# Patient Record
Sex: MALE | Race: WHITE | NOT HISPANIC OR LATINO | Employment: UNEMPLOYED | URBAN - METROPOLITAN AREA
[De-identification: names, ages, dates, MRNs, and addresses within clinical notes are randomized per-mention and may not be internally consistent; named-entity substitution may affect disease eponyms.]

---

## 2017-09-30 ENCOUNTER — HOSPITAL ENCOUNTER (OUTPATIENT)
Dept: RADIOLOGY | Facility: MEDICAL CENTER | Age: 57
End: 2017-09-30

## 2017-09-30 ENCOUNTER — APPOINTMENT (OUTPATIENT)
Dept: RADIOLOGY | Facility: MEDICAL CENTER | Age: 57
DRG: 166 | End: 2017-09-30
Attending: EMERGENCY MEDICINE
Payer: COMMERCIAL

## 2017-09-30 ENCOUNTER — RESOLUTE PROFESSIONAL BILLING HOSPITAL PROF FEE (OUTPATIENT)
Dept: MEDSURG UNIT | Facility: MEDICAL CENTER | Age: 57
End: 2017-09-30
Payer: MEDICAID

## 2017-09-30 ENCOUNTER — HOSPITAL ENCOUNTER (INPATIENT)
Facility: MEDICAL CENTER | Age: 57
LOS: 48 days | DRG: 166 | End: 2017-11-17
Attending: EMERGENCY MEDICINE | Admitting: INTERNAL MEDICINE
Payer: COMMERCIAL

## 2017-09-30 DIAGNOSIS — C34.91 SMALL CELL CARCINOMA OF RIGHT LUNG (HCC): ICD-10-CM

## 2017-09-30 DIAGNOSIS — E87.1 HYPONATREMIA: ICD-10-CM

## 2017-09-30 DIAGNOSIS — R91.8 LUNG MASS: ICD-10-CM

## 2017-09-30 DIAGNOSIS — C34.92 SMALL CELL CARCINOMA OF LEFT LUNG (HCC): ICD-10-CM
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
## 2017-09-30 PROBLEM — F10.20 ALCOHOLIC (HCC): Chronic | Status: ACTIVE | Noted: 2017-09-30

## 2017-09-30 LAB
25(OH)D3 SERPL-MCNC: 29 NG/ML (ref 30–100)
ALBUMIN SERPL BCP-MCNC: 3.4 G/DL (ref 3.2–4.9)
ALBUMIN/GLOB SERPL: 1.2 G/DL
ALP SERPL-CCNC: 84 U/L (ref 30–99)
ALT SERPL-CCNC: 18 U/L (ref 2–50)
AMPHET UR QL SCN: NEGATIVE
ANION GAP SERPL CALC-SCNC: 10 MMOL/L (ref 0–11.9)
ANION GAP SERPL CALC-SCNC: 13 MMOL/L (ref 0–11.9)
AST SERPL-CCNC: 47 U/L (ref 12–45)
BARBITURATES UR QL SCN: NEGATIVE
BASOPHILS # BLD AUTO: 0.4 % (ref 0–1.8)
BASOPHILS # BLD: 0.04 K/UL (ref 0–0.12)
BENZODIAZ UR QL SCN: NEGATIVE
BILIRUB SERPL-MCNC: 0.8 MG/DL (ref 0.1–1.5)
BUN SERPL-MCNC: 3 MG/DL (ref 8–22)
BUN SERPL-MCNC: 3 MG/DL (ref 8–22)
BZE UR QL SCN: NEGATIVE
CALCIUM SERPL-MCNC: 8.4 MG/DL (ref 8.5–10.5)
CALCIUM SERPL-MCNC: 8.4 MG/DL (ref 8.5–10.5)
CANNABINOIDS UR QL SCN: NEGATIVE
CHLORIDE SERPL-SCNC: 80 MMOL/L (ref 96–112)
CHLORIDE SERPL-SCNC: 82 MMOL/L (ref 96–112)
CO2 SERPL-SCNC: 18 MMOL/L (ref 20–33)
CO2 SERPL-SCNC: 18 MMOL/L (ref 20–33)
CORTIS SERPL-MCNC: 14.5 UG/DL (ref 0–23)
CREAT SERPL-MCNC: 0.23 MG/DL (ref 0.5–1.4)
CREAT SERPL-MCNC: 0.34 MG/DL (ref 0.5–1.4)
CREAT UR-MCNC: 23.1 MG/DL
EOSINOPHIL # BLD AUTO: 0.08 K/UL (ref 0–0.51)
EOSINOPHIL NFR BLD: 0.8 % (ref 0–6.9)
ERYTHROCYTE [DISTWIDTH] IN BLOOD BY AUTOMATED COUNT: 37.2 FL (ref 35.9–50)
ETHANOL BLD-MCNC: 0.01 G/DL
GFR SERPL CREATININE-BSD FRML MDRD: >60 ML/MIN/1.73 M 2
GFR SERPL CREATININE-BSD FRML MDRD: >60 ML/MIN/1.73 M 2
GLOBULIN SER CALC-MCNC: 2.9 G/DL (ref 1.9–3.5)
GLUCOSE SERPL-MCNC: 68 MG/DL (ref 65–99)
GLUCOSE SERPL-MCNC: 76 MG/DL (ref 65–99)
HCT VFR BLD AUTO: 33.1 % (ref 42–52)
HGB BLD-MCNC: 12.3 G/DL (ref 14–18)
IMM GRANULOCYTES # BLD AUTO: 0.06 K/UL (ref 0–0.11)
IMM GRANULOCYTES NFR BLD AUTO: 0.6 % (ref 0–0.9)
LYMPHOCYTES # BLD AUTO: 0.82 K/UL (ref 1–4.8)
LYMPHOCYTES NFR BLD: 8.5 % (ref 22–41)
MCH RBC QN AUTO: 33.8 PG (ref 27–33)
MCHC RBC AUTO-ENTMCNC: 37.2 G/DL (ref 33.7–35.3)
MCV RBC AUTO: 90.9 FL (ref 81.4–97.8)
METHADONE UR QL SCN: NEGATIVE
MONOCYTES # BLD AUTO: 1.3 K/UL (ref 0–0.85)
MONOCYTES NFR BLD AUTO: 13.5 % (ref 0–13.4)
NEUTROPHILS # BLD AUTO: 7.36 K/UL (ref 1.82–7.42)
NEUTROPHILS NFR BLD: 76.2 % (ref 44–72)
NRBC # BLD AUTO: 0 K/UL
NRBC BLD AUTO-RTO: 0 /100 WBC
OPIATES UR QL SCN: NEGATIVE
OSMOLALITY SERPL: 229 MOSM/KG H2O (ref 278–298)
OSMOLALITY UR: 173 MOSM/KG H2O (ref 300–900)
OXYCODONE UR QL SCN: NEGATIVE
PCP UR QL SCN: NEGATIVE
PLATELET # BLD AUTO: 262 K/UL (ref 164–446)
PMV BLD AUTO: 9.4 FL (ref 9–12.9)
POTASSIUM SERPL-SCNC: 3.8 MMOL/L (ref 3.6–5.5)
POTASSIUM SERPL-SCNC: 5 MMOL/L (ref 3.6–5.5)
PROPOXYPH UR QL SCN: NEGATIVE
PROT SERPL-MCNC: 6.3 G/DL (ref 6–8.2)
RBC # BLD AUTO: 3.64 M/UL (ref 4.7–6.1)
SODIUM SERPL-SCNC: 108 MMOL/L (ref 135–145)
SODIUM SERPL-SCNC: 113 MMOL/L (ref 135–145)
SODIUM UR-SCNC: <10 MMOL/L
TSH SERPL DL<=0.005 MIU/L-ACNC: 1.61 UIU/ML (ref 0.3–3.7)
VIT B12 SERPL-MCNC: 452 PG/ML (ref 211–911)
WBC # BLD AUTO: 9.7 K/UL (ref 4.8–10.8)

## 2017-09-30 PROCEDURE — 84300 ASSAY OF URINE SODIUM: CPT

## 2017-09-30 PROCEDURE — 93005 ELECTROCARDIOGRAM TRACING: CPT | Performed by: EMERGENCY MEDICINE

## 2017-09-30 PROCEDURE — 83930 ASSAY OF BLOOD OSMOLALITY: CPT

## 2017-09-30 PROCEDURE — 85025 COMPLETE CBC W/AUTO DIFF WBC: CPT

## 2017-09-30 PROCEDURE — 71260 CT THORAX DX C+: CPT

## 2017-09-30 PROCEDURE — 83935 ASSAY OF URINE OSMOLALITY: CPT

## 2017-09-30 PROCEDURE — 80048 BASIC METABOLIC PNL TOTAL CA: CPT

## 2017-09-30 PROCEDURE — 99291 CRITICAL CARE FIRST HOUR: CPT

## 2017-09-30 PROCEDURE — 82306 VITAMIN D 25 HYDROXY: CPT

## 2017-09-30 PROCEDURE — 80053 COMPREHEN METABOLIC PANEL: CPT

## 2017-09-30 PROCEDURE — 770022 HCHG ROOM/CARE - ICU (200)

## 2017-09-30 PROCEDURE — 84443 ASSAY THYROID STIM HORMONE: CPT

## 2017-09-30 PROCEDURE — 82533 TOTAL CORTISOL: CPT

## 2017-09-30 PROCEDURE — 700117 HCHG RX CONTRAST REV CODE 255: Performed by: EMERGENCY MEDICINE

## 2017-09-30 PROCEDURE — 700105 HCHG RX REV CODE 258: Performed by: INTERNAL MEDICINE

## 2017-09-30 PROCEDURE — 36415 COLL VENOUS BLD VENIPUNCTURE: CPT

## 2017-09-30 PROCEDURE — 80307 DRUG TEST PRSMV CHEM ANLYZR: CPT

## 2017-09-30 PROCEDURE — 82570 ASSAY OF URINE CREATININE: CPT

## 2017-09-30 PROCEDURE — 82607 VITAMIN B-12: CPT

## 2017-09-30 RX ORDER — POLYETHYLENE GLYCOL 3350 17 G/17G
1 POWDER, FOR SOLUTION ORAL
Status: DISCONTINUED | OUTPATIENT
Start: 2017-09-30 | End: 2017-10-20

## 2017-09-30 RX ORDER — ONDANSETRON 4 MG/1
4 TABLET, ORALLY DISINTEGRATING ORAL EVERY 4 HOURS PRN
Status: DISCONTINUED | OUTPATIENT
Start: 2017-09-30 | End: 2017-11-17 | Stop reason: HOSPADM

## 2017-09-30 RX ORDER — BISACODYL 10 MG
10 SUPPOSITORY, RECTAL RECTAL
Status: DISCONTINUED | OUTPATIENT
Start: 2017-09-30 | End: 2017-10-20

## 2017-09-30 RX ORDER — AMOXICILLIN 250 MG
2 CAPSULE ORAL 2 TIMES DAILY
Status: DISCONTINUED | OUTPATIENT
Start: 2017-09-30 | End: 2017-10-20

## 2017-09-30 RX ORDER — LABETALOL HYDROCHLORIDE 5 MG/ML
10 INJECTION, SOLUTION INTRAVENOUS EVERY 4 HOURS PRN
Status: DISCONTINUED | OUTPATIENT
Start: 2017-09-30 | End: 2017-10-30

## 2017-09-30 RX ORDER — PROMETHAZINE HYDROCHLORIDE 25 MG/1
12.5-25 TABLET ORAL EVERY 4 HOURS PRN
Status: DISCONTINUED | OUTPATIENT
Start: 2017-09-30 | End: 2017-11-17 | Stop reason: HOSPADM

## 2017-09-30 RX ORDER — SODIUM CHLORIDE 9 MG/ML
INJECTION, SOLUTION INTRAVENOUS CONTINUOUS
Status: DISCONTINUED | OUTPATIENT
Start: 2017-09-30 | End: 2017-10-01

## 2017-09-30 RX ORDER — PROMETHAZINE HYDROCHLORIDE 25 MG/1
12.5-25 SUPPOSITORY RECTAL EVERY 4 HOURS PRN
Status: DISCONTINUED | OUTPATIENT
Start: 2017-09-30 | End: 2017-11-17 | Stop reason: HOSPADM

## 2017-09-30 RX ORDER — ONDANSETRON 2 MG/ML
4 INJECTION INTRAMUSCULAR; INTRAVENOUS EVERY 4 HOURS PRN
Status: DISCONTINUED | OUTPATIENT
Start: 2017-09-30 | End: 2017-11-17 | Stop reason: HOSPADM

## 2017-09-30 RX ORDER — NICOTINE 21 MG/24HR
21 PATCH, TRANSDERMAL 24 HOURS TRANSDERMAL
Status: DISCONTINUED | OUTPATIENT
Start: 2017-10-01 | End: 2017-10-23

## 2017-09-30 RX ORDER — THIAMINE MONONITRATE (VIT B1) 100 MG
100 TABLET ORAL DAILY
Status: DISCONTINUED | OUTPATIENT
Start: 2017-10-01 | End: 2017-10-01

## 2017-09-30 RX ORDER — ACETAMINOPHEN 325 MG/1
650 TABLET ORAL EVERY 6 HOURS PRN
Status: DISCONTINUED | OUTPATIENT
Start: 2017-09-30 | End: 2017-11-17 | Stop reason: HOSPADM

## 2017-09-30 RX ORDER — FOLIC ACID 1 MG/1
1 TABLET ORAL DAILY
Status: DISCONTINUED | OUTPATIENT
Start: 2017-10-01 | End: 2017-10-01

## 2017-09-30 RX ORDER — GUAIFENESIN/DEXTROMETHORPHAN 100-10MG/5
10 SYRUP ORAL EVERY 6 HOURS PRN
Status: DISCONTINUED | OUTPATIENT
Start: 2017-09-30 | End: 2017-10-30

## 2017-09-30 RX ADMIN — IOHEXOL 75 ML: 350 INJECTION, SOLUTION INTRAVENOUS at 19:45

## 2017-09-30 RX ADMIN — SODIUM CHLORIDE: 9 INJECTION, SOLUTION INTRAVENOUS at 21:12

## 2017-09-30 ASSESSMENT — LIFESTYLE VARIABLES
TOTAL SCORE: 1
EVER HAD A DRINK FIRST THING IN THE MORNING TO STEADY YOUR NERVES TO GET RID OF A HANGOVER: NO
HAVE YOU EVER FELT YOU SHOULD CUT DOWN ON YOUR DRINKING: YES
AVERAGE NUMBER OF DAYS PER WEEK YOU HAVE A DRINK CONTAINING ALCOHOL: 2
ALCOHOL_USE: YES
EVER_SMOKED: YES
ON A TYPICAL DAY WHEN YOU DRINK ALCOHOL HOW MANY DRINKS DO YOU HAVE: 2
CONSUMPTION TOTAL: NEGATIVE
EVER FELT BAD OR GUILTY ABOUT YOUR DRINKING: NO
EVER_SMOKED: YES
HAVE PEOPLE ANNOYED YOU BY CRITICIZING YOUR DRINKING: NO
HOW MANY TIMES IN THE PAST YEAR HAVE YOU HAD 5 OR MORE DRINKS IN A DAY: 0

## 2017-09-30 ASSESSMENT — PATIENT HEALTH QUESTIONNAIRE - PHQ9
1. LITTLE INTEREST OR PLEASURE IN DOING THINGS: NOT AT ALL
SUM OF ALL RESPONSES TO PHQ9 QUESTIONS 1 AND 2: 0
SUM OF ALL RESPONSES TO PHQ QUESTIONS 1-9: 0
2. FEELING DOWN, DEPRESSED, IRRITABLE, OR HOPELESS: NOT AT ALL

## 2017-09-30 ASSESSMENT — ENCOUNTER SYMPTOMS
WHEEZING: 0
COUGH: 1
NECK PAIN: 0
TINGLING: 0
HEARTBURN: 0
ORTHOPNEA: 0
DIZZINESS: 0
MYALGIAS: 0
ABDOMINAL PAIN: 0
CHILLS: 0
PALPITATIONS: 0
WEIGHT LOSS: 1
DEPRESSION: 0
VOMITING: 0
DIARRHEA: 0
FEVER: 0
SPUTUM PRODUCTION: 0
CLAUDICATION: 0
DOUBLE VISION: 0
WEAKNESS: 1
NAUSEA: 0
SHORTNESS OF BREATH: 0
HEMOPTYSIS: 0
BLURRED VISION: 0
HEADACHES: 0

## 2017-09-30 ASSESSMENT — COPD QUESTIONNAIRES
COPD SCREENING SCORE: 3
HAVE YOU SMOKED AT LEAST 100 CIGARETTES IN YOUR ENTIRE LIFE: YES
DO YOU EVER COUGH UP ANY MUCUS OR PHLEGM?: NO/ONLY WITH OCCASIONAL COLDS OR INFECTIONS
DURING THE PAST 4 WEEKS HOW MUCH DID YOU FEEL SHORT OF BREATH: NONE/LITTLE OF THE TIME

## 2017-09-30 ASSESSMENT — PAIN SCALES - GENERAL: PAINLEVEL_OUTOF10: 0

## 2017-10-01 PROBLEM — F10.90 CHRONIC ALCOHOL USE: Status: ACTIVE | Noted: 2017-09-30

## 2017-10-01 PROBLEM — E87.8 DISORDER OF ELECTROLYTES: Status: ACTIVE | Noted: 2017-10-01

## 2017-10-01 LAB
25(OH)D3 SERPL-MCNC: 21 NG/ML (ref 30–100)
ANION GAP SERPL CALC-SCNC: 6 MMOL/L (ref 0–11.9)
ANION GAP SERPL CALC-SCNC: 7 MMOL/L (ref 0–11.9)
ANION GAP SERPL CALC-SCNC: 7 MMOL/L (ref 0–11.9)
ANION GAP SERPL CALC-SCNC: 9 MMOL/L (ref 0–11.9)
ANION GAP SERPL CALC-SCNC: 9 MMOL/L (ref 0–11.9)
APPEARANCE UR: CLEAR
BACTERIA #/AREA URNS HPF: ABNORMAL /HPF
BASOPHILS # BLD AUTO: 0.3 % (ref 0–1.8)
BASOPHILS # BLD: 0.03 K/UL (ref 0–0.12)
BILIRUB UR QL STRIP.AUTO: NEGATIVE
BUN SERPL-MCNC: 3 MG/DL (ref 8–22)
BUN SERPL-MCNC: 4 MG/DL (ref 8–22)
CALCIUM SERPL-MCNC: 7.5 MG/DL (ref 8.5–10.5)
CALCIUM SERPL-MCNC: 8.2 MG/DL (ref 8.5–10.5)
CALCIUM SERPL-MCNC: 8.4 MG/DL (ref 8.5–10.5)
CALCIUM SERPL-MCNC: 8.6 MG/DL (ref 8.5–10.5)
CALCIUM SERPL-MCNC: 8.8 MG/DL (ref 8.5–10.5)
CHLORIDE SERPL-SCNC: 81 MMOL/L (ref 96–112)
CHLORIDE SERPL-SCNC: 83 MMOL/L (ref 96–112)
CHLORIDE SERPL-SCNC: 84 MMOL/L (ref 96–112)
CHLORIDE SERPL-SCNC: 89 MMOL/L (ref 96–112)
CHLORIDE SERPL-SCNC: 90 MMOL/L (ref 96–112)
CO2 SERPL-SCNC: 18 MMOL/L (ref 20–33)
CO2 SERPL-SCNC: 20 MMOL/L (ref 20–33)
CO2 SERPL-SCNC: 23 MMOL/L (ref 20–33)
CO2 SERPL-SCNC: 25 MMOL/L (ref 20–33)
CO2 SERPL-SCNC: 25 MMOL/L (ref 20–33)
COLOR UR: YELLOW
CREAT SERPL-MCNC: 0.26 MG/DL (ref 0.5–1.4)
CREAT SERPL-MCNC: 0.29 MG/DL (ref 0.5–1.4)
CREAT SERPL-MCNC: 0.32 MG/DL (ref 0.5–1.4)
CREAT SERPL-MCNC: 0.41 MG/DL (ref 0.5–1.4)
CREAT SERPL-MCNC: 0.46 MG/DL (ref 0.5–1.4)
EOSINOPHIL # BLD AUTO: 0.04 K/UL (ref 0–0.51)
EOSINOPHIL NFR BLD: 0.4 % (ref 0–6.9)
ERYTHROCYTE [DISTWIDTH] IN BLOOD BY AUTOMATED COUNT: 38.5 FL (ref 35.9–50)
ETHANOL BLD-MCNC: 0.01 G/DL
FERRITIN SERPL-MCNC: 513.5 NG/ML (ref 22–322)
FOLATE SERPL-MCNC: 9.9 NG/ML
GFR SERPL CREATININE-BSD FRML MDRD: >60 ML/MIN/1.73 M 2
GLUCOSE SERPL-MCNC: 108 MG/DL (ref 65–99)
GLUCOSE SERPL-MCNC: 111 MG/DL (ref 65–99)
GLUCOSE SERPL-MCNC: 143 MG/DL (ref 65–99)
GLUCOSE SERPL-MCNC: 75 MG/DL (ref 65–99)
GLUCOSE SERPL-MCNC: 99 MG/DL (ref 65–99)
GLUCOSE UR STRIP.AUTO-MCNC: NEGATIVE MG/DL
HCT VFR BLD AUTO: 30.5 % (ref 42–52)
HGB BLD-MCNC: 11 G/DL (ref 14–18)
IMM GRANULOCYTES # BLD AUTO: 0.05 K/UL (ref 0–0.11)
IMM GRANULOCYTES NFR BLD AUTO: 0.6 % (ref 0–0.9)
IRON SATN MFR SERPL: 20 % (ref 15–55)
IRON SERPL-MCNC: 51 UG/DL (ref 50–180)
KETONES UR STRIP.AUTO-MCNC: 100 MG/DL
LEUKOCYTE ESTERASE UR QL STRIP.AUTO: NEGATIVE
LYMPHOCYTES # BLD AUTO: 0.65 K/UL (ref 1–4.8)
LYMPHOCYTES NFR BLD: 7.2 % (ref 22–41)
MCH RBC QN AUTO: 33.8 PG (ref 27–33)
MCHC RBC AUTO-ENTMCNC: 36.1 G/DL (ref 33.7–35.3)
MCV RBC AUTO: 93.8 FL (ref 81.4–97.8)
MICRO URNS: ABNORMAL
MONOCYTES # BLD AUTO: 0.79 K/UL (ref 0–0.85)
MONOCYTES NFR BLD AUTO: 8.7 % (ref 0–13.4)
NEUTROPHILS # BLD AUTO: 7.48 K/UL (ref 1.82–7.42)
NEUTROPHILS NFR BLD: 82.8 % (ref 44–72)
NITRITE UR QL STRIP.AUTO: NEGATIVE
NRBC # BLD AUTO: 0 K/UL
NRBC BLD AUTO-RTO: 0 /100 WBC
PH UR STRIP.AUTO: 7 [PH]
PHOSPHATE SERPL-MCNC: 3.3 MG/DL (ref 2.5–4.5)
PLATELET # BLD AUTO: 247 K/UL (ref 164–446)
PMV BLD AUTO: 9.5 FL (ref 9–12.9)
POTASSIUM SERPL-SCNC: 3.4 MMOL/L (ref 3.6–5.5)
POTASSIUM SERPL-SCNC: 3.5 MMOL/L (ref 3.6–5.5)
PROT UR QL STRIP: NEGATIVE MG/DL
RBC # BLD AUTO: 3.25 M/UL (ref 4.7–6.1)
RBC UR QL AUTO: NEGATIVE
SODIUM SERPL-SCNC: 113 MMOL/L (ref 135–145)
SODIUM SERPL-SCNC: 115 MMOL/L (ref 135–145)
SODIUM SERPL-SCNC: 116 MMOL/L (ref 135–145)
SP GR UR STRIP.AUTO: 1
TIBC SERPL-MCNC: 252 UG/DL (ref 250–450)
TSH SERPL DL<=0.005 MIU/L-ACNC: 2.33 UIU/ML (ref 0.3–3.7)
UROBILINOGEN UR STRIP.AUTO-MCNC: 2 MG/DL
VIT B12 SERPL-MCNC: 370 PG/ML (ref 211–911)
WBC # BLD AUTO: 9 K/UL (ref 4.8–10.8)
WBC #/AREA URNS HPF: ABNORMAL /HPF

## 2017-10-01 PROCEDURE — 700111 HCHG RX REV CODE 636 W/ 250 OVERRIDE (IP): Performed by: INTERNAL MEDICINE

## 2017-10-01 PROCEDURE — 84443 ASSAY THYROID STIM HORMONE: CPT

## 2017-10-01 PROCEDURE — 700101 HCHG RX REV CODE 250: Performed by: HOSPITALIST

## 2017-10-01 PROCEDURE — 83540 ASSAY OF IRON: CPT

## 2017-10-01 PROCEDURE — 82728 ASSAY OF FERRITIN: CPT

## 2017-10-01 PROCEDURE — 87181 SC STD AGAR DILUTION PER AGT: CPT

## 2017-10-01 PROCEDURE — 82607 VITAMIN B-12: CPT

## 2017-10-01 PROCEDURE — 700105 HCHG RX REV CODE 258: Performed by: INTERNAL MEDICINE

## 2017-10-01 PROCEDURE — 82306 VITAMIN D 25 HYDROXY: CPT

## 2017-10-01 PROCEDURE — 700102 HCHG RX REV CODE 250 W/ 637 OVERRIDE(OP): Performed by: INTERNAL MEDICINE

## 2017-10-01 PROCEDURE — 80048 BASIC METABOLIC PNL TOTAL CA: CPT | Mod: 91

## 2017-10-01 PROCEDURE — A9270 NON-COVERED ITEM OR SERVICE: HCPCS | Performed by: INTERNAL MEDICINE

## 2017-10-01 PROCEDURE — 80307 DRUG TEST PRSMV CHEM ANLYZR: CPT

## 2017-10-01 PROCEDURE — 81001 URINALYSIS AUTO W/SCOPE: CPT

## 2017-10-01 PROCEDURE — 770022 HCHG ROOM/CARE - ICU (200)

## 2017-10-01 PROCEDURE — 87040 BLOOD CULTURE FOR BACTERIA: CPT

## 2017-10-01 PROCEDURE — 83550 IRON BINDING TEST: CPT

## 2017-10-01 PROCEDURE — 700101 HCHG RX REV CODE 250: Performed by: INTERNAL MEDICINE

## 2017-10-01 PROCEDURE — 85025 COMPLETE CBC W/AUTO DIFF WBC: CPT

## 2017-10-01 PROCEDURE — 84100 ASSAY OF PHOSPHORUS: CPT

## 2017-10-01 PROCEDURE — 82746 ASSAY OF FOLIC ACID SERUM: CPT

## 2017-10-01 PROCEDURE — 87077 CULTURE AEROBIC IDENTIFY: CPT

## 2017-10-01 RX ORDER — POTASSIUM CHLORIDE 20 MEQ/1
20 TABLET, EXTENDED RELEASE ORAL ONCE
Status: COMPLETED | OUTPATIENT
Start: 2017-10-01 | End: 2017-10-01

## 2017-10-01 RX ORDER — THIAMINE MONONITRATE (VIT B1) 100 MG
100 TABLET ORAL DAILY
Status: DISCONTINUED | OUTPATIENT
Start: 2017-10-02 | End: 2017-10-01

## 2017-10-01 RX ORDER — THIAMINE MONONITRATE (VIT B1) 100 MG
100 TABLET ORAL DAILY
Status: DISCONTINUED | OUTPATIENT
Start: 2017-10-01 | End: 2017-10-01

## 2017-10-01 RX ORDER — SODIUM CHLORIDE AND POTASSIUM CHLORIDE 150; 900 MG/100ML; MG/100ML
INJECTION, SOLUTION INTRAVENOUS CONTINUOUS
Status: DISCONTINUED | OUTPATIENT
Start: 2017-10-01 | End: 2017-10-02

## 2017-10-01 RX ORDER — DEXTROSE MONOHYDRATE, SODIUM CHLORIDE, AND POTASSIUM CHLORIDE 50; 2.98; 4.5 G/1000ML; G/1000ML; G/1000ML
INJECTION, SOLUTION INTRAVENOUS CONTINUOUS
Status: DISCONTINUED | OUTPATIENT
Start: 2017-10-01 | End: 2017-10-01

## 2017-10-01 RX ORDER — POTASSIUM CHLORIDE 20 MEQ/1
20 TABLET, EXTENDED RELEASE ORAL
Status: DISCONTINUED | OUTPATIENT
Start: 2017-10-01 | End: 2017-10-01

## 2017-10-01 RX ORDER — AZITHROMYCIN 250 MG/1
500 TABLET, FILM COATED ORAL DAILY
Status: DISPENSED | OUTPATIENT
Start: 2017-10-01 | End: 2017-10-06

## 2017-10-01 RX ORDER — FOLIC ACID 1 MG/1
1 TABLET ORAL DAILY
Status: DISCONTINUED | OUTPATIENT
Start: 2017-10-02 | End: 2017-10-01

## 2017-10-01 RX ORDER — SODIUM CHLORIDE AND POTASSIUM CHLORIDE 150; 450 MG/100ML; MG/100ML
INJECTION, SOLUTION INTRAVENOUS CONTINUOUS
Status: DISCONTINUED | OUTPATIENT
Start: 2017-10-01 | End: 2017-10-01

## 2017-10-01 RX ADMIN — CALCIUM GLUCONATE 1 G: 94 INJECTION, SOLUTION INTRAVENOUS at 08:52

## 2017-10-01 RX ADMIN — NICOTINE 21 MG: 21 PATCH, EXTENDED RELEASE TRANSDERMAL at 05:06

## 2017-10-01 RX ADMIN — ACETAMINOPHEN 650 MG: 325 TABLET, FILM COATED ORAL at 04:18

## 2017-10-01 RX ADMIN — FOLIC ACID: 5 INJECTION, SOLUTION INTRAMUSCULAR; INTRAVENOUS; SUBCUTANEOUS at 08:52

## 2017-10-01 RX ADMIN — POTASSIUM CHLORIDE 20 MEQ: 1500 TABLET, EXTENDED RELEASE ORAL at 10:35

## 2017-10-01 RX ADMIN — POTASSIUM CHLORIDE 20 MEQ: 1500 TABLET, EXTENDED RELEASE ORAL at 02:56

## 2017-10-01 RX ADMIN — AZITHROMYCIN 500 MG: 250 TABLET, FILM COATED ORAL at 09:05

## 2017-10-01 RX ADMIN — POTASSIUM CHLORIDE AND SODIUM CHLORIDE 1000 ML: 450; 150 INJECTION, SOLUTION INTRAVENOUS at 09:22

## 2017-10-01 RX ADMIN — POTASSIUM CHLORIDE AND SODIUM CHLORIDE: 900; 150 INJECTION, SOLUTION INTRAVENOUS at 23:23

## 2017-10-01 RX ADMIN — POTASSIUM CHLORIDE, DEXTROSE MONOHYDRATE AND SODIUM CHLORIDE: 300; 5; 450 INJECTION, SOLUTION INTRAVENOUS at 07:47

## 2017-10-01 RX ADMIN — CEFTRIAXONE 2 G: 2 INJECTION, SOLUTION INTRAVENOUS at 10:32

## 2017-10-01 RX ADMIN — STANDARDIZED SENNA CONCENTRATE AND DOCUSATE SODIUM 2 TABLET: 8.6; 5 TABLET, FILM COATED ORAL at 09:05

## 2017-10-01 RX ADMIN — ENOXAPARIN SODIUM 40 MG: 100 INJECTION SUBCUTANEOUS at 09:05

## 2017-10-01 ASSESSMENT — PAIN SCALES - GENERAL
PAINLEVEL_OUTOF10: 0

## 2017-10-01 ASSESSMENT — ENCOUNTER SYMPTOMS
CHILLS: 1
SHORTNESS OF BREATH: 1
SEIZURES: 0
VOMITING: 0
STRIDOR: 0
WEAKNESS: 1
ABDOMINAL PAIN: 0
HEADACHES: 1
NAUSEA: 0
COUGH: 1
DIZZINESS: 0
TREMORS: 0

## 2017-10-01 ASSESSMENT — LIFESTYLE VARIABLES
ORIENTATION AND CLOUDING OF SENSORIUM: ORIENTED AND CAN DO SERIAL ADDITIONS
NAUSEA AND VOMITING: NO NAUSEA AND NO VOMITING
TOTAL SCORE: 1
AUDITORY DISTURBANCES: NOT PRESENT
ANXIETY: NO ANXIETY (AT EASE)
HAVE PEOPLE ANNOYED YOU BY CRITICIZING YOUR DRINKING: NO
TOTAL SCORE: 1
AGITATION: NORMAL ACTIVITY
ANXIETY: NO ANXIETY (AT EASE)
EVER HAD A DRINK FIRST THING IN THE MORNING TO STEADY YOUR NERVES TO GET RID OF A HANGOVER: NO
VISUAL DISTURBANCES: NOT PRESENT
CONSUMPTION TOTAL: NEGATIVE
AUDITORY DISTURBANCES: NOT PRESENT
PAROXYSMAL SWEATS: NO SWEAT VISIBLE
HEADACHE, FULLNESS IN HEAD: NOT PRESENT
AVERAGE NUMBER OF DAYS PER WEEK YOU HAVE A DRINK CONTAINING ALCOHOL: 2
DO YOU DRINK ALCOHOL: YES
VISUAL DISTURBANCES: NOT PRESENT
EVER FELT BAD OR GUILTY ABOUT YOUR DRINKING: NO
AGITATION: NORMAL ACTIVITY
HOW MANY TIMES IN THE PAST YEAR HAVE YOU HAD 5 OR MORE DRINKS IN A DAY: 0
TOTAL SCORE: 1
TOTAL SCORE: 0
TREMOR: NO TREMOR
TOTAL SCORE: 0
NAUSEA AND VOMITING: NO NAUSEA AND NO VOMITING
ON A TYPICAL DAY WHEN YOU DRINK ALCOHOL HOW MANY DRINKS DO YOU HAVE: 2
TREMOR: NO TREMOR
HAVE YOU EVER FELT YOU SHOULD CUT DOWN ON YOUR DRINKING: YES
ORIENTATION AND CLOUDING OF SENSORIUM: ORIENTED AND CAN DO SERIAL ADDITIONS
HEADACHE, FULLNESS IN HEAD: NOT PRESENT
PAROXYSMAL SWEATS: NO SWEAT VISIBLE

## 2017-10-01 ASSESSMENT — COPD QUESTIONNAIRES
DO YOU EVER COUGH UP ANY MUCUS OR PHLEGM?: YES, A FEW DAYS A WEEK OR MONTH
HAVE YOU SMOKED AT LEAST 100 CIGARETTES IN YOUR ENTIRE LIFE: YES
COPD SCREENING SCORE: 4
DURING THE PAST 4 WEEKS HOW MUCH DID YOU FEEL SHORT OF BREATH: NONE/LITTLE OF THE TIME

## 2017-10-01 ASSESSMENT — PATIENT HEALTH QUESTIONNAIRE - PHQ9
SUM OF ALL RESPONSES TO PHQ9 QUESTIONS 1 AND 2: 0
1. LITTLE INTEREST OR PLEASURE IN DOING THINGS: NOT AT ALL
2. FEELING DOWN, DEPRESSED, IRRITABLE, OR HOPELESS: NOT AT ALL
SUM OF ALL RESPONSES TO PHQ QUESTIONS 1-9: 0
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
## 2017-10-01 NOTE — PROGRESS NOTES
Tres from Lab called with critical result of Na 116 at 0637. Critical lab result read back to Tres.  This value is expected; no MD notification required.

## 2017-10-01 NOTE — ED NOTES
Pt BIB [facility] from [place]  Pt had a GLF, because he tripped on the sidewalk, pt denies hitting head, pt denies LOC  Pt was found to have a sodium of 107 and BS 54  Rally bag and NS infusing  Pt A & 0 x 4, pt placed on monitor, provided urinal and call light

## 2017-10-01 NOTE — PROGRESS NOTES
Karina from Lab called with critical result of Na 113 at 2305. Critical lab result read back to Karina.  This lab value is improving; no MD notification required.
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
## 2017-10-01 NOTE — ASSESSMENT & PLAN NOTE
- Consumed 1 beer daily   - BAL: 0.01 on admission  - Continue PO thiamine, B12 and folic acid supplements

## 2017-10-01 NOTE — PROGRESS NOTES
[facility] [place] [facility] Interval Note    Name Froylan Spain     1960   Age/Sex 57 y.o. male   MRN [id number]   Code Status Full     After 5PM or if no immediate response to page, please call for cross-coverage  Attending/Team: JAMES Manzano / Dr. Garvey  Call [phone] to page   1st Call - Day Intern (R1):    2nd Call - Day Sr. Resident (R2/R3):   Dr. Roberts         Chief complaint/ reason for interval visit (Primary Diagnosis)   58 yo M with PMHx of alcohol use disorder and lung nodule presented after GLF and AMS, found to have severe hyponatremia of 107, admitted to ICU for management and close monitoring     Interval Problem Daily Status Update    - 10/1: Patient spiked a fever last night with Tmax of 101.1, sodium initially corrected too quick from 108 to 116 in 8 hours, he complains of cough and dyspnea this morning, no evidence of neurological deficits, looks very dry on exam, will switch to D5 1/2 NS, initiate sepsis work up and start patient on Ceftriaxone and azithromycin     Review of Systems   Constitutional: Positive for chills and malaise/fatigue.   Respiratory: Positive for cough and shortness of breath. Negative for stridor.    Cardiovascular: Negative for chest pain and leg swelling.   Gastrointestinal: Negative for abdominal pain, nausea and vomiting.   Genitourinary: Negative for dysuria.   Neurological: Positive for weakness and headaches. Negative for dizziness, tremors and seizures.     Consultants/Specialty  PMA    Disposition  ICU for severe hyponatremia     Quality Measures    Reviewed items::  EKG reviewed, Radiology images reviewed, Labs reviewed and Medications reviewed  Garvin catheter::  No Garvin  DVT prophylaxis pharmacological::  Enoxaparin (Lovenox)  DVT prophylaxis - mechanical:  SCDs  Ulcer Prophylaxis::  Not indicated  Antibiotics:  Treating active infection/contamination beyond 24 hours perioperative coverage      Physical Exam       Vitals:     10/01/17 0815 10/01/17 0900 10/01/17 1000 10/01/17 1100   BP:       Pulse:  88 86 89   Resp:  (!) 22 (!) 11 17   Temp:  36.2 °C (97.2 °F)  37.1 °C (98.8 °F)   SpO2: 99% 99% 98% 97%   Weight:       Height:         Body mass index is 18.03 kg/m². Weight: 57 kg (125 lb 10.6 oz)  Oxygen Therapy:  Pulse Oximetry: 97 %, O2 (LPM): 3.5, O2 Delivery: Silicone Nasal Cannula    Physical Exam   Constitutional: He is oriented to person, place, and time. No distress.   HENT:   Head: Normocephalic and atraumatic.   Dry mucus membranes    Eyes: Conjunctivae are normal. No scleral icterus.   Neck: Neck supple.   Cardiovascular: Normal rate and regular rhythm.    Pulmonary/Chest: Effort normal and breath sounds normal. No stridor. No respiratory distress. He has no wheezes.   Coarse respiratory sounds    Abdominal: Soft. Bowel sounds are normal. He exhibits no distension. There is no tenderness.   Neurological: He is alert and oriented to person, place, and time. GCS score is 15.   No focal deficits    Skin: Skin is dry. He is not diaphoretic.       Lab Data Review:      10/1/2017  7:45 AM    Recent Labs      09/30/17   2222  10/01/17   0146  10/01/17   0551   SODIUM  113*  116*  116*   POTASSIUM  3.8  3.4*  3.5*   CHLORIDE  82*  90*  89*   CO2  18*  20  18*   BUN  3*  4*  4*   CREATININE  0.34*  0.32*  0.26*   PHOSPHORUS   --    --   3.3   CALCIUM  8.4*  7.5*  8.2*       Recent Labs      09/30/17   1808  09/30/17   2222  10/01/17   0146  10/01/17   0551   ALTSGPT  18   --    --    --    ASTSGOT  47*   --    --    --    ALKPHOSPHAT  84   --    --    --    TBILIRUBIN  0.8   --    --    --    GLUCOSE  76  68  99  75       Recent Labs      09/30/17   1808  10/01/17   0551   RBC  3.64*  3.25*   HEMOGLOBIN  12.3*  11.0*   HEMATOCRIT  33.1*  30.5*   PLATELETCT  262  247       Recent Labs      09/30/17   1808  10/01/17   0551   WBC  9.7  9.0   NEUTSPOLYS  76.20*  82.80*   LYMPHOCYTES  8.50*  7.20*   MONOCYTES  13.50*  8.70   EOSINOPHILS   0.80  0.40   BASOPHILS  0.40  0.30   ASTSGOT  47*   --    ALTSGPT  18   --    ALKPHOSPHAT  84   --    TBILIRUBIN  0.8   --            Assessment/Plan     * Hyponatremia- (present on admission)   Assessment & Plan    - Na of 107 and AMS on admission   - Hypovolemic hyponatremia  Vs SIADH due paraneoplastic syndrome   - Sosm: 229, Uosm: 173, Liys <10 (10/1)  - Slow correction of Na, <10 in 24 hours   - On 1/2 NS and KCL @ 50  - Rally bad with D5W @ 50  - Monitor BMP Q4H with neuro-checks         Lung mass- (present on admission)   Assessment & Plan    - Hx of smoking >20 years, 10 pack-year.   - Cough, weight loss and low appetite  - CT chest: 3.6x5.9 cm paratracheal mass compressing on SVC and 2x1.8 cm R upper lobe mass (9/30)  - Highly suspicious for malignant etiology  - Will need eval by biopsy: most likely IR after patient is more stable         Disorder of electrolytes- (present on admission)   Assessment & Plan    - Low level of K and Ca   - Replete as needed         Fall- (present on admission)   Assessment & Plan    - GLF with no reported syncope   - Due weakness and mechanical etiology   - No CT head on admission   - No evidence of neurological deficits   - CTM        Chronic alcohol use- (present on admission)   Assessment & Plan    - Consumes 1 beer daily   - BAL: 0.01on admission   - Rally bag   - PO thiamine, B12 and folic acid supplements   - Monitor him for withdrawal symptoms   - Monitor Mg and PO4

## 2017-10-01 NOTE — ASSESSMENT & PLAN NOTE
- GLF with no reported syncope.  - CT head on admission from other facility negative for bleeding or intracranial pathology  - No evidence of neurological deficits   - CTM

## 2017-10-01 NOTE — PROGRESS NOTES
Tech from Lab called with critical result of Na 116 at 0225. Critical lab result read back to Tech  Dr. Son notified of critical lab result at 0239.  Critical lab result read back by Dr. Son.  MD also notified of K+ 3.4, and Ca 7.5 (corrected calcium 7.98).  No new orders received at this time.  MD also notified of one episode of diarrhea.

## 2017-10-01 NOTE — CONSULTS
DATE OF SERVICE:  09/30/2017    PULMONARY CRITICAL CARE CONSULTATION    DATE OF SERVICE:  09/30/2017.    REQUESTING PHYSICIAN:  Jaquan Quintanilla MD    REASON FOR REQUEST:  Pulmonary nodule and severe hyponatremia.    HISTORY OF PRESENT ILLNESS:  This is a 57-year-old gentleman transferred from   [facility] after initially presenting there after a ground level   fall, from which he says he slipped on a rock and fell.  He had workup there   identifying severe hyponatremia with sodium level of less than 110 and CAT   scan results showing large paratracheal mass as well as right upper lobe mass.    The patient states that over the past 1 year he has lost approximately 20   pounds of weight.  He says he has a chronic cough, denies any hemoptysis,   denies any fever, chills or night sweats.  Denies any nausea, vomiting,   abdominal pain or diarrhea.  Denies any headache, visual change, sore throat,   or sinus congestion.  The patient denies any known past medical history.    Denies taking any prescribed medications.  He does endorse tobacco use   long-term, approximately half a pack to 1 pack per day.  Also endorses alcohol   use, indicating 1 beer every other day; however, he was admitted with acute   intoxication back in 2016 and traumatic fractures of his facial bones from   fall at that time.  The patient denies any family history of malignancy, lung   or heart disease.  He denies having any loss of consciousness with most recent   fall.  He denies any ataxia or imbalance as well.    ALLERGIES:  No known drug allergies.    OUTPATIENT MEDICATIONS:  None.    FAMILY HISTORY:  Negative for malignancy or lung disease.    PAST MEDICAL HISTORY:  As indicated above in the HPI.    SOCIAL HISTORY:  Regular tobacco use, approximately half a pack to a pack a   day x30 years, endorses only 1 beer every other day.    REVIEW OF SYSTEMS:  Otherwise negative according AMA/CMS criteria.    PHYSICAL EXAMINATION:  VITAL  SIGNS:  Afebrile, heart rate 87, respiratory rate 18, satting 97% on   room air, blood pressure 129/70.  GENERAL:  The patient appears much older than stated age, very emaciated and   cachectic in appearance.  HEENT:  Normocephalic, atraumatic.  Anicteric.  Moist mucosal membranes.  LUNGS:  Diminished with scattered rhonchi throughout.  CARDIOVASCULAR:  Regular rate and rhythm.  ABDOMEN:  Soft, nontender, and nondistended.  Positive bowel sounds.  EXTREMITIES:  Trace edema.  Otherwise very thin in appearance.  NEUROLOGIC:  The patient is mildly somnolent.  Otherwise oriented and   following commands.   PSYCHIATRIC:  Normal affect and behavior.    RESULTS:  White count 9.7, hemoglobin 12, hematocrit of 33, platelet count of   262.  Sodium 108, potassium 5, chloride 80, bicarb 18, and anion gap 10,   glucose 76, BUN 3, creatinine 0.23, calcium 8.4, AST 47, ALT 18, alkaline   phosphatase 94, total bilirubin 0.8.  Serum osmolality is 229.  Urine tox screen negative.    IMAGING:  A CT chest from 09/30/2017 shows approximately 6 cm large   paratracheal mass with deviation of trachea to the left, bilateral perihilar   lymphadenopathy, greatest on the right and approximately 2 cm lesion in the   right upper lobe, abutting the pleura posteriorly.       ASSESSMENT:  1.  Mildly symptomatic severe hyponatremia, likely related to syndrome of   inappropriate antidiuretic hormone plus or minus beer potomania as the patient   euvolemic status.  2.  Large paratracheal mass with bilateral perihilar lymphadenopathy and 2 cm   right upper lobe nodule.  3.  Severe protein-calorie malnutrition.  4.  Non-anion gap metabolic acidosis.  5.  Chronic tobacco use.  6.  Chronic alcohol use.    PLAN:  1.  Again, this is a 57-year-old gentleman presenting after a ground level   fall and transferred from [facility] with severe underlying   hyponatremia as well as newly identified large pulmonary and mediastinal mass   lesion.  At this  time, the patient will be admitted to the intensive care unit   for very close monitoring and correction of his hyponatremia.  We will   correct sodium by 6 to 9 mEq per 24-hour period.  Initially we will attempt   with normal saline; however, should this cause further decline in sodium   level, we will place on food restriction and low-dose continuous 3% NS.    Furthermore, he will need to be on seizure precautions as well as every 1 hour   neuro checks.  Given the fact that the patient does not have emergent   findings needing rapid correction of sodium, we will monitor closely and   carefully; further workup to the etiology of his hyponatremia in place with   concern of possible underlying malignancy related syndrome of inappropriate   antidiuretic hormone plus or minus beer potomania.  Once the patient is more   clinically stable, we will need further workup of his mediastinal and right   upper lobe mass lesions concerning for underlying malignancy.  The patient   will be on RT and OT protocols.  Further we will monitor for any evidence of   alcohol withdrawal.  The patient will be placed on multivitamin, thiamine and   folate.  2.  Prognosis, guarded.  3.  Total critical care time not including billable procedures is 40 minutes.       ____________________________________     MD EZEKIEL Beltrán / WILD    DD:  09/30/2017 21:41:10  DT:  10/01/2017 00:18:05    D#:  [id number]  Job#:  [id number]

## 2017-10-01 NOTE — PROGRESS NOTES
Pt transported from [facility] to R-112 on monitor with ACLS RN and CCT via [place] at approximately 2130.  Pt tolerated transport without difficulty.  A&Ox4.  Physical assessment and CHG bath completed.  Pt resting comfortably at this time.  BMP q4h.  Fall precautions in place.

## 2017-10-01 NOTE — ED NOTES
Call received from [facility]  Dept, [phone], that Pt's wallet is there and Pt needs to pick it up when he's discharged.
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
## 2017-10-01 NOTE — ASSESSMENT & PLAN NOTE
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

## 2017-10-01 NOTE — ASSESSMENT & PLAN NOTE
-Diagnosed by mediastinoscopy with biopsy of paratracheal mass (10/17) showing metastatic small cell carcinoma  - Staging workup including MRI Brain and CT Abdomen/Pelvis:  Unremarkable  - Chemotherapy regimen-  CISplatin on day 1 and etoposide on days 1-3 28 day cycle for 4-6 cycles. First cycle 10/21-10/23, second cycle 11/13-11/15  - 10/23- 11/13 completed 30 doses of radiation  - Patient will be discharged to a group home tomorrow

## 2017-10-01 NOTE — CARE PLAN
Problem: Venous Thromboembolism (VTW)/Deep Vein Thrombosis (DVT) Prevention:  Goal: Patient will participate in Venous Thrombosis (VTE)/Deep Vein Thrombosis (DVT)Prevention Measures  SCDs in place to lower extremities    Problem: Bowel/Gastric:  Goal: Normal bowel function is maintained or improved  Pt had one loose, yellow/brown BM this shift    Problem: Respiratory:  Goal: Respiratory status will improve  High 90's on RA

## 2017-10-01 NOTE — PROGRESS NOTES
(0700) Assumed care of pt at this time. Rec'd lying in bed with HOB elevated. AAO X4. Resp even and nonlabored on 4L O2 via NC after desaturation on last night. Base line 2L. Pt denies any SOB or chest pain at this time. No s/s ETOH w/d noted. Skin w/d to touch. Redness and fragile skin noted to sacrum, buttocks and BUE.Tele box intact. Bilateral hand 20G saline locks WNL. Expiratory wheezes noted during auscultation to BUL. Abdomen soft and nondistended. BS present x4. Pt is passing a lot of flatulence this morning. Condom catheter intact; gabi colored urine noted in drainage bag. SCDs/Nonskid socks in place to BLE. Pt denies any needs or complaints at this time. Side rails up x2, bed in lowest/locked position and call light within reach. Will continue to monitor this shift.    (0852) Rally bag @50ml/hr initiated and currently infusing via LT hand PIV as ordered.    (1000) 0.45% NaCL with 20mEq KCL initiated as ordered and currently infusing via RT hand PIV @50l/hr. Q4 hour BMP due at this time. Pt denies any needs or complaints at this time. Side rails up x2, bed in lowest/locked position and call light within reach. Will continue to monitor this shift.    (1650) Call rec'd from lab regarding critical NA of 115. Dr. Roberts informed immediately at pt's bedside. As ordered, 1/2NaCl+20KCL infusion increased to 100 ml/hr and Rally bag infusion decreased to 42 ml/hr. Pt remains alert, oriented and stable. No seizure activity noted. Will continue to monitor this shift.

## 2017-10-01 NOTE — CARE PLAN
Problem: Safety  Goal: Will remain free from falls    Intervention: Implement fall precautions  Bed in lowest/locked position and call light within reach. Non-skids in place to BLE.

## 2017-10-01 NOTE — CARE PLAN
Problem: Nutritional:  Goal: Achieve adequate nutritional intake  Patient will consume 50% of meals    Intervention: Advance diet as tolerated  Pt is NPO at this time and being monitored for s/s ETOH withdrawal. RD following.

## 2017-10-02 PROBLEM — R78.81 BACTEREMIA: Status: ACTIVE | Noted: 2017-10-02

## 2017-10-02 LAB
ANION GAP SERPL CALC-SCNC: 6 MMOL/L (ref 0–11.9)
ANION GAP SERPL CALC-SCNC: 7 MMOL/L (ref 0–11.9)
BASOPHILS # BLD AUTO: 0.6 % (ref 0–1.8)
BASOPHILS # BLD: 0.05 K/UL (ref 0–0.12)
BLOOD CULTURE HOLD CXBCH: NORMAL
BUN SERPL-MCNC: 3 MG/DL (ref 8–22)
BUN SERPL-MCNC: 4 MG/DL (ref 8–22)
BUN SERPL-MCNC: 5 MG/DL (ref 8–22)
BUN SERPL-MCNC: 5 MG/DL (ref 8–22)
CALCIUM SERPL-MCNC: 8.5 MG/DL (ref 8.5–10.5)
CALCIUM SERPL-MCNC: 8.6 MG/DL (ref 8.5–10.5)
CALCIUM SERPL-MCNC: 8.7 MG/DL (ref 8.5–10.5)
CALCIUM SERPL-MCNC: 8.8 MG/DL (ref 8.5–10.5)
CALCIUM SERPL-MCNC: 8.8 MG/DL (ref 8.5–10.5)
CALCIUM SERPL-MCNC: 9.1 MG/DL (ref 8.5–10.5)
CHLORIDE SERPL-SCNC: 81 MMOL/L (ref 96–112)
CHLORIDE SERPL-SCNC: 82 MMOL/L (ref 96–112)
CHLORIDE SERPL-SCNC: 84 MMOL/L (ref 96–112)
CHLORIDE SERPL-SCNC: 84 MMOL/L (ref 96–112)
CHLORIDE SERPL-SCNC: 85 MMOL/L (ref 96–112)
CHLORIDE SERPL-SCNC: 87 MMOL/L (ref 96–112)
CO2 SERPL-SCNC: 23 MMOL/L (ref 20–33)
CO2 SERPL-SCNC: 24 MMOL/L (ref 20–33)
CO2 SERPL-SCNC: 26 MMOL/L (ref 20–33)
CO2 SERPL-SCNC: 26 MMOL/L (ref 20–33)
CREAT SERPL-MCNC: 0.32 MG/DL (ref 0.5–1.4)
CREAT SERPL-MCNC: 0.33 MG/DL (ref 0.5–1.4)
CREAT SERPL-MCNC: 0.39 MG/DL (ref 0.5–1.4)
CREAT SERPL-MCNC: 0.4 MG/DL (ref 0.5–1.4)
EOSINOPHIL # BLD AUTO: 0.05 K/UL (ref 0–0.51)
EOSINOPHIL NFR BLD: 0.6 % (ref 0–6.9)
ERYTHROCYTE [DISTWIDTH] IN BLOOD BY AUTOMATED COUNT: 38.5 FL (ref 35.9–50)
GFR SERPL CREATININE-BSD FRML MDRD: >60 ML/MIN/1.73 M 2
GLUCOSE SERPL-MCNC: 115 MG/DL (ref 65–99)
GLUCOSE SERPL-MCNC: 118 MG/DL (ref 65–99)
GLUCOSE SERPL-MCNC: 179 MG/DL (ref 65–99)
GLUCOSE SERPL-MCNC: 67 MG/DL (ref 65–99)
GLUCOSE SERPL-MCNC: 88 MG/DL (ref 65–99)
GLUCOSE SERPL-MCNC: 93 MG/DL (ref 65–99)
HCT VFR BLD AUTO: 36.4 % (ref 42–52)
HGB BLD-MCNC: 13.6 G/DL (ref 14–18)
IMM GRANULOCYTES # BLD AUTO: 0.03 K/UL (ref 0–0.11)
IMM GRANULOCYTES NFR BLD AUTO: 0.3 % (ref 0–0.9)
LYMPHOCYTES # BLD AUTO: 0.91 K/UL (ref 1–4.8)
LYMPHOCYTES NFR BLD: 10.5 % (ref 22–41)
MCH RBC QN AUTO: 33.8 PG (ref 27–33)
MCHC RBC AUTO-ENTMCNC: 36.5 G/DL (ref 33.7–35.3)
MCV RBC AUTO: 92.6 FL (ref 81.4–97.8)
MONOCYTES # BLD AUTO: 0.74 K/UL (ref 0–0.85)
MONOCYTES NFR BLD AUTO: 8.5 % (ref 0–13.4)
NEUTROPHILS # BLD AUTO: 6.89 K/UL (ref 1.82–7.42)
NEUTROPHILS NFR BLD: 79.5 % (ref 44–72)
NRBC # BLD AUTO: 0 K/UL
NRBC BLD AUTO-RTO: 0 /100 WBC
PLATELET # BLD AUTO: 271 K/UL (ref 164–446)
PMV BLD AUTO: 9.5 FL (ref 9–12.9)
POTASSIUM SERPL-SCNC: 3.5 MMOL/L (ref 3.6–5.5)
POTASSIUM SERPL-SCNC: 3.6 MMOL/L (ref 3.6–5.5)
POTASSIUM SERPL-SCNC: 3.7 MMOL/L (ref 3.6–5.5)
POTASSIUM SERPL-SCNC: 3.9 MMOL/L (ref 3.6–5.5)
POTASSIUM SERPL-SCNC: 4 MMOL/L (ref 3.6–5.5)
POTASSIUM SERPL-SCNC: 4 MMOL/L (ref 3.6–5.5)
RBC # BLD AUTO: 3.93 M/UL (ref 4.7–6.1)
SODIUM SERPL-SCNC: 113 MMOL/L (ref 135–145)
SODIUM SERPL-SCNC: 114 MMOL/L (ref 135–145)
SODIUM SERPL-SCNC: 115 MMOL/L (ref 135–145)
SODIUM SERPL-SCNC: 116 MMOL/L (ref 135–145)
SODIUM SERPL-SCNC: 116 MMOL/L (ref 135–145)
SODIUM SERPL-SCNC: 117 MMOL/L (ref 135–145)
WBC # BLD AUTO: 8.7 K/UL (ref 4.8–10.8)

## 2017-10-02 PROCEDURE — A9270 NON-COVERED ITEM OR SERVICE: HCPCS | Performed by: INTERNAL MEDICINE

## 2017-10-02 PROCEDURE — G8978 MOBILITY CURRENT STATUS: HCPCS | Mod: CK

## 2017-10-02 PROCEDURE — 97165 OT EVAL LOW COMPLEX 30 MIN: CPT

## 2017-10-02 PROCEDURE — 770022 HCHG ROOM/CARE - ICU (200)

## 2017-10-02 PROCEDURE — G8987 SELF CARE CURRENT STATUS: HCPCS | Mod: CK

## 2017-10-02 PROCEDURE — G8988 SELF CARE GOAL STATUS: HCPCS | Mod: CI

## 2017-10-02 PROCEDURE — 700102 HCHG RX REV CODE 250 W/ 637 OVERRIDE(OP): Performed by: INTERNAL MEDICINE

## 2017-10-02 PROCEDURE — 700111 HCHG RX REV CODE 636 W/ 250 OVERRIDE (IP): Performed by: INTERNAL MEDICINE

## 2017-10-02 PROCEDURE — 97161 PT EVAL LOW COMPLEX 20 MIN: CPT

## 2017-10-02 PROCEDURE — 85025 COMPLETE CBC W/AUTO DIFF WBC: CPT

## 2017-10-02 PROCEDURE — 87040 BLOOD CULTURE FOR BACTERIA: CPT

## 2017-10-02 PROCEDURE — 80048 BASIC METABOLIC PNL TOTAL CA: CPT | Mod: 91

## 2017-10-02 PROCEDURE — G8979 MOBILITY GOAL STATUS: HCPCS | Mod: CI

## 2017-10-02 PROCEDURE — 700105 HCHG RX REV CODE 258: Performed by: STUDENT IN AN ORGANIZED HEALTH CARE EDUCATION/TRAINING PROGRAM

## 2017-10-02 RX ORDER — POTASSIUM CHLORIDE 20 MEQ/1
40 TABLET, EXTENDED RELEASE ORAL ONCE
Status: COMPLETED | OUTPATIENT
Start: 2017-10-02 | End: 2017-10-02

## 2017-10-02 RX ORDER — SODIUM CHLORIDE 9 MG/ML
INJECTION, SOLUTION INTRAVENOUS CONTINUOUS
Status: DISCONTINUED | OUTPATIENT
Start: 2017-10-02 | End: 2017-10-03

## 2017-10-02 RX ORDER — FOLIC ACID 1 MG/1
1 TABLET ORAL DAILY
Status: DISCONTINUED | OUTPATIENT
Start: 2017-10-02 | End: 2017-11-17 | Stop reason: HOSPADM

## 2017-10-02 RX ORDER — THIAMINE MONONITRATE (VIT B1) 100 MG
100 TABLET ORAL DAILY
Status: DISCONTINUED | OUTPATIENT
Start: 2017-10-02 | End: 2017-11-17 | Stop reason: HOSPADM

## 2017-10-02 RX ADMIN — ENOXAPARIN SODIUM 40 MG: 100 INJECTION SUBCUTANEOUS at 08:14

## 2017-10-02 RX ADMIN — SODIUM CHLORIDE: 9 INJECTION, SOLUTION INTRAVENOUS at 15:30

## 2017-10-02 RX ADMIN — CEFTRIAXONE 2 G: 2 INJECTION, SOLUTION INTRAVENOUS at 08:14

## 2017-10-02 RX ADMIN — POTASSIUM CHLORIDE 40 MEQ: 1500 TABLET, EXTENDED RELEASE ORAL at 01:28

## 2017-10-02 RX ADMIN — THERA TABS 1 TABLET: TAB at 08:14

## 2017-10-02 RX ADMIN — FOLIC ACID 1 MG: 1 TABLET ORAL at 08:14

## 2017-10-02 RX ADMIN — THIAMINE HCL TAB 100 MG 100 MG: 100 TAB at 08:14

## 2017-10-02 RX ADMIN — AZITHROMYCIN 500 MG: 250 TABLET, FILM COATED ORAL at 08:14

## 2017-10-02 RX ADMIN — NICOTINE 21 MG: 21 PATCH, EXTENDED RELEASE TRANSDERMAL at 06:58

## 2017-10-02 RX ADMIN — ACETAMINOPHEN 650 MG: 325 TABLET, FILM COATED ORAL at 08:14

## 2017-10-02 ASSESSMENT — ACTIVITIES OF DAILY LIVING (ADL): TOILETING: INDEPENDENT

## 2017-10-02 ASSESSMENT — PAIN SCALES - GENERAL
PAINLEVEL_OUTOF10: 0
PAINLEVEL_OUTOF10: 3
PAINLEVEL_OUTOF10: 0

## 2017-10-02 ASSESSMENT — GAIT ASSESSMENTS
DISTANCE (FEET): 5
DEVIATION: SHUFFLED GAIT;BRADYKINETIC
ASSISTIVE DEVICE: HAND HELD ASSIST
GAIT LEVEL OF ASSIST: MODERATE ASSIST

## 2017-10-02 ASSESSMENT — COGNITIVE AND FUNCTIONAL STATUS - GENERAL
SUGGESTED CMS G CODE MODIFIER DAILY ACTIVITY: CJ
WALKING IN HOSPITAL ROOM: A LOT
TURNING FROM BACK TO SIDE WHILE IN FLAT BAD: UNABLE
SUGGESTED CMS G CODE MODIFIER MOBILITY: CL
HELP NEEDED FOR BATHING: A LITTLE
MOVING FROM LYING ON BACK TO SITTING ON SIDE OF FLAT BED: UNABLE
CLIMB 3 TO 5 STEPS WITH RAILING: A LOT
TOILETING: A LITTLE
DAILY ACTIVITIY SCORE: 20
MOVING TO AND FROM BED TO CHAIR: UNABLE
DRESSING REGULAR LOWER BODY CLOTHING: A LOT
MOBILITY SCORE: 10
STANDING UP FROM CHAIR USING ARMS: A LITTLE

## 2017-10-02 NOTE — PROGRESS NOTES
Pulmonary Critical Care Progress Note      Date of Service: 10/2/2017  Reason for Admit:  Severe hyponatremia and GLF    History of Present Illness:  57 year old homeless male without significant past medical history, was transferred from [facility] for AMS and a sodium level of 107 and also found to have a right apical lung mass       ROS:  Respiratory: negative, Cardiac: negative, GI: negative.  All other systems negative.    Interval Events:  24 hour interval history reviewed    - No overnight events   - Tmax 99.0   - Remains hemodynamically stable   - Regular 2g sodium diet   - No CXR today and stable on 6 liters/min       PFSH:  No change.    Respiratory:     Pulse Oximetry: 95 %  Chest Tube Drains:          Exam: clear throughout, no wheezing, but diminished at the bases  ImagingAvailable data reviewed         Invalid input(s): [id number]    HemoDynamics:  Pulse: 82, Heart Rate (Monitored): 80  NIBP: 110/69       Exam: regular rate and rhythm  Imaging: Available data reviewed        Neuro:  GCS Total Clymer Coma Score: 15       Exam: no focal deficits noted mental status intact oriented for age x3 Motor and sensory exam grossly intact follows commands, raises two fingers  Imaging: Available data reviewed    Fluids:  Intake/Output       09/30/17 0700 - 10/01/17 0659 10/01/17 0700 - 10/02/17 0659 10/02/17 0700 - 10/03/17 0659      7637-2032 4977-0603 Total 3805-2697 9153-5552 Total 8540-7599 1806-6426 Total       Intake    P.O.  --  120 120  118  360 478  --  -- --    P.O. -- 120 120 118 360 478 -- -- --    I.V.  --  -- --  1100  778 1878  --  -- --    IV Volume (1/2NaCL+20KCL) -- -- --  -- -- --    IV Volume (Rally Bag) -- -- -- 500 168 668 -- -- --    IV Volume (NS w/ 20 K) -- -- -- -- 110 110 -- -- --    Total Intake --  1138 2356 -- -- --       Output    Urine  --  1300 1300  1775  1450 3225  --  -- --    Condom Catheter -- 1300 1300 1775 1450 3225 -- -- --    Number  of Times Incontinent of Urine -- 1 x 1 x -- -- -- -- -- --    Stool  --  -- --  --  -- --  --  -- --    Number of Times Stooled -- -- -- 2 x -- 2 x -- -- --    Total Output -- 1300 1300 1775 1450 3225 -- -- --       Net I/O     -- -1180 -1180 -557 -312 -869 -- -- --           Recent Labs      10/01/17   0551   10/01/17   2044  10/02/17   0027  10/02/17   0423   SODIUM  116*   < >  113*  113*  116*   POTASSIUM  3.5*   < >  3.5*  3.5*  4.0   CHLORIDE  89*   < >  81*  82*  85*   CO2  18*   < >  25  24  24   BUN  4*   < >  3*  4*  3*   CREATININE  0.26*   < >  0.46*  0.32*  0.32*   PHOSPHORUS  3.3   --    --    --    --    CALCIUM  8.2*   < >  8.4*  8.5  8.8    < > = values in this interval not displayed.       GI/Nutrition:  Exam: abdomen is soft and non-tender, normal active bowel sounds, nontender  Imaging: Available data reviewed  taking PO  Liver Function  Recent Labs      09/30/17   1808   10/01/17   2044  10/02/17   0027  10/02/17   0423   ALTSGPT  18   --    --    --    --    ASTSGOT  47*   --    --    --    --    ALKPHOSPHAT  84   --    --    --    --    TBILIRUBIN  0.8   --    --    --    --    GLUCOSE  76   < >  143*  115*  93    < > = values in this interval not displayed.       Heme:  Recent Labs      09/30/17   1808  10/01/17   0551  10/01/17   0846  10/01/17   1038   RBC  3.64*  3.25*   --    --    HEMOGLOBIN  12.3*  11.0*   --    --    HEMATOCRIT  33.1*  30.5*   --    --    PLATELETCT  262  247   --    --    IRON   --    --    --   51   FERRITIN   --    --   513.5*   --    TOTIRONBC   --    --    --   252       Infectious Disease:  Temp  Av.1 °C (98.7 °F)  Min: 36.2 °C (97.2 °F)  Max: 37.2 °C (99 °F)  Micro: antibiotics reviewed, cultures pending and cultures reviewed  Recent Labs      17   1808  10/01/17   0551   WBC  9.7  9.0   NEUTSPOLYS  76.20*  82.80*   LYMPHOCYTES  8.50*  7.20*   MONOCYTES  13.50*  8.70   EOSINOPHILS  0.80  0.40   BASOPHILS  0.40  0.30   ASTSGOT  47*   --    ALTSGPT  18    --    ALKPHOSPHAT  84   --    TBILIRUBIN  0.8   --      Current Facility-Administered Medications   Medication Dose Frequency Provider Last Rate Last Dose   • multivitamin (THERAGRAN) tablet 1 Tab  1 Tab DAILY Trell Curry M.D.       • thiamine (THIAMINE) tablet 100 mg  100 mg DAILY Trell Curry M.D.       • folic acid (FOLVITE) tablet 1 mg  1 mg DAILY Trell Curry M.D.       • cefTRIAXone (ROCEPHIN) 2 g in 50 mL D5W IVPB premix  2 g Q24HRS Angelica Roberts M.D.   Stopped at 10/01/17 1102   • azithromycin (ZITHROMAX) tablet 500 mg  500 mg DAILY Angelica Roberts M.D.   500 mg at 10/01/17 0905   • senna-docusate (PERICOLACE or SENOKOT S) 8.6-50 MG per tablet 2 Tab  2 Tab BID Jannet Son M.D.   Stopped at 10/01/17 2100    And   • polyethylene glycol/lytes (MIRALAX) PACKET 1 Packet  1 Packet QDAY PRN Jannet Son M.D.        And   • magnesium hydroxide (MILK OF MAGNESIA) suspension 30 mL  30 mL QDAY PRN Jannet Son M.D.        And   • bisacodyl (DULCOLAX) suppository 10 mg  10 mg QDAY PRN Jannet Son M.D.       • Respiratory Care per Protocol   Continuous RT Jannet Son M.D.       • enoxaparin (LOVENOX) inj 40 mg  40 mg DAILY Jannet Son M.D.   40 mg at 10/01/17 0905   • labetalol (NORMODYNE,TRANDATE) injection 10 mg  10 mg Q4HRS PRN Jannet Son M.D.       • ondansetron (ZOFRAN) syringe/vial injection 4 mg  4 mg Q4HRS PRN Jannet Son M.D.       • ondansetron (ZOFRAN ODT) dispertab 4 mg  4 mg Q4HRS PRN Jannet Sno M.D.       • promethazine (PHENERGAN) tablet 12.5-25 mg  12.5-25 mg Q4HRS PRN Jannet Son M.D.       • promethazine (PHENERGAN) suppository 12.5-25 mg  12.5-25 mg Q4HRS PRN Jannet Son M.D.       • prochlorperazine (COMPAZINE) injection 5-10 mg  5-10 mg Q4HRS PRN Jannet Son M.D.       • nicotine (NICODERM) 21 MG/24HR 21 mg  21 mg Daily-0600 Jannet Son M.D.   21 mg at 10/02/17 0658    And   • nicotine  polacrilex (NICORETTE) 2 MG piece 2 mg  2 mg Q HOUR PRN Jannet Son M.D.       • guaifenesin dextromethorphan (ROBITUSSIN DM) 100-10 MG/5ML syrup 10 mL  10 mL Q6HRS PRN Jannet Son M.D.       • acetaminophen (TYLENOL) tablet 650 mg  650 mg Q6HRS PRN Jannet Son M.D.   650 mg at 10/01/17 0418   • Influenza Vaccine Quad pf injection 0.5 mL  0.5 mL Once PRN Trell Curry M.D.       • pneumococcal vaccine (PNEUMOVAX-23) injection 25 mcg  0.5 mL Once PRN Trell Curry M.D.         Last reviewed on 9/30/2017  8:16 PM by Ramona Lagunas T    Quality  Measures:  Radiology images reviewed, Labs reviewed and Medications reviewed  Garvin catheter: No Garvin      DVT Prophylaxis: Enoxaparin (Lovenox)  DVT prophylaxis - mechanical: SCDs  Ulcer prophylaxis: Not indicated  Antibiotics: Treating active infection/contamination beyond 24 hours perioperative coverage        Problems/Plan:  Severe hyponatremia   - likely related to syndrome of inappropriate antidiuretic hormone +/- beer podomania    - Euvolemic status   - noted slight decrease in levels   - cont to fluid restrict   - may need to resume NS and stop 1/2NS  Large paratracheal mass with bilateral perihilar lymphadenopathy and 2 cm right upper lobe nodule.   - once medically stable will need IR-guided biopsy   - diffuse infiltrates-->C3/azithromycin  Severe protein-calorie malnutrition.   - dietary to see   - start supplemental nutrition  Non-anion gap metabolic acidosis.  Chronic tobacco use.   - nicotine patch  Chronic alcohol use.   - Rally bag   - cont MVI, folate, and thiamine   - cont to monitor for alcohol withdrawal  Ground level fall, no trauma       Discussed patient condition and risk of morbidity and/or mortality with RN, RT, Pharmacy, [facility] resident and Patient.    The patient remains critically ill.  Critical care time = 34 minutes in directly providing and coordinating critical care and extensive data review.  No time overlap  and excludes procedures.

## 2017-10-02 NOTE — PROGRESS NOTES
2255- Dr. Casper at bedside to evaluate pt. No new orders received.    2315- Call from Dr. Casper. Orders received to DC 1/2 NS w/ 20 mEq of K and start 0.9% NS w/ 20 mEq of K at 110mL/hr.     0030- Call from Urban from pharmacy- orders received per MD Curry to DC 0.9% NS w/ 20 mEq of K.

## 2017-10-02 NOTE — CARE PLAN
Problem: Venous Thromboembolism (VTW)/Deep Vein Thrombosis (DVT) Prevention:  Goal: Patient will participate in Venous Thrombosis (VTE)/Deep Vein Thrombosis (DVT)Prevention Measures    Intervention: Ensure patient wears graduated elastic stockings (ZORAIDA hose) and/or SCDs, if ordered, when in bed or chair (Remove at least once per shift for skin check)  SCD's correctly in place. Anticoagulation ordered, on MAR.       Problem: Knowledge Deficit  Goal: Knowledge of disease process/condition, treatment plan, diagnostic tests, and medications will improve    Intervention: Assess knowledge level of disease process/condition, treatment plan, diagnostic tests, and medications  Updated and educated pt on disease process and plan of care. RN assessed pts understanding and provided explanation for interventions.       Problem: Skin Integrity  Goal: Risk for impaired skin integrity will decrease    Intervention: Implement precautions to protect skin integrity in collaboration with the interdisciplinary team  Implemented interventions to maintain skin integrity. Q2 hr turns, regular diet ordered, mepilex applied to sacrum, pillows in place to protect bony prominences.

## 2017-10-02 NOTE — PROGRESS NOTES
[facility] Progress Note      Admit Date: 9/30/2017  ICU Day: 4    Resident(s): Cortney Parra   Attending: JAMES MARCIAL/ Dr. Hood     Date & Time:   10/2/2017   11:11 AM       Patient ID:    Name:             Froylan Spain   YOB: 1960  Age:                 57 y.o.  male   MRN:               [id number]    Diagnosis:  Hyponatremia  Lung mass   GLF  EtOH use    HPI:    57 y.o male with hx of alcohol use disorder and lung nodule presented after GLF and AMS, found to have severe hyponatremia of 107, admitted to ICU for management and close monitoring.    Consultants:     PMA: Dr. Hood    Interval Events:    10/02 - Na dropped overnight, IVF discontinues. This AM Na is back up to 116. Will hold IVF until next BMP and if decreasing will start pt on NS.    Physical Exam:  GEN: AAOx3, NAD.  CV: S1, S2, NSR, No MRG.  RESP: CTABL, crackles in upper lung fields BL  ABD: Soft, ND, mild tenderness to palpation  EXT: No LE edema b/l. No signs of cyanosis    Respiratory:     Respiration: 19, Pulse Oximetry: 98 %    HemoDynamics:  Pulse: 95, Heart Rate (Monitored): 94 NIBP: (!) 92/52      Fluids:    Date 10/02/17 0700 - 10/03/17 0659   Shift 5273-7364 5192-4601 5165-9532 24 Hour Total   I  N  T  A  K  E   Shift Total       O  U  T  P  U  T   Stool          Number of Times Stooled 1 x   1 x    Shift Total       NET            Intake/Output Summary (Last 24 hours) at 10/02/17 1111  Last data filed at 10/02/17 0600   Gross per 24 hour   Intake             2356 ml   Output             2175 ml   Net              181 ml          Body mass index is 18.03 kg/m².    Recent Labs      10/01/17   0551   10/02/17   0027  10/02/17   0423  10/02/17   0958   SODIUM  116*   < >  113*  116*  116*   POTASSIUM  3.5*   < >  3.5*  4.0  3.7   CHLORIDE  89*   < >  82*  85*  84*   CO2  18*   < >  24  24  26   BUN  4*   < >  4*  3*  3*   CREATININE  0.26*   < >  0.32*  0.32*  0.39*   PHOSPHORUS  3.3   --    --    --    --     CALCIUM  8.2*   < >  8.5  8.8  8.8    < > = values in this interval not displayed.       GI/Nutrition:  Recent Labs      09/30/17   1808   10/02/17   0027  10/02/17   0423  10/02/17   0958   ALTSGPT  18   --    --    --    --    ASTSGOT  47*   --    --    --    --    ALKPHOSPHAT  84   --    --    --    --    TBILIRUBIN  0.8   --    --    --    --    GLUCOSE  76   < >  115*  93  179*    < > = values in this interval not displayed.       Heme:  Recent Labs      09/30/17   1808  10/01/17   0551  10/01/17   0846  10/01/17   1038  10/02/17   0958   RBC  3.64*  3.25*   --    --   3.93*   HEMOGLOBIN  12.3*  11.0*   --    --   13.6*   HEMATOCRIT  33.1*  30.5*   --    --   36.4*   PLATELETCT  262  247   --    --   271   IRON   --    --    --   51   --    FERRITIN   --    --   513.5*   --    --    TOTIRONBC   --    --    --   252   --        Infectious Disease:  Temp  Av.2 °C (98.9 °F)  Min: 37 °C (98.6 °F)  Max: 37.3 °C (99.2 °F)  Recent Labs      09/30/17   1808  10/01/17   0551  10/02/17   0958   WBC  9.7  9.0  8.7   NEUTSPOLYS  76.20*  82.80*  79.50*   LYMPHOCYTES  8.50*  7.20*  10.50*   MONOCYTES  13.50*  8.70  8.50   EOSINOPHILS  0.80  0.40  0.60   BASOPHILS  0.40  0.30  0.60   ASTSGOT  47*   --    --    ALTSGPT  18   --    --    ALKPHOSPHAT  84   --    --    TBILIRUBIN  0.8   --    --        Meds:  • multivitamin  1 Tab     • thiamine  100 mg     • folic acid  1 mg     • cefTRIAXone (ROCEPHIN) IVPB  2 g Stopped (10/02/17 0844)   • azithromycin  500 mg     • senna-docusate  2 Tab      And   • polyethylene glycol/lytes  1 Packet      And   • magnesium hydroxide  30 mL      And   • bisacodyl  10 mg     • Respiratory Care per Protocol       • enoxaparin  40 mg     • labetalol  10 mg     • ondansetron  4 mg     • ondansetron  4 mg     • promethazine  12.5-25 mg     • promethazine  12.5-25 mg     • prochlorperazine  5-10 mg     • nicotine  21 mg      And   • nicotine polacrilex  2 mg     • guaifenesin dextromethorphan   10 mL     • acetaminophen  650 mg     • Influenza Vaccine Quad pf  0.5 mL     • pneumococcal vaccine  0.5 mL          Problem and Plan:    * Hyponatremia- (present on admission)   Assessment & Plan    - Na of 107 and AMS on admission   - Sosm: 229, Uosm: 173, Lisy <10 (10/1)  - Continue slow correction of Na, <10 in 24 hours   - Monitor BMP Q4H with neuro-checks         Lung mass- (present on admission)   Assessment & Plan    - Hx of smoking >20 years, 10 pack-year.   - Cough, recent weight loss and low appetite.  - CT chest: 3.6x5.9 cm paratracheal mass compressing on SVC and 2x1.8 cm R upper lobe mass (9/30)  - Highly suspicious for malignant etiology  - Will need eval by biopsy: most likely IR after patient is more stable.        Disorder of electrolytes- (present on admission)   Assessment & Plan    - monitor electrolytes  - replete as necessary        Fall- (present on admission)   Assessment & Plan    - GLF with no reported syncope.  - CT head on admission from other facility negative for bleeding or intracranial pathology  - No evidence of neurological deficits   - CTM        Bacteremia   Assessment & Plan    - blood cultures positive for Gram positive cocci  - WBC wnl, CXR clear, UA negative  - Continue Rocephin and Azithromycin  - Repeat blood cultures        Chronic alcohol use- (present on admission)   Assessment & Plan    - Consumes 1 beer daily   - BAL: 0.01 on admission   - PO thiamine, B12 and folic acid supplements   - Monitor for withdrawal symptoms             DISPO: ICU    CODE STATUS: FULL    Quality Measures:  Garvin Catheter: Condom cath  DVT Prophylaxis: Lovenox  Ulcer Prophylaxis: Not indicated  Antibiotics: C3, Azithromycin  Lines: PIV    Procedures:  none    Imaging:  CT-CHEST (THORAX) WITH   Final Result      Right paratracheal mass measuring 3.6 x 5.9 cm which causes mass effect on the SVC and trachea with mild tracheal deviation to the left. Subcarinal and right hilar lymphadenopathy is  also noted.      2 x 1.8 cm right upper lobe nodule which is centrally hypodense and may be necrotic worrisome for malignancy.      Ill-defined areas of groundglass opacity may be infectious or inflammatory.      Tree-in-bud nodularity in the left lower lobe is likely infectious or inflammatory.      Small pericardial effusion.      Atherosclerotic plaque.      There is likely mild esophageal wall thickening with mucosal hyperenhancement. This can be seen in the setting of esophagitis.      Healing left-sided rib fractures.      Hepatic steatosis.      Hypodensity along the falciform ligament may represent focal fat but a small 1.1 cm lesion is not excluded.            OUTSIDE IMAGES-DX CHEST   Final Result      OUTSIDE IMAGES-CT CERVICAL SPINE   Final Result      OUTSIDE IMAGES-CT FACE   Final Result      OUTSIDE IMAGES-CT HEAD   Final Result

## 2017-10-02 NOTE — ASSESSMENT & PLAN NOTE
- blood cultures on admission positive for Strep pneumo  - Repeat blood cultures negative 10/1, 10/2, 10/3  - afebrile  - s/p PO Augmentin BID (10/9-10/14)

## 2017-10-02 NOTE — PROGRESS NOTES
Saroj from Lab called with critical result of Na 113 at 0149. Critical lab result read back to Saroj.   Dr. Curry notified of critical lab result at 0159.  Critical lab result read back by Dr. Curry.    Saroj from Lab called with result of Gram positive cocci in blood cultures- Dr. Curry aware of new result.     No new orders received.

## 2017-10-02 NOTE — PROGRESS NOTES
Lab called with critical result of Na 116 at 0513. Critical lab result read back to lab personnel.   Dr. Curry notified of critical lab result at 0524.  Critical lab result read back by Dr. Curry.

## 2017-10-02 NOTE — PROGRESS NOTES
Lor from Lab called with critical result of Na 116 at 1052. Critical lab result read back to Lor.   Dr. Parra notified of critical lab result at 1100.  Critical lab result read back by Dr. Parra.

## 2017-10-03 LAB
ANION GAP SERPL CALC-SCNC: 6 MMOL/L (ref 0–11.9)
ANION GAP SERPL CALC-SCNC: 7 MMOL/L (ref 0–11.9)
BUN SERPL-MCNC: 3 MG/DL (ref 8–22)
BUN SERPL-MCNC: 4 MG/DL (ref 8–22)
CALCIUM SERPL-MCNC: 8.3 MG/DL (ref 8.5–10.5)
CALCIUM SERPL-MCNC: 8.4 MG/DL (ref 8.5–10.5)
CALCIUM SERPL-MCNC: 8.6 MG/DL (ref 8.5–10.5)
CALCIUM SERPL-MCNC: 8.7 MG/DL (ref 8.5–10.5)
CHLORIDE SERPL-SCNC: 85 MMOL/L (ref 96–112)
CHLORIDE SERPL-SCNC: 87 MMOL/L (ref 96–112)
CHLORIDE UR-SCNC: 163 MMOL/L
CO2 SERPL-SCNC: 23 MMOL/L (ref 20–33)
CO2 SERPL-SCNC: 23 MMOL/L (ref 20–33)
CO2 SERPL-SCNC: 24 MMOL/L (ref 20–33)
CO2 SERPL-SCNC: 24 MMOL/L (ref 20–33)
CREAT SERPL-MCNC: 0.31 MG/DL (ref 0.5–1.4)
CREAT SERPL-MCNC: 0.33 MG/DL (ref 0.5–1.4)
CREAT SERPL-MCNC: 0.35 MG/DL (ref 0.5–1.4)
CREAT SERPL-MCNC: 0.43 MG/DL (ref 0.5–1.4)
CREAT UR-MCNC: 64.3 MG/DL
ETEST SENSITIVITY ETEST: NORMAL
GFR SERPL CREATININE-BSD FRML MDRD: >60 ML/MIN/1.73 M 2
GLUCOSE SERPL-MCNC: 129 MG/DL (ref 65–99)
GLUCOSE SERPL-MCNC: 135 MG/DL (ref 65–99)
GLUCOSE SERPL-MCNC: 89 MG/DL (ref 65–99)
GLUCOSE SERPL-MCNC: 92 MG/DL (ref 65–99)
OSMOLALITY SERPL: 240 MOSM/KG H2O (ref 278–298)
OSMOLALITY UR: 475 MOSM/KG H2O (ref 300–900)
POTASSIUM SERPL-SCNC: 3.6 MMOL/L (ref 3.6–5.5)
POTASSIUM SERPL-SCNC: 3.9 MMOL/L (ref 3.6–5.5)
POTASSIUM SERPL-SCNC: 3.9 MMOL/L (ref 3.6–5.5)
POTASSIUM SERPL-SCNC: 4 MMOL/L (ref 3.6–5.5)
POTASSIUM UR-SCNC: 37.2 MMOL/L
PROT UR-MCNC: 7.2 MG/DL (ref 0–15)
SODIUM SERPL-SCNC: 116 MMOL/L (ref 135–145)
SODIUM SERPL-SCNC: 117 MMOL/L (ref 135–145)
SODIUM UR-SCNC: 139 MMOL/L

## 2017-10-03 PROCEDURE — 84156 ASSAY OF PROTEIN URINE: CPT

## 2017-10-03 PROCEDURE — 700102 HCHG RX REV CODE 250 W/ 637 OVERRIDE(OP): Performed by: INTERNAL MEDICINE

## 2017-10-03 PROCEDURE — 700111 HCHG RX REV CODE 636 W/ 250 OVERRIDE (IP): Performed by: INTERNAL MEDICINE

## 2017-10-03 PROCEDURE — 83930 ASSAY OF BLOOD OSMOLALITY: CPT

## 2017-10-03 PROCEDURE — 83935 ASSAY OF URINE OSMOLALITY: CPT

## 2017-10-03 PROCEDURE — 84133 ASSAY OF URINE POTASSIUM: CPT

## 2017-10-03 PROCEDURE — 80048 BASIC METABOLIC PNL TOTAL CA: CPT | Mod: 91

## 2017-10-03 PROCEDURE — 770006 HCHG ROOM/CARE - MED/SURG/GYN SEMI*

## 2017-10-03 PROCEDURE — 87040 BLOOD CULTURE FOR BACTERIA: CPT

## 2017-10-03 PROCEDURE — 82570 ASSAY OF URINE CREATININE: CPT

## 2017-10-03 PROCEDURE — 84300 ASSAY OF URINE SODIUM: CPT

## 2017-10-03 PROCEDURE — A9270 NON-COVERED ITEM OR SERVICE: HCPCS | Performed by: INTERNAL MEDICINE

## 2017-10-03 PROCEDURE — 700105 HCHG RX REV CODE 258: Performed by: STUDENT IN AN ORGANIZED HEALTH CARE EDUCATION/TRAINING PROGRAM

## 2017-10-03 PROCEDURE — 82436 ASSAY OF URINE CHLORIDE: CPT

## 2017-10-03 RX ORDER — SODIUM CHLORIDE 1 G/1
1 TABLET ORAL
Status: DISCONTINUED | OUTPATIENT
Start: 2017-10-03 | End: 2017-10-04

## 2017-10-03 RX ADMIN — SODIUM CHLORIDE TAB 1 GM 1 G: 1 TAB at 17:35

## 2017-10-03 RX ADMIN — NICOTINE 21 MG: 21 PATCH, EXTENDED RELEASE TRANSDERMAL at 05:29

## 2017-10-03 RX ADMIN — FOLIC ACID 1 MG: 1 TABLET ORAL at 08:46

## 2017-10-03 RX ADMIN — CEFTRIAXONE 2 G: 2 INJECTION, SOLUTION INTRAVENOUS at 08:46

## 2017-10-03 RX ADMIN — AZITHROMYCIN 500 MG: 250 TABLET, FILM COATED ORAL at 08:46

## 2017-10-03 RX ADMIN — THERA TABS 1 TABLET: TAB at 08:46

## 2017-10-03 RX ADMIN — SODIUM CHLORIDE: 9 INJECTION, SOLUTION INTRAVENOUS at 13:59

## 2017-10-03 RX ADMIN — SODIUM CHLORIDE: 9 INJECTION, SOLUTION INTRAVENOUS at 04:25

## 2017-10-03 RX ADMIN — VITAMIN D, TAB 1000IU (100/BT) 5000 UNITS: 25 TAB at 10:59

## 2017-10-03 RX ADMIN — ENOXAPARIN SODIUM 40 MG: 100 INJECTION SUBCUTANEOUS at 08:46

## 2017-10-03 RX ADMIN — THIAMINE HCL TAB 100 MG 100 MG: 100 TAB at 08:46

## 2017-10-03 ASSESSMENT — PAIN SCALES - GENERAL
PAINLEVEL_OUTOF10: 0

## 2017-10-03 NOTE — THERAPY
"Physical Therapy Evaluation completed.   Bed Mobility:  Supine to Sit: Minimal Assist  Transfers: Sit to Stand: Moderate Assist  Gait: Level Of Assist: Moderate Assist with Front-Wheel Walker       Plan of Care: Will benefit from Physical Therapy 3 times per week  Discharge Recommendations: Equipment: Will Continue to Assess for Equipment Needs. Post-acute therapy Discharge to a transitional care facility for continued skilled therapy services.    See \"Rehab Therapy-Acute\" Patient Summary Report for complete documentation.     "

## 2017-10-03 NOTE — PROGRESS NOTES
ICU TRANSFER NOTE    54 y.o male with EtOH abuse andwithout significant PMH, was transferred from [facility] where he presented after GLF.  At that time, the pt reported that he slipped on a rock and fell; denied LOC, palpitations, dizziness. Pt also reported that he had poor appetite, weakness, lost a lot of weight, and was feeling weak. Smoker for 20 years, 1/2 PPD. On work up he was found to have sever hyponatremia with Na 107 and he was transferred to [facility].    In the ED, he was alert and oriented, c/o weakness, low Na on chem panel. On further evaluation, CT chest showed a right paratracheal mass measuring 3.6 x 5.9 cm which causes mass effect on the SVC and trachea with mild tracheal deviation to the left, a subcarinal and right hilar lymphadenopathy, and a 2 x 1.8 cm right upper lobe nodule which is centrally hypodense and may be necrotic (worrisome for malignancy).    In the ICU, the pt received NS which slowly improved his NA to 117 and he's been stable since. The etiology of his hyponatremia is most likely due to SIADH and beer potomania. As his initial labs were inconclusive, repeat studies have been sent. Pt was started on C3 and Azithromycin for empiric treatment of pneumonia and remains on same regimen as his blood cultures came back positive for strep pneumoniae. Pt has been AAOx3, hemodynamically stable, and is transferred to [facility] for further management.      Plan:  - Follow urine studies  - Consult Pulm for further follow up of lung mass  - IR for biopsy of lung mass  - Continue vitamin supplementation  - Continue ABT for S. Pneumoniae bacteremia  - Follow up blood cultures

## 2017-10-03 NOTE — PROGRESS NOTES
[facility] ICU Progress Note      Admit Date: 9/30/2017  ICU Day: 5    Resident(s): Cortney Parra   Attending: JAMES MARCIAL/ Dr. Hood    Date & Time:   10/3/2017   11:26 AM       Patient ID:    Name:             Froylan Spain   YOB: 1960  Age:                 57 y.o.  male   MRN:               [id number]    Diagnosis:    Hyponatremia  Lung mass   GLF  EtOH use     HPI:    57 y.o male with hx of alcohol use disorder and lung nodule presented after GLF and AMS, found to have severe hyponatremia of 107, admitted to ICU for management and close monitoring.     Consultants:    PMA: Dr. Hood    Interval Events:    10/02 - Na dropped overnight, IVF discontinues. This AM Na is back up to 116. Will hold IVF until next BMP and if decreasing will start pt on NS.    10/03 - pt resumed on NS, Na up to 117 overnight and stable. Increased rate of NS as the Na is not going up appropriately.     Physical Exam:  GEN: AAOx3. NAD.  CV: S1, S2, NSR, No MRG.  RESP: Mild crackles BL.  ABD: Soft, NT, ND; +BS  EXT: No LE edema b/l. No signs of cyanosis    Respiratory:     Respiration: 13, Pulse Oximetry: 96 %    HemoDynamics:  Pulse: 86, Heart Rate (Monitored): 85 NIBP: 118/70      Fluids:    Date 10/03/17 0700 - 10/04/17 0659   Shift 3453-5293 1760-9599 1850-5127 24 Hour Total   I  N  T  A  K  E   P.O. 120   120      P.O. 120   120    I.V. 332.3   332.3      IV Volume (Normal Saline) 332.3   332.3    Shift Total 452.3   452.3   O  U  T  P  U  T   Urine 400   400      Condom Catheter 400   400    Stool          Number of Times Stooled 0 x   0 x    Shift Total 400   400   NET 52.3   52.3        Intake/Output Summary (Last 24 hours) at 10/03/17 1126  Last data filed at 10/03/17 1000   Gross per 24 hour   Intake          2772.75 ml   Output             2250 ml   Net           522.75 ml          Body mass index is 18.03 kg/m².    Recent Labs      10/01/17   0551   10/02/17   2205  10/03/17   0210  10/03/17   0645   SODIUM   116*   < >  117*  117*  117*   POTASSIUM  3.5*   < >  4.0  3.9  3.9   CHLORIDE  89*   < >  87*  87*  87*   CO2  18*   < >  23  23  24   BUN  4*   < >  5*  4*  4*   CREATININE  0.26*   < >  0.32*  0.33*  0.31*   PHOSPHORUS  3.3   --    --    --    --    CALCIUM  8.2*   < >  8.7  8.4*  8.7    < > = values in this interval not displayed.       GI/Nutrition:  Recent Labs      09/30/17   1808   10/02/17   2205  10/03/17   0210  10/03/17   0645   ALTSGPT  18   --    --    --    --    ASTSGOT  47*   --    --    --    --    ALKPHOSPHAT  84   --    --    --    --    TBILIRUBIN  0.8   --    --    --    --    GLUCOSE  76   < >  67  92  89    < > = values in this interval not displayed.       Heme:  Recent Labs      09/30/17   1808  10/01/17   0551  10/01/17   0846  10/01/17   1038  10/02/17   0958   RBC  3.64*  3.25*   --    --   3.93*   HEMOGLOBIN  12.3*  11.0*   --    --   13.6*   HEMATOCRIT  33.1*  30.5*   --    --   36.4*   PLATELETCT  262  247   --    --   271   IRON   --    --    --   51   --    FERRITIN   --    --   513.5*   --    --    TOTIRONBC   --    --    --   252   --        Infectious Disease:  Temp  Av °C (98.6 °F)  Min: 36.6 °C (97.9 °F)  Max: 37.3 °C (99.2 °F)  Recent Labs      09/30/17   1808  10/01/17   0551  10/02/17   0958   WBC  9.7  9.0  8.7   NEUTSPOLYS  76.20*  82.80*  79.50*   LYMPHOCYTES  8.50*  7.20*  10.50*   MONOCYTES  13.50*  8.70  8.50   EOSINOPHILS  0.80  0.40  0.60   BASOPHILS  0.40  0.30  0.60   ASTSGOT  47*   --    --    ALTSGPT  18   --    --    ALKPHOSPHAT  84   --    --    TBILIRUBIN  0.8   --    --        Meds:  • vitamin D  5,000 Units     • multivitamin  1 Tab     • thiamine  100 mg     • folic acid  1 mg     • NS   120 mL/hr at 10/03/17 0745   • cefTRIAXone (ROCEPHIN) IVPB  2 g Stopped (10/03/17 0916)   • azithromycin  500 mg     • senna-docusate  2 Tab      And   • polyethylene glycol/lytes  1 Packet      And   • magnesium hydroxide  30 mL      And   • bisacodyl  10 mg     •  Respiratory Care per Protocol       • enoxaparin  40 mg     • labetalol  10 mg     • ondansetron  4 mg     • ondansetron  4 mg     • promethazine  12.5-25 mg     • promethazine  12.5-25 mg     • prochlorperazine  5-10 mg     • nicotine  21 mg      And   • nicotine polacrilex  2 mg     • guaifenesin dextromethorphan  10 mL     • acetaminophen  650 mg     • Influenza Vaccine Quad pf  0.5 mL     • pneumococcal vaccine  0.5 mL          Problem and Plan:    * Hyponatremia- (present on admission)   Assessment & Plan    - Na of 107 and AMS on admission   - Continue slow correction of Na, <10 in 24 hours   - Monitor BMP Q4H with neuro-checks   - Na up to 117 on NS and not increasing  - Increase rate of fluid and adjust per BMP results  - Monitor for signs of fluid overload.        Lung mass- (present on admission)   Assessment & Plan    - Hx of smoking >20 years, 10 pack-year.   - Cough, recent weight loss and low appetite.  - CT chest: 3.6x5.9 cm paratracheal mass compressing on SVC and 2x1.8 cm R upper lobe mass (9/30)  - Highly suspicious for malignant etiology  - Will need eval by biopsy: most likely IR after patient is more stable.        Disorder of electrolytes- (present on admission)   Assessment & Plan    - monitor electrolytes  - replete as necessary        Fall- (present on admission)   Assessment & Plan    - GLF with no reported syncope.  - CT head on admission from other facility negative for bleeding or intracranial pathology  - No evidence of neurological deficits   - CTM        Bacteremia   Assessment & Plan    - blood cultures positive for Strep pneumo  - Continue Rocephin and Azithromycin  - Repeat blood cultures until negative        Chronic alcohol use- (present on admission)   Assessment & Plan    - Consumes 1 beer daily   - BAL: 0.01 on admission   - PO thiamine, B12 and folic acid supplements   - Monitor for withdrawal symptoms           DISPO: If Na remains stable, will transfer to floor.    CODE  STATUS: FULL    Quality Measures:  Garvin Catheter: Condom cath  DVT Prophylaxis: Lovenox  Ulcer Prophylaxis: Not indicated  Antibiotics: C3, Azithromycin  Lines: PIV     Procedures:  None.    Imaging:  CT-CHEST (THORAX) WITH   Final Result      Right paratracheal mass measuring 3.6 x 5.9 cm which causes mass effect on the SVC and trachea with mild tracheal deviation to the left. Subcarinal and right hilar lymphadenopathy is also noted.      2 x 1.8 cm right upper lobe nodule which is centrally hypodense and may be necrotic worrisome for malignancy.      Ill-defined areas of groundglass opacity may be infectious or inflammatory.      Tree-in-bud nodularity in the left lower lobe is likely infectious or inflammatory.      Small pericardial effusion.      Atherosclerotic plaque.      There is likely mild esophageal wall thickening with mucosal hyperenhancement. This can be seen in the setting of esophagitis.      Healing left-sided rib fractures.      Hepatic steatosis.      Hypodensity along the falciform ligament may represent focal fat but a small 1.1 cm lesion is not excluded.            OUTSIDE IMAGES-DX CHEST   Final Result      OUTSIDE IMAGES-CT CERVICAL SPINE   Final Result      OUTSIDE IMAGES-CT FACE   Final Result      OUTSIDE IMAGES-CT HEAD   Final Result

## 2017-10-03 NOTE — PROGRESS NOTES
Pulmonary Critical Care Progress Note      Date of Service: 10/3/2017  Reason for Admit:  Severe hyponatremia and GLF    History of Present Illness:  57 year old homeless male without significant past medical history, was transferred from [facility] for AMS and a sodium level of 107 and also found to have a right apical lung mass       ROS:  Respiratory: negative, Cardiac: negative, GI: negative.  All other systems negative.    Interval Events:  24 hour interval history reviewed    - No overnight events   - Flat affect, but oriented and appropriate   - SR and hemodynamically stable   - Regular 2 g sodium diet   - Last BM yesterday   - No respiratory issues   - 1/2 BC was Strep   - Last sodium was 117   - NS at 120cc    Yesterday's Events:   - No overnight events   - Tmax 99.0   - Remains hemodynamically stable   - Regular 2g sodium diet   - No CXR today and stable on 6 liters/min       PFSH:  No change.    Respiratory:     Pulse Oximetry: 99 %    Exam: clear throughout, no wheezing, but diminished at the bases  ImagingAvailable data reviewed     HemoDynamics:  Pulse: 80, Heart Rate (Monitored): 79  NIBP: 135/82       Exam: regular rate and rhythm  Imaging: Available data reviewed        Neuro:  GCS Total [place] Coma Score: 15       Exam: no focal deficits noted mental status intact oriented for age x3 Motor and sensory exam grossly intact follows commands, raises two fingers  Imaging: Available data reviewed    Fluids:  Intake/Output       10/01/17 0700 - 10/02/17 0659 10/02/17 0700 - 10/03/17 0659 10/03/17 0700 - 10/04/17 0659      1203-8210 0823-8303 Total 8821-7448 6605-0900 Total 5058-6779 6594-5691 Total       Intake    P.O.  118  360 478  630  840 1470  --  -- --    P.O. 118 360 478  -- -- --    I.V.  1100  778 1878  174.5  996 1170.5  --  -- --    IV Volume (1/2NaCL+20KCL)  -- -- -- -- -- --    IV Volume (Rally Bag) 500 168 668 -- -- -- -- -- --    IV Volume (NS w/ 20 K)  -- 110 110 -- -- -- -- -- --    IV Volume (IVPB) -- -- -- 50 -- 50 -- -- --    IV Volume (Normal Saline) -- -- -- 124.5 996 1120.5 -- -- --    Total Intake 1218 1138 2356 804.5 1836 2640.5 -- -- --       Output    Urine  1775  1450 3225  600  1250 1850  --  -- --    Condom Catheter 1775 1450 3225 600 1250 1850 -- -- --    Number of Times Incontinent of Urine -- -- -- 1 x -- 1 x -- -- --    Stool  --  -- --  --  -- --  --  -- --    Number of Times Stooled 2 x -- 2 x 1 x 0 x 1 x -- -- --    Total Output 1775 1450 3225 600 1250 1850 -- -- --       Net I/O     -719 -312 -679 204.5 586 790.5 -- -- --           Recent Labs      10/01/17   0551   10/02/17   2205  10/03/17   0210  10/03/17   0645   SODIUM  116*   < >  117*  117*  117*   POTASSIUM  3.5*   < >  4.0  3.9  3.9   CHLORIDE  89*   < >  87*  87*  87*   CO2  18*   < >  23  23  24   BUN  4*   < >  5*  4*  4*   CREATININE  0.26*   < >  0.32*  0.33*  0.31*   PHOSPHORUS  3.3   --    --    --    --    CALCIUM  8.2*   < >  8.7  8.4*  8.7    < > = values in this interval not displayed.       GI/Nutrition:  Exam: abdomen is soft and non-tender, normal active bowel sounds, nontender  Imaging: Available data reviewed  taking PO  Liver Function  Recent Labs      09/30/17   1808   10/02/17   2205  10/03/17   0210  10/03/17   0645   ALTSGPT  18   --    --    --    --    ASTSGOT  47*   --    --    --    --    ALKPHOSPHAT  84   --    --    --    --    TBILIRUBIN  0.8   --    --    --    --    GLUCOSE  76   < >  67  92  89    < > = values in this interval not displayed.       Heme:  Recent Labs      09/30/17   1808  10/01/17   0551  10/01/17   0846  10/01/17   1038  10/02/17   0958   RBC  3.64*  3.25*   --    --   3.93*   HEMOGLOBIN  12.3*  11.0*   --    --   13.6*   HEMATOCRIT  33.1*  30.5*   --    --   36.4*   PLATELETCT  262  247   --    --   271   IRON   --    --    --   51   --    FERRITIN   --    --   513.5*   --    --    TOTIRONBC   --    --    --   252   --         Infectious Disease:  Temp  Av.1 °C (98.7 °F)  Min: 36.6 °C (97.9 °F)  Max: 37.3 °C (99.2 °F)  Micro: antibiotics reviewed, cultures pending and cultures reviewed  Recent Labs      17   1808  10/01/17   0551  10/02/17   0958   WBC  9.7  9.0  8.7   NEUTSPOLYS  76.20*  82.80*  79.50*   LYMPHOCYTES  8.50*  7.20*  10.50*   MONOCYTES  13.50*  8.70  8.50   EOSINOPHILS  0.80  0.40  0.60   BASOPHILS  0.40  0.30  0.60   ASTSGOT  47*   --    --    ALTSGPT  18   --    --    ALKPHOSPHAT  84   --    --    TBILIRUBIN  0.8   --    --      Current Facility-Administered Medications   Medication Dose Frequency Provider Last Rate Last Dose   • multivitamin (THERAGRAN) tablet 1 Tab  1 Tab DAILY Trell Curry M.D.   1 Tab at 10/02/17 0814   • thiamine (THIAMINE) tablet 100 mg  100 mg DAILY Trell Curry M.D.   100 mg at 10/02/17 0814   • folic acid (FOLVITE) tablet 1 mg  1 mg DAILY Trell Curry M.D.   1 mg at 10/02/17 0814   • NS infusion   Continuous Cortney Parra M.D. 120 mL/hr at 10/03/17 0745     • cefTRIAXone (ROCEPHIN) 2 g in 50 mL D5W IVPB premix  2 g Q24HRS Angelica Roberts M.D.   Stopped at 10/02/17 0844   • azithromycin (ZITHROMAX) tablet 500 mg  500 mg DAILY Angelica Roberts M.D.   500 mg at 10/02/17 0814   • senna-docusate (PERICOLACE or SENOKOT S) 8.6-50 MG per tablet 2 Tab  2 Tab BID Jannet Son M.D.   Stopped at 10/01/17 2100    And   • polyethylene glycol/lytes (MIRALAX) PACKET 1 Packet  1 Packet QDAY PRN Jannet Son M.D.        And   • magnesium hydroxide (MILK OF MAGNESIA) suspension 30 mL  30 mL QDAY PRN Jannet Son M.D.        And   • bisacodyl (DULCOLAX) suppository 10 mg  10 mg QDAY PRN Jannet Son M.D.       • Respiratory Care per Protocol   Continuous RT Jannet Son M.D.       • enoxaparin (LOVENOX) inj 40 mg  40 mg DAILY Jannet Son M.D.   40 mg at 10/02/17 0814   • labetalol (NORMODYNE,TRANDATE) injection 10 mg  10 mg Q4HRS PRN  Jannet Son M.D.       • ondansetron (ZOFRAN) syringe/vial injection 4 mg  4 mg Q4HRS PRN Jannet Son M.D.       • ondansetron (ZOFRAN ODT) dispertab 4 mg  4 mg Q4HRS PRN Jannet Son M.D.       • promethazine (PHENERGAN) tablet 12.5-25 mg  12.5-25 mg Q4HRS PRN Jannet Son M.D.       • promethazine (PHENERGAN) suppository 12.5-25 mg  12.5-25 mg Q4HRS PRN Jannet Son M.D.       • prochlorperazine (COMPAZINE) injection 5-10 mg  5-10 mg Q4HRS PRN Jannet Son M.D.       • nicotine (NICODERM) 21 MG/24HR 21 mg  21 mg Daily-0600 Jannet Son M.D.   21 mg at 10/03/17 0529    And   • nicotine polacrilex (NICORETTE) 2 MG piece 2 mg  2 mg Q HOUR PRN Jannet Son M.D.       • guaifenesin dextromethorphan (ROBITUSSIN DM) 100-10 MG/5ML syrup 10 mL  10 mL Q6HRS PRN Jannet Son M.D.       • acetaminophen (TYLENOL) tablet 650 mg  650 mg Q6HRS PRN Jannet Son M.D.   650 mg at 10/02/17 0814   • Influenza Vaccine Quad pf injection 0.5 mL  0.5 mL Once PRN Trell Curry M.D.       • pneumococcal vaccine (PNEUMOVAX-23) injection 25 mcg  0.5 mL Once PRN Trell Curry M.D.         Last reviewed on 9/30/2017  8:16 PM by Kenny Kinney    Quality  Measures:  Radiology images reviewed, Labs reviewed and Medications reviewed  Garvin catheter: No Garvin      DVT Prophylaxis: Enoxaparin (Lovenox)  DVT prophylaxis - mechanical: SCDs  Ulcer prophylaxis: Not indicated  Antibiotics: Treating active infection/contamination beyond 24 hours perioperative coverage        Problems/Plan:  Severe hyponatremia   - likely related to syndrome of inappropriate antidiuretic hormone +/- beer podomania    - Euvolemic status initially and now may be hypervolemic   - cont NS    - recheck serum/urine osmolality and urine Na+ levels as initial data inconsistent  Large paratracheal mass with bilateral perihilar lymphadenopathy and 2 cm right upper lobe nodule.   - once medically stable will  need IR-guided biopsy   - diffuse infiltrates-->C3/azithromycin  Severe protein-calorie malnutrition.   - dietary to see   - start supplemental nutrition  Non-anion gap metabolic acidosis.  Chronic tobacco use.   - nicotine patch  Chronic alcohol use.   - Rally bag   - cont MVI, folate, and thiamine   - cont to monitor for alcohol withdrawal  Ground level fall, no trauma     Pt is stable from an ICU perspective and can be transferred to the medicine floor with pulmonary continuing to follow due to the lung masses.  Pt will need IR-guided biopsy of right apical mass, but may need staging bronchoscopy vs PET scan for subcarinal adenopathy.    Discussed patient condition and risk of morbidity and/or mortality with RN, RT, Pharmacy, [facility] resident and Patient. [phone]

## 2017-10-03 NOTE — PROGRESS NOTES
Report called to Xiao on Gabriela 5. Assessment, plan of care, labs, medications reviewed with new Primary RN.

## 2017-10-03 NOTE — THERAPY
"Occupational Therapy Evaluation completed.   Functional Status:  Mod A with ADLs and txfs  Plan of Care: Will benefit from Occupational Therapy 3 times per week  Discharge Recommendations:  Equipment: Will Continue to Assess for Equipment Needs. Post-acute therapy Discharge to a transitional care facility for continued skilled therapy services.    See \"Rehab Therapy-Acute\" Patient Summary Report for complete documentation.    "

## 2017-10-03 NOTE — PROGRESS NOTES
Candelaria  from Lab called with critical result of Na 116 at 1600. Critical lab result read back to Candelaria.   Dr. Parra notified of critical lab result at 1604.  Critical lab result read back by Dr. Patterson.

## 2017-10-03 NOTE — PROGRESS NOTES
Dr. Parra notified of Na result of 116, prior check was 117. Pt has transfer orders to Medical and a bed available. MD to come evaluate pt to determine is transfer is still appropriate at this time.

## 2017-10-03 NOTE — PROGRESS NOTES
Dr. Parra on the unit to assess pt. Per MD, pt is still ok to transfer. MD will heplock iv and place patient on 1500 ml/day fluid restriction. Continue to check BMP Q 4 hrs.

## 2017-10-03 NOTE — PROGRESS NOTES
Janet from Lab called with critical result of Na 117 at 0725. Critical lab result read back to Janet.   Dr. Parra notified of critical lab result at 0727.  Critical lab result read back by Dr. Valderrama27.

## 2017-10-04 LAB
ANION GAP SERPL CALC-SCNC: 6 MMOL/L (ref 0–11.9)
ANION GAP SERPL CALC-SCNC: 6 MMOL/L (ref 0–11.9)
ANION GAP SERPL CALC-SCNC: 7 MMOL/L (ref 0–11.9)
ANION GAP SERPL CALC-SCNC: 8 MMOL/L (ref 0–11.9)
ANION GAP SERPL CALC-SCNC: 9 MMOL/L (ref 0–11.9)
ANION GAP SERPL CALC-SCNC: 9 MMOL/L (ref 0–11.9)
BACTERIA BLD CULT: ABNORMAL
BACTERIA BLD CULT: ABNORMAL
BUN SERPL-MCNC: 4 MG/DL (ref 8–22)
BUN SERPL-MCNC: 5 MG/DL (ref 8–22)
BUN SERPL-MCNC: 5 MG/DL (ref 8–22)
BUN SERPL-MCNC: 7 MG/DL (ref 8–22)
BUN SERPL-MCNC: 7 MG/DL (ref 8–22)
BUN SERPL-MCNC: 9 MG/DL (ref 8–22)
CALCIUM SERPL-MCNC: 8.3 MG/DL (ref 8.5–10.5)
CALCIUM SERPL-MCNC: 8.4 MG/DL (ref 8.5–10.5)
CALCIUM SERPL-MCNC: 8.4 MG/DL (ref 8.5–10.5)
CALCIUM SERPL-MCNC: 8.5 MG/DL (ref 8.5–10.5)
CALCIUM SERPL-MCNC: 8.5 MG/DL (ref 8.5–10.5)
CALCIUM SERPL-MCNC: 8.6 MG/DL (ref 8.5–10.5)
CHLORIDE SERPL-SCNC: 84 MMOL/L (ref 96–112)
CHLORIDE SERPL-SCNC: 84 MMOL/L (ref 96–112)
CHLORIDE SERPL-SCNC: 85 MMOL/L (ref 96–112)
CHLORIDE SERPL-SCNC: 85 MMOL/L (ref 96–112)
CHLORIDE SERPL-SCNC: 86 MMOL/L (ref 96–112)
CHLORIDE SERPL-SCNC: 87 MMOL/L (ref 96–112)
CO2 SERPL-SCNC: 21 MMOL/L (ref 20–33)
CO2 SERPL-SCNC: 22 MMOL/L (ref 20–33)
CO2 SERPL-SCNC: 23 MMOL/L (ref 20–33)
CO2 SERPL-SCNC: 24 MMOL/L (ref 20–33)
CREAT SERPL-MCNC: 0.27 MG/DL (ref 0.5–1.4)
CREAT SERPL-MCNC: 0.29 MG/DL (ref 0.5–1.4)
CREAT SERPL-MCNC: 0.31 MG/DL (ref 0.5–1.4)
CREAT SERPL-MCNC: 0.36 MG/DL (ref 0.5–1.4)
CREAT SERPL-MCNC: 0.37 MG/DL (ref 0.5–1.4)
CREAT SERPL-MCNC: 0.41 MG/DL (ref 0.5–1.4)
GFR SERPL CREATININE-BSD FRML MDRD: >60 ML/MIN/1.73 M 2
GLUCOSE SERPL-MCNC: 102 MG/DL (ref 65–99)
GLUCOSE SERPL-MCNC: 130 MG/DL (ref 65–99)
GLUCOSE SERPL-MCNC: 195 MG/DL (ref 65–99)
GLUCOSE SERPL-MCNC: 92 MG/DL (ref 65–99)
GLUCOSE SERPL-MCNC: 92 MG/DL (ref 65–99)
GLUCOSE SERPL-MCNC: 94 MG/DL (ref 65–99)
INR PPP: 1.04 (ref 0.87–1.13)
POTASSIUM SERPL-SCNC: 3.1 MMOL/L (ref 3.6–5.5)
POTASSIUM SERPL-SCNC: 3.5 MMOL/L (ref 3.6–5.5)
POTASSIUM SERPL-SCNC: 3.5 MMOL/L (ref 3.6–5.5)
POTASSIUM SERPL-SCNC: 3.6 MMOL/L (ref 3.6–5.5)
POTASSIUM SERPL-SCNC: 3.7 MMOL/L (ref 3.6–5.5)
POTASSIUM SERPL-SCNC: 3.8 MMOL/L (ref 3.6–5.5)
PROCALCITONIN SERPL-MCNC: 0.06 NG/ML
PROTHROMBIN TIME: 13.9 SEC (ref 12–14.6)
SIGNIFICANT IND 70042: ABNORMAL
SITE SITE: ABNORMAL
SODIUM SERPL-SCNC: 113 MMOL/L (ref 135–145)
SODIUM SERPL-SCNC: 115 MMOL/L (ref 135–145)
SODIUM SERPL-SCNC: 116 MMOL/L (ref 135–145)
SODIUM SERPL-SCNC: 116 MMOL/L (ref 135–145)
SOURCE SOURCE: ABNORMAL

## 2017-10-04 PROCEDURE — 90732 PPSV23 VACC 2 YRS+ SUBQ/IM: CPT | Performed by: INTERNAL MEDICINE

## 2017-10-04 PROCEDURE — 85610 PROTHROMBIN TIME: CPT

## 2017-10-04 PROCEDURE — 700102 HCHG RX REV CODE 250 W/ 637 OVERRIDE(OP): Performed by: INTERNAL MEDICINE

## 2017-10-04 PROCEDURE — 36415 COLL VENOUS BLD VENIPUNCTURE: CPT

## 2017-10-04 PROCEDURE — 80048 BASIC METABOLIC PNL TOTAL CA: CPT

## 2017-10-04 PROCEDURE — A9270 NON-COVERED ITEM OR SERVICE: HCPCS | Performed by: INTERNAL MEDICINE

## 2017-10-04 PROCEDURE — 84145 PROCALCITONIN (PCT): CPT

## 2017-10-04 PROCEDURE — 90686 IIV4 VACC NO PRSV 0.5 ML IM: CPT | Performed by: INTERNAL MEDICINE

## 2017-10-04 PROCEDURE — 90471 IMMUNIZATION ADMIN: CPT

## 2017-10-04 PROCEDURE — 700111 HCHG RX REV CODE 636 W/ 250 OVERRIDE (IP): Performed by: INTERNAL MEDICINE

## 2017-10-04 PROCEDURE — A9270 NON-COVERED ITEM OR SERVICE: HCPCS | Performed by: STUDENT IN AN ORGANIZED HEALTH CARE EDUCATION/TRAINING PROGRAM

## 2017-10-04 PROCEDURE — 700102 HCHG RX REV CODE 250 W/ 637 OVERRIDE(OP): Performed by: STUDENT IN AN ORGANIZED HEALTH CARE EDUCATION/TRAINING PROGRAM

## 2017-10-04 PROCEDURE — 302242 IV POLE: Performed by: STUDENT IN AN ORGANIZED HEALTH CARE EDUCATION/TRAINING PROGRAM

## 2017-10-04 PROCEDURE — 99233 SBSQ HOSP IP/OBS HIGH 50: CPT | Mod: GC | Performed by: INTERNAL MEDICINE

## 2017-10-04 PROCEDURE — 770006 HCHG ROOM/CARE - MED/SURG/GYN SEMI*

## 2017-10-04 RX ORDER — SODIUM CHLORIDE 1 G/1
2 TABLET ORAL
Status: DISCONTINUED | OUTPATIENT
Start: 2017-10-04 | End: 2017-10-05

## 2017-10-04 RX ORDER — SODIUM CHLORIDE 9 MG/ML
INJECTION, SOLUTION INTRAVENOUS CONTINUOUS
Status: DISCONTINUED | OUTPATIENT
Start: 2017-10-04 | End: 2017-10-04

## 2017-10-04 RX ORDER — POTASSIUM CHLORIDE 20 MEQ/1
40 TABLET, EXTENDED RELEASE ORAL DAILY
Status: DISCONTINUED | OUTPATIENT
Start: 2017-10-04 | End: 2017-10-04

## 2017-10-04 RX ORDER — POTASSIUM CHLORIDE 20 MEQ/1
20 TABLET, EXTENDED RELEASE ORAL 2 TIMES DAILY
Status: DISCONTINUED | OUTPATIENT
Start: 2017-10-04 | End: 2017-10-04

## 2017-10-04 RX ORDER — POTASSIUM CHLORIDE 20 MEQ/1
40 TABLET, EXTENDED RELEASE ORAL 2 TIMES DAILY
Status: DISCONTINUED | OUTPATIENT
Start: 2017-10-04 | End: 2017-10-08

## 2017-10-04 RX ADMIN — SODIUM CHLORIDE TAB 1 GM 2 G: 1 TAB at 17:56

## 2017-10-04 RX ADMIN — NICOTINE 21 MG: 21 PATCH, EXTENDED RELEASE TRANSDERMAL at 06:05

## 2017-10-04 RX ADMIN — FOLIC ACID 1 MG: 1 TABLET ORAL at 09:00

## 2017-10-04 RX ADMIN — POTASSIUM CHLORIDE 40 MEQ: 1500 TABLET, EXTENDED RELEASE ORAL at 17:57

## 2017-10-04 RX ADMIN — CEFTRIAXONE 2 G: 2 INJECTION, SOLUTION INTRAVENOUS at 11:17

## 2017-10-04 RX ADMIN — THIAMINE HCL TAB 100 MG 100 MG: 100 TAB at 09:01

## 2017-10-04 RX ADMIN — THERA TABS 1 TABLET: TAB at 09:00

## 2017-10-04 RX ADMIN — PNEUMOCOCCAL VACCINE POLYVALENT 25 MCG
25; 25; 25; 25; 25; 25; 25; 25; 25; 25; 25; 25; 25; 25; 25; 25; 25; 25; 25; 25; 25; 25; 25 INJECTION, SOLUTION INTRAMUSCULAR; SUBCUTANEOUS at 11:17

## 2017-10-04 RX ADMIN — AZITHROMYCIN 500 MG: 250 TABLET, FILM COATED ORAL at 08:59

## 2017-10-04 RX ADMIN — VITAMIN D, TAB 1000IU (100/BT) 5000 UNITS: 25 TAB at 08:59

## 2017-10-04 RX ADMIN — ENOXAPARIN SODIUM 40 MG: 100 INJECTION SUBCUTANEOUS at 09:01

## 2017-10-04 RX ADMIN — STANDARDIZED SENNA CONCENTRATE AND DOCUSATE SODIUM 2 TABLET: 8.6; 5 TABLET, FILM COATED ORAL at 08:59

## 2017-10-04 RX ADMIN — SODIUM CHLORIDE TAB 1 GM 2 G: 1 TAB at 09:00

## 2017-10-04 RX ADMIN — INFLUENZA A VIRUS A/MICHIGAN/45/2015 X-275 (H1N1) ANTIGEN (FORMALDEHYDE INACTIVATED), INFLUENZA A VIRUS A/HONG KONG/4801/2014 X-263B (H3N2) ANTIGEN (FORMALDEHYDE INACTIVATED), INFLUENZA B VIRUS B/PHUKET/3073/2013 ANTIGEN (FORMALDEHYDE INACTIVATED), AND INFLUENZA B VIRUS B/BRISBANE/60/2008 ANTIGEN (FORMALDEHYDE INACTIVATED) 0.5 ML: 15; 15; 15; 15 INJECTION, SUSPENSION INTRAMUSCULAR at 18:33

## 2017-10-04 ASSESSMENT — ENCOUNTER SYMPTOMS
DIZZINESS: 1
WHEEZING: 0
FALLS: 0
LOSS OF CONSCIOUSNESS: 0
POLYDIPSIA: 0
ABDOMINAL PAIN: 0
BRUISES/BLEEDS EASILY: 0
EYE PAIN: 0
SENSORY CHANGE: 1
DOUBLE VISION: 0
NAUSEA: 0
HEARTBURN: 0
COUGH: 1
DIZZINESS: 0
FEVER: 0
DIARRHEA: 0
EYE DISCHARGE: 0
WEIGHT LOSS: 1
SEIZURES: 0
HEADACHES: 0
BLURRED VISION: 0
CHILLS: 0
FOCAL WEAKNESS: 0
PALPITATIONS: 0
HEADACHES: 1
MEMORY LOSS: 0
TREMORS: 0
SPUTUM PRODUCTION: 0
MYALGIAS: 0
HEMOPTYSIS: 0
CONSTIPATION: 0
SHORTNESS OF BREATH: 0
WEAKNESS: 1
NERVOUS/ANXIOUS: 0

## 2017-10-04 ASSESSMENT — PAIN SCALES - GENERAL
PAINLEVEL_OUTOF10: 0
PAINLEVEL_OUTOF10: 0
PAINLEVEL_OUTOF10: 3
PAINLEVEL_OUTOF10: 0
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
## 2017-10-04 NOTE — PROGRESS NOTES
Internal Medicine Interval Note    Name Froylan Spain     1960   Age/Sex 57 y.o. male   MRN [id number]   Code Status FULL     After 5PM or if no immediate response to page, please call for cross-coverage  Attending/Team: Dr. Villalta/Nikolai See Patient List for primary contact information  Call [phone] to page    1st Call - Day Intern (R1):   Dr. Gale 2nd Call - Day Sr. Resident (R2/R3):   Dr. Blanchard         Reason for interval visit  (Principal Problem)   Hyponatremia    Interval Problem Daily Status Update  (24 hours)   Received patient from ICU. Overnight, patient's Na decreased slightly 117-->115-->113. ICU team made aware and fluids were restricted to 1500 ml/day. IV line was heplocked. Patient did not report any symptoms. No seizures.     He currently appears lethargic and reports numbness in his feet b/l, unchanged from the day before. Patient states he has had a cough for years. He does not see a Dr regularly. Lives alone in [place]. Started smoking when he was 26 (30 years, current smoker, 0.5 PPD).    Pt aware about masses in lungs. He would like to have them evaluated. Spoke with Dr. Hood (ICU Pulm) regarding hyponatremia. She recommends regular diet, fluid restriction 1500 L/day. Salt tablets increased to 2g TID. CTM. Dr. Menchaca (pulm) to follow on medical floor.     Pt was seen by Pulmonary this AM. Recommend IR biopsy of R upper lobe lung mass. 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 Early diagnosis and treatment is indicated as small cell cancer is responsive to chemo.     Review of Systems   Constitutional: Negative for chills and fever.   HENT: Negative for congestion.    Eyes: Negative for pain and discharge.   Respiratory: Positive for cough. Negative for sputum production and shortness of breath.    Cardiovascular: Negative for chest pain, palpitations and leg swelling.   Gastrointestinal: Negative for abdominal pain, constipation, diarrhea and  heartburn.   Genitourinary: Negative for dysuria and frequency.   Musculoskeletal: Negative for falls and myalgias.   Skin: Negative for itching and rash.   Neurological: Positive for dizziness, sensory change, weakness and headaches. Negative for tremors, focal weakness, seizures and loss of consciousness.   Endo/Heme/Allergies: Negative for polydipsia. Does not bruise/bleed easily.   Psychiatric/Behavioral: Negative for memory loss. The patient is not nervous/anxious.        Consultants/Specialty  Pulm    Disposition  Inpatient    Quality Measures    Reviewed items::  Labs reviewed, Medications reviewed and Radiology images reviewed  Garvin catheter::  No Garvin  DVT prophylaxis pharmacological::  Enoxaparin (Lovenox)  DVT prophylaxis - mechanical:  SCDs  Antibiotics:  Treating active infection/contamination beyond 24 hours perioperative coverage          Physical Exam       Vitals:    10/03/17 1200 10/03/17 1300 10/04/17 0400 10/04/17 0820   BP:   125/69 106/60   Pulse: 83 78 82 79   Resp: 15 15 16 16   Temp:   36.9 °C (98.5 °F) 37.3 °C (99.1 °F)   SpO2: 95% 99% 93% 97%   Weight:       Height:         Body mass index is 18.03 kg/m².    Oxygen Therapy:  Pulse Oximetry: 97 %, O2 (LPM): 0, O2 Delivery: None (Room Air)    Physical Exam   Constitutional: No distress.   Lethargic, cachectic, disheveled   HENT:   Head: Normocephalic and atraumatic.   Eyes: Conjunctivae are normal. No scleral icterus.   Neck: Normal range of motion.   Cardiovascular: Normal rate, regular rhythm and normal heart sounds.    Pulmonary/Chest: No stridor.   Coarse breathing. Diminished BS b/l.    Abdominal: Soft. Bowel sounds are normal. He exhibits no distension. There is tenderness.   Tenderness on deep palpation of Upper abdomen   Musculoskeletal: Normal range of motion. He exhibits no edema, tenderness or deformity.   Neurological: He is alert.   Poor sensation in UE and LE, difficulty determining sharp vs dull. Strength 3-4 in UE and LE.    Skin: Skin is warm and dry. He is not diaphoretic.   Psychiatric: Affect and judgment normal.         Lab Data Review:         10/4/2017  7:04 AM    Recent Labs      10/04/17   0217  10/04/17   0649  10/04/17   0926   SODIUM  115*  113*  115*   POTASSIUM  3.6  3.5*  3.1*   CHLORIDE  84*  84*  85*   CO2  22  23  21   BUN  5*  4*  5*   CREATININE  0.29*  0.27*  0.36*   CALCIUM  8.4*  8.5  8.3*       Recent Labs      10/04/17   0217  10/04/17   0649  10/04/17   0926   GLUCOSE  94  92  195*       Recent Labs      10/02/17   0958   RBC  3.93*   HEMOGLOBIN  13.6*   HEMATOCRIT  36.4*   PLATELETCT  271       Recent Labs      10/02/17   0958   WBC  8.7   NEUTSPOLYS  79.50*   LYMPHOCYTES  10.50*   MONOCYTES  8.50   EOSINOPHILS  0.60   BASOPHILS  0.60           Assessment/Plan     Hyponatremia- (present on admission)  Na of 107 and AMS on admission, admitted to ICU. Na improved to 117 with IVF on 10/3, and patient was transferred to medical floor. FeNa 0.5%. Hyponatremia likely 2/2 SIADH and mild beer protonemia.   - Continue slow correction of Na, <10 in 24 hours   - As per Dr. Hood (ICU pulm): Diet Regular, Fluid restriction to 1.5L/daily. Increased salt tablets to 2g TID  - Nephrology consulted, appreciate recs on whether additional agents needed  - Monitor BMP Q4H with neuro-checks     Lung mass- (present on admission)  Hx of smoking >20 years, 10 pack-year. Has not seen PMD in years. +Cough for years, recent weight loss, poor appetite. Presented with SIADH. CT chest on 9/30 showed 3.6x5.9 cm paratracheal mass compressing on SVC and 2x1.8 cm  necrotic R upper lobe mass. Pt would like to have this investigated. Likely malignancy.   As per pulm:  - Suspecting a small cell lung cancer with a paraneoplastic syndrome  recommend bronchoscopy soon to evaluate masses. Small cell lung ca is rapidly growing and highly responsive to chemo  - Need to evaluate by tissue IR biopsy, INR ordered     Bacteremia  Blood cultures on  presentation positive for Strep pneumo x1 (10/1). No WBC elevation. No fevers/chills. Likely from CAP  - C/w ceftriaxone and azithromycin (day 4)  - Repeat BCx until 10/3 BCx neg for 48 hours  - CTM for signs of infection    Chronic alcohol use- (present on admission)  Consumes ~2 beers per week. BAL: 0.01 on admission. Mild AST elevation (53).   - f/u B12, folate, TSH, Mg tomorrow  - PO thiamine, B12 and folic acid supplements   - Monitor for withdrawal symptoms     Fall- (present on admission)  Presented after GLF with no reported syncope. CT head on admission from other facility negative for bleeding or intracranial pathology  - No evidence of neurological deficits   - CTM

## 2017-10-04 NOTE — PROGRESS NOTES
Nataly Tamayo Fall Risk Assessment:     Last Known Fall: Within the last month  Mobility: Immobilized/requires assist of one person  Medications: No meds  Mental Status/LOC/Awareness: Oriented to person and place  Toileting Needs: Use of assistive device (Bedside commode, bedpan, urinal)  Volume/Electrolyte Status: Low blood sugar/electrolyte imbalances  Communication/Sensory: No deficits  Behavior: Appropriate behavior  Nataly Tamayo Fall Risk Total: 14  Fall Risk Level: MODERATE RISK    Universal Fall Precautions:  call light/belongings in reach, bed in low position and locked, wheelchairs and assistive devices out of sight, siderails up x 2, use non-slip footwear, adequate lighting, clutter free and spill free environment, educate on level of risk, educate to call for assistance    Fall Risk Level Interventions:    TRIAL (TELE 8, NEURO, MED BEN 5) Moderate Fall Risk Interventions  Place yellow fall risk ID band on patient: verified  Provide patient/family education based on risk assessment : completed  Educate patient/family to call staff for assistance when getting out of bed: completed  Place fall precaution signage outside patient door: completed  Utilize bed/chair fall alarm: completed     Patient Specific Interventions:     Medication: review medications with patient and family  Mental Status/LOC/Awareness: reorient patient, reinforce falls education, check on patient hourly and utilize bed/chair fall alarm  Toileting: consider obtaining elevated toilet seat or bedside commode (BSC)  Volume/Electrolyte Status: ensure patient remains hydrated  Communication/Sensory: update plan of care on whiteboard and ensure proper positioning when transferrng/ambulating  Behavioral: encourage patient to voice feelings  Mobility: utilize bed/chair fall alarm, ensure bed is locked and in lowest position and provide appropriate assistive device

## 2017-10-04 NOTE — PROGRESS NOTES
Lakesha from Lab called with critical result of sodium level 115 mmol/L at 0325. Critical lab result read back to Lakesha.   [facility] team MD notified of critical lab result at 0327.  Critical lab result read back by [facility] team MD at 0333. No new orders received. Pt's VSS, no s/s of distress, pt resting comfortably.

## 2017-10-04 NOTE — PROGRESS NOTES
Roselia from Lab called with critical result of sodium level 113 at at 0730. Critical lab result read back to Roselia. [facility] team (Dr Gale) notified of critical lab result at 0733. Critical lab result read back by Dr. Gale. Order given to increase sodium chloride tablet dose from 1g 3x/day to 2g 3x/day. Pt's VSS, no s/s of distress.Pt eating breakfast.

## 2017-10-04 NOTE — PROGRESS NOTES
Pauline from Lab called with critical result sodium level of 116 at 1540. Critical lab result read back to Pauline. [facility] team notified of critical lab result at 1545. Critical lab result read back by MD. No new orders received, will continue to monitor and report any change in condition. Pt's VSS, no s/s of distress.

## 2017-10-04 NOTE — PROGRESS NOTES
Pulmonary Medicine Interval Note    Name Froylan Spain     1960   Age/Sex 57 y.o. male   MRN [id number]   Code Status Full       Attending/Team: Dr. Menchaca / Pulmonary  See Patient List for primary contact information  Call [phone] to page        Reason for interval visit  (Principal Problem)   Hyponatremia   Lung masses    ID: Patient is a 57-year-old male admitted following a ground level fall and was found to be severely hyponatremic. Further workup showed a right sided paratracheal and upper lobe lung masses concerning for malignancy in the setting of long-standing smoking history. CAT scan also showed some infiltrates and blood cultures were positive for strep pneumonia. Patient currently on ceftriaxone and azithromycin.    Interval Problem Daily Status Update  (24 hours)   Patient reported feeling fine. Denied any shortness of breath or chest pain. Sodium level is down again, 113 this morning. Sodium tablets were added.    Review of Systems   Constitutional: Positive for malaise/fatigue and weight loss. Negative for chills and fever.   HENT: Negative for congestion.    Eyes: Negative for blurred vision and double vision.   Respiratory: Positive for cough. Negative for hemoptysis, sputum production, shortness of breath and wheezing.    Cardiovascular: Negative for chest pain, palpitations and leg swelling.   Gastrointestinal: Negative for constipation, heartburn and nausea.   Genitourinary: Negative for dysuria and urgency.   Musculoskeletal: Negative for myalgias.   Skin: Negative for itching and rash.   Neurological: Positive for weakness. Negative for dizziness, focal weakness and headaches.         Quality Measures    Reviewed items::  EKG reviewed, Labs reviewed, Medications reviewed and Radiology images reviewed          Physical Exam       Vitals:    10/03/17 1200 10/03/17 1300 10/04/17 0400 10/04/17 0820   BP:   125/69 106/60   Pulse: 83 78 82 79   Resp: 15 15 16 16   Temp:   36.9  °C (98.5 °F) 37.3 °C (99.1 °F)   SpO2: 95% 99% 93% 97%   Weight:       Height:         Body mass index is 18.03 kg/m².    Oxygen Therapy:  Pulse Oximetry: 97 %, O2 (LPM): 0, O2 Delivery: None (Room Air)    Physical Exam   Constitutional: He is oriented to person, place, and time.   Cachectic   HENT:   Head: Atraumatic.   Temporal wasting   Eyes: Conjunctivae are normal. Pupils are equal, round, and reactive to light.   Neck: Neck supple.   Cardiovascular: Normal rate, regular rhythm and normal heart sounds.    No murmur heard.  Pulmonary/Chest: Effort normal. He has wheezes (scattered more on the right side. Slightly prolonged expiratory phase).   Abdominal: Soft. He exhibits no distension. There is no tenderness.   Musculoskeletal: Normal range of motion. He exhibits no edema or deformity.   Lymphadenopathy:     He has no cervical adenopathy.   Neurological: He is alert and oriented to person, place, and time.   Skin:   Some skin breaks and dryness on  hands.         Lab Data Review:         10/4/2017  10:44 AM    Recent Labs      10/04/17   0217  10/04/17   0649  10/04/17   0926   SODIUM  115*  113*  115*   POTASSIUM  3.6  3.5*  3.1*   CHLORIDE  84*  84*  85*   CO2  22  23  21   BUN  5*  4*  5*   CREATININE  0.29*  0.27*  0.36*   CALCIUM  8.4*  8.5  8.3*       Recent Labs      10/04/17   0217  10/04/17   0649  10/04/17   0926   GLUCOSE  94  92  195*       Recent Labs      10/02/17   0958   RBC  3.93*   HEMOGLOBIN  13.6*   HEMATOCRIT  36.4*   PLATELETCT  271       Recent Labs      10/02/17   0958   WBC  8.7   NEUTSPOLYS  79.50*   LYMPHOCYTES  10.50*   MONOCYTES  8.50   EOSINOPHILS  0.60   BASOPHILS  0.60           Assessment/Plan   Patient is a 57-year-old male with history of homelessness and chronic tobacco use presents with a ground-level fall found to be severely hyponatremic and Imaging showed concerning right-sided lung masses. Pulmonary medicine asked to evaluate the patient regarding the lung masses. A  tissue diagnosis is definitely required in this situation. A bronchoscopy with biopsy would be a good option at least to help with staging but with the involvement of subcarinal and hilar lymph nodes evident on imaging, the patient is not a surgical candidate anyway. In addition bronchoscopy is not the test of choice at this point due to unstable sodium levels. 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 Plan discussed with the patient and he is agreeable to the plan and workup.    Lung mass  Hyponatremia secondary to SIADH  Strep bacteremia  Community acquired pneumonia  Chronic alcohol use  Chronic tobacco use    Recommend:  -Proceeding with IR biopsy of the right upper lobe lung mass. INR ordered for tomorrow morning.  -Agree with current antibiotics  -RT and OT treatments per protocols  -Hyponatremia management per primary team. Patient currently on salt tablets and sodium levels coming up slowly. Recommend fluid restriction, considering loop diuretics and/or demeclocycline if no improvement on the current treatment.  -Monitor and replace electrolytes as needed. Ordered magnesium levels for tomorrow.  -Discussing goals of care with the patient and next of kin for decision making.  -We'll continue to follow patient with you.  -Thank you for allowing us to participate in this patient's care.
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
## 2017-10-04 NOTE — PROGRESS NOTES
Received report from day RN. Assumed pt care. Discussed call light/phone system, communication board and POC. Pt is aao x 3, disoriented to time. Pt mobility assessed. Pt is a one assist with the use of a FWW. Bed alarm on. Fall precautions in place. Bed is locked and in low position, call light within reach. Treaded slippers in place. Pt needs met at this time. Hourly rounding in place.

## 2017-10-04 NOTE — PROGRESS NOTES
Assumed pt care at 0700. Pt A&Ox3, disoriented to time. Denies pain. No SOB or in any acute distress, pt resting comfortably in bed. Pt on 1,500mL fluid restriction. Up with 1 assist and use of FWW. Bed alarm on, pt wearing treaded socks, personal possessions and call light within reach.

## 2017-10-04 NOTE — WOUND TEAM
"[facility]  Inpatient Services  Initial Wound & Skin Care Evaluation    Admission Date:  9/30/2017   HPI, PMH, SH: Reviewed  Unit where seen by Wound Team: ANDRE 112/00    WOUND CONSULT RELATED TO: multiple wounds    SUBJECTIVE:  \"Okay we can do that.\"     Self Report / Pain Level: no c/o pain      OBJECTIVE: on BRUNO bed, heels floated off pillows, mepilex drsg in place  WOUND TYPE, LOCATION, CHARACTERISTICS (Pressure ulcers: location, stage, POA or date identified)  Wound POA Abrasion Shin;Knee Anterior BLE  Periwound Skin: Intact  Drainage : Scant, Serosanguinous   Tissue Type and %:    100% scab/red  Wound Edges:    attached    Odor:     None   Exposed structure(s):   No   Signs and Symptoms of Infection: none    Wound POA Skin Tear Ear Left  Periwound Skin: Intact  Drainage : None  Tissue Type and %:    100% red  Wound Edges:    attached    Odor:     None   Exposed structure(s):   No   Signs and Symptoms of Infection: none    Wound POA Abrasion Sacrum Medial  Periwound Skin: Intact  Drainage : None     Tissue Type and %:    100% pink  Wound Edges:    attached    Odor:     None   Exposed structure(s):   No   Signs and Symptoms of Infection: none    Measurements: taken 10/3/17    Pierre; L  L ear sacrum  Length (cm):  2  0.4 1.5  Width (cm):  0.8  0.5 2  Depth (cm):  0.1  <0.1 <0.1    Tracts/undermining:  None     INTERVENTIONS BY WOUND TEAM:viewed and palpated wounds, L shin cleaned with NS, dried and mepilex lite foam placed. Peeled back mepilex, viewed and palpated skin, able to remove dry brown crust from wound bed revealing pink resolving wound. Left L ear wound EFRAIN.   Pierre;L-Dressing Options:  (mepilex lite)  Ear Left-Dressing Options: Open to Air  Sacrum Medial-Dressing Options: Mepilex    Interdisciplinary consultation:   With Nursing;With Patient    EVALUATION: pt has abrasions to BLE, discolored elbows that margie. Small open partial thickness wound to L ear, blanching. L shin wound covered r/t " scant ss drainage from SCDs abrading it. Rest of scabs are all EFRAIN. Sacrococcygeal area is covered with a mepilex, improved since admit pictures.     Factors affecting wound healing: lung cancer, homelessness, decreased nutrition  Goals: decreased wound size 2% every week      NURSING PLAN OF CARE ORDERS (X):    Dressing changes: See Dressing Maintenance orders:   Skin care: See Skin Care orders: x  Rectal tube care: See Rectal Tube Care orders:   Other orders:    RSKIN: CURRENT (X) ORDERED (O)  Q shift Kieran:  X  Q shift pressure point assessments:  X  Pressure redistribution mattress        BRUNO   x   Bariatric BRUNO      Bariatric foam        Heel float boots       Heels floated on pillows   x   Barrier wipes      Barrier Cream      Barrier paste      Sacral silicone dressing  x    Padded O2 tubing      Anchorfast      Trach with Optifoam split foam       Waffle cushion      Rectal tube or BMS      Antifungal tx    Turn q 2 hours  x  Up to chair  Ambulate   PT/OT     Dietician      PO x    TF   TPN     PVN    NPO   # days   Other       WOUND TEAM PLAN OF CARE (X):   NPWT change 3 x week:        Dressing changes by wound team:       Follow up as needed:   Wound worsening or failure to progress    Other (explain):    Anticipated discharge plans (X):  SNF:           Home Care:           Outpatient Wound Center:            Self Care:            Other: tbd

## 2017-10-04 NOTE — PROGRESS NOTES
Updates:    1. Case discussed with Dr. Choudhary (Pulmonary resident physician) - Pt to have IR biopsy of peripheral pulmonary lesion tomorrow, staining for EGFR, ALK, and ROS requested. NPO after midnight. INR ordered for tomorrow morning. Pt is not a good candidate for bronchoscopy at this time due to hyponatremia. CTM.    2. Pt seen by Dr. Bain (nephrology). NO FREE fluids at this time, other beverages OK. Encourage improved nutritional status. Poor PO intake. Nutrition consult placed. K repletion 40 mEq BID ordered. Will continue to follow.

## 2017-10-04 NOTE — PROGRESS NOTES
Nataly Tamayo Fall Risk Assessment:     Last Known Fall: Within the last month  Mobility: Immobilized/requires assist of one person  Medications: No meds  Mental Status/LOC/Awareness: Oriented to person and place  Toileting Needs: Use of assistive device (Bedside commode, bedpan, urinal)  Volume/Electrolyte Status: Low blood sugar/electrolyte imbalances  Communication/Sensory: No deficits  Behavior: Appropriate behavior  Nataly Tamayo Fall Risk Total: 14  Fall Risk Level: MODERATE RISK    Universal Fall Precautions:  call light/belongings in reach, bed in low position and locked, wheelchairs and assistive devices out of sight, use non-slip footwear, adequate lighting, clutter free and spill free environment, educate on level of risk, educate to call for assistance    Fall Risk Level Interventions:    TRIAL (TELE 8, NEURO, MED BEN 5) Moderate Fall Risk Interventions  Place yellow fall risk ID band on patient: verified  Provide patient/family education based on risk assessment : completed  Educate patient/family to call staff for assistance when getting out of bed: completed  Place fall precaution signage outside patient door: verified  Utilize bed/chair fall alarm: verified     Patient Specific Interventions:     Medication: review medications with patient and family  Mental Status/LOC/Awareness: reinforce falls education, check on patient hourly, utilize bed/chair fall alarm and reinforce the use of call light  Toileting: consider obtaining elevated toilet seat or bedside commode (BSC), provide frquent toileting, instruct patient/family on the use of grab bars and instruct patient/family on the need to call for assistance when toileting  Volume/Electrolyte Status: ensure patient remains hydrated and monitor abnormal lab values  Communication/Sensory: update plan of care on whiteboard and ensure proper positioning when transferrng/ambulating  Behavioral: encourage patient to voice feelings  Mobility: utilize  bed/chair fall alarm, ensure bed is locked and in lowest position, provide appropriate assistive device and collaborate with doctor for possible PT/OT consult

## 2017-10-05 ENCOUNTER — APPOINTMENT (OUTPATIENT)
Dept: RADIOLOGY | Facility: MEDICAL CENTER | Age: 57
DRG: 166 | End: 2017-10-05
Attending: RADIOLOGY
Payer: COMMERCIAL

## 2017-10-05 ENCOUNTER — APPOINTMENT (OUTPATIENT)
Dept: RADIOLOGY | Facility: MEDICAL CENTER | Age: 57
DRG: 166 | End: 2017-10-05
Attending: STUDENT IN AN ORGANIZED HEALTH CARE EDUCATION/TRAINING PROGRAM
Payer: COMMERCIAL

## 2017-10-05 LAB
ALBUMIN SERPL BCP-MCNC: 2.9 G/DL (ref 3.2–4.9)
ALBUMIN/GLOB SERPL: 0.9 G/DL
ALP SERPL-CCNC: 68 U/L (ref 30–99)
ALT SERPL-CCNC: 19 U/L (ref 2–50)
ANION GAP SERPL CALC-SCNC: 5 MMOL/L (ref 0–11.9)
ANION GAP SERPL CALC-SCNC: 6 MMOL/L (ref 0–11.9)
ANION GAP SERPL CALC-SCNC: 7 MMOL/L (ref 0–11.9)
ANION GAP SERPL CALC-SCNC: 8 MMOL/L (ref 0–11.9)
AST SERPL-CCNC: 30 U/L (ref 12–45)
BASOPHILS # BLD AUTO: 0.5 % (ref 0–1.8)
BASOPHILS # BLD: 0.03 K/UL (ref 0–0.12)
BILIRUB SERPL-MCNC: 0.5 MG/DL (ref 0.1–1.5)
BUN SERPL-MCNC: 10 MG/DL (ref 8–22)
BUN SERPL-MCNC: 6 MG/DL (ref 8–22)
BUN SERPL-MCNC: 7 MG/DL (ref 8–22)
CALCIUM SERPL-MCNC: 8.4 MG/DL (ref 8.5–10.5)
CALCIUM SERPL-MCNC: 8.5 MG/DL (ref 8.5–10.5)
CALCIUM SERPL-MCNC: 8.7 MG/DL (ref 8.5–10.5)
CALCIUM SERPL-MCNC: 8.9 MG/DL (ref 8.5–10.5)
CHLORIDE SERPL-SCNC: 88 MMOL/L (ref 96–112)
CHLORIDE SERPL-SCNC: 89 MMOL/L (ref 96–112)
CHLORIDE SERPL-SCNC: 90 MMOL/L (ref 96–112)
CHLORIDE SERPL-SCNC: 93 MMOL/L (ref 96–112)
CO2 SERPL-SCNC: 21 MMOL/L (ref 20–33)
CO2 SERPL-SCNC: 22 MMOL/L (ref 20–33)
CO2 SERPL-SCNC: 22 MMOL/L (ref 20–33)
CO2 SERPL-SCNC: 23 MMOL/L (ref 20–33)
CO2 SERPL-SCNC: 24 MMOL/L (ref 20–33)
CO2 SERPL-SCNC: 24 MMOL/L (ref 20–33)
CREAT SERPL-MCNC: 0.28 MG/DL (ref 0.5–1.4)
CREAT SERPL-MCNC: 0.29 MG/DL (ref 0.5–1.4)
CREAT SERPL-MCNC: 0.29 MG/DL (ref 0.5–1.4)
CREAT SERPL-MCNC: 0.34 MG/DL (ref 0.5–1.4)
CREAT SERPL-MCNC: 0.36 MG/DL (ref 0.5–1.4)
CREAT SERPL-MCNC: 0.4 MG/DL (ref 0.5–1.4)
EOSINOPHIL # BLD AUTO: 0.13 K/UL (ref 0–0.51)
EOSINOPHIL NFR BLD: 2.1 % (ref 0–6.9)
ERYTHROCYTE [DISTWIDTH] IN BLOOD BY AUTOMATED COUNT: 37.8 FL (ref 35.9–50)
FOLATE SERPL-MCNC: 11.4 NG/ML
GFR SERPL CREATININE-BSD FRML MDRD: >60 ML/MIN/1.73 M 2
GLOBULIN SER CALC-MCNC: 3.1 G/DL (ref 1.9–3.5)
GLUCOSE SERPL-MCNC: 194 MG/DL (ref 65–99)
GLUCOSE SERPL-MCNC: 63 MG/DL (ref 65–99)
GLUCOSE SERPL-MCNC: 83 MG/DL (ref 65–99)
GLUCOSE SERPL-MCNC: 87 MG/DL (ref 65–99)
GLUCOSE SERPL-MCNC: 88 MG/DL (ref 65–99)
GLUCOSE SERPL-MCNC: 90 MG/DL (ref 65–99)
HCT VFR BLD AUTO: 32.2 % (ref 42–52)
HGB BLD-MCNC: 11.9 G/DL (ref 14–18)
IMM GRANULOCYTES # BLD AUTO: 0.04 K/UL (ref 0–0.11)
IMM GRANULOCYTES NFR BLD AUTO: 0.6 % (ref 0–0.9)
INR PPP: 1.08 (ref 0.87–1.13)
LYMPHOCYTES # BLD AUTO: 1.14 K/UL (ref 1–4.8)
LYMPHOCYTES NFR BLD: 18.5 % (ref 22–41)
MAGNESIUM SERPL-MCNC: 1.6 MG/DL (ref 1.5–2.5)
MCH RBC QN AUTO: 33.8 PG (ref 27–33)
MCHC RBC AUTO-ENTMCNC: 37 G/DL (ref 33.7–35.3)
MCV RBC AUTO: 91.5 FL (ref 81.4–97.8)
MONOCYTES # BLD AUTO: 1.27 K/UL (ref 0–0.85)
MONOCYTES NFR BLD AUTO: 20.6 % (ref 0–13.4)
NEUTROPHILS # BLD AUTO: 3.55 K/UL (ref 1.82–7.42)
NEUTROPHILS NFR BLD: 57.7 % (ref 44–72)
NRBC # BLD AUTO: 0 K/UL
NRBC BLD AUTO-RTO: 0 /100 WBC
PLATELET # BLD AUTO: 267 K/UL (ref 164–446)
PMV BLD AUTO: 9.3 FL (ref 9–12.9)
POTASSIUM SERPL-SCNC: 3.7 MMOL/L (ref 3.6–5.5)
POTASSIUM SERPL-SCNC: 3.7 MMOL/L (ref 3.6–5.5)
POTASSIUM SERPL-SCNC: 3.8 MMOL/L (ref 3.6–5.5)
POTASSIUM SERPL-SCNC: 3.8 MMOL/L (ref 3.6–5.5)
POTASSIUM SERPL-SCNC: 4 MMOL/L (ref 3.6–5.5)
POTASSIUM SERPL-SCNC: 4.3 MMOL/L (ref 3.6–5.5)
PROT SERPL-MCNC: 6 G/DL (ref 6–8.2)
PROTHROMBIN TIME: 14.3 SEC (ref 12–14.6)
RBC # BLD AUTO: 3.46 M/UL (ref 4.7–6.1)
SODIUM SERPL-SCNC: 117 MMOL/L (ref 135–145)
SODIUM SERPL-SCNC: 117 MMOL/L (ref 135–145)
SODIUM SERPL-SCNC: 118 MMOL/L (ref 135–145)
SODIUM SERPL-SCNC: 118 MMOL/L (ref 135–145)
SODIUM SERPL-SCNC: 120 MMOL/L (ref 135–145)
SODIUM SERPL-SCNC: 122 MMOL/L (ref 135–145)
TSH SERPL DL<=0.005 MIU/L-ACNC: 3.68 UIU/ML (ref 0.3–3.7)
VIT B12 SERPL-MCNC: 386 PG/ML (ref 211–911)
WBC # BLD AUTO: 6.2 K/UL (ref 4.8–10.8)

## 2017-10-05 PROCEDURE — 700102 HCHG RX REV CODE 250 W/ 637 OVERRIDE(OP): Performed by: INTERNAL MEDICINE

## 2017-10-05 PROCEDURE — 99232 SBSQ HOSP IP/OBS MODERATE 35: CPT | Mod: GC | Performed by: INTERNAL MEDICINE

## 2017-10-05 PROCEDURE — 82607 VITAMIN B-12: CPT

## 2017-10-05 PROCEDURE — A9270 NON-COVERED ITEM OR SERVICE: HCPCS | Performed by: STUDENT IN AN ORGANIZED HEALTH CARE EDUCATION/TRAINING PROGRAM

## 2017-10-05 PROCEDURE — 0BBC3ZX EXCISION OF RIGHT UPPER LUNG LOBE, PERCUTANEOUS APPROACH, DIAGNOSTIC: ICD-10-PCS | Performed by: RADIOLOGY

## 2017-10-05 PROCEDURE — 80048 BASIC METABOLIC PNL TOTAL CA: CPT | Mod: 91

## 2017-10-05 PROCEDURE — 700101 HCHG RX REV CODE 250

## 2017-10-05 PROCEDURE — 770006 HCHG ROOM/CARE - MED/SURG/GYN SEMI*

## 2017-10-05 PROCEDURE — A9270 NON-COVERED ITEM OR SERVICE: HCPCS | Performed by: INTERNAL MEDICINE

## 2017-10-05 PROCEDURE — 36415 COLL VENOUS BLD VENIPUNCTURE: CPT

## 2017-10-05 PROCEDURE — 700111 HCHG RX REV CODE 636 W/ 250 OVERRIDE (IP)

## 2017-10-05 PROCEDURE — 700111 HCHG RX REV CODE 636 W/ 250 OVERRIDE (IP): Performed by: STUDENT IN AN ORGANIZED HEALTH CARE EDUCATION/TRAINING PROGRAM

## 2017-10-05 PROCEDURE — 85610 PROTHROMBIN TIME: CPT

## 2017-10-05 PROCEDURE — 700111 HCHG RX REV CODE 636 W/ 250 OVERRIDE (IP): Performed by: INTERNAL MEDICINE

## 2017-10-05 PROCEDURE — 85025 COMPLETE CBC W/AUTO DIFF WBC: CPT

## 2017-10-05 PROCEDURE — 700102 HCHG RX REV CODE 250 W/ 637 OVERRIDE(OP): Performed by: STUDENT IN AN ORGANIZED HEALTH CARE EDUCATION/TRAINING PROGRAM

## 2017-10-05 PROCEDURE — 82746 ASSAY OF FOLIC ACID SERUM: CPT

## 2017-10-05 PROCEDURE — 80053 COMPREHEN METABOLIC PANEL: CPT

## 2017-10-05 PROCEDURE — 99153 MOD SED SAME PHYS/QHP EA: CPT

## 2017-10-05 PROCEDURE — 83735 ASSAY OF MAGNESIUM: CPT

## 2017-10-05 PROCEDURE — 71010 DX-CHEST-PORTABLE (1 VIEW): CPT

## 2017-10-05 PROCEDURE — 84443 ASSAY THYROID STIM HORMONE: CPT

## 2017-10-05 PROCEDURE — 88305 TISSUE EXAM BY PATHOLOGIST: CPT

## 2017-10-05 RX ORDER — FLUMAZENIL 0.1 MG/ML
INJECTION INTRAVENOUS
Status: COMPLETED
Start: 2017-10-05 | End: 2017-10-05

## 2017-10-05 RX ORDER — MIDAZOLAM HYDROCHLORIDE 1 MG/ML
INJECTION INTRAMUSCULAR; INTRAVENOUS
Status: COMPLETED
Start: 2017-10-05 | End: 2017-10-05

## 2017-10-05 RX ORDER — SODIUM CHLORIDE 1 G/1
3 TABLET ORAL
Status: DISCONTINUED | OUTPATIENT
Start: 2017-10-05 | End: 2017-10-22

## 2017-10-05 RX ORDER — ONDANSETRON 2 MG/ML
4 INJECTION INTRAMUSCULAR; INTRAVENOUS PRN
Status: ACTIVE | OUTPATIENT
Start: 2017-10-05 | End: 2017-10-05

## 2017-10-05 RX ORDER — AZITHROMYCIN 250 MG/1
500 TABLET, FILM COATED ORAL DAILY
Status: DISCONTINUED | OUTPATIENT
Start: 2017-10-05 | End: 2017-10-06

## 2017-10-05 RX ORDER — MIDAZOLAM HYDROCHLORIDE 1 MG/ML
.5-2 INJECTION INTRAMUSCULAR; INTRAVENOUS PRN
Status: ACTIVE | OUTPATIENT
Start: 2017-10-05 | End: 2017-10-05

## 2017-10-05 RX ORDER — SODIUM CHLORIDE 9 MG/ML
500 INJECTION, SOLUTION INTRAVENOUS
Status: ACTIVE | OUTPATIENT
Start: 2017-10-05 | End: 2017-10-05

## 2017-10-05 RX ORDER — NALOXONE HYDROCHLORIDE 0.4 MG/ML
INJECTION, SOLUTION INTRAMUSCULAR; INTRAVENOUS; SUBCUTANEOUS
Status: COMPLETED
Start: 2017-10-05 | End: 2017-10-05

## 2017-10-05 RX ADMIN — SODIUM CHLORIDE TAB 1 GM 2 G: 1 TAB at 12:37

## 2017-10-05 RX ADMIN — FOLIC ACID 1 MG: 1 TABLET ORAL at 16:58

## 2017-10-05 RX ADMIN — AZITHROMYCIN 500 MG: 250 TABLET, FILM COATED ORAL at 16:57

## 2017-10-05 RX ADMIN — MIDAZOLAM HYDROCHLORIDE 2 MG: 1 INJECTION INTRAMUSCULAR; INTRAVENOUS at 13:12

## 2017-10-05 RX ADMIN — STANDARDIZED SENNA CONCENTRATE AND DOCUSATE SODIUM 2 TABLET: 8.6; 5 TABLET, FILM COATED ORAL at 16:58

## 2017-10-05 RX ADMIN — POTASSIUM CHLORIDE 40 MEQ: 1500 TABLET, EXTENDED RELEASE ORAL at 22:31

## 2017-10-05 RX ADMIN — THERA TABS 1 TABLET: TAB at 16:56

## 2017-10-05 RX ADMIN — THIAMINE HCL TAB 100 MG 100 MG: 100 TAB at 16:57

## 2017-10-05 RX ADMIN — ENOXAPARIN SODIUM 40 MG: 100 INJECTION SUBCUTANEOUS at 22:30

## 2017-10-05 RX ADMIN — SODIUM CHLORIDE TAB 1 GM 3 G: 1 TAB at 16:57

## 2017-10-05 RX ADMIN — POTASSIUM CHLORIDE 40 MEQ: 1500 TABLET, EXTENDED RELEASE ORAL at 16:58

## 2017-10-05 RX ADMIN — MIDAZOLAM 2 MG: 1 INJECTION INTRAMUSCULAR; INTRAVENOUS at 13:12

## 2017-10-05 RX ADMIN — NICOTINE 21 MG: 21 PATCH, EXTENDED RELEASE TRANSDERMAL at 06:23

## 2017-10-05 RX ADMIN — VITAMIN D, TAB 1000IU (100/BT) 5000 UNITS: 25 TAB at 16:55

## 2017-10-05 RX ADMIN — FENTANYL CITRATE 25 MCG: 50 INJECTION, SOLUTION INTRAMUSCULAR; INTRAVENOUS at 13:12

## 2017-10-05 RX ADMIN — CEFTRIAXONE 2 G: 2 INJECTION, SOLUTION INTRAVENOUS at 09:55

## 2017-10-05 ASSESSMENT — ENCOUNTER SYMPTOMS
ORTHOPNEA: 0
COUGH: 1
LOSS OF CONSCIOUSNESS: 0
MYALGIAS: 0
SORE THROAT: 0
DEPRESSION: 0
NERVOUS/ANXIOUS: 0
SENSORY CHANGE: 0
HEARTBURN: 0
NECK PAIN: 0
EYES NEGATIVE: 1
ABDOMINAL PAIN: 0
BLURRED VISION: 0
HEMOPTYSIS: 0
BACK PAIN: 1
FOCAL WEAKNESS: 0
DIZZINESS: 0
WEIGHT LOSS: 1
MUSCULOSKELETAL NEGATIVE: 1
FLANK PAIN: 0
DOUBLE VISION: 0
CONSTIPATION: 0
CHILLS: 0
WEAKNESS: 1
FEVER: 0
SHORTNESS OF BREATH: 0
STRIDOR: 0
VOMITING: 0
PALPITATIONS: 0
SPUTUM PRODUCTION: 0
SEIZURES: 0
SENSORY CHANGE: 1
HEADACHES: 0
DIARRHEA: 0
NAUSEA: 0
WHEEZING: 0
COUGH: 0

## 2017-10-05 ASSESSMENT — PAIN SCALES - GENERAL
PAINLEVEL_OUTOF10: 0

## 2017-10-05 ASSESSMENT — LIFESTYLE VARIABLES: SUBSTANCE_ABUSE: 0

## 2017-10-05 NOTE — PROGRESS NOTES
10/4/17 Per Dr. Anita christy to do procedure on 10/5/17-Informed nurse Xiao and she will have pt NPO midnight and will have PT/INR drawn. [email or web address]

## 2017-10-05 NOTE — PROGRESS NOTES
Nataly Tamayo Fall Risk Assessment:     Last Known Fall: Within the last month  Mobility: Immobilized/requires assist of one person  Medications: No meds  Mental Status/LOC/Awareness: Awake, alert, and oriented to date, place, and person  Toileting Needs: Use of assistive device (Bedside commode, bedpan, urinal)  Volume/Electrolyte Status: No problems  Communication/Sensory: No deficits  Behavior: Appropriate behavior  Nataly Tamayo Fall Risk Total: 9  Fall Risk Level: LOW RISK    Universal Fall Precautions:  call light/belongings in reach, bed in low position and locked, wheelchairs and assistive devices out of sight, siderails up x 2, use non-slip footwear, adequate lighting, clutter free and spill free environment, educate on level of risk, educate to call for assistance    Fall Risk Level Interventions:   TRIAL ([employer]er, NEURO, MED BEN 5) Low Fall Risk Interventions  Place yellow fall risk ID band on patient: verified  Provide patient/family education based on risk assessment: completed  Educate patient/family to call staff for assistance when getting out of bed: completed  Place fall precaution signage outside patient door: verifiedTRIAL ([place] 8, NEURO, MED BEN 5) Moderate Fall Risk Interventions  Place yellow fall risk ID band on patient: verified  Provide patient/family education based on risk assessment : completed  Educate patient/family to call staff for assistance when getting out of bed: completed  Place fall precaution signage outside patient door: verified  Utilize bed/chair fall alarm: verified     Patient Specific Interventions:     Medication: review medications with patient and family  Mental Status/LOC/Awareness: reinforce falls education, check on patient hourly, utilize bed/chair fall alarm and reinforce the use of call light  Toileting: provide frquent toileting  Volume/Electrolyte Status: monitor abnormal lab values and ensure IV fluids are appropriate  Communication/Sensory: update plan of  care on whiteboard  Behavioral: administer medication as ordered  Mobility: utilize bed/chair fall alarm and ensure bed is locked and in lowest position

## 2017-10-05 NOTE — PROGRESS NOTES
"       Internal Medicine Interval Note  Note Author: Mary Gale     Name Froylan Spain     1960   Age/Sex 57 y.o. male   MRN [id number]   Code Status FULL     After 5PM or if no immediate response to page, please call for cross-coverage  Attending/Team: Dr. Villalta/Nikolai See Patient List for primary contact information  Call [phone] to page    1st Call - Day Intern (R1):   Dr. Gale 2nd Call - Day Sr. Resident (R2/R3):   Dr. Earl          Reason for interval visit  (Principal Problem)   Hyponatremia    Interval Problem Daily Status Update  (24 hours)   Pt seen and examined at bedside. Patient continues to feel weak. No seizures or AMS. Na uptrending to 118 this morning. B12/Folate/TSH WNL.     States he has \"a little\" shortness of breath. Cough persists. BCx from 10/2 and 103 NGTD.    No abdominal complaints. Was NPO after midnight. Lovenox was d/c'ed for procedure today. Will be restarted 6h after procedure. INR 1.04.     Discussed need for IR biopsy this AM.     ROS  Constitutional: Negative for chills and fever.   HENT: Negative for congestion.    Eyes: Negative for pain and discharge.   Respiratory: Positive for cough and shortness of breath. Negative for sputum production.  Cardiovascular: Negative for chest pain, palpitations and leg swelling.   Gastrointestinal: Negative for abdominal pain, constipation, diarrhea and heartburn.   Genitourinary: Negative for dysuria and frequency.   Musculoskeletal: Negative for falls and myalgias.   Skin: Negative for itching and rash.   Neurological: Positive for weakness. Negative for tremors, focal weakness, seizures and loss of consciousness.   Endo/Heme/Allergies: Negative for polydipsia. Does not bruise/bleed easily.   Psychiatric/Behavioral: Negative for memory loss. The patient is not nervous/anxious.      Consultants/Specialty  Nephrology  Pulmonary  IR    Disposition  Inpatient    Quality Measures  Quality-Core Measures  Reviewed items::  " Labs reviewed, Medications reviewed and Radiology images reviewed  Garvin catheter::  No Garvin  DVT prophylaxis - mechanical:  SCDs  Antibiotics:  Treating active infection/contamination beyond 24 hours perioperative coverage      Physical Exam       Vitals:    10/04/17 0820 10/04/17 1647 10/04/17 2000 10/05/17 0400   BP: 106/60 120/74 129/75 106/68   Pulse: 79 81 81 82   Resp: 16 16 16 16   Temp: 37.3 °C (99.1 °F) 37.1 °C (98.8 °F) 36.8 °C (98.2 °F) 36.6 °C (97.8 °F)   SpO2: 97% 97% 96% 100%   Weight:       Height:         Body mass index is 18.03 kg/m².    Oxygen Therapy:  Pulse Oximetry: 100 %, O2 (LPM): 0, O2 Delivery: None (Room Air)    Physical Exam   Constitutional: Weak, cachectic, temporal wasting. He is oriented to person, place, and time.   Eyes: Conjunctivae are normal. Pupils are equal, round, and reactive to light.   Neck: Neck supple.   Cardiovascular: Normal rate, regular rhythm and normal heart sounds.    No murmur heard.  Pulmonary/Chest: Effort normal. Scattered diffuse rhonchi R>L. Noisy upper airway sounds.   Abdominal: Soft. He exhibits no distension. There is no tenderness.   Musculoskeletal: Normal range of motion. He exhibits no edema or deformity.   Lymphadenopathy:     He has no cervical adenopathy.   Neurological: He is alert and oriented to person, place, and time.   Skin: Patient appears dry.     Lab Data Review:         10/5/2017  7:15 AM    Recent Labs      10/04/17   2145  10/05/17   0255  10/05/17   0615   SODIUM  116*  117*  118*   POTASSIUM  3.8  3.8  3.7   CHLORIDE  87*  88*  88*   CO2  22  23  22   BUN  7*  7*  6*   CREATININE  0.31*  0.29*  0.29*   MAGNESIUM   --   1.6   --    CALCIUM  8.6  8.9  8.5       Recent Labs      10/04/17   2145  10/05/17   0255  10/05/17   0615   ALTSGPT   --   19   --    ASTSGOT   --   30   --    ALKPHOSPHAT   --   68   --    TBILIRUBIN   --   0.5   --    GLUCOSE  92  83  90       Recent Labs      10/02/17   0958  10/04/17   2146  10/05/17   0256    RBC  3.93*   --   3.46*   HEMOGLOBIN  13.6*   --   11.9*   HEMATOCRIT  36.4*   --   32.2*   PLATELETCT  271   --   267   PROTHROMBTM   --   13.9  14.3   INR   --   1.04  1.08       Recent Labs      10/02/17   0958  10/05/17   0255   WBC  8.7  6.2   NEUTSPOLYS  79.50*  57.70   LYMPHOCYTES  10.50*  18.50*   MONOCYTES  8.50  20.60*   EOSINOPHILS  0.60  2.10   BASOPHILS  0.60  0.50   ASTSGOT   --   30   ALTSGPT   --   19   ALKPHOSPHAT   --   68   TBILIRUBIN   --   0.5           Assessment/Plan     Hyponatremia- (present on admission)  Na of 107 and AMS on admission, admitted to ICU. Na improved to 117 with IVF on 10/3, and patient was transferred to medical floor. Hyponatremia likely 2/2 SIADH. Na now uptrending.  - Continue slow correction of Na, <10 in 24 hours   -  Diet Regular, Fluid restriction to 800 mL daily. Increased salt tablets to 3g TID.   - Nephrology on board  - Monitor BMP Q4H with neuro-checks      Lung mass- (present on admission)  Hx of smoking >20 years, 10 pack-year. Has not seen PMD in years. +Cough for years, recent weight loss, poor appetite. Presented with SIADH. CT chest on 9/30 showed 3.6x5.9 cm paratracheal mass compressing on SVC and 2x1.8 cm  necrotic R upper lobe mass. Pt would like to have this investigated. Likely malignancy.   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 mall cell lung ca is rapidly growing and highly responsive to chemo  - No bronchoscopy at this time as pt is hyponatremic  - F/u IR bx results      Bacteremia  Blood cultures on presentation positive for Strep pneumo x1 (10/1). No WBC elevation. No fevers/chills. Likely from CAP. BCx 10/1 +, BCx 10/2 and 10/3 NGTD.  - C/w ceftriaxone and azithromycin (day 5)  - CTM for signs of infection    Chronic alcohol use- (present on admission)  Consumes ~2 beers per week. BAL: 0.01 on admission. Mild AST elevation (53). No withdrawal sx to date.  -  B12, folate, TSH, Mg WNL  - C/w PO thiamine, B12 and folic acid supplements   - Monitor for withdrawal symptoms      Fall- (present on admission)  Presented after GLF with no reported syncope. CT head on admission from other facility negative for bleeding or intracranial pathology  - No evidence of neurological deficits   - CTM

## 2017-10-05 NOTE — PROGRESS NOTES
Roselia from Lab called with critical result of sodium 118 at 0712. Critical lab result read back to Roselia.   [facility] team notified of critical lab result at 0725.  Critical lab result read back by MD.

## 2017-10-05 NOTE — DISCHARGE PLANNING
MARIAJOSE received consult for  stating pt would like his brother contacted to make medical decisions in case pt becomes incapacitated. This can be completed through an advanced directive. Certificate of Competency placed in pt's chart.   MARIAJOSE paged [facility] Dr. Gale requesting Certificate of Competency be completed. MARIAJOSE will notify Lety Reyes that patient is requesting an Advanced Directive.

## 2017-10-05 NOTE — PROGRESS NOTES
CT RN note:    RIGHT UPPER LUNG MASS BIOPSY  by MD CONTRERAS assisted by CT TECH CRESENCIO,  End Tidal CO2 NOT WORKING during procedure  D/T PT BEING A MOUTH BREATHER    Patient tolerated procedure, hemodynamically stable; report given to MARCIA IRIZARRY;   patient transported back to [facility]-

## 2017-10-05 NOTE — PROGRESS NOTES
Lakesha from Lab called with critical result of sodium level 117 at 0330. Critical lab result read back to Lakesha.   This critical lab result is within parameters established by [facility] team per previous result for this patient. Pt's sodium level trending up. Pt is asymptomatic.

## 2017-10-05 NOTE — PROGRESS NOTES
Pt arrived via bed to room 521-2 from interventional radiology. Pt is not c/o pain or distress at this time. Will monitor.

## 2017-10-05 NOTE — PROGRESS NOTES
Received report from day RN. Assumed pt care. Discussed call light/phone system, communication board and POC. Pt is aao x 4 and verbalizes understanding. Pt severely hyponatremic. This RN monitors labs closely and watches abnormal s/s. Pt mobility assessed. Pt is a one assist with the use of a FWW. Bed alarm on. Fall precautions in place. Bed is locked and in low position, call light within reach. Treaded slippers in place. Pt needs met at this time. Hourly rounding in place.

## 2017-10-05 NOTE — PROGRESS NOTES
Pulmonary Medicine Interval Note    Name Froylan Spain     1960   Age/Sex 57 y.o. male   MRN [id number]   Code Status Full       Attending/Team: Dr. Menchaca / Pulmonary  See Patient List for primary contact information  Call [phone] to page        Reason for interval visit  (Principal Problem)   Hyponatremia   Lung masses    ID: Patient is a 57-year-old male admitted following a ground level fall and was found to be severely hyponatremic. Further workup showed a right sided paratracheal and upper lobe lung masses concerning for malignancy in the setting of long-standing smoking history. CAT scan also showed some infiltrates and blood cultures were positive for strep pneumonia. Patient currently on ceftriaxone and azithromycin.    Interval Problem Daily Status Update  (24 hours)   Patient reported feeling fine. Denied any shortness of breath or chest pain. Sodium level coming up, 118 this morning. On sodium tabs. Nephro consulted recommended Free fluid restriction and adding Lasix if refractory. Plan on IR Biopsy today.    Review of Systems   Constitutional: Positive for malaise/fatigue and weight loss. Negative for chills and fever.   HENT: Negative for congestion.    Eyes: Negative for blurred vision and double vision.   Respiratory: Positive for cough. Negative for hemoptysis, sputum production, shortness of breath and wheezing.    Cardiovascular: Negative for chest pain, palpitations and leg swelling.   Gastrointestinal: Negative for constipation, heartburn and nausea.   Genitourinary: Negative for dysuria and urgency.   Musculoskeletal: Negative for myalgias.   Skin: Negative for itching and rash.   Neurological: Positive for weakness. Negative for dizziness, focal weakness and headaches.         Quality Measures    Reviewed items::  EKG reviewed, Labs reviewed, Medications reviewed and Radiology images reviewed          Physical Exam       Vitals:    10/04/17 1647 10/04/17 2000 10/05/17 0400  10/05/17 0710   BP: 120/74 129/75 106/68 122/72   Pulse: 81 81 82 81   Resp: 16 16 16 17   Temp: 37.1 °C (98.8 °F) 36.8 °C (98.2 °F) 36.6 °C (97.8 °F) 36.6 °C (97.8 °F)   SpO2: 97% 96% 100% 96%   Weight:       Height:         Body mass index is 18.03 kg/m².    Oxygen Therapy:  Pulse Oximetry: 96 %, O2 (LPM): 0, O2 Delivery: None (Room Air)    Physical Exam   Constitutional: He is oriented to person, place, and time.   Cachectic   HENT:   Head: Atraumatic.   Temporal wasting   Eyes: Conjunctivae are normal. Pupils are equal, round, and reactive to light.   Neck: Neck supple.   Cardiovascular: Normal rate, regular rhythm and normal heart sounds.    No murmur heard.  Pulmonary/Chest: Effort normal. He has wheezes (scattered more on the right side. Slightly prolonged expiratory phase).   Abdominal: Soft. He exhibits no distension. There is no tenderness.   Musculoskeletal: Normal range of motion. He exhibits no edema or deformity.   Lymphadenopathy:     He has no cervical adenopathy.   Neurological: He is alert and oriented to person, place, and time.   Skin:   Some skin breaks and dryness on  hands.         Lab Data Review:         10/4/2017  10:44 AM    Recent Labs      10/04/17   2145  10/05/17   0255  10/05/17   0615   SODIUM  116*  117*  118*   POTASSIUM  3.8  3.8  3.7   CHLORIDE  87*  88*  88*   CO2  22  23  22   BUN  7*  7*  6*   CREATININE  0.31*  0.29*  0.29*   MAGNESIUM   --   1.6   --    CALCIUM  8.6  8.9  8.5       Recent Labs      10/04/17   2145  10/05/17   0255  10/05/17   0615   ALTSGPT   --   19   --    ASTSGOT   --   30   --    ALKPHOSPHAT   --   68   --    TBILIRUBIN   --   0.5   --    GLUCOSE  92  83  90       Recent Labs      10/04/17   2146  10/05/17   0255   RBC   --   3.46*   HEMOGLOBIN   --   11.9*   HEMATOCRIT   --   32.2*   PLATELETCT   --   267   PROTHROMBTM  13.9  14.3   INR  1.04  1.08       Recent Labs      10/05/17   0255   WBC  6.2   NEUTSPOLYS  57.70   LYMPHOCYTES  18.50*   MONOCYTES   20.60*   EOSINOPHILS  2.10   BASOPHILS  0.50   ASTSGOT  30   ALTSGPT  19   ALKPHOSPHAT  68   TBILIRUBIN  0.5           Assessment/Plan   Patient is a 57-year-old male with history of homelessness and chronic tobacco use presents with a ground-level fall found to be severely hyponatremic and Imaging showed concerning right-sided lung masses. Pulmonary medicine asked to evaluate the patient regarding the lung masses. A tissue diagnosis is definitely required in this situation. A bronchoscopy with biopsy would be a good option at least to help with staging but with the involvement of subcarinal and hilar lymph nodes evident on imaging, the patient is not a surgical candidate anyway. In addition bronchoscopy is High risk procedure now due to unstable sodium levels. 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 Plan discussed with the patient and he is agreeable.    Lung mass  Hyponatremia secondary to SIADH  Strep bacteremia  Community acquired pneumonia  Chronic alcohol use  Chronic tobacco use    Recommend:  -Proceed with IR biopsy of the right upper lobe lung mass.   -Consider sample testing for actionable driven mutations in case of non-small cell lung cancer.  -Agree with current antibiotics  -RT and OT treatments per protocols  -Hyponatremia management per primary team. Patient currently on salt tablets and free water restriction and sodium levels coming up slowly. Recommend continuing fluid restriction, considering loop diuretics and/or demeclocycline if no improvement on the current  treatment. Nephro on board  -Monitor and replace electrolytes as needed.   -Pt has a brother Benjamin Spain in [place] who he would want contacted if needed and could make decisions for him if the pt becomes unable.  -We'll continue to follow patient with you.  -Thank you for allowing us to participate in this patient's care.

## 2017-10-05 NOTE — PROGRESS NOTES
Hospital Medicine Progress Note, Adult, Complex               Author: Tomasa Cotodominickjess Date & Time created: 10/5/2017  1:33 PM     Interval History:  58 y/o male with paratracheal and RUL masses with severe hyponatremia  C/o fatigue, weight loss, poor po intake  Low Na at 118  Scheduled diagnostic RUL biopsy    Review of Systems:  Review of Systems   Constitutional: Positive for malaise/fatigue and weight loss. Negative for chills and fever.   HENT: Negative.  Negative for congestion, nosebleeds and sore throat.    Eyes: Negative.    Respiratory: Negative for cough, shortness of breath and wheezing.    Cardiovascular: Negative for chest pain, palpitations, orthopnea and leg swelling.   Gastrointestinal: Negative for abdominal pain, nausea and vomiting.   Genitourinary: Negative for dysuria, flank pain, frequency, hematuria and urgency.   Musculoskeletal: Positive for back pain. Negative for myalgias and neck pain.   Skin: Negative.  Negative for itching and rash.   Neurological: Negative for dizziness, sensory change, focal weakness and headaches.   All other systems reviewed and are negative.      Physical Exam:  Physical Exam   Constitutional: He is oriented to person, place, and time. He appears well-developed. No distress.   cachectic   HENT:   Head: Normocephalic and atraumatic.   Mouth/Throat: Oropharynx is clear and moist.   Eyes: Conjunctivae and EOM are normal. Pupils are equal, round, and reactive to light. No scleral icterus.   Neck: Normal range of motion. Neck supple.   Cardiovascular: Normal rate and regular rhythm.  Exam reveals no gallop and no friction rub.    Pulmonary/Chest: Effort normal and breath sounds normal. No respiratory distress. He has no wheezes. He has no rales.   Abdominal: Soft. Bowel sounds are normal. He exhibits no distension and no mass.   Musculoskeletal: Normal range of motion. He exhibits no edema.   Neurological: He is alert and oriented to person, place, and time. No cranial  nerve deficit.   Skin: Skin is warm. No rash noted. No erythema.   Nursing note and vitals reviewed.      Labs:        Invalid input(s): [id number]      Recent Labs      10/05/17   0255  10/05/17   0615  10/05/17   1002   SODIUM  117*  118*  117*   POTASSIUM  3.8  3.7  3.8   CHLORIDE  88*  88*  88*   CO2  23  22  22   BUN  7*  6*  6*   CREATININE  0.29*  0.29*  0.28*   MAGNESIUM  1.6   --    --    CALCIUM  8.9  8.5  8.5     Recent Labs      10/05/17   0255  10/05/17   0615  10/05/17   1002   ALTSGPT  19   --    --    ASTSGOT  30   --    --    ALKPHOSPHAT  68   --    --    TBILIRUBIN  0.5   --    --    GLUCOSE  83  90  87     Recent Labs      10/04/17   2146  10/05/17   0255   RBC   --   3.46*   HEMOGLOBIN   --   11.9*   HEMATOCRIT   --   32.2*   PLATELETCT   --   267   PROTHROMBTM  13.9  14.3   INR  1.04  1.08     Recent Labs      10/05/17   0255   WBC  6.2   NEUTSPOLYS  57.70   LYMPHOCYTES  18.50*   MONOCYTES  20.60*   EOSINOPHILS  2.10   BASOPHILS  0.50   ASTSGOT  30   ALTSGPT  19   ALKPHOSPHAT  68   TBILIRUBIN  0.5           Hemodynamics:  Temp (24hrs), Av.8 °C (98.2 °F), Min:36.6 °C (97.8 °F), Max:37.1 °C (98.8 °F)  Temperature: 36.6 °C (97.8 °F)  Pulse  Av.1  Min: 75  Max: 99Heart Rate (Monitored): 80  Blood Pressure: 122/72, NIBP: 128/81     Respiratory:    Respiration: 16, Pulse Oximetry: 100 %        RUL Breath Sounds: Rhonchi, RML Breath Sounds: Rhonchi, RLL Breath Sounds: Diminished, LEONARDO Breath Sounds: Rhonchi, LLL Breath Sounds: Diminished  Fluids:    Intake/Output Summary (Last 24 hours) at 10/05/17 1333  Last data filed at 10/05/17 0810   Gross per 24 hour   Intake              592 ml   Output             1700 ml   Net            -1108 ml        GI/Nutrition:  Orders Placed This Encounter   Procedures   • DIET ORDER     Standing Status:   Standing     Number of Occurrences:   1     Order Specific Question:   Diet:     Answer:   Regular [1]     Medical Decision Making, by Problem:  Active  Hospital Problems    Diagnosis   • *Hyponatremia [E87.1]   • Lung mass [R91.8]   • Disorder of electrolytes [E87.8]   • Fall [W19.XXXA]   • Chronic alcohol use [F10.10]   • Bacteremia [R78.81]         Reviewed items::  Labs reviewed and Medications reviewed    Assessment and Plan  1.CKD I  -stable -  creat WNL  2.HTN: Pt advised to monitor BP at home  3.Electrolytes: hyponatremia better (SIADH, ? Cancer)  4.Anemia: Hb stable  5.Volume: euvolemic    Recs: avoid free water, f/u biopsy results             [facility]

## 2017-10-05 NOTE — PROGRESS NOTES
Roselia from Lab called with critical result of sodium 117 at 1118. Critical lab result read back to Roselia.   This critical lab result is within parameters established by [facility] team for this patient.

## 2017-10-05 NOTE — PROGRESS NOTES
Lakesha from Lab called with critical result sodium level of 115 at 1840. Critical lab result read back to Lakesha. R Nikolai team paged. Nightshift RN aware, awaiting page back from MD.

## 2017-10-05 NOTE — OR SURGEON
Immediate Post- Operative Note        PostOp Diagnosis: R lung mass.       Procedure(s): CT guided lung biopsy.       Estimated Blood Loss: Less than 5 ml        Complications: None            10/5/2017     1332 PM     Dorian Watkins

## 2017-10-05 NOTE — CARE PLAN
Problem: Safety  Goal: Will remain free from injury  Outcome: PROGRESSING AS EXPECTED  Pt calls for assistance when needing to mobilize. Practices safe transfer techniques with walker and one person assist.

## 2017-10-05 NOTE — PROGRESS NOTES
Lakesha from Lab called with critical result of sodium level 116 mmol/L at 1029. Critical lab result read back to Lakesha.   [facility] team MD notified of critical lab result at 1059.  Critical lab result read back by JAMES Menchaca MD. No new orders received at this time. Pt is asymptomatic. Will continue to monitor pt for AMS and s/s of hyponatremia.

## 2017-10-06 LAB
ALBUMIN SERPL BCP-MCNC: 2.9 G/DL (ref 3.2–4.9)
ALBUMIN/GLOB SERPL: 1 G/DL
ALP SERPL-CCNC: 64 U/L (ref 30–99)
ALT SERPL-CCNC: 18 U/L (ref 2–50)
ANION GAP SERPL CALC-SCNC: 6 MMOL/L (ref 0–11.9)
ANION GAP SERPL CALC-SCNC: 7 MMOL/L (ref 0–11.9)
ANION GAP SERPL CALC-SCNC: 9 MMOL/L (ref 0–11.9)
AST SERPL-CCNC: 28 U/L (ref 12–45)
BACTERIA BLD CULT: NORMAL
BASOPHILS # BLD AUTO: 0.6 % (ref 0–1.8)
BASOPHILS # BLD: 0.04 K/UL (ref 0–0.12)
BILIRUB SERPL-MCNC: 0.4 MG/DL (ref 0.1–1.5)
BUN SERPL-MCNC: 10 MG/DL (ref 8–22)
BUN SERPL-MCNC: 11 MG/DL (ref 8–22)
BUN SERPL-MCNC: 7 MG/DL (ref 8–22)
BUN SERPL-MCNC: 7 MG/DL (ref 8–22)
BUN SERPL-MCNC: 8 MG/DL (ref 8–22)
BUN SERPL-MCNC: 9 MG/DL (ref 8–22)
CALCIUM SERPL-MCNC: 8.7 MG/DL (ref 8.5–10.5)
CALCIUM SERPL-MCNC: 8.7 MG/DL (ref 8.5–10.5)
CALCIUM SERPL-MCNC: 8.8 MG/DL (ref 8.5–10.5)
CALCIUM SERPL-MCNC: 9.1 MG/DL (ref 8.5–10.5)
CHLORIDE SERPL-SCNC: 91 MMOL/L (ref 96–112)
CHLORIDE SERPL-SCNC: 92 MMOL/L (ref 96–112)
CHLORIDE SERPL-SCNC: 93 MMOL/L (ref 96–112)
CHLORIDE SERPL-SCNC: 97 MMOL/L (ref 96–112)
CO2 SERPL-SCNC: 21 MMOL/L (ref 20–33)
CO2 SERPL-SCNC: 21 MMOL/L (ref 20–33)
CO2 SERPL-SCNC: 23 MMOL/L (ref 20–33)
CO2 SERPL-SCNC: 23 MMOL/L (ref 20–33)
CO2 SERPL-SCNC: 24 MMOL/L (ref 20–33)
CO2 SERPL-SCNC: 25 MMOL/L (ref 20–33)
CREAT SERPL-MCNC: 0.3 MG/DL (ref 0.5–1.4)
CREAT SERPL-MCNC: 0.36 MG/DL (ref 0.5–1.4)
CREAT SERPL-MCNC: 0.38 MG/DL (ref 0.5–1.4)
CREAT SERPL-MCNC: 0.41 MG/DL (ref 0.5–1.4)
CREAT SERPL-MCNC: 0.42 MG/DL (ref 0.5–1.4)
CREAT SERPL-MCNC: 0.47 MG/DL (ref 0.5–1.4)
EOSINOPHIL # BLD AUTO: 0.18 K/UL (ref 0–0.51)
EOSINOPHIL NFR BLD: 2.7 % (ref 0–6.9)
ERYTHROCYTE [DISTWIDTH] IN BLOOD BY AUTOMATED COUNT: 38.1 FL (ref 35.9–50)
GFR SERPL CREATININE-BSD FRML MDRD: >60 ML/MIN/1.73 M 2
GLOBULIN SER CALC-MCNC: 3 G/DL (ref 1.9–3.5)
GLUCOSE SERPL-MCNC: 130 MG/DL (ref 65–99)
GLUCOSE SERPL-MCNC: 130 MG/DL (ref 65–99)
GLUCOSE SERPL-MCNC: 136 MG/DL (ref 65–99)
GLUCOSE SERPL-MCNC: 156 MG/DL (ref 65–99)
GLUCOSE SERPL-MCNC: 88 MG/DL (ref 65–99)
GLUCOSE SERPL-MCNC: 92 MG/DL (ref 65–99)
HCT VFR BLD AUTO: 33.2 % (ref 42–52)
HGB BLD-MCNC: 12.2 G/DL (ref 14–18)
IMM GRANULOCYTES # BLD AUTO: 0.04 K/UL (ref 0–0.11)
IMM GRANULOCYTES NFR BLD AUTO: 0.6 % (ref 0–0.9)
LYMPHOCYTES # BLD AUTO: 1.03 K/UL (ref 1–4.8)
LYMPHOCYTES NFR BLD: 15.2 % (ref 22–41)
MCH RBC QN AUTO: 34.3 PG (ref 27–33)
MCHC RBC AUTO-ENTMCNC: 36.7 G/DL (ref 33.7–35.3)
MCV RBC AUTO: 93.3 FL (ref 81.4–97.8)
MONOCYTES # BLD AUTO: 1.1 K/UL (ref 0–0.85)
MONOCYTES NFR BLD AUTO: 16.3 % (ref 0–13.4)
NEUTROPHILS # BLD AUTO: 4.37 K/UL (ref 1.82–7.42)
NEUTROPHILS NFR BLD: 64.6 % (ref 44–72)
NRBC # BLD AUTO: 0 K/UL
NRBC BLD AUTO-RTO: 0 /100 WBC
PLATELET # BLD AUTO: 303 K/UL (ref 164–446)
PMV BLD AUTO: 9.5 FL (ref 9–12.9)
POTASSIUM SERPL-SCNC: 4.3 MMOL/L (ref 3.6–5.5)
POTASSIUM SERPL-SCNC: 4.3 MMOL/L (ref 3.6–5.5)
POTASSIUM SERPL-SCNC: 4.4 MMOL/L (ref 3.6–5.5)
POTASSIUM SERPL-SCNC: 4.4 MMOL/L (ref 3.6–5.5)
POTASSIUM SERPL-SCNC: 4.5 MMOL/L (ref 3.6–5.5)
POTASSIUM SERPL-SCNC: 4.6 MMOL/L (ref 3.6–5.5)
PROT SERPL-MCNC: 5.9 G/DL (ref 6–8.2)
RBC # BLD AUTO: 3.56 M/UL (ref 4.7–6.1)
SIGNIFICANT IND 70042: NORMAL
SITE SITE: NORMAL
SODIUM SERPL-SCNC: 122 MMOL/L (ref 135–145)
SODIUM SERPL-SCNC: 123 MMOL/L (ref 135–145)
SODIUM SERPL-SCNC: 125 MMOL/L (ref 135–145)
SOURCE SOURCE: NORMAL
WBC # BLD AUTO: 6.8 K/UL (ref 4.8–10.8)

## 2017-10-06 PROCEDURE — 302255 BARRIER CREAM MOISTURE BAZA PROTECT (ZINC) 5OZ: Performed by: INTERNAL MEDICINE

## 2017-10-06 PROCEDURE — 80053 COMPREHEN METABOLIC PANEL: CPT

## 2017-10-06 PROCEDURE — 700111 HCHG RX REV CODE 636 W/ 250 OVERRIDE (IP): Performed by: INTERNAL MEDICINE

## 2017-10-06 PROCEDURE — 700102 HCHG RX REV CODE 250 W/ 637 OVERRIDE(OP): Performed by: STUDENT IN AN ORGANIZED HEALTH CARE EDUCATION/TRAINING PROGRAM

## 2017-10-06 PROCEDURE — A9270 NON-COVERED ITEM OR SERVICE: HCPCS | Performed by: INTERNAL MEDICINE

## 2017-10-06 PROCEDURE — 80048 BASIC METABOLIC PNL TOTAL CA: CPT | Mod: 91

## 2017-10-06 PROCEDURE — 99232 SBSQ HOSP IP/OBS MODERATE 35: CPT | Mod: GC | Performed by: INTERNAL MEDICINE

## 2017-10-06 PROCEDURE — A9270 NON-COVERED ITEM OR SERVICE: HCPCS | Performed by: STUDENT IN AN ORGANIZED HEALTH CARE EDUCATION/TRAINING PROGRAM

## 2017-10-06 PROCEDURE — 700102 HCHG RX REV CODE 250 W/ 637 OVERRIDE(OP): Performed by: INTERNAL MEDICINE

## 2017-10-06 PROCEDURE — 700111 HCHG RX REV CODE 636 W/ 250 OVERRIDE (IP): Performed by: STUDENT IN AN ORGANIZED HEALTH CARE EDUCATION/TRAINING PROGRAM

## 2017-10-06 PROCEDURE — 36415 COLL VENOUS BLD VENIPUNCTURE: CPT

## 2017-10-06 PROCEDURE — 85025 COMPLETE CBC W/AUTO DIFF WBC: CPT

## 2017-10-06 PROCEDURE — 770006 HCHG ROOM/CARE - MED/SURG/GYN SEMI*

## 2017-10-06 RX ADMIN — NICOTINE 21 MG: 21 PATCH, EXTENDED RELEASE TRANSDERMAL at 06:33

## 2017-10-06 RX ADMIN — POTASSIUM CHLORIDE 40 MEQ: 1500 TABLET, EXTENDED RELEASE ORAL at 21:52

## 2017-10-06 RX ADMIN — FOLIC ACID 1 MG: 1 TABLET ORAL at 08:23

## 2017-10-06 RX ADMIN — POTASSIUM CHLORIDE 40 MEQ: 1500 TABLET, EXTENDED RELEASE ORAL at 08:22

## 2017-10-06 RX ADMIN — THERA TABS 1 TABLET: TAB at 08:22

## 2017-10-06 RX ADMIN — ENOXAPARIN SODIUM 40 MG: 100 INJECTION SUBCUTANEOUS at 08:21

## 2017-10-06 RX ADMIN — SODIUM CHLORIDE TAB 1 GM 3 G: 1 TAB at 17:38

## 2017-10-06 RX ADMIN — SODIUM CHLORIDE TAB 1 GM 3 G: 1 TAB at 11:11

## 2017-10-06 RX ADMIN — VITAMIN D, TAB 1000IU (100/BT) 5000 UNITS: 25 TAB at 08:22

## 2017-10-06 RX ADMIN — CEFTRIAXONE 2 G: 2 INJECTION, SOLUTION INTRAVENOUS at 08:24

## 2017-10-06 RX ADMIN — THIAMINE HCL TAB 100 MG 100 MG: 100 TAB at 08:22

## 2017-10-06 RX ADMIN — STANDARDIZED SENNA CONCENTRATE AND DOCUSATE SODIUM 2 TABLET: 8.6; 5 TABLET, FILM COATED ORAL at 08:22

## 2017-10-06 RX ADMIN — AZITHROMYCIN 500 MG: 250 TABLET, FILM COATED ORAL at 08:22

## 2017-10-06 RX ADMIN — STANDARDIZED SENNA CONCENTRATE AND DOCUSATE SODIUM 2 TABLET: 8.6; 5 TABLET, FILM COATED ORAL at 21:52

## 2017-10-06 ASSESSMENT — ENCOUNTER SYMPTOMS
DOUBLE VISION: 0
ABDOMINAL PAIN: 0
ORTHOPNEA: 0
FLANK PAIN: 0
HEARTBURN: 0
SORE THROAT: 0
SHORTNESS OF BREATH: 1
SHORTNESS OF BREATH: 0
WHEEZING: 0
COUGH: 1
HEADACHES: 0
WEIGHT LOSS: 1
DIZZINESS: 0
DIARRHEA: 0
NAUSEA: 0
CONSTIPATION: 0
MYALGIAS: 0
EYES NEGATIVE: 1
WEAKNESS: 1
FALLS: 1
NECK PAIN: 0
CHILLS: 0
HEMOPTYSIS: 0
VOMITING: 0
FEVER: 0
SENSORY CHANGE: 0
PALPITATIONS: 0
COUGH: 0
BLURRED VISION: 0
FOCAL WEAKNESS: 0
BACK PAIN: 1
SPUTUM PRODUCTION: 0

## 2017-10-06 ASSESSMENT — PAIN SCALES - GENERAL
PAINLEVEL_OUTOF10: 3
PAINLEVEL_OUTOF10: 0
PAINLEVEL_OUTOF10: 3
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
## 2017-10-06 NOTE — PROGRESS NOTES
Pulmonary Medicine Interval Note    Name Froylan Spain     1960   Age/Sex 57 y.o. male   MRN [id number]   Code Status Full       Attending/Team: Dr. Menchaca / Pulmonary  See Patient List for primary contact information  Call [phone] to page        Reason for interval visit  (Principal Problem)   Hyponatremia   Lung masses    ID: Patient is a 57-year-old male admitted following a ground level fall and was found to be severely hyponatremic. Further workup showed a right sided paratracheal and upper lobe lung masses concerning for malignancy in the setting of long-standing smoking history. CAT scan also showed some infiltrates and blood cultures were positive for strep pneumonia. Patient currently on ceftriaxone and azithromycin.    Interval Problem Daily Status Update  (24 hours)   Patient reported feeling fine. Denied any shortness of breath or chest pain. Sodium level coming up, 123 this morning. On sodium tabs. IR biopsy of the right upper lobe peripheral mass negative for malignancy.    Review of Systems   Constitutional: Positive for malaise/fatigue and weight loss. Negative for chills and fever.   HENT: Negative for congestion.    Eyes: Negative for blurred vision and double vision.   Respiratory: Positive for cough. Negative for hemoptysis, sputum production, shortness of breath and wheezing.    Cardiovascular: Negative for chest pain, palpitations and leg swelling.   Gastrointestinal: Negative for constipation, heartburn and nausea.   Genitourinary: Negative for dysuria and urgency.   Musculoskeletal: Negative for myalgias.   Skin: Negative for itching and rash.   Neurological: Positive for weakness. Negative for dizziness, focal weakness and headaches.         Quality Measures    Reviewed items::  EKG reviewed, Labs reviewed, Medications reviewed and Radiology images reviewed          Physical Exam       Vitals:    10/05/17 1515 10/05/17 1934 10/06/17 0230 10/06/17 0746   BP: 111/72 112/67  110/60 109/61   Pulse: 76 86 85 80   Resp:  18 20 18   Temp:  36.8 °C (98.2 °F) 36.8 °C (98.2 °F) 36.7 °C (98.1 °F)   SpO2: 98% 98% 97% 98%   Weight:       Height:         Body mass index is 18.03 kg/m².    Oxygen Therapy:  Pulse Oximetry: 98 %, O2 (LPM): 0, O2 Delivery: None (Room Air)    Physical Exam   Constitutional: He is oriented to person, place, and time.   Cachectic   HENT:   Head: Atraumatic.   Temporal wasting   Eyes: Conjunctivae are normal. Pupils are equal, round, and reactive to light.   Neck: Neck supple.   Cardiovascular: Normal rate, regular rhythm and normal heart sounds.    No murmur heard.  Pulmonary/Chest: Effort normal. He has wheezes (scattered more on the right side. Slightly prolonged expiratory phase).   Abdominal: Soft. He exhibits no distension. There is no tenderness.   Musculoskeletal: Normal range of motion. He exhibits no edema or deformity.   Lymphadenopathy:     He has no cervical adenopathy.   Neurological: He is alert and oriented to person, place, and time.   Skin:   Some skin breaks and dryness on  hands.         Lab Data Review:         10/4/2017  10:44 AM    Recent Labs      10/05/17   0255   10/06/17   0154  10/06/17   0528  10/06/17   1013   SODIUM  117*   < >  122*  123*  123*   POTASSIUM  3.8   < >  4.3  4.6  4.5   CHLORIDE  88*   < >  92*  93*  93*   CO2  23   < >  21  23  23   BUN  7*   < >  9  8  7*   CREATININE  0.29*   < >  0.30*  0.36*  0.38*   MAGNESIUM  1.6   --    --    --    --    CALCIUM  8.9   < >  8.7  8.8  8.8    < > = values in this interval not displayed.       Recent Labs      10/05/17   0255   10/06/17   0154  10/06/17   0528  10/06/17   1013   ALTSGPT  19   --   18   --    --    ASTSGOT  30   --   28   --    --    ALKPHOSPHAT  68   --   64   --    --    TBILIRUBIN  0.5   --   0.4   --    --    GLUCOSE  83   < >  156*  88  130*    < > = values in this interval not displayed.       Recent Labs      10/04/17   2146  10/05/17   0255  10/06/17   0154   RBC    --   3.46*  3.56*   HEMOGLOBIN   --   11.9*  12.2*   HEMATOCRIT   --   32.2*  33.2*   PLATELETCT   --   267  303   PROTHROMBTM  13.9  14.3   --    INR  1.04  1.08   --        Recent Labs      10/05/17   0255  10/06/17   0154   WBC  6.2  6.8   NEUTSPOLYS  57.70  64.60   LYMPHOCYTES  18.50*  15.20*   MONOCYTES  20.60*  16.30*   EOSINOPHILS  2.10  2.70   BASOPHILS  0.50  0.60   ASTSGOT  30  28   ALTSGPT  19  18   ALKPHOSPHAT  68  64   TBILIRUBIN  0.5  0.4           Assessment/Plan   Patient is a 57-year-old male with history of homelessness and chronic tobacco use presents with a ground-level fall found to be severely hyponatremic and Imaging showed concerning right-sided lung masses. Pulmonary medicine asked to evaluate the patient regarding the lung masses. A tissue diagnosis is definitely required in this situation. IR biopsy of the right upper lobe mass is nondiagnostic showing fibrous tissue and no evidence of malignancy. We'll arrange bronchoscopy with biopsy and possible endobronchial ultrasound.    Lung mass  Hyponatremia secondary to SIADH  Strep bacteremia  Community acquired pneumonia  Chronic alcohol use  Chronic tobacco use    Recommend:  -We'll arrange bronchoscopy with biopsy of the paratracheal mass.  -Consider sample testing for actionable driven mutations in case of non-small cell lung cancer.  -Agree with current antibiotics  -RT and OT treatments per protocols  -Hyponatremia management per primary team. Patient currently on salt tablets and free water restriction and sodium levels coming up slowly. Recommend continuing fluid restriction, considering loop diuretics and/or demeclocycline if no improvement on the current treatment. Nephro on board  -Monitor and replace electrolytes as needed.   -Pt has a brother Benjamin Spain in [place] who he would want contacted if needed and could make decisions for him if the pt becomes unable.  -We'll continue to follow patient with you.  -Thank you for allowing us  to participate in this patient's care.

## 2017-10-06 NOTE — PROGRESS NOTES
Hospital Medicine Progress Note, Adult, Complex               Author: Tomasa Odell Date & Time created: 10/6/2017  2:54 PM     Interval History:  56 y/o male with paratracheal and RUL masses with severe hyponatremia  C/o fatigue, weight loss, poor po intake  Low Na at 118 -better to 123  Scheduled bronchoscopy    Review of Systems:  Review of Systems   Constitutional: Positive for malaise/fatigue and weight loss. Negative for chills and fever.   HENT: Negative.  Negative for congestion, nosebleeds and sore throat.    Eyes: Negative.    Respiratory: Negative for cough, shortness of breath and wheezing.    Cardiovascular: Negative for chest pain, palpitations, orthopnea and leg swelling.   Gastrointestinal: Negative for abdominal pain, nausea and vomiting.   Genitourinary: Negative for dysuria, flank pain, frequency, hematuria and urgency.   Musculoskeletal: Positive for back pain. Negative for myalgias and neck pain.   Skin: Negative.  Negative for itching and rash.   Neurological: Negative for dizziness, sensory change, focal weakness and headaches.   All other systems reviewed and are negative.      Physical Exam:  Physical Exam   Constitutional: He is oriented to person, place, and time. He appears well-developed. No distress.   cachectic   HENT:   Head: Normocephalic and atraumatic.   Mouth/Throat: Oropharynx is clear and moist.   Eyes: Conjunctivae and EOM are normal. Pupils are equal, round, and reactive to light. No scleral icterus.   Neck: Normal range of motion. Neck supple.   Cardiovascular: Normal rate and regular rhythm.  Exam reveals no gallop and no friction rub.    Pulmonary/Chest: Effort normal and breath sounds normal. No respiratory distress. He has no wheezes. He has no rales.   Abdominal: Soft. Bowel sounds are normal. He exhibits no distension and no mass.   Musculoskeletal: Normal range of motion. He exhibits no edema.   Neurological: He is alert and oriented to person, place, and time. No  cranial nerve deficit.   Skin: Skin is warm. No rash noted. No erythema.   Nursing note and vitals reviewed.      Labs:        Invalid input(s): [id number]      Recent Labs      10/05/17   0255   10/06/17   0528  10/06/17   1013  10/06/17   1406   SODIUM  117*   < >  123*  123*  123*   POTASSIUM  3.8   < >  4.6  4.5  4.4   CHLORIDE  88*   < >  93*  93*  91*   CO2  23   < >  23  23  25   BUN  7*   < >  8  7*  7*   CREATININE  0.29*   < >  0.36*  0.38*  0.42*   MAGNESIUM  1.6   --    --    --    --    CALCIUM  8.9   < >  8.8  8.8  9.1    < > = values in this interval not displayed.     Recent Labs      10/05/17   0255   10/06/17   0154  10/06/17   0528  10/06/17   1013  10/06/17   1406   ALTSGPT  19   --   18   --    --    --    ASTSGOT  30   --   28   --    --    --    ALKPHOSPHAT  68   --   64   --    --    --    TBILIRUBIN  0.5   --   0.4   --    --    --    GLUCOSE  83   < >  156*  88  130*  130*    < > = values in this interval not displayed.     Recent Labs      10/04/17   2146  10/05/17   0255  10/06/17   0154   RBC   --   3.46*  3.56*   HEMOGLOBIN   --   11.9*  12.2*   HEMATOCRIT   --   32.2*  33.2*   PLATELETCT   --   267  303   PROTHROMBTM  13.9  14.3   --    INR  1.04  1.08   --      Recent Labs      10/05/17   0255  10/06/17   0154   WBC  6.2  6.8   NEUTSPOLYS  57.70  64.60   LYMPHOCYTES  18.50*  15.20*   MONOCYTES  20.60*  16.30*   EOSINOPHILS  2.10  2.70   BASOPHILS  0.50  0.60   ASTSGOT  30  28   ALTSGPT  19  18   ALKPHOSPHAT  68  64   TBILIRUBIN  0.5  0.4           Hemodynamics:  Temp (24hrs), Av.8 °C (98.2 °F), Min:36.7 °C (98.1 °F), Max:36.8 °C (98.2 °F)  Temperature: 36.7 °C (98.1 °F)  Pulse  Av.5  Min: 74  Max: 99   Blood Pressure: 109/61     Respiratory:    Respiration: 18, Pulse Oximetry: 98 %        RUL Breath Sounds: Rhonchi, RML Breath Sounds: Rhonchi, RLL Breath Sounds: Diminished, LEONARDO Breath Sounds: Rhonchi, LLL Breath Sounds: Diminished  Fluids:    Intake/Output Summary (Last  24 hours) at 10/06/17 1454  Last data filed at 10/06/17 1315   Gross per 24 hour   Intake             1910 ml   Output             1200 ml   Net              710 ml        GI/Nutrition:  Orders Placed This Encounter   Procedures   • DIET ORDER     Standing Status:   Standing     Number of Occurrences:   1     Order Specific Question:   Diet:     Answer:   Regular [1]     Order Specific Question:   Consistency/Fluid modifications:     Answer:   1000 ml Fluid Restriction [7]     Medical Decision Making, by Problem:  Active Hospital Problems    Diagnosis   • *Hyponatremia [E87.1]   • Lung mass [R91.8]   • Disorder of electrolytes [E87.8]   • Fall [W19.XXXA]   • Chronic alcohol use [F10.10]   • Bacteremia [R78.81]         Reviewed items::  Labs reviewed and Medications reviewed    Assessment and Plan  1.CKD I  -stable -  creat WNL  2.HTN: Pt advised to monitor BP at home  3.Electrolytes: hyponatremia better (SIADH, ? Cancer)  4.Anemia: Hb stable  5.Volume: euvolemic    Recs: avoid free water,              BMP QAM

## 2017-10-06 NOTE — PROGRESS NOTES
Marcelino from Lab called with critical result of sodium level 118 mmol/L at 1910. Critical lab result read back to Marcelino.   This critical lab result is within parameters established by the JAMES Menchaca MD team for this patient.  Pt's Na level trending up slowly. RN monitoring for changes in mental status and s/s.

## 2017-10-06 NOTE — PROGRESS NOTES
Pt's lung biopsy site on right upper back is clean, dry and intact. There does not appear to be any swelling or changes from previous assessment. Pt has no c/o pain. VSS.

## 2017-10-06 NOTE — PROGRESS NOTES
Received report from day RN. Assumed pt care. Discussed call light/phone system, communication board and POC. Pt is aao x 4 and verbalizes understanding. Pt severely hyponatremic. This RN monitors labs closely and watches abnormal s/s. Pt is on a 720 mL fluid restriction. RN and CNA monitor strict I/O. Pt s/p biopsy during day shift, dressing to R shoulder, CDI. Pt mobility assessed. Pt is a one assist with the use of a FWW. Bed alarm on. Fall precautions in place. Bed is locked and in low position, call light within reach. Treaded slippers in place. Pt needs met at this time. Hourly rounding in place.

## 2017-10-06 NOTE — PROGRESS NOTES
Nataly Tamayo Fall Risk Assessment:     Last Known Fall: Within the last month  Mobility: Immobilized/requires assist of one person  Medications: No meds  Mental Status/LOC/Awareness: Awake, alert, and oriented to date, place, and person  Toileting Needs: Use of assistive device (Bedside commode, bedpan, urinal)  Volume/Electrolyte Status: Low blood sugar/electrolyte imbalances  Communication/Sensory: No deficits  Behavior: Ethanol/substance abuse  Nataly Tamayo Fall Risk Total: 16  Fall Risk Level: HIGH RISK    Universal Fall Precautions:  call light/belongings in reach, bed in low position and locked, wheelchairs and assistive devices out of sight, siderails up x 2, use non-slip footwear, adequate lighting, clutter free and spill free environment, educate on level of risk, educate to call for assistance    Fall Risk Level Interventions:   TRIAL (TELE 8, NEURO, MED BEN 5) Low Fall Risk Interventions  Place yellow fall risk ID band on patient: verified  Provide patient/family education based on risk assessment: completed  Educate patient/family to call staff for assistance when getting out of bed: completed  Place fall precaution signage outside patient door: verifiedTRIAL (TELE 8, NEURO, MED BEN 5) Moderate Fall Risk Interventions  Place yellow fall risk ID band on patient: verified  Provide patient/family education based on risk assessment : completed  Educate patient/family to call staff for assistance when getting out of bed: completed  Place fall precaution signage outside patient door: verified  Utilize bed/chair fall alarm: verifiedTRIAL (TELE 8, NEURO, [employer] BEN 5) High Fall Risk Interventions  Place yellow fall risk ID band on patient: verified  Provide patient/family education based on risk assessment: completed  Educate patient/family to call staff for assistance when getting out of bed: completed  Place fall precaution signage outside patient door: verified  Place patient in room close to nursing  station: currently not available/charge notified  Utilize bed/chair fall alarm: verified  Notify charge of high risk for huddle: completed    Patient Specific Interventions:     Medication: review medications with patient and family  Mental Status/LOC/Awareness: reinforce falls education, check on patient hourly and utilize bed/chair fall alarm  Toileting: provide frquent toileting and monitor intake and output/use of appropriate interventions  Volume/Electrolyte Status: ensure patient remains hydrated  Communication/Sensory: update plan of care on whiteboard, ensure proper positioning when transferrng/ambulating and ensure patient has glasses/contacts and hearing aids/dentures  Behavioral: encourage patient to voice feelings  Mobility: utilize bed/chair fall alarm, ensure bed is locked and in lowest position and provide appropriate assistive device

## 2017-10-06 NOTE — CARE PLAN
Problem: Nutritional:  Goal: Achieve adequate nutritional intake  Patient will consume 50% of meals   Outcome: PROGRESSING AS EXPECTED  Patient eating well so far at 50-75% per chart since advancing to a regular diet from NPO.

## 2017-10-07 LAB
ANION GAP SERPL CALC-SCNC: 6 MMOL/L (ref 0–11.9)
ANION GAP SERPL CALC-SCNC: 7 MMOL/L (ref 0–11.9)
ANION GAP SERPL CALC-SCNC: 7 MMOL/L (ref 0–11.9)
BACTERIA BLD CULT: NORMAL
BUN SERPL-MCNC: 7 MG/DL (ref 8–22)
BUN SERPL-MCNC: 8 MG/DL (ref 8–22)
CALCIUM SERPL-MCNC: 8.7 MG/DL (ref 8.5–10.5)
CALCIUM SERPL-MCNC: 8.8 MG/DL (ref 8.5–10.5)
CALCIUM SERPL-MCNC: 8.8 MG/DL (ref 8.5–10.5)
CALCIUM SERPL-MCNC: 8.9 MG/DL (ref 8.5–10.5)
CALCIUM SERPL-MCNC: 9 MG/DL (ref 8.5–10.5)
CALCIUM SERPL-MCNC: 9.2 MG/DL (ref 8.5–10.5)
CHLORIDE SERPL-SCNC: 91 MMOL/L (ref 96–112)
CHLORIDE SERPL-SCNC: 92 MMOL/L (ref 96–112)
CHLORIDE SERPL-SCNC: 93 MMOL/L (ref 96–112)
CHLORIDE SERPL-SCNC: 93 MMOL/L (ref 96–112)
CHLORIDE SERPL-SCNC: 95 MMOL/L (ref 96–112)
CHLORIDE SERPL-SCNC: 95 MMOL/L (ref 96–112)
CO2 SERPL-SCNC: 23 MMOL/L (ref 20–33)
CO2 SERPL-SCNC: 24 MMOL/L (ref 20–33)
CO2 SERPL-SCNC: 24 MMOL/L (ref 20–33)
CO2 SERPL-SCNC: 25 MMOL/L (ref 20–33)
CREAT SERPL-MCNC: 0.29 MG/DL (ref 0.5–1.4)
CREAT SERPL-MCNC: 0.32 MG/DL (ref 0.5–1.4)
CREAT SERPL-MCNC: 0.36 MG/DL (ref 0.5–1.4)
CREAT SERPL-MCNC: 0.38 MG/DL (ref 0.5–1.4)
CREAT SERPL-MCNC: 0.38 MG/DL (ref 0.5–1.4)
CREAT SERPL-MCNC: 0.41 MG/DL (ref 0.5–1.4)
GFR SERPL CREATININE-BSD FRML MDRD: >60 ML/MIN/1.73 M 2
GLUCOSE SERPL-MCNC: 107 MG/DL (ref 65–99)
GLUCOSE SERPL-MCNC: 110 MG/DL (ref 65–99)
GLUCOSE SERPL-MCNC: 82 MG/DL (ref 65–99)
GLUCOSE SERPL-MCNC: 88 MG/DL (ref 65–99)
GLUCOSE SERPL-MCNC: 88 MG/DL (ref 65–99)
GLUCOSE SERPL-MCNC: 92 MG/DL (ref 65–99)
POTASSIUM SERPL-SCNC: 3.6 MMOL/L (ref 3.6–5.5)
POTASSIUM SERPL-SCNC: 3.9 MMOL/L (ref 3.6–5.5)
POTASSIUM SERPL-SCNC: 4.4 MMOL/L (ref 3.6–5.5)
POTASSIUM SERPL-SCNC: 4.5 MMOL/L (ref 3.6–5.5)
POTASSIUM SERPL-SCNC: 4.5 MMOL/L (ref 3.6–5.5)
POTASSIUM SERPL-SCNC: 4.6 MMOL/L (ref 3.6–5.5)
SIGNIFICANT IND 70042: NORMAL
SITE SITE: NORMAL
SODIUM SERPL-SCNC: 122 MMOL/L (ref 135–145)
SODIUM SERPL-SCNC: 122 MMOL/L (ref 135–145)
SODIUM SERPL-SCNC: 123 MMOL/L (ref 135–145)
SODIUM SERPL-SCNC: 125 MMOL/L (ref 135–145)
SODIUM SERPL-SCNC: 125 MMOL/L (ref 135–145)
SODIUM SERPL-SCNC: 126 MMOL/L (ref 135–145)
SOURCE SOURCE: NORMAL

## 2017-10-07 PROCEDURE — 700111 HCHG RX REV CODE 636 W/ 250 OVERRIDE (IP): Performed by: INTERNAL MEDICINE

## 2017-10-07 PROCEDURE — 80048 BASIC METABOLIC PNL TOTAL CA: CPT | Mod: 91

## 2017-10-07 PROCEDURE — A9270 NON-COVERED ITEM OR SERVICE: HCPCS | Performed by: STUDENT IN AN ORGANIZED HEALTH CARE EDUCATION/TRAINING PROGRAM

## 2017-10-07 PROCEDURE — 770006 HCHG ROOM/CARE - MED/SURG/GYN SEMI*

## 2017-10-07 PROCEDURE — A9270 NON-COVERED ITEM OR SERVICE: HCPCS | Performed by: INTERNAL MEDICINE

## 2017-10-07 PROCEDURE — 700102 HCHG RX REV CODE 250 W/ 637 OVERRIDE(OP): Performed by: INTERNAL MEDICINE

## 2017-10-07 PROCEDURE — 700102 HCHG RX REV CODE 250 W/ 637 OVERRIDE(OP): Performed by: STUDENT IN AN ORGANIZED HEALTH CARE EDUCATION/TRAINING PROGRAM

## 2017-10-07 PROCEDURE — 700111 HCHG RX REV CODE 636 W/ 250 OVERRIDE (IP): Performed by: STUDENT IN AN ORGANIZED HEALTH CARE EDUCATION/TRAINING PROGRAM

## 2017-10-07 PROCEDURE — 99232 SBSQ HOSP IP/OBS MODERATE 35: CPT | Mod: GC | Performed by: INTERNAL MEDICINE

## 2017-10-07 PROCEDURE — 36415 COLL VENOUS BLD VENIPUNCTURE: CPT

## 2017-10-07 RX ADMIN — POTASSIUM CHLORIDE 40 MEQ: 1500 TABLET, EXTENDED RELEASE ORAL at 22:09

## 2017-10-07 RX ADMIN — FOLIC ACID 1 MG: 1 TABLET ORAL at 10:12

## 2017-10-07 RX ADMIN — THIAMINE HCL TAB 100 MG 100 MG: 100 TAB at 10:11

## 2017-10-07 RX ADMIN — NICOTINE 21 MG: 21 PATCH, EXTENDED RELEASE TRANSDERMAL at 11:35

## 2017-10-07 RX ADMIN — ENOXAPARIN SODIUM 40 MG: 100 INJECTION SUBCUTANEOUS at 10:13

## 2017-10-07 RX ADMIN — POTASSIUM CHLORIDE 40 MEQ: 1500 TABLET, EXTENDED RELEASE ORAL at 10:12

## 2017-10-07 RX ADMIN — VITAMIN D, TAB 1000IU (100/BT) 5000 UNITS: 25 TAB at 10:09

## 2017-10-07 RX ADMIN — SODIUM CHLORIDE TAB 1 GM 3 G: 1 TAB at 11:35

## 2017-10-07 RX ADMIN — THERA TABS 1 TABLET: TAB at 10:10

## 2017-10-07 RX ADMIN — SODIUM CHLORIDE TAB 1 GM 3 G: 1 TAB at 10:10

## 2017-10-07 RX ADMIN — SODIUM CHLORIDE TAB 1 GM 3 G: 1 TAB at 17:29

## 2017-10-07 RX ADMIN — CEFTRIAXONE 2 G: 2 INJECTION, SOLUTION INTRAVENOUS at 10:13

## 2017-10-07 ASSESSMENT — ENCOUNTER SYMPTOMS
BACK PAIN: 1
DIZZINESS: 0
PALPITATIONS: 0
VOMITING: 0
COUGH: 0
ORTHOPNEA: 0
MYALGIAS: 0
FOCAL WEAKNESS: 0
NAUSEA: 0
FEVER: 0
EYES NEGATIVE: 1
CHILLS: 0
FLANK PAIN: 0
WEIGHT LOSS: 1
SENSORY CHANGE: 0
ABDOMINAL PAIN: 0
SORE THROAT: 0
HEADACHES: 0
SHORTNESS OF BREATH: 0
WHEEZING: 0
NECK PAIN: 0

## 2017-10-07 ASSESSMENT — PAIN SCALES - GENERAL: PAINLEVEL_OUTOF10: 3

## 2017-10-07 NOTE — PROGRESS NOTES
Hospital Medicine Progress Note, Adult, Complex               Author: Tomasa Odell Date & Time created: 10/7/2017  11:28 AM     Interval History:  58 y/o male with paratracheal and RUL masses with severe hyponatremia  C/o fatigue, weight loss, poor po intake  Low Na at 122  Plan for dignostic bronchoscopy with / of malignancy    Review of Systems:  Review of Systems   Constitutional: Positive for malaise/fatigue and weight loss. Negative for chills and fever.   HENT: Negative.  Negative for congestion, nosebleeds and sore throat.    Eyes: Negative.    Respiratory: Negative for cough, shortness of breath and wheezing.    Cardiovascular: Negative for chest pain, palpitations, orthopnea and leg swelling.   Gastrointestinal: Negative for abdominal pain, nausea and vomiting.   Genitourinary: Negative for dysuria, flank pain, frequency, hematuria and urgency.   Musculoskeletal: Positive for back pain. Negative for myalgias and neck pain.   Skin: Negative.  Negative for itching and rash.   Neurological: Negative for dizziness, sensory change, focal weakness and headaches.   All other systems reviewed and are negative.      Physical Exam:  Physical Exam   Constitutional: He is oriented to person, place, and time. He appears well-developed. No distress.   cachectic   HENT:   Head: Normocephalic and atraumatic.   Mouth/Throat: Oropharynx is clear and moist.   Eyes: Conjunctivae and EOM are normal. Pupils are equal, round, and reactive to light. No scleral icterus.   Neck: Normal range of motion. Neck supple.   Cardiovascular: Normal rate and regular rhythm.  Exam reveals no gallop and no friction rub.    Pulmonary/Chest: Effort normal and breath sounds normal. No respiratory distress. He has no wheezes. He has no rales.   Abdominal: Soft. Bowel sounds are normal. He exhibits no distension and no mass.   Musculoskeletal: Normal range of motion. He exhibits no edema.   Neurological: He is alert and oriented to person, place, and  time. No cranial nerve deficit.   Skin: Skin is warm. No rash noted. No erythema.   Nursing note and vitals reviewed.      Labs:        Invalid input(s): [id number]      Recent Labs      10/05/17   0255   10/07/17   0156  10/07/17   0543  10/07/17   09   SODIUM  117*   < >  123*  122*  122*   POTASSIUM  3.8   < >  4.4  3.9  3.6   CHLORIDE  88*   < >  93*  92*  91*   CO2  23   < >  24  24  25   BUN  7*   < >  8  7*  7*   CREATININE  0.29*   < >  0.38*  0.29*  0.32*   MAGNESIUM  1.6   --    --    --    --    CALCIUM  8.9   < >  9.0  8.8  8.8    < > = values in this interval not displayed.     Recent Labs      10/05/17   0255   10/06/17   0154   10/07/17   0156  10/07/17   0543  10/07/17   0942   ALTSGPT  19   --   18   --    --    --    --    ASTSGOT  30   --   28   --    --    --    --    ALKPHOSPHAT  68   --   64   --    --    --    --    TBILIRUBIN  0.5   --   0.4   --    --    --    --    GLUCOSE  83   < >  156*   < >  88  88  110*    < > = values in this interval not displayed.     Recent Labs      10/04/17   2146  10/05/17   0255  10/06/17   0154   RBC   --   3.46*  3.56*   HEMOGLOBIN   --   11.9*  12.2*   HEMATOCRIT   --   32.2*  33.2*   PLATELETCT   --   267  303   PROTHROMBTM  13.9  14.3   --    INR  1.04  1.08   --      Recent Labs      10/05/17   0255  10/06/17   0154   WBC  6.2  6.8   NEUTSPOLYS  57.70  64.60   LYMPHOCYTES  18.50*  15.20*   MONOCYTES  20.60*  16.30*   EOSINOPHILS  2.10  2.70   BASOPHILS  0.50  0.60   ASTSGOT  30  28   ALTSGPT  19  18   ALKPHOSPHAT  68  64   TBILIRUBIN  0.5  0.4           Hemodynamics:  Temp (24hrs), Av.8 °C (98.3 °F), Min:36.7 °C (98 °F), Max:37 °C (98.6 °F)  Temperature: 36.9 °C (98.4 °F)  Pulse  Av.2  Min: 65  Max: 99   Blood Pressure: 120/75     Respiratory:    Respiration: 18, Pulse Oximetry: 95 %        RUL Breath Sounds: Rhonchi, RML Breath Sounds: Rhonchi, RLL Breath Sounds: Diminished, LEONARDO Breath Sounds: Rhonchi, LLL Breath Sounds:  Diminished  Fluids:    Intake/Output Summary (Last 24 hours) at 10/07/17 1128  Last data filed at 10/07/17 0900   Gross per 24 hour   Intake             1200 ml   Output              500 ml   Net              700 ml        GI/Nutrition:  Orders Placed This Encounter   Procedures   • DIET ORDER     Standing Status:   Standing     Number of Occurrences:   1     Order Specific Question:   Diet:     Answer:   Regular [1]     Order Specific Question:   Consistency/Fluid modifications:     Answer:   720 ml Fluid Restriction [6]     Medical Decision Making, by Problem:  Active Hospital Problems    Diagnosis   • *Hyponatremia [E87.1]   • Lung mass [R91.8]   • Disorder of electrolytes [E87.8]   • Fall [W19.XXXA]   • Chronic alcohol use [F10.10]   • Bacteremia [R78.81]         Reviewed items::  Labs reviewed and Medications reviewed    Assessment and Plan  1.CKD I  -stable -  creat WNL  2.HTN: Pt advised to monitor BP at home  3.Electrolytes: hyponatremia better (SIADH, ? Cancer)  4.Anemia: Hb stable  5.Volume: euvolemic    Recs: avoid free water, encoutage po solid food intake             BMP QAM

## 2017-10-07 NOTE — PROGRESS NOTES
Seen by [facility] group during rounds, fluid restrictions clarified to be 800 ml total including all oral intake, will get oob.

## 2017-10-07 NOTE — CARE PLAN
Problem: Safety  Goal: Will remain free from falls    Intervention: Assess risk factors for falls  Patient environment is clutter free at this time. Non-skid socks in place. Bed locked and in the lowest position.       Problem: Skin Integrity  Goal: Risk for impaired skin integrity will decrease    Intervention: Assess and monitor skin integrity, appearance and/or temperature  Assessing patient skin for risk factors associated with impaired skin integrity. Patient provided with Barrier cream and moisture.

## 2017-10-07 NOTE — PROGRESS NOTES
Nataly Tamayo Fall Risk Assessment:     Last Known Fall: Within the last month  Mobility: Immobilized/requires assist of one person  Medications: No meds  Mental Status/LOC/Awareness: Awake, alert, and oriented to date, place, and person  Toileting Needs: Use of assistive device (Bedside commode, bedpan, urinal)  Volume/Electrolyte Status: Low blood sugar/electrolyte imbalances  Communication/Sensory: No deficits  Behavior: Ethanol/substance abuse  Nataly Tamayo Fall Risk Total: 16  Fall Risk Level: HIGH RISK    Universal Fall Precautions:  call light/belongings in reach, bed in low position and locked, siderails up x 2, use non-slip footwear, adequate lighting, educate to call for assistance, clutter free and spill free environment    Fall Risk Level Interventions:   TRIAL ([employer], NEURO, MED BEN 5) Low Fall Risk Interventions  Place yellow fall risk ID band on patient: verified  Provide patient/family education based on risk assessment: completed  Educate patient/family to call staff for assistance when getting out of bed: completed  Place fall precaution signage outside patient door: verifiedTRIAL ([employer] 8, NEURO, MED BEN 5) Moderate Fall Risk Interventions  Place yellow fall risk ID band on patient: verified  Provide patient/family education based on risk assessment : completed  Educate patient/family to call staff for assistance when getting out of bed: completed  Place fall precaution signage outside patient door: verified  Utilize bed/chair fall alarm: verifiedTRIAL ([employer] 8, NEURO, [employer] BEN 5) High Fall Risk Interventions  Place yellow fall risk ID band on patient: verified  Provide patient/family education based on risk assessment: completed  Educate patient/family to call staff for assistance when getting out of bed: completed  Place fall precaution signage outside patient door: verified  Place patient in room close to nursing station: currently not available/charge notified  Utilize bed/chair fall  alarm: verified  Notify charge of high risk for huddle: completed    Patient Specific Interventions:     Medication: review medications with patient and family  Mental Status/LOC/Awareness: reinforce falls education, check on patient hourly, utilize bed/chair fall alarm and reinforce the use of call light  Toileting: provide frquent toileting and instruct patient/family on the need to call for assistance when toileting  Volume/Electrolyte Status: ensure patient remains hydrated and monitor abnormal lab values  Communication/Sensory: update plan of care on whiteboard and ensure proper positioning when transferrng/ambulating  Behavioral: encourage patient to voice feelings, administer medication as ordered and instruct/reinforce fall program rationale  Mobility: provide comfort measures during transport, utilize bed/chair fall alarm, ensure bed is locked and in lowest position, provide appropriate assistive device and instruct patient to exit bed on their strongest side

## 2017-10-07 NOTE — PROGRESS NOTES
Assumed care of pt after receiving report from dayshift RN. Bedside rounding completed w/ dayshift RN. Pt resting in bed A&OX4 w/ no complaints of pain or discomfort. Fall measures in place, call light within reach, personal belongings nearby, bed in lowest position, and hourly rounding in place. Will continue to monitor pt.

## 2017-10-07 NOTE — CARE PLAN
Problem: Knowledge Deficit  Goal: Knowledge of disease process/condition, treatment plan, diagnostic tests, and medications will improve    Intervention: Explain information regarding disease process/condition, treatment plan, diagnostic tests, and medications and document in education  Fluid restrictions, will get oob, NA level checks      Problem: Skin Integrity  Goal: Risk for impaired skin integrity will decrease    Intervention: Implement precautions to protect skin integrity in collaboration with the interdisciplinary team  q2 turn observed

## 2017-10-07 NOTE — PROGRESS NOTES
Report received from night RN. Assumed patient care at 0700. Patient is AAOx4. Labs and orders reviewed. Assessment completed. Patient sitting up in bed at this time. Patient denies any pain at this time. Patient provided with scheduled medication, refer to MAR. Patient needs have been met at this time. Patient encouraged to call when needing assistance. Call light within reach. Bed locked and in the lowest position. Hourly rounding in place.

## 2017-10-07 NOTE — PROGRESS NOTES
Nataly Tamayo Fall Risk Assessment:     Last Known Fall: Within the last month  Mobility: Immobilized/requires assist of one person  Medications: No meds  Mental Status/LOC/Awareness: Awake, alert, and oriented to date, place, and person  Toileting Needs: Use of assistive device (Bedside commode, bedpan, urinal)  Volume/Electrolyte Status: Low blood sugar/electrolyte imbalances  Communication/Sensory: No deficits  Behavior: Ethanol/substance abuse  Nataly Tamayo Fall Risk Total: 16  Fall Risk Level: HIGH RISK    Universal Fall Precautions:  call light/belongings in reach, bed in low position and locked, siderails up x 2, use non-slip footwear, adequate lighting, educate to call for assistance, clutter free and spill free environment    Fall Risk Level Interventions:   TRIAL ([employer], NEURO, MED BEN 5) Low Fall Risk Interventions  Place yellow fall risk ID band on patient: verified  Provide patient/family education based on risk assessment: completed  Educate patient/family to call staff for assistance when getting out of bed: completed  Place fall precaution signage outside patient door: verifiedTRIAL ([employer], NEURO, MED BEN 5) Moderate Fall Risk Interventions  Place yellow fall risk ID band on patient: verified  Provide patient/family education based on risk assessment : completed  Educate patient/family to call staff for assistance when getting out of bed: completed  Place fall precaution signage outside patient door: verified  Utilize bed/chair fall alarm: verifiedTRIAL ([employer], NEURO, [employer] BEN 5) High Fall Risk Interventions  Place yellow fall risk ID band on patient: verified  Provide patient/family education based on risk assessment: completed  Educate patient/family to call staff for assistance when getting out of bed: completed  Place fall precaution signage outside patient door: verified  Place patient in room close to nursing station: currently not available/charge notified  Utilize bed/chair fall  alarm: verified  Notify charge of high risk for huddle: completed    Patient Specific Interventions:     Medication: review medications with patient and family  Mental Status/LOC/Awareness: reinforce falls education, check on patient hourly, utilize bed/chair fall alarm and reinforce the use of call light  Toileting: provide frquent toileting and instruct patient/family on the need to call for assistance when toileting  Volume/Electrolyte Status: ensure patient remains hydrated and monitor abnormal lab values  Communication/Sensory: update plan of care on whiteboard and ensure proper positioning when transferrng/ambulating  Behavioral: encourage patient to voice feelings, administer medication as ordered and instruct/reinforce fall program rationale  Mobility: provide comfort measures during transport, utilize bed/chair fall alarm, ensure bed is locked and in lowest position, provide appropriate assistive device and instruct patient to exit bed on their strongest side

## 2017-10-07 NOTE — PROGRESS NOTES
Internal Medicine Interval Note  Note Author: Mary Gale M.D.     Name Froylan Spain     1960   Age/Sex 57 y.o. male   MRN [id number]   Code Status FULL     After 5PM or if no immediate response to page, please call for cross-coverage  Attending/Team: Dr. Villalta/Nikolai See Patient List for primary contact information  Call [phone] to page    1st Call - Day Intern (R1):   Dr. Gale 2nd Call - Day Sr. Resident (R2/R3):   Dr. Blanchard         Reason for interval visit  (Principal Problem)   Hyponatremia    Interval Problem Daily Status Update  (24 hours)   Pt seen and examined at bedside. No acute events.  Fluid restriction volume was raised to 1L yesterday. Na improving and reached a peak of 125 overnight but decreased to 122 this AM. TOTAL fluid restriction changed back to 800mL. Discussed with RN.      Monitoring to prevent overcorrection.   Pt has mild SOB that he reports has been stable.   Plan for bronchoscopy on Monday as IR biopsy was negative.   Day 7 of IV abx, will consider switching to PO after bronch on Monday.    ROS  Constitutional: Positive for malaise/fatigue and weight loss. Negative for chills and fever.   HENT: Negative.  Negative for congestion, nosebleeds and sore throat.    Eyes: Negative.    Respiratory: Positive for shortness of breath and cough. Negative forwheezing.    Cardiovascular: Negative for chest pain, palpitations, orthopnea and leg swelling.   Gastrointestinal: Negative for abdominal pain, nausea and vomiting.   Genitourinary: Negative for dysuria, flank pain, frequency, hematuria and urgency.   Musculoskeletal:  Negative for myalgias and neck pain.   Skin: Negative.  Negative for itching and rash.   Neurological: Negative for dizziness, sensory change, focal weakness and headaches.   All other systems reviewed and are negative.    Consultants/Specialty  Pulmonary   Nephrology    Disposition  Inpatient    Quality Measures  Quality-Core Measures  Reviewed  items::  Labs reviewed, Medications reviewed and Radiology images reviewed  Garvin catheter::  No Garvin  DVT prophylaxis pharmacological::  Enoxaparin (Lovenox)      Physical Exam       Vitals:    10/06/17 1536 10/06/17 1928 10/07/17 0353 10/07/17 0745   BP: 108/62 105/64 119/75 120/75   Pulse: 81 85 65 79   Resp: 18 18 16 18   Temp: 36.8 °C (98.3 °F) 37 °C (98.6 °F) 36.7 °C (98 °F) 36.9 °C (98.4 °F)   SpO2: 96% 93% 95% 95%   Weight:       Height:         Body mass index is 18.03 kg/m².    Oxygen Therapy:  Pulse Oximetry: 95 %, O2 (LPM): 0, O2 Delivery: None (Room Air)    Physical Exam  Constitutional: He is oriented to person, place, and time.   Elderly disheveled  male, not in acute distress   HENT:   Head: Normocephalic and atraumatic.   Eyes: EOM are normal.   L pupil dilation   Neck: Neck supple.   Cardiovascular: Normal rate and regular rhythm.    No murmur heard.  Pulmonary/Chest: Effort normal. No respiratory distress. He has rales. He exhibits no tenderness.   +mild bi-balisar rhonci  Abdominal: Soft. He exhibits no distension. There is no tenderness.   Musculoskeletal: He exhibits no edema or deformity.   Neurological: He is alert and oriented to person, place, and time. GCS score is 15.   No gross motor or sensory deficit. Cr Nr II to XII grossly intact. No gross sensory deficit.  Slow speech  Skin: Skin is dry.   Bronze colored skin/likely sunburn. But no plethora noted.  bruises/excoriation of the skin in elbow and knee from the fall    Lab Data Review:         10/7/2017  7:48 AM    Recent Labs      10/05/17   0255   10/06/17   2153  10/07/17   0156  10/07/17   0543   SODIUM  117*   < >  125*  123*  122*   POTASSIUM  3.8   < >  4.3  4.4  3.9   CHLORIDE  88*   < >  97  93*  92*   CO2  23   < >  21  24  24   BUN  7*   < >  11  8  7*   CREATININE  0.29*   < >  0.47*  0.38*  0.29*   MAGNESIUM  1.6   --    --    --    --    CALCIUM  8.9   < >  8.8  9.0  8.8    < > = values in this interval not  displayed.       Recent Labs      10/05/17   0255   10/06/17   0154   10/06/17   2153  10/07/17   0156  10/07/17   0543   ALTSGPT  19   --   18   --    --    --    --    ASTSGOT  30   --   28   --    --    --    --    ALKPHOSPHAT  68   --   64   --    --    --    --    TBILIRUBIN  0.5   --   0.4   --    --    --    --    GLUCOSE  83   < >  156*   < >  136*  88  88    < > = values in this interval not displayed.       Recent Labs      10/04/17   2146  10/05/17   0255  10/06/17   0154   RBC   --   3.46*  3.56*   HEMOGLOBIN   --   11.9*  12.2*   HEMATOCRIT   --   32.2*  33.2*   PLATELETCT   --   267  303   PROTHROMBTM  13.9  14.3   --    INR  1.04  1.08   --        Recent Labs      10/05/17   0255  10/06/17   0154   WBC  6.2  6.8   NEUTSPOLYS  57.70  64.60   LYMPHOCYTES  18.50*  15.20*   MONOCYTES  20.60*  16.30*   EOSINOPHILS  2.10  2.70   BASOPHILS  0.50  0.60   ASTSGOT  30  28   ALTSGPT  19  18   ALKPHOSPHAT  68  64   TBILIRUBIN  0.5  0.4           Assessment/Plan     Hyponatremia- (present on admission)  Due to SIADH. TSH and random cortisol level is wnl. Na of 107 and AMS on admission, admitted to ICU and transferred out to floor on 10/3.   10/7: Na is 122-125  Plan:  -Change fluid restriction to 800mL  -C/w salt tabs 3g TID cautiously, careful not to overcorrect  -Cont with fall, aspiration and seizure precautions  -F/u orthostatics      Lung mass- (present on admission)  Hx of smoking >20 years, 10 pack-year. Has not seen PMD in years. +Cough for years, recent weight loss, poor appetite. Presented with SIADH. CT chest on 9/30 showed 3.6x5.9 cm paratracheal mass compressing on SVC and 2x1.8 cm  necrotic R upper lobe mass. Pt would like to have this investigated. Likely malignancy.   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 Mixed inflammation, granulation tissue, fibrosis and necrosis consistent w organizing pna.  Plan:  -Plan for  Bronchoscopy of the paratracheal mass as per pulm on Monday  -Will cont to follow pulm        Bacteremia  Blood cultures on presentation positive for Strep pneumo x1 (10/1).   No WBC elevation. No fevers/chills. Likely from CAP. BCx 10/1 +, BCx 10/2 and 10/3 NGTD.  -C/w ceftriaxone and azithromycin, continue changing to PO after bronch  -CTM for signs of infection    Chronic alcohol use- (present on admission)  Consumes ~2 beers per week. BAL: 0.01 on admission. Mild AST elevation (53). No withdrawal sx to date.  - B12, folate, TSH, Mg WNL  - C/w PO thiamine, B12 and folic acid supplements   - No s/s of withdrawal.   - Not on CIWA protocol     Fall- (present on admission)  Presented after GLF with no reported syncope. CT head and C-spine on admission from other facility negative for bleeding or intracranial pathology or Cervical #  Likely from chronic weakness from the hyponatremia and alcohol intake.  - No evidence of neurological deficits   - CTM

## 2017-10-08 LAB
ALBUMIN SERPL BCP-MCNC: 3.1 G/DL (ref 3.2–4.9)
ALBUMIN/GLOB SERPL: 0.9 G/DL
ALP SERPL-CCNC: 67 U/L (ref 30–99)
ALT SERPL-CCNC: 26 U/L (ref 2–50)
ANION GAP SERPL CALC-SCNC: 5 MMOL/L (ref 0–11.9)
ANION GAP SERPL CALC-SCNC: 5 MMOL/L (ref 0–11.9)
ANION GAP SERPL CALC-SCNC: 6 MMOL/L (ref 0–11.9)
ANION GAP SERPL CALC-SCNC: 7 MMOL/L (ref 0–11.9)
ANION GAP SERPL CALC-SCNC: 8 MMOL/L (ref 0–11.9)
AST SERPL-CCNC: 29 U/L (ref 12–45)
BACTERIA BLD CULT: NORMAL
BASOPHILS # BLD AUTO: 0.9 % (ref 0–1.8)
BASOPHILS # BLD: 0.06 K/UL (ref 0–0.12)
BILIRUB SERPL-MCNC: 0.3 MG/DL (ref 0.1–1.5)
BUN SERPL-MCNC: 11 MG/DL (ref 8–22)
BUN SERPL-MCNC: 12 MG/DL (ref 8–22)
BUN SERPL-MCNC: 12 MG/DL (ref 8–22)
BUN SERPL-MCNC: 6 MG/DL (ref 8–22)
BUN SERPL-MCNC: 7 MG/DL (ref 8–22)
CALCIUM SERPL-MCNC: 9 MG/DL (ref 8.5–10.5)
CALCIUM SERPL-MCNC: 9 MG/DL (ref 8.5–10.5)
CALCIUM SERPL-MCNC: 9.1 MG/DL (ref 8.5–10.5)
CALCIUM SERPL-MCNC: 9.2 MG/DL (ref 8.5–10.5)
CALCIUM SERPL-MCNC: 9.4 MG/DL (ref 8.5–10.5)
CHLORIDE SERPL-SCNC: 94 MMOL/L (ref 96–112)
CHLORIDE SERPL-SCNC: 94 MMOL/L (ref 96–112)
CHLORIDE SERPL-SCNC: 95 MMOL/L (ref 96–112)
CHLORIDE SERPL-SCNC: 95 MMOL/L (ref 96–112)
CHLORIDE SERPL-SCNC: 97 MMOL/L (ref 96–112)
CO2 SERPL-SCNC: 22 MMOL/L (ref 20–33)
CO2 SERPL-SCNC: 25 MMOL/L (ref 20–33)
CO2 SERPL-SCNC: 25 MMOL/L (ref 20–33)
CO2 SERPL-SCNC: 26 MMOL/L (ref 20–33)
CO2 SERPL-SCNC: 26 MMOL/L (ref 20–33)
CREAT SERPL-MCNC: 0.32 MG/DL (ref 0.5–1.4)
CREAT SERPL-MCNC: 0.37 MG/DL (ref 0.5–1.4)
CREAT SERPL-MCNC: 0.43 MG/DL (ref 0.5–1.4)
CREAT SERPL-MCNC: 0.52 MG/DL (ref 0.5–1.4)
CREAT SERPL-MCNC: 0.55 MG/DL (ref 0.5–1.4)
EOSINOPHIL # BLD AUTO: 0.39 K/UL (ref 0–0.51)
EOSINOPHIL NFR BLD: 5.6 % (ref 0–6.9)
ERYTHROCYTE [DISTWIDTH] IN BLOOD BY AUTOMATED COUNT: 39.2 FL (ref 35.9–50)
GFR SERPL CREATININE-BSD FRML MDRD: >60 ML/MIN/1.73 M 2
GLOBULIN SER CALC-MCNC: 3.3 G/DL (ref 1.9–3.5)
GLUCOSE SERPL-MCNC: 101 MG/DL (ref 65–99)
GLUCOSE SERPL-MCNC: 121 MG/DL (ref 65–99)
GLUCOSE SERPL-MCNC: 125 MG/DL (ref 65–99)
GLUCOSE SERPL-MCNC: 91 MG/DL (ref 65–99)
GLUCOSE SERPL-MCNC: 96 MG/DL (ref 65–99)
HCT VFR BLD AUTO: 34.2 % (ref 42–52)
HGB BLD-MCNC: 12.5 G/DL (ref 14–18)
IMM GRANULOCYTES # BLD AUTO: 0.05 K/UL (ref 0–0.11)
IMM GRANULOCYTES NFR BLD AUTO: 0.7 % (ref 0–0.9)
LYMPHOCYTES # BLD AUTO: 1 K/UL (ref 1–4.8)
LYMPHOCYTES NFR BLD: 14.4 % (ref 22–41)
MAGNESIUM SERPL-MCNC: 1.6 MG/DL (ref 1.5–2.5)
MCH RBC QN AUTO: 34.6 PG (ref 27–33)
MCHC RBC AUTO-ENTMCNC: 36.5 G/DL (ref 33.7–35.3)
MCV RBC AUTO: 94.7 FL (ref 81.4–97.8)
MONOCYTES # BLD AUTO: 1.2 K/UL (ref 0–0.85)
MONOCYTES NFR BLD AUTO: 17.3 % (ref 0–13.4)
NEUTROPHILS # BLD AUTO: 4.23 K/UL (ref 1.82–7.42)
NEUTROPHILS NFR BLD: 61.1 % (ref 44–72)
NRBC # BLD AUTO: 0 K/UL
NRBC BLD AUTO-RTO: 0 /100 WBC
PLATELET # BLD AUTO: 354 K/UL (ref 164–446)
PMV BLD AUTO: 9 FL (ref 9–12.9)
POTASSIUM SERPL-SCNC: 4 MMOL/L (ref 3.6–5.5)
POTASSIUM SERPL-SCNC: 4.4 MMOL/L (ref 3.6–5.5)
POTASSIUM SERPL-SCNC: 4.4 MMOL/L (ref 3.6–5.5)
POTASSIUM SERPL-SCNC: 4.5 MMOL/L (ref 3.6–5.5)
POTASSIUM SERPL-SCNC: 4.7 MMOL/L (ref 3.6–5.5)
PROT SERPL-MCNC: 6.4 G/DL (ref 6–8.2)
RBC # BLD AUTO: 3.61 M/UL (ref 4.7–6.1)
SIGNIFICANT IND 70042: NORMAL
SITE SITE: NORMAL
SODIUM SERPL-SCNC: 124 MMOL/L (ref 135–145)
SODIUM SERPL-SCNC: 125 MMOL/L (ref 135–145)
SODIUM SERPL-SCNC: 125 MMOL/L (ref 135–145)
SODIUM SERPL-SCNC: 127 MMOL/L (ref 135–145)
SODIUM SERPL-SCNC: 129 MMOL/L (ref 135–145)
SOURCE SOURCE: NORMAL
WBC # BLD AUTO: 6.9 K/UL (ref 4.8–10.8)

## 2017-10-08 PROCEDURE — A9270 NON-COVERED ITEM OR SERVICE: HCPCS | Performed by: INTERNAL MEDICINE

## 2017-10-08 PROCEDURE — 80053 COMPREHEN METABOLIC PANEL: CPT

## 2017-10-08 PROCEDURE — 700102 HCHG RX REV CODE 250 W/ 637 OVERRIDE(OP): Performed by: STUDENT IN AN ORGANIZED HEALTH CARE EDUCATION/TRAINING PROGRAM

## 2017-10-08 PROCEDURE — 700102 HCHG RX REV CODE 250 W/ 637 OVERRIDE(OP): Performed by: INTERNAL MEDICINE

## 2017-10-08 PROCEDURE — 36415 COLL VENOUS BLD VENIPUNCTURE: CPT

## 2017-10-08 PROCEDURE — 99232 SBSQ HOSP IP/OBS MODERATE 35: CPT | Mod: GC | Performed by: INTERNAL MEDICINE

## 2017-10-08 PROCEDURE — A9270 NON-COVERED ITEM OR SERVICE: HCPCS | Performed by: STUDENT IN AN ORGANIZED HEALTH CARE EDUCATION/TRAINING PROGRAM

## 2017-10-08 PROCEDURE — 80048 BASIC METABOLIC PNL TOTAL CA: CPT | Mod: 91

## 2017-10-08 PROCEDURE — 770006 HCHG ROOM/CARE - MED/SURG/GYN SEMI*

## 2017-10-08 PROCEDURE — 85025 COMPLETE CBC W/AUTO DIFF WBC: CPT

## 2017-10-08 PROCEDURE — 83735 ASSAY OF MAGNESIUM: CPT

## 2017-10-08 PROCEDURE — 700111 HCHG RX REV CODE 636 W/ 250 OVERRIDE (IP): Performed by: INTERNAL MEDICINE

## 2017-10-08 RX ORDER — SODIUM CHLORIDE 9 MG/ML
500 INJECTION, SOLUTION INTRAVENOUS
Status: ACTIVE | OUTPATIENT
Start: 2017-10-08 | End: 2017-10-08

## 2017-10-08 RX ORDER — MIDODRINE HYDROCHLORIDE 5 MG/1
5 TABLET ORAL
Status: DISCONTINUED | OUTPATIENT
Start: 2017-10-08 | End: 2017-11-17 | Stop reason: HOSPADM

## 2017-10-08 RX ORDER — MIDAZOLAM HYDROCHLORIDE 1 MG/ML
.5-2 INJECTION INTRAMUSCULAR; INTRAVENOUS PRN
Status: ACTIVE | OUTPATIENT
Start: 2017-10-08 | End: 2017-10-08

## 2017-10-08 RX ADMIN — CEFTRIAXONE 2 G: 2 INJECTION, SOLUTION INTRAVENOUS at 09:40

## 2017-10-08 RX ADMIN — FOLIC ACID 1 MG: 1 TABLET ORAL at 09:40

## 2017-10-08 RX ADMIN — THERA TABS 1 TABLET: TAB at 09:40

## 2017-10-08 RX ADMIN — VITAMIN D, TAB 1000IU (100/BT) 5000 UNITS: 25 TAB at 09:40

## 2017-10-08 RX ADMIN — MIDODRINE HYDROCHLORIDE 5 MG: 5 TABLET ORAL at 17:34

## 2017-10-08 RX ADMIN — SODIUM CHLORIDE TAB 1 GM 3 G: 1 TAB at 17:34

## 2017-10-08 RX ADMIN — NICOTINE 21 MG: 21 PATCH, EXTENDED RELEASE TRANSDERMAL at 05:52

## 2017-10-08 RX ADMIN — SODIUM CHLORIDE TAB 1 GM 3 G: 1 TAB at 13:06

## 2017-10-08 RX ADMIN — SODIUM CHLORIDE TAB 1 GM 3 G: 1 TAB at 09:40

## 2017-10-08 RX ADMIN — THIAMINE HCL TAB 100 MG 100 MG: 100 TAB at 09:40

## 2017-10-08 ASSESSMENT — ENCOUNTER SYMPTOMS
COUGH: 0
VOMITING: 0
HEADACHES: 0
ORTHOPNEA: 0
FOCAL WEAKNESS: 0
CHILLS: 0
FLANK PAIN: 0
WHEEZING: 0
EYES NEGATIVE: 1
NECK PAIN: 0
SENSORY CHANGE: 0
BACK PAIN: 1
SHORTNESS OF BREATH: 0
PALPITATIONS: 0
ABDOMINAL PAIN: 0
DIZZINESS: 0
WEIGHT LOSS: 1
MYALGIAS: 0
FEVER: 0
SORE THROAT: 0
NAUSEA: 0

## 2017-10-08 ASSESSMENT — PAIN SCALES - GENERAL
PAINLEVEL_OUTOF10: 0
PAINLEVEL_OUTOF10: 0

## 2017-10-08 NOTE — PROGRESS NOTES
Aaox4, fluid restriction reinforced, NA level checks, Q2 urns observed,  will get oob as discussed.

## 2017-10-08 NOTE — CARE PLAN
Problem: Knowledge Deficit  Goal: Knowledge of disease process/condition, treatment plan, diagnostic tests, and medications will improve    Intervention: Explain information regarding disease process/condition, treatment plan, diagnostic tests, and medications and document in education  NPO MN for planned bronchoscopy, will get oob, NA level monitoring, fluid restrictions per MD      Problem: Skin Integrity  Goal: Risk for impaired skin integrity will decrease    Intervention: Implement precautions to protect skin integrity in collaboration with the interdisciplinary team  q2 turn observed

## 2017-10-08 NOTE — PROGRESS NOTES
Hospital Medicine Progress Note, Adult, Complex               Author: Tomasa Odell Date & Time created: 10/8/2017  12:40 PM     Interval History:  56 y/o male with paratracheal and RUL masses with severe hyponatremia  C/o fatigue, weight loss, poor po intake  Low Na  -at 125 -better  Scheduled bronchoscopy for tomorrow    Review of Systems:  Review of Systems   Constitutional: Positive for malaise/fatigue and weight loss. Negative for chills and fever.   HENT: Negative.  Negative for congestion, nosebleeds and sore throat.    Eyes: Negative.    Respiratory: Negative for cough, shortness of breath and wheezing.    Cardiovascular: Negative for chest pain, palpitations, orthopnea and leg swelling.   Gastrointestinal: Negative for abdominal pain, nausea and vomiting.   Genitourinary: Negative for dysuria, flank pain, frequency, hematuria and urgency.   Musculoskeletal: Positive for back pain. Negative for myalgias and neck pain.   Skin: Negative.  Negative for itching and rash.   Neurological: Negative for dizziness, sensory change, focal weakness and headaches.   All other systems reviewed and are negative.      Physical Exam:  Physical Exam   Constitutional: He is oriented to person, place, and time. He appears well-developed. No distress.   cachectic   HENT:   Head: Normocephalic and atraumatic.   Mouth/Throat: Oropharynx is clear and moist.   Eyes: Conjunctivae and EOM are normal. Pupils are equal, round, and reactive to light. No scleral icterus.   Neck: Normal range of motion. Neck supple.   Cardiovascular: Normal rate and regular rhythm.  Exam reveals no gallop and no friction rub.    Pulmonary/Chest: Effort normal and breath sounds normal. No respiratory distress. He has no wheezes. He has no rales.   Abdominal: Soft. Bowel sounds are normal. He exhibits no distension and no mass.   Musculoskeletal: Normal range of motion. He exhibits no edema.   Neurological: He is alert and oriented to person, place, and time.  No cranial nerve deficit.   Skin: Skin is warm. No rash noted. No erythema.   Nursing note and vitals reviewed.      Labs:        Invalid input(s): [id number]      Recent Labs      10/07/17   2203  10/08/17   0203  10/08/17   0607   SODIUM  125*  124*  125*   POTASSIUM  4.5  4.5  4.4   CHLORIDE  95*  94*  94*   CO2  23  22  26   BUN  8  7*  6*   CREATININE  0.41*  0.37*  0.32*   MAGNESIUM   --   1.6   --    CALCIUM  8.7  9.0  9.4     Recent Labs      10/06/17   0154   10/07/17   2203  10/08/17   0203  10/08/17   0607   ALTSGPT  18   --    --   26   --    ASTSGOT  28   --    --   29   --    ALKPHOSPHAT  64   --    --   67   --    TBILIRUBIN  0.4   --    --   0.3   --    GLUCOSE  156*   < >  92  91  101*    < > = values in this interval not displayed.     Recent Labs      10/06/17   0154  10/08/17   0203   RBC  3.56*  3.61*   HEMOGLOBIN  12.2*  12.5*   HEMATOCRIT  33.2*  34.2*   PLATELETCT  303  354     Recent Labs      10/06/17   0154  10/08/17   0203   WBC  6.8  6.9   NEUTSPOLYS  64.60  61.10   LYMPHOCYTES  15.20*  14.40*   MONOCYTES  16.30*  17.30*   EOSINOPHILS  2.70  5.60   BASOPHILS  0.60  0.90   ASTSGOT  28  29   ALTSGPT  18  26   ALKPHOSPHAT  64  67   TBILIRUBIN  0.4  0.3           Hemodynamics:  Temp (24hrs), Av.8 °C (98.3 °F), Min:36.4 °C (97.6 °F), Max:37.4 °C (99.3 °F)  Temperature: 36.9 °C (98.5 °F)  Pulse  Av.7  Min: 65  Max: 99   Blood Pressure: (!) 96/66     Respiratory:    Respiration: 18, Pulse Oximetry: 98 %        RUL Breath Sounds: Rhonchi, RML Breath Sounds: Rhonchi, RLL Breath Sounds: Diminished, LEONARDO Breath Sounds: Rhonchi, LLL Breath Sounds: Diminished  Fluids:    Intake/Output Summary (Last 24 hours) at 10/08/17 1240  Last data filed at 10/08/17 1200   Gross per 24 hour   Intake              716 ml   Output             1275 ml   Net             -559 ml        GI/Nutrition:  Orders Placed This Encounter   Procedures   • DIET ORDER     Standing Status:   Standing     Number of  Occurrences:   1     Order Specific Question:   Diet:     Answer:   Regular [1]     Order Specific Question:   Consistency/Fluid modifications:     Answer:   720 ml Fluid Restriction [6]   • DIET NPO     Standing Status:   Standing     Number of Occurrences:   8     Order Specific Question:   Restrict to:     Answer:   Strict [1]     Medical Decision Making, by Problem:  Active Hospital Problems    Diagnosis   • *Hyponatremia [E87.1]   • Lung mass [R91.8]   • Disorder of electrolytes [E87.8]   • Fall [W19.XXXA]   • Chronic alcohol use [F10.10]   • Bacteremia [R78.81]         Reviewed items::  Labs reviewed and Medications reviewed    Assessment and Plan  1.CKD I  -stable -  creat WNL  2.HTN: Pt advised to monitor BP at home  3.Electrolytes: hyponatremia better (SIADH, ? Cancer)  4.Anemia: Hb stable  5.Volume: euvolemic    Recs: avoid free water,              BMP QAM

## 2017-10-08 NOTE — PROGRESS NOTES
Nataly Tamayo Fall Risk Assessment:     Last Known Fall: Within the last month  Mobility: Immobilized/requires assist of one person  Medications: No meds  Mental Status/LOC/Awareness: Awake, alert, and oriented to date, place, and person  Toileting Needs: Use of assistive device (Bedside commode, bedpan, urinal)  Volume/Electrolyte Status: Low blood sugar/electrolyte imbalances  Communication/Sensory: No deficits  Behavior: Ethanol/substance abuse  Nataly Tamayo Fall Risk Total: 16  Fall Risk Level: HIGH RISK    Universal Fall Precautions:  call light/belongings in reach, bed in low position and locked, siderails up x 2, use non-slip footwear, adequate lighting, educate to call for assistance, clutter free and spill free environment    Fall Risk Level Interventions:   TRIAL (p[employer]ell, NEURO, MED BEN 5) Low Fall Risk Interventions  Place yellow fall risk ID band on patient: verified  Provide patient/family education based on risk assessment: completed  Educate patient/family to call staff for assistance when getting out of bed: completed  Place fall precaution signage outside patient door: verifiedTRIAL ([employer], NEURO, MED BEN 5) Moderate Fall Risk Interventions  Place yellow fall risk ID band on patient: verified  Provide patient/family education based on risk assessment : completed  Educate patient/family to call staff for assistance when getting out of bed: completed  Place fall precaution signage outside patient door: verified  Utilize bed/chair fall alarm: verifiedTRIAL ([employer] [address], NEURO, [employer] BEN 5) High Fall Risk Interventions  Place yellow fall risk ID band on patient: verified  Provide patient/family education based on risk assessment: completed  Educate patient/family to call staff for assistance when getting out of bed: completed  Place fall precaution signage outside patient door: verified  Place patient in room close to nursing station: currently not available/charge notified  Utilize bed/chair fall  alarm: verified  Notify charge of high risk for huddle: completed    Patient Specific Interventions:     Medication: review medications with patient and family  Mental Status/LOC/Awareness: reinforce falls education, check on patient hourly, utilize bed/chair fall alarm and reinforce the use of call light  Toileting: provide frquent toileting and instruct patient/family on the need to call for assistance when toileting  Volume/Electrolyte Status: ensure patient remains hydrated and monitor abnormal lab values  Communication/Sensory: update plan of care on whiteboard and ensure proper positioning when transferrng/ambulating  Behavioral: encourage patient to voice feelings, administer medication as ordered and instruct/reinforce fall program rationale  Mobility: provide comfort measures during transport, utilize bed/chair fall alarm, ensure bed is locked and in lowest position, provide appropriate assistive device and instruct patient to exit bed on their strongest side

## 2017-10-08 NOTE — PROGRESS NOTES
Internal Medicine Interval Note  Note Author: Mary Gale M.D.     Name Froylan Spain     1960   Age/Sex 57 y.o. male   MRN [id number]   Code Status FULL     After 5PM or if no immediate response to page, please call for cross-coverage  Attending/Team: Dr. Villalta See Patient List for primary contact information  Call [phone] to page    1st Call - Day Intern (R1):   Dr. Gale 2nd Call - Day Sr. Resident (R2/R3):   Dr. Blanchard         Reason for interval visit  (Principal Problem)   Hyponatremia    Interval Problem Daily Status Update  (24 hours)   No acute events overnight. Na range 124-126. Patient continues to feel weak. Coughing/mild SOB. Pt unable to ambulate. On 800 mL fluid restriction, receiving 3g salt tablet TID. Abx day 8 for bacteremia.  Plan for bronch tomorrow, NPO after midnight. Lovenox held.     ROS  Constitutional: Positive for malaise/fatigue (stable). Negative for chills and fever.   HENT: Negative.  Negative for congestion, nosebleeds and sore throat.    Eyes: Negative.    Respiratory: Positive for shortness of breath and cough (stable). Negative forwheezing.    Cardiovascular: Negative for chest pain, palpitations, orthopnea and leg swelling.   Gastrointestinal: Negative for abdominal pain, nausea and vomiting.   Genitourinary: Negative for dysuria, flank pain, frequency, hematuria and urgency.   Musculoskeletal:  Negative for myalgias and neck pain.   Skin: Negative.  Negative for itching and rash.   Neurological: Negative for dizziness, sensory change, focal weakness and headaches.   All other systems reviewed and are negative.    Consultants/Specialty  Pulmonary   Nephrology     Disposition  Inpatient    Quality Measures  Quality-Core Measures  Reviewed items::  Labs reviewed, Medications reviewed and Radiology images reviewed  Garvin catheter::  No Garvin  DVT prophylaxis pharmacological::  Enoxaparin (Lovenox) - Hold for bronchoscopy on 10/9      Physical Exam        Vitals:    10/07/17 0745 10/07/17 1600 10/07/17 2000 10/08/17 0400   BP: 120/75 113/61 110/67 125/77   Pulse: 79 75 75 80   Resp: 18 18 18 18   Temp: 36.9 °C (98.4 °F) 37.4 °C (99.3 °F) 36.4 °C (97.6 °F) 36.6 °C (97.8 °F)   SpO2: 95% 97% 93% 94%   Weight:       Height:         Body mass index is 18.03 kg/m².    Oxygen Therapy:  Pulse Oximetry: 94 %, O2 (LPM): 0, O2 Delivery: None (Room Air)    Physical Exam  Constitutional: He is oriented to person, place, and time. No acute distress.  Elderly disheveled  male.  HENT:   Head: Normocephalic and atraumatic.   Eyes: EOM are normal. No scleral icterus.    Neck: Neck supple.   Cardiovascular: Normal rate and regular rhythm.    No murmur heard.  Pulmonary/Chest: Effort normal. No respiratory distress. He exhibits no tenderness.   Abdominal: Soft. He exhibits no distension. There is no tenderness.   Musculoskeletal: He exhibits no edema or deformity.   Neurological: He is alert and oriented to person, place, and time. GCS score is 15.   No gross motor or sensory deficits. Cr Nr II to XII grossly intact. Slow speech.  Skin: Skin is dry.   Bronze colored skin/likely sunburn. Bruises/excoriation of the skin in elbow and knee     Lab Data Review:         10/8/2017  7:32 AM    Recent Labs      10/07/17   2203  10/08/17   0203  10/08/17   0607   SODIUM  125*  124*  125*   POTASSIUM  4.5  4.5  4.4   CHLORIDE  95*  94*  94*   CO2  23  22  26   BUN  8  7*  6*   CREATININE  0.41*  0.37*  0.32*   MAGNESIUM   --   1.6   --    CALCIUM  8.7  9.0  9.4       Recent Labs      10/06/17   0154   10/07/17   2203  10/08/17   0203  10/08/17   0607   ALTSGPT  18   --    --   26   --    ASTSGOT  28   --    --   29   --    ALKPHOSPHAT  64   --    --   67   --    TBILIRUBIN  0.4   --    --   0.3   --    GLUCOSE  156*   < >  92  91  101*    < > = values in this interval not displayed.       Recent Labs      10/06/17   0154  10/08/17   0203   RBC  3.56*  3.61*   HEMOGLOBIN  12.2*   12.5*   HEMATOCRIT  33.2*  34.2*   PLATELETCT  303  354       Recent Labs      10/06/17   0154  10/08/17   0203   WBC  6.8  6.9   NEUTSPOLYS  64.60  61.10   LYMPHOCYTES  15.20*  14.40*   MONOCYTES  16.30*  17.30*   EOSINOPHILS  2.70  5.60   BASOPHILS  0.60  0.90   ASTSGOT  28  29   ALTSGPT  18  26   ALKPHOSPHAT  64  67   TBILIRUBIN  0.4  0.3           Assessment/Plan     Hyponatremia- (present on admission)  Due to SIADH. TSH and random cortisol level is wnl. Na of 107 and AMS on admission, admitted to ICU and transferred out to floor on 10/3.   10/8: Na is 124-126  Plan:  -C/w fluid restriction to 800mL  -C/w salt tabs 3g TID cautiously, careful not to overcorrect  -Cont with fall, aspiration and seizure precautions     Lung mass- (present on admission)  Hx of smoking >20 years, 10 pack-year. Has not seen PMD in years. +Cough for years, recent weight loss, poor appetite. Presented with SIADH. CT chest on 9/30 showed 3.6x5.9 cm paratracheal mass compressing on SVC and 2x1.8 cm  necrotic R upper lobe mass. Pt would like to have this investigated. Likely malignancy.   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 Mixed inflammation, granulation tissue, fibrosis and necrosis consistent w organizing pna.  Plan:  -Plan for Bronchoscopy of the paratracheal mass as per pulm on Monday  -NPO after midnight, lovenox held  -Will cont to follow pulm        Bacteremia  Blood cultures on presentation positive for Strep pneumo x1 (10/1).   No WBC elevation. No fevers/chills. Likely from CAP. BCx 10/1 +, BCx 10/2 final result neg. BCx on 10/3 NGTD.  -C/w ceftriaxone and azithromycin (day 8), continue changing to PO after bronch  -CTM for signs of infection    Chronic alcohol use- (present on admission)  Consumes ~2 beers per week. BAL: 0.01 on admission. Mild AST elevation (53). No withdrawal sx to date.  - B12, folate, TSH, Mg WNL  - C/w PO thiamine, B12  and folic acid supplements   - No s/s of withdrawal.   - Not on CIWA protocol     Fall- (present on admission)  Presented after GLF with no reported syncope. CT head and C-spine on admission from other facility negative for bleeding or intracranial pathology or Cervical #  Likely from chronic weakness from the hyponatremia and alcohol intake.  - No evidence of neurological deficits   - CTM

## 2017-10-08 NOTE — PROGRESS NOTES
Assumed care of pt after receiving report from dayshift RN. Bedside rounding completed w/ andrew RN. Pt resting in bed A&OX4, eat packs applied to lower back for 3/10 aching lower back pain. Fall measures in place, call light within reach, personal belongings nearby, bed in lowest position, and hourly rounding in place. Will continue to monitor pt.

## 2017-10-08 NOTE — PROGRESS NOTES
"Pulmonary Progress Note    Patient ID:   Name:             Froylan Spain   YOB: 1960  Age:                 57 y.o.  male   MRN:               [id number]                                                  Subjective:  Denies pain or sob    Interval Changes:   IR biopsy of RUL mass  Core biopsies demonstrating marked mixed inflammation,          granulation tissue, fibrosis and necrosis consistent with          organizing pneumonia    ROS:  Awaiting bronch for diagnosis    Objective:   Vitals/ General Appearance:   Weight/BMI: Body mass index is 18.03 kg/m².  Blood pressure (!) 96/66, pulse 78, temperature 36.9 °C (98.5 °F), resp. rate 18, height 1.778 m (5' 10\"), weight 57 kg (125 lb 10.6 oz), SpO2 98 %.  Vitals:    10/08/17 0800 10/08/17 1224 10/08/17 1225 10/08/17 1226   BP: 109/66 113/66 100/69 (!) 96/66   Pulse: 81 79 75 78   Resp: 18      Temp: 36.9 °C (98.5 °F)      SpO2: 98%      Weight:       Height:         Oxygen Therapy:  Pulse Oximetry: 98 %, O2 (LPM): 0, O2 Delivery: None (Room Air)    Constitutional:   No distress, able to speak in full sentences  HENMT:  Normocephalic, Atraumatic, Oropharynx moist mucous membranes, No oral exudates, Nose normal.  No thyromegaly.  Eyes:  EOMI, Conjunctiva normal, No discharge.  Neck:  Normal range of motion, No cervical tenderness,  no JVD.  Cardiovascular:  regular  Lungs:  clear  Abdomen: Bowel sounds normal, Soft, No tenderness, No guarding, No rebound, No masses, No hepatosplenomegaly.  Skin: Warm, Dry, No erythema, No rash, no induration.  Neurologic: Alert & oriented x 3, No focal deficits noted, cranial nerves II through X are grossly intact.  Extremities: neg    Labs:  Recent Labs      10/06/17   0154  10/08/17   0203   WBC  6.8  6.9   RBC  3.56*  3.61*   HEMOGLOBIN  12.2*  12.5*   HEMATOCRIT  33.2*  34.2*   MCV  93.3  94.7   MCH  34.3*  34.6*   MCHC  36.7*  36.5*   RDW  38.1  39.2   PLATELETCT  303  354   MPV  9.5  9.0                 Recent " Labs      10/07/17   2203  10/08/17   0203  10/08/17   0607   SODIUM  125*  124*  125*   POTASSIUM  4.5  4.5  4.4   CHLORIDE  95*  94*  94*   CO2  23  22  26   GLUCOSE  92  91  101*   BUN  8  7*  6*     Recent Labs      10/07/17   2203  10/08/17   0203  10/08/17   0607   SODIUM  125*  124*  125*   POTASSIUM  4.5  4.5  4.4   CHLORIDE  95*  94*  94*   CO2  23  22  26   BUN  8  7*  6*   CREATININE  0.41*  0.37*  0.32*   MAGNESIUM   --   1.6   --    CALCIUM  8.7  9.0  9.4       Hospital Medications:    Current Facility-Administered Medications:   •  NS (BOLUS) infusion 500 mL, 500 mL, Intravenous, Once PRN, Sanford Menchaca M.D.  •  fentaNYL (SUBLIMAZE) injection 12.5-50 mcg, 12.5-50 mcg, Intravenous, PRN, Sanford Menchaca M.D.  •  midazolam (VERSED) injection 0.5-2 mg, 0.5-2 mg, Intravenous, PRN, Sanford Menchaca M.D.  •  sodium chloride (SALT) tablet 3 g, 3 g, Oral, TID WITH MEALS, Mary Gale M.D., 3 g at 10/08/17 1306  •  vitamin D (cholecalciferol) tablet 5,000 Units, 5,000 Units, Oral, DAILY, Lilly Hood M.D., 5,000 Units at 10/08/17 0940  •  multivitamin (THERAGRAN) tablet 1 Tab, 1 Tab, Oral, DAILY, Trell Curry M.D., 1 Tab at 10/08/17 0940  •  thiamine (THIAMINE) tablet 100 mg, 100 mg, Oral, DAILY, Terll Curry M.D., 100 mg at 10/08/17 0940  •  folic acid (FOLVITE) tablet 1 mg, 1 mg, Oral, DAILY, Trell Curry M.D., 1 mg at 10/08/17 0940  •  cefTRIAXone (ROCEPHIN) 2 g in 50 mL D5W IVPB premix, 2 g, Intravenous, Q24HRS, Angelica Roberts M.D., Stopped at 10/08/17 1010  •  senna-docusate (PERICOLACE or SENOKOT S) 8.6-50 MG per tablet 2 Tab, 2 Tab, Oral, BID, 2 Tab at 10/06/17 2152 **AND** polyethylene glycol/lytes (MIRALAX) PACKET 1 Packet, 1 Packet, Oral, QDAY PRN **AND** magnesium hydroxide (MILK OF MAGNESIA) suspension 30 mL, 30 mL, Oral, QDAY PRN **AND** bisacodyl (DULCOLAX) suppository 10 mg, 10 mg, Rectal, QDAY PRN, Jannet Son M.D.  •  Respiratory Care per Protocol, ,  Nebulization, Continuous RT, Jannet Son M.D.  •  labetalol (NORMODYNE,TRANDATE) injection 10 mg, 10 mg, Intravenous, Q4HRS PRN, Jannet Son M.D.  •  ondansetron (ZOFRAN) syringe/vial injection 4 mg, 4 mg, Intravenous, Q4HRS PRN, Jannet Son M.D.  •  ondansetron (ZOFRAN ODT) dispertab 4 mg, 4 mg, Oral, Q4HRS PRN, Jannet Son M.D.  •  promethazine (PHENERGAN) tablet 12.5-25 mg, 12.5-25 mg, Oral, Q4HRS PRN, Jannet Son M.D.  •  promethazine (PHENERGAN) suppository 12.5-25 mg, 12.5-25 mg, Rectal, Q4HRS PRN, Jannet Son M.D.  •  prochlorperazine (COMPAZINE) injection 5-10 mg, 5-10 mg, Intravenous, Q4HRS PRN, Jannet Son M.D.  •  INITIATE NICOTINE REPLACEMENT PROTOCOL , , , Once **AND** nicotine (NICODERM) 21 MG/24HR 21 mg, 21 mg, Transdermal, Daily-0600, 21 mg at 10/08/17 0552 **AND** Protocol 205 PATIENT EDUCATION MATERIALS, , , Once **AND** Protocol 205 Rotate nicotine patch application sites daily , , , CONTINUOUS **AND** nicotine polacrilex (NICORETTE) 2 MG piece 2 mg, 2 mg, Oral, Q HOUR PRN, Jannet Son M.D.  •  guaifenesin dextromethorphan (ROBITUSSIN DM) 100-10 MG/5ML syrup 10 mL, 10 mL, Oral, Q6HRS PRN, Jannet Son M.D.  •  acetaminophen (TYLENOL) tablet 650 mg, 650 mg, Oral, Q6HRS PRN, Jannet Son M.D., 650 mg at 10/02/17 0814    Medication Allergy:  No Known Allergies    Assessment and Plan:  Principal Problem:    Hyponatremia improving thought secondary to SIADH  Active Problems:    Lung mass - for burkett needle in am    Chronic alcohol use POA: Yes    Bacteremia POA: strep pneumonia in blood culture on treatment    Bronch in am

## 2017-10-09 ENCOUNTER — TELEPHONE (OUTPATIENT)
Dept: PULMONOLOGY | Facility: HOSPICE | Age: 57
End: 2017-10-09

## 2017-10-09 LAB
ALBUMIN SERPL BCP-MCNC: 3 G/DL (ref 3.2–4.9)
ALBUMIN/GLOB SERPL: 1 G/DL
ALP SERPL-CCNC: 78 U/L (ref 30–99)
ALT SERPL-CCNC: 24 U/L (ref 2–50)
ANION GAP SERPL CALC-SCNC: 5 MMOL/L (ref 0–11.9)
ANION GAP SERPL CALC-SCNC: 6 MMOL/L (ref 0–11.9)
ANION GAP SERPL CALC-SCNC: 7 MMOL/L (ref 0–11.9)
ANION GAP SERPL CALC-SCNC: 8 MMOL/L (ref 0–11.9)
AST SERPL-CCNC: 26 U/L (ref 12–45)
BASOPHILS # BLD AUTO: 1 % (ref 0–1.8)
BASOPHILS # BLD: 0.07 K/UL (ref 0–0.12)
BILIRUB SERPL-MCNC: 0.2 MG/DL (ref 0.1–1.5)
BUN SERPL-MCNC: 10 MG/DL (ref 8–22)
BUN SERPL-MCNC: 11 MG/DL (ref 8–22)
BUN SERPL-MCNC: 11 MG/DL (ref 8–22)
BUN SERPL-MCNC: 14 MG/DL (ref 8–22)
BUN SERPL-MCNC: 16 MG/DL (ref 8–22)
BUN SERPL-MCNC: 9 MG/DL (ref 8–22)
CALCIUM SERPL-MCNC: 9 MG/DL (ref 8.5–10.5)
CALCIUM SERPL-MCNC: 9.1 MG/DL (ref 8.5–10.5)
CALCIUM SERPL-MCNC: 9.2 MG/DL (ref 8.5–10.5)
CALCIUM SERPL-MCNC: 9.2 MG/DL (ref 8.5–10.5)
CALCIUM SERPL-MCNC: 9.3 MG/DL (ref 8.5–10.5)
CALCIUM SERPL-MCNC: 9.5 MG/DL (ref 8.5–10.5)
CHLORIDE SERPL-SCNC: 94 MMOL/L (ref 96–112)
CHLORIDE SERPL-SCNC: 96 MMOL/L (ref 96–112)
CHLORIDE SERPL-SCNC: 96 MMOL/L (ref 96–112)
CHLORIDE SERPL-SCNC: 97 MMOL/L (ref 96–112)
CHLORIDE SERPL-SCNC: 98 MMOL/L (ref 96–112)
CHLORIDE SERPL-SCNC: 98 MMOL/L (ref 96–112)
CO2 SERPL-SCNC: 23 MMOL/L (ref 20–33)
CO2 SERPL-SCNC: 25 MMOL/L (ref 20–33)
CO2 SERPL-SCNC: 26 MMOL/L (ref 20–33)
CO2 SERPL-SCNC: 26 MMOL/L (ref 20–33)
CREAT SERPL-MCNC: 0.29 MG/DL (ref 0.5–1.4)
CREAT SERPL-MCNC: 0.31 MG/DL (ref 0.5–1.4)
CREAT SERPL-MCNC: 0.34 MG/DL (ref 0.5–1.4)
CREAT SERPL-MCNC: 0.4 MG/DL (ref 0.5–1.4)
CREAT SERPL-MCNC: 0.44 MG/DL (ref 0.5–1.4)
CREAT SERPL-MCNC: 0.44 MG/DL (ref 0.5–1.4)
EOSINOPHIL # BLD AUTO: 0.36 K/UL (ref 0–0.51)
EOSINOPHIL NFR BLD: 5.3 % (ref 0–6.9)
ERYTHROCYTE [DISTWIDTH] IN BLOOD BY AUTOMATED COUNT: 38.9 FL (ref 35.9–50)
GFR SERPL CREATININE-BSD FRML MDRD: >60 ML/MIN/1.73 M 2
GLOBULIN SER CALC-MCNC: 3 G/DL (ref 1.9–3.5)
GLUCOSE SERPL-MCNC: 101 MG/DL (ref 65–99)
GLUCOSE SERPL-MCNC: 119 MG/DL (ref 65–99)
GLUCOSE SERPL-MCNC: 169 MG/DL (ref 65–99)
GLUCOSE SERPL-MCNC: 86 MG/DL (ref 65–99)
GLUCOSE SERPL-MCNC: 94 MG/DL (ref 65–99)
GLUCOSE SERPL-MCNC: 95 MG/DL (ref 65–99)
HCT VFR BLD AUTO: 32.6 % (ref 42–52)
HGB BLD-MCNC: 11.8 G/DL (ref 14–18)
IMM GRANULOCYTES # BLD AUTO: 0.03 K/UL (ref 0–0.11)
IMM GRANULOCYTES NFR BLD AUTO: 0.4 % (ref 0–0.9)
LYMPHOCYTES # BLD AUTO: 1.07 K/UL (ref 1–4.8)
LYMPHOCYTES NFR BLD: 15.6 % (ref 22–41)
MCH RBC QN AUTO: 34.4 PG (ref 27–33)
MCHC RBC AUTO-ENTMCNC: 36.2 G/DL (ref 33.7–35.3)
MCV RBC AUTO: 95 FL (ref 81.4–97.8)
MONOCYTES # BLD AUTO: 1.28 K/UL (ref 0–0.85)
MONOCYTES NFR BLD AUTO: 18.7 % (ref 0–13.4)
NEUTROPHILS # BLD AUTO: 4.03 K/UL (ref 1.82–7.42)
NEUTROPHILS NFR BLD: 59 % (ref 44–72)
NRBC # BLD AUTO: 0 K/UL
NRBC BLD AUTO-RTO: 0 /100 WBC
PLATELET # BLD AUTO: 399 K/UL (ref 164–446)
PMV BLD AUTO: 9 FL (ref 9–12.9)
POTASSIUM SERPL-SCNC: 3.6 MMOL/L (ref 3.6–5.5)
POTASSIUM SERPL-SCNC: 3.8 MMOL/L (ref 3.6–5.5)
POTASSIUM SERPL-SCNC: 4 MMOL/L (ref 3.6–5.5)
POTASSIUM SERPL-SCNC: 4.1 MMOL/L (ref 3.6–5.5)
POTASSIUM SERPL-SCNC: 4.3 MMOL/L (ref 3.6–5.5)
POTASSIUM SERPL-SCNC: 4.3 MMOL/L (ref 3.6–5.5)
PROT SERPL-MCNC: 6 G/DL (ref 6–8.2)
RBC # BLD AUTO: 3.43 M/UL (ref 4.7–6.1)
SODIUM SERPL-SCNC: 127 MMOL/L (ref 135–145)
SODIUM SERPL-SCNC: 127 MMOL/L (ref 135–145)
SODIUM SERPL-SCNC: 128 MMOL/L (ref 135–145)
SODIUM SERPL-SCNC: 128 MMOL/L (ref 135–145)
SODIUM SERPL-SCNC: 129 MMOL/L (ref 135–145)
SODIUM SERPL-SCNC: 130 MMOL/L (ref 135–145)
WBC # BLD AUTO: 6.8 K/UL (ref 4.8–10.8)

## 2017-10-09 PROCEDURE — 160035 HCHG PACU - 1ST 60 MINS PHASE I: Performed by: INTERNAL MEDICINE

## 2017-10-09 PROCEDURE — 80053 COMPREHEN METABOLIC PANEL: CPT

## 2017-10-09 PROCEDURE — 36415 COLL VENOUS BLD VENIPUNCTURE: CPT

## 2017-10-09 PROCEDURE — 99232 SBSQ HOSP IP/OBS MODERATE 35: CPT | Mod: GC | Performed by: INTERNAL MEDICINE

## 2017-10-09 PROCEDURE — 302977 HCHG BRONCHOSCOPY PROC-THERAPEUTIC

## 2017-10-09 PROCEDURE — 85025 COMPLETE CBC W/AUTO DIFF WBC: CPT

## 2017-10-09 PROCEDURE — 700111 HCHG RX REV CODE 636 W/ 250 OVERRIDE (IP)

## 2017-10-09 PROCEDURE — A9270 NON-COVERED ITEM OR SERVICE: HCPCS | Performed by: INTERNAL MEDICINE

## 2017-10-09 PROCEDURE — 99152 MOD SED SAME PHYS/QHP 5/>YRS: CPT | Performed by: INTERNAL MEDICINE

## 2017-10-09 PROCEDURE — 160002 HCHG RECOVERY MINUTES (STAT): Performed by: INTERNAL MEDICINE

## 2017-10-09 PROCEDURE — 770006 HCHG ROOM/CARE - MED/SURG/GYN SEMI*

## 2017-10-09 PROCEDURE — 700111 HCHG RX REV CODE 636 W/ 250 OVERRIDE (IP): Performed by: INTERNAL MEDICINE

## 2017-10-09 PROCEDURE — 700102 HCHG RX REV CODE 250 W/ 637 OVERRIDE(OP): Performed by: INTERNAL MEDICINE

## 2017-10-09 PROCEDURE — 160048 HCHG OR STATISTICAL LEVEL 1-5: Performed by: INTERNAL MEDICINE

## 2017-10-09 PROCEDURE — 0BJ08ZZ INSPECTION OF TRACHEOBRONCHIAL TREE, VIA NATURAL OR ARTIFICIAL OPENING ENDOSCOPIC: ICD-10-PCS | Performed by: INTERNAL MEDICINE

## 2017-10-09 PROCEDURE — 80048 BASIC METABOLIC PNL TOTAL CA: CPT

## 2017-10-09 PROCEDURE — A9270 NON-COVERED ITEM OR SERVICE: HCPCS | Performed by: STUDENT IN AN ORGANIZED HEALTH CARE EDUCATION/TRAINING PROGRAM

## 2017-10-09 PROCEDURE — 700102 HCHG RX REV CODE 250 W/ 637 OVERRIDE(OP): Performed by: STUDENT IN AN ORGANIZED HEALTH CARE EDUCATION/TRAINING PROGRAM

## 2017-10-09 RX ORDER — AMOXICILLIN AND CLAVULANATE POTASSIUM 875; 125 MG/1; MG/1
1 TABLET, FILM COATED ORAL EVERY 12 HOURS
Status: DISCONTINUED | OUTPATIENT
Start: 2017-10-09 | End: 2017-10-09

## 2017-10-09 RX ORDER — SODIUM CHLORIDE 9 MG/ML
500 INJECTION, SOLUTION INTRAVENOUS
Status: ACTIVE | OUTPATIENT
Start: 2017-10-09 | End: 2017-10-09

## 2017-10-09 RX ORDER — AMOXICILLIN AND CLAVULANATE POTASSIUM 875; 125 MG/1; MG/1
1 TABLET, FILM COATED ORAL EVERY 12 HOURS
Status: DISPENSED | OUTPATIENT
Start: 2017-10-09 | End: 2017-10-14

## 2017-10-09 RX ORDER — MIDAZOLAM HYDROCHLORIDE 1 MG/ML
.5-2 INJECTION INTRAMUSCULAR; INTRAVENOUS PRN
Status: ACTIVE | OUTPATIENT
Start: 2017-10-09 | End: 2017-10-09

## 2017-10-09 RX ORDER — MIDAZOLAM HYDROCHLORIDE 1 MG/ML
INJECTION INTRAMUSCULAR; INTRAVENOUS
Status: DISCONTINUED | OUTPATIENT
Start: 2017-10-09 | End: 2017-10-09 | Stop reason: HOSPADM

## 2017-10-09 RX ADMIN — NICOTINE 21 MG: 21 PATCH, EXTENDED RELEASE TRANSDERMAL at 06:23

## 2017-10-09 RX ADMIN — AMOXICILLIN AND CLAVULANATE POTASSIUM 1 TABLET: 875; 125 TABLET, FILM COATED ORAL at 22:36

## 2017-10-09 RX ADMIN — CEFTRIAXONE 2 G: 2 INJECTION, SOLUTION INTRAVENOUS at 12:41

## 2017-10-09 RX ADMIN — SODIUM CHLORIDE TAB 1 GM 3 G: 1 TAB at 12:41

## 2017-10-09 RX ADMIN — SODIUM CHLORIDE TAB 1 GM 3 G: 1 TAB at 17:17

## 2017-10-09 RX ADMIN — MIDODRINE HYDROCHLORIDE 5 MG: 5 TABLET ORAL at 12:41

## 2017-10-09 RX ADMIN — MIDODRINE HYDROCHLORIDE 5 MG: 5 TABLET ORAL at 17:17

## 2017-10-09 ASSESSMENT — ENCOUNTER SYMPTOMS
HEMOPTYSIS: 0
WEIGHT LOSS: 1
WEAKNESS: 0
PALPITATIONS: 0
CONSTIPATION: 0
WEAKNESS: 1
DIARRHEA: 0
WHEEZING: 0
NAUSEA: 0
HEADACHES: 0
MYALGIAS: 0
BLURRED VISION: 0
CHILLS: 0
ABDOMINAL PAIN: 0
EYES NEGATIVE: 1
DIZZINESS: 0
FEVER: 0
SPEECH CHANGE: 0
WEIGHT LOSS: 0
COUGH: 0
DOUBLE VISION: 0
SENSORY CHANGE: 0
VOMITING: 0
DIAPHORESIS: 0
HEARTBURN: 0
MUSCULOSKELETAL NEGATIVE: 1
COUGH: 1
SHORTNESS OF BREATH: 0
FOCAL WEAKNESS: 0
SPUTUM PRODUCTION: 0

## 2017-10-09 ASSESSMENT — PAIN SCALES - GENERAL
PAINLEVEL_OUTOF10: 0

## 2017-10-09 NOTE — PROGRESS NOTES
Assumed pt care at 0700. Pt A&Ox3, disoriented to time. Denies pain. No SOB or in any acute distress, pt resting comfortably in bed. Pt NPO as of midnight for bronchoscopy procedure this AM, otherwise on 800mL fluid restriction. Up with 1 assist and use of FWW. Bed alarm on, pt wearing treaded socks, personal possessions and call light within reach.

## 2017-10-09 NOTE — PROGRESS NOTES
Hospital Medicine Progress Note, Adult, Complex               Author: Juancho Gironjjar Date & Time created: 10/9/2017  2:02 PM     Interval History:  58 y/o male with paratracheal and RUL masses with severe hyponatremia  C/o fatigue, weight loss, poor po intake  Plan for lung biopsy    Review of Systems:  Review of Systems   Constitutional: Positive for malaise/fatigue and weight loss. Negative for chills and fever.   Respiratory: Negative for cough and shortness of breath.    Cardiovascular: Negative for chest pain and leg swelling.   Gastrointestinal: Negative for abdominal pain, nausea and vomiting.   Genitourinary: Negative for dysuria, frequency and urgency.   Neurological: Negative for headaches.       Physical Exam:  Physical Exam   Constitutional: He is oriented to person, place, and time. He appears cachectic. No distress.   HENT:   Head: Normocephalic and atraumatic.   Nose: Nose normal.   Mouth/Throat: Oropharynx is clear and moist.   Eyes: No scleral icterus.   Cardiovascular: Normal rate and regular rhythm.    Pulmonary/Chest: Effort normal and breath sounds normal. No respiratory distress. He has no wheezes. He has no rales.   Abdominal: Soft.   Musculoskeletal: Normal range of motion. He exhibits no edema.   Neurological: He is alert and oriented to person, place, and time.   Nursing note and vitals reviewed.      Labs:        Invalid input(s): [id number]      Recent Labs      10/08/17   0203   10/09/17   0221  10/09/17   0614  10/09/17   1054   SODIUM  124*   < >  127*  128*  127*   POTASSIUM  4.5   < >  4.1  4.3  4.0   CHLORIDE  94*   < >  98  96  94*   CO2  22   < >  23  25  26   BUN  7*   < >  11  10  9   CREATININE  0.37*   < >  0.31*  0.34*  0.29*   MAGNESIUM  1.6   --    --    --    --    CALCIUM  9.0   < >  9.3  9.2  9.5    < > = values in this interval not displayed.     Recent Labs      10/08/17   0203   10/09/17   0221  10/09/17   0614  10/09/17   1054   ALTSGPT  26   --   24   --    --     ASTSGOT  29   --   26   --    --    ALKPHOSPHAT  67   --   78   --    --    TBILIRUBIN  0.3   --   0.2   --    --    GLUCOSE  91   < >  101*  119*  86    < > = values in this interval not displayed.     Recent Labs      10/08/17   0203  10/09/17   0221   RBC  3.61*  3.43*   HEMOGLOBIN  12.5*  11.8*   HEMATOCRIT  34.2*  32.6*   PLATELETCT  354  399     Recent Labs      10/08/17   0203  10/09/17   0221   WBC  6.9  6.8   NEUTSPOLYS  61.10  59.00   LYMPHOCYTES  14.40*  15.60*   MONOCYTES  17.30*  18.70*   EOSINOPHILS  5.60  5.30   BASOPHILS  0.90  1.00   ASTSGOT  29  26   ALTSGPT  26  24   ALKPHOSPHAT  67  78   TBILIRUBIN  0.3  0.2           Hemodynamics:  Temp (24hrs), Av.3 °C (99.1 °F), Min:37 °C (98.6 °F), Max:37.6 °C (99.6 °F)  Temperature: 37 °C (98.6 °F)  Pulse  Av.7  Min: 65  Max: 99Heart Rate (Monitored): 87  Blood Pressure: 113/77, NIBP: 119/78     Respiratory:    Respiration: 19, Pulse Oximetry: 98 %        RUL Breath Sounds: Rhonchi, RML Breath Sounds: Rhonchi, RLL Breath Sounds: Diminished, LEONARDO Breath Sounds: Rhonchi, LLL Breath Sounds: Diminished  Fluids:    Intake/Output Summary (Last 24 hours) at 10/09/17 1402  Last data filed at 10/09/17 0500   Gross per 24 hour   Intake              218 ml   Output              900 ml   Net             -682 ml        GI/Nutrition:  Orders Placed This Encounter   Procedures   • DIET ORDER     Standing Status:   Standing     Number of Occurrences:   1     Order Specific Question:   Diet:     Answer:   Renal [8]     Order Specific Question:   Electrolyte modifications:     Answer:   1.5 g Sodium [1]   • DIET NPO     Standing Status:   Standing     Number of Occurrences:   8     Order Specific Question:   Restrict to:     Answer:   Sips with Medications [3]     Medical Decision Making, by Problem:  Active Hospital Problems    Diagnosis   • *Hyponatremia [E87.1]   • Lung mass [R91.8]   • Disorder of electrolytes [E87.8]   • Fall [W19.XXXA]   • Chronic alcohol use  [F10.10]   • Bacteremia [R78.81]         Reviewed items::  Labs reviewed    Assessment and Plan  1.CKD I  -stable -  creat WNL  2.HTN: Pt advised to monitor BP at home  3.Electrolytes: hyponatremia better ,most likely SIADH  4.Anemia: Hb stable  5.Volume: euvolemic    Recs:   continue free water restriction  R/O malignancy  Nutrition evaluation  no need for HD  Renal dose all meds  Avoid nephrotoxins  Prognosis poor.

## 2017-10-09 NOTE — NON-PROVIDER
Internal Medicine Medical Student Note    Name Froylan Spain     1960   Age/Sex 57 y.o. male   MRN [id number]   Code Status FULL     After 5PM or if no immediate response to page, please call for cross-coverage  Attending/Team: Dr. Villalta (Green/Purple) See Patient List for primary contact information  Call [phone] to page after hours   1st Call - Day Intern (R1):   Dr. Snell 2nd Call - Day Sr. Resident (R2/R3):   Dr. Bianchi         Reason for interval visit  (Principal Problem)   Hyponatremia    Interval Problem Daily Status Update  (24 hours)   Patient is resting comfortably this morning.  No acute events were reported overnight.  The patient appears more alert this morning compared to previous exams, and speech appears to be improved.  The patient states that he does not feel different from time of admission though.  He denies any SOB, LOC, change in mental status.    Results from the lung biopsy did not demonstrate evidence of a malignant process, so the plan for today is to have the patient undergo a bronchoscopy and potentially sample the mediastinal lesion.  Hyponatremia has improved following fluid restriction as well as supplementary NaCl administration. Na appears to have leveled off around 125-128 range.  Pulmonology and Nephrology will follow.    Review of Systems   Constitutional: Negative for chills and fever.   HENT: Negative.    Eyes: Negative.    Respiratory: Negative for cough and shortness of breath.    Cardiovascular: Negative for chest pain, palpitations and leg swelling.   Gastrointestinal: Negative for abdominal pain, diarrhea, nausea and vomiting.   Genitourinary: Negative for dysuria and hematuria.   Musculoskeletal: Negative.    Skin: Negative.    Neurological: Positive for weakness. Negative for dizziness, sensory change and speech change.   Endo/Heme/Allergies: Negative.            Current Facility-Administered Medications:   •  midodrine (PROAMATINE) tablet 5  mg, 5 mg, Oral, TID WITH MEALS, Mary Gale M.D., 5 mg at 10/09/17 1241  •  sodium chloride (SALT) tablet 3 g, 3 g, Oral, TID WITH MEALS, Mary Gale M.D., 3 g at 10/09/17 1241  •  vitamin D (cholecalciferol) tablet 5,000 Units, 5,000 Units, Oral, DAILY, Lilly Hood M.D., Stopped at 10/09/17 0900  •  multivitamin (THERAGRAN) tablet 1 Tab, 1 Tab, Oral, DAILY, Trell Curry M.D., Stopped at 10/09/17 0900  •  thiamine (THIAMINE) tablet 100 mg, 100 mg, Oral, DAILY, Trell Curry M.D., Stopped at 10/09/17 0900  •  folic acid (FOLVITE) tablet 1 mg, 1 mg, Oral, DAILY, Trell Curry M.D., Stopped at 10/09/17 0900  •  cefTRIAXone (ROCEPHIN) 2 g in 50 mL D5W IVPB premix, 2 g, Intravenous, Q24HRS, Angelica Roberts M.D., Last Rate: 100 mL/hr at 10/09/17 1241, 2 g at 10/09/17 1241  •  senna-docusate (PERICOLACE or SENOKOT S) 8.6-50 MG per tablet 2 Tab, 2 Tab, Oral, BID, Stopped at 10/09/17 0900 **AND** polyethylene glycol/lytes (MIRALAX) PACKET 1 Packet, 1 Packet, Oral, QDAY PRN **AND** magnesium hydroxide (MILK OF MAGNESIA) suspension 30 mL, 30 mL, Oral, QDAY PRN **AND** bisacodyl (DULCOLAX) suppository 10 mg, 10 mg, Rectal, QDAY PRN, Jannet Son M.D.  •  Respiratory Care per Protocol, , Nebulization, Continuous RT, Jannet Son M.D.  •  labetalol (NORMODYNE,TRANDATE) injection 10 mg, 10 mg, Intravenous, Q4HRS PRN, Jannet Son M.D.  •  ondansetron (ZOFRAN) syringe/vial injection 4 mg, 4 mg, Intravenous, Q4HRS PRN, Jannet Son M.D.  •  ondansetron (ZOFRAN ODT) dispertab 4 mg, 4 mg, Oral, Q4HRS PRN, Jannet Son M.D.  •  promethazine (PHENERGAN) tablet 12.5-25 mg, 12.5-25 mg, Oral, Q4HRS PRN, Jannet Son M.D.  •  promethazine (PHENERGAN) suppository 12.5-25 mg, 12.5-25 mg, Rectal, Q4HRS PRN, Jannet Son M.D.  •  prochlorperazine (COMPAZINE) injection 5-10 mg, 5-10 mg, Intravenous, Q4HRS PRN, Jannet Son M.D.  •  INITIATE NICOTINE REPLACEMENT  PROTOCOL , , , Once **AND** nicotine (NICODERM) 21 MG/24HR 21 mg, 21 mg, Transdermal, Daily-0600, 21 mg at 10/09/17 0623 **AND** Protocol 205 PATIENT EDUCATION MATERIALS, , , Once **AND** Protocol 205 Rotate nicotine patch application sites daily , , , CONTINUOUS **AND** nicotine polacrilex (NICORETTE) 2 MG piece 2 mg, 2 mg, Oral, Q HOUR PRN, Jannet Son M.D.  •  guaifenesin dextromethorphan (ROBITUSSIN DM) 100-10 MG/5ML syrup 10 mL, 10 mL, Oral, Q6HRS PRN, Jannet Son M.D.  •  acetaminophen (TYLENOL) tablet 650 mg, 650 mg, Oral, Q6HRS PRN, Jannet Son M.D., 650 mg at 10/02/17 0814        Physical Exam       Vitals:    10/09/17 1005 10/09/17 1010 10/09/17 1015 10/09/17 1020   BP:       Pulse: 90   87   Resp: (!) 25 (!) 23 (!) 25 19   Temp:       SpO2: 99% 100% 98% 98%   Weight:       Height:         Body mass index is 18.03 kg/m².    Oxygen Therapy:  Pulse Oximetry: 98 %, O2 (LPM): 0, O2 Delivery: None (Room Air)    Physical Exam   Constitutional: He is oriented to person, place, and time. He appears unhealthy. No distress.   HENT:   Head: Normocephalic and atraumatic.   Eyes: EOM are normal.   Neck: Normal range of motion.   Cardiovascular: Normal rate, regular rhythm, S1 normal and S2 normal.  Exam reveals no gallop and no friction rub.    No murmur heard.  Pulmonary/Chest: Effort normal and breath sounds normal. No respiratory distress. He exhibits no tenderness.   Coarse bronchial breath sounds   Abdominal: Soft. Bowel sounds are normal. He exhibits no distension and no mass. There is no tenderness. There is no rebound.   Musculoskeletal: Normal range of motion. He exhibits no edema or tenderness.   Neurological: He is alert and oriented to person, place, and time.   Patient appears to be more alert than exams on previous days.   Skin: Skin is warm and dry. No rash noted. No erythema.   Psychiatric: Mood, memory and affect normal.     Labs:    Percutaneous CT-guided lung biopsy of apical  lung nodule  A. 3 cores, right upper lobe mass:         Core biopsies demonstrating marked mixed inflammation,          granulation tissue, fibrosis and necrosis consistent with          organizing pneumonia.         No malignancy identified.    Assessment/Plan     # Hyponatremia  - Na: 107 at time of admission  - Na: 128 (10/9)  - Patient previously had altered level of consciousness, no mental status change at this time.  - No evidence of seizure noted since time of admission.  - Biopsy did not demonstrate evidence of malignancy which would be consistent with SIADH  PLAN:  - Fluid restriction <800mL  - Continue NaCl supplementation, 1 tab TID  - Consider loop diuretics and/or demeclocycline if there is no improvement on current treatment.  - Pulmonary will follow  - Nephro will follow  - Per patient, paratracheal mass will be biopsied on Wednesday.     # Lung Mass  - CT report notes paratracheal mass measuring 3.6x5.9cm as well as 2.0x1.8cm lession in right upper lobe.  - Smoking history and hyponatremia increase suspicion that this is a malignant process with paraneoplastic syndrome.  - Biopsy Result: marked mixed inflammation,          granulation tissue, fibrosis and necrosis consistent with          organizing pneumonia.  PLAN:  - Paratracheal mass on wednesday  - Continue management of SIADH symptoms/hyponatremia  - Pulm will follow     # Bacteremia  - Blood cultures positive for strep pneumo in ICU  - Started on azithromycin for bacteremia.  - Blood cultures negative at 48 hours.  PLAN:  - d/c ceftriaxone  - Augmentin BID x6 days (total abx 14 days)  - Monitor for s/s of sepsis      # Chronic Alcohol Use  - Patient states that he has 2-3 drinks/week.  - No s/s of acute alcohol withdrawal.  PLAN:  - no change

## 2017-10-09 NOTE — TELEPHONE ENCOUNTER
Dr. Gibson would like to do EBUS on Marvin, he is currently admitted. Left voicemail for endo scheduling to call me back. Dr. Gibson would like this done this week.

## 2017-10-09 NOTE — PROGRESS NOTES
Pulmonary Medicine Interval Note    Name Froylan Spain     1960   Age/Sex 57 y.o. male   MRN [id number]   Code Status Full       Attending/Team: Dr. Gibson / Pulmonary         Reason for interval visit  (Principal Problem)   Hyponatremia   Lung masses    ID: Patient is a 57-year-old male admitted following a ground level fall and was found to be severely hyponatremic. Further workup showed a right sided paratracheal and upper lobe lung masses concerning for malignancy in the setting of long-standing smoking history. CAT scan also showed some infiltrates and blood cultures were positive for strep pneumonia. Patient currently on ceftriaxone     Interval Problem Daily Status Update  (24 hours)   Patient reported feeling fine. Denied any shortness of breath or chest pain. Sodium level coming up, 129 this morning. On sodium tabs. Bronchoscopy canceled due to high risk because lesion close to the trachea.    Review of Systems   Constitutional: Positive for malaise/fatigue and weight loss. Negative for chills and fever.   HENT: Negative for congestion.    Eyes: Negative for blurred vision and double vision.   Respiratory: Positive for cough. Negative for hemoptysis, sputum production, shortness of breath and wheezing.    Cardiovascular: Negative for chest pain, palpitations and leg swelling.   Gastrointestinal: Negative for constipation, heartburn and nausea.   Genitourinary: Negative for dysuria and urgency.   Musculoskeletal: Negative for myalgias.   Skin: Negative for itching and rash.   Neurological: Positive for weakness. Negative for dizziness, focal weakness and headaches.         Quality Measures    Reviewed items::  EKG reviewed, Labs reviewed, Medications reviewed and Radiology images reviewed          Physical Exam       Vitals:    10/09/17 1005 10/09/17 1010 10/09/17 1015 10/09/17 1020   BP:       Pulse: 90   87   Resp: (!) 25 (!) 23 (!) 25 19   Temp:       SpO2: 99% 100% 98% 98%   Weight:        Height:         Body mass index is 18.03 kg/m².    Oxygen Therapy:  Pulse Oximetry: 98 %, O2 (LPM): 2, O2 Delivery: Nasal Cannula    Physical Exam   Constitutional: He is oriented to person, place, and time.   Cachectic   HENT:   Head: Atraumatic.   Temporal wasting   Eyes: Conjunctivae are normal. Pupils are equal, round, and reactive to light.   Neck: Neck supple.   Cardiovascular: Normal rate, regular rhythm and normal heart sounds.    No murmur heard.  Pulmonary/Chest: Effort normal. He has wheezes (scattered more on the right side. Slightly prolonged expiratory phase).   Abdominal: Soft. He exhibits no distension. There is no tenderness.   Musculoskeletal: Normal range of motion. He exhibits no edema or deformity.   Lymphadenopathy:     He has no cervical adenopathy.   Neurological: He is alert and oriented to person, place, and time.   Skin:   Some skin breaks and dryness on  hands.         Lab Data Review:         10/4/2017  10:44 AM    Recent Labs      10/08/17   0203   10/09/17   0614  10/09/17   1054  10/09/17   1447   SODIUM  124*   < >  128*  127*  129*   POTASSIUM  4.5   < >  4.3  4.0  3.6   CHLORIDE  94*   < >  96  94*  96   CO2  22   < >  25  26  25   BUN  7*   < >  10  9  11   CREATININE  0.37*   < >  0.34*  0.29*  0.44*   MAGNESIUM  1.6   --    --    --    --    CALCIUM  9.0   < >  9.2  9.5  9.2    < > = values in this interval not displayed.       Recent Labs      10/08/17   0203   10/09/17   0221  10/09/17   0614  10/09/17   1054  10/09/17   1447   ALTSGPT  26   --   24   --    --    --    ASTSGOT  29   --   26   --    --    --    ALKPHOSPHAT  67   --   78   --    --    --    TBILIRUBIN  0.3   --   0.2   --    --    --    GLUCOSE  91   < >  101*  119*  86  169*    < > = values in this interval not displayed.       Recent Labs      10/08/17   0203  10/09/17   0221   RBC  3.61*  3.43*   HEMOGLOBIN  12.5*  11.8*   HEMATOCRIT  34.2*  32.6*   PLATELETCT  354  399       Recent Labs      10/08/17    0203  10/09/17   0221   WBC  6.9  6.8   NEUTSPOLYS  61.10  59.00   LYMPHOCYTES  14.40*  15.60*   MONOCYTES  17.30*  18.70*   EOSINOPHILS  5.60  5.30   BASOPHILS  0.90  1.00   ASTSGOT  29  26   ALTSGPT  26  24   ALKPHOSPHAT  67  78   TBILIRUBIN  0.3  0.2           Assessment/Plan   Patient is a 57-year-old male with history of homelessness and chronic tobacco use presents with a ground-level fall found to be severely hyponatremic and Imaging showed concerning right-sided lung masses. Pulmonary medicine asked to evaluate the patient regarding the lung masses. A tissue diagnosis is definitely required in this situation. IR biopsy of the right upper lobe mass is nondiagnostic showing fibrous tissue and no evidence of malignancy. Bronchial bronchoscopy under sedation could not be performed due to the fact that the lesion is compressing the trachea and it would be a high-risk procedure especially in case of bleeding    Lung mass  Hyponatremia secondary to SIADH  Strep bacteremia  Community acquired pneumonia  Chronic alcohol use  Chronic tobacco use    Recommend:  -We'll arrange bronchoscopy with biopsy of the paratracheal mass in the OR  -Consider sample testing for actionable driven mutations in case of non-small cell lung cancer.  -Keep nothing by mouth after midnight  -Agree with current antibiotics  -RT and OT treatments per protocols  -Hyponatremia management per primary team.   -Pt has a brother Benjamin Spain in [place] who he would want contacted if needed and could make decisions for him if the pt becomes unable.  -We'll continue to follow patient with you.  -Thank you for allowing us to participate in this patient's care.    Pulmonary attending:  Pt was seen and examined with medical resident. Treatment plan was discussed with the patient, the nurse and with medical resident.     I agree with his progress note

## 2017-10-10 LAB
ANION GAP SERPL CALC-SCNC: 8 MMOL/L (ref 0–11.9)
BUN SERPL-MCNC: 13 MG/DL (ref 8–22)
CALCIUM SERPL-MCNC: 9 MG/DL (ref 8.5–10.5)
CHLORIDE SERPL-SCNC: 96 MMOL/L (ref 96–112)
CO2 SERPL-SCNC: 24 MMOL/L (ref 20–33)
CREAT SERPL-MCNC: 0.36 MG/DL (ref 0.5–1.4)
GFR SERPL CREATININE-BSD FRML MDRD: >60 ML/MIN/1.73 M 2
GLUCOSE SERPL-MCNC: 67 MG/DL (ref 65–99)
HGB RETIC QN AUTO: 37.2 PG/CELL (ref 29–35)
IMM RETICS NFR: 4.5 % (ref 9.3–17.4)
IRON SATN MFR SERPL: 21 % (ref 15–55)
IRON SERPL-MCNC: 67 UG/DL (ref 50–180)
POTASSIUM SERPL-SCNC: 4.2 MMOL/L (ref 3.6–5.5)
RETICS # AUTO: 0.05 M/UL (ref 0.04–0.06)
RETICS/RBC NFR: 1.2 % (ref 0.8–2.1)
SODIUM SERPL-SCNC: 128 MMOL/L (ref 135–145)
TIBC SERPL-MCNC: 322 UG/DL (ref 250–450)

## 2017-10-10 PROCEDURE — 700102 HCHG RX REV CODE 250 W/ 637 OVERRIDE(OP): Performed by: STUDENT IN AN ORGANIZED HEALTH CARE EDUCATION/TRAINING PROGRAM

## 2017-10-10 PROCEDURE — 85046 RETICYTE/HGB CONCENTRATE: CPT

## 2017-10-10 PROCEDURE — 80048 BASIC METABOLIC PNL TOTAL CA: CPT

## 2017-10-10 PROCEDURE — 700111 HCHG RX REV CODE 636 W/ 250 OVERRIDE (IP): Performed by: STUDENT IN AN ORGANIZED HEALTH CARE EDUCATION/TRAINING PROGRAM

## 2017-10-10 PROCEDURE — 770006 HCHG ROOM/CARE - MED/SURG/GYN SEMI*

## 2017-10-10 PROCEDURE — 36415 COLL VENOUS BLD VENIPUNCTURE: CPT

## 2017-10-10 PROCEDURE — 99232 SBSQ HOSP IP/OBS MODERATE 35: CPT | Mod: GC | Performed by: INTERNAL MEDICINE

## 2017-10-10 PROCEDURE — 83540 ASSAY OF IRON: CPT

## 2017-10-10 PROCEDURE — 83550 IRON BINDING TEST: CPT

## 2017-10-10 PROCEDURE — A9270 NON-COVERED ITEM OR SERVICE: HCPCS | Performed by: STUDENT IN AN ORGANIZED HEALTH CARE EDUCATION/TRAINING PROGRAM

## 2017-10-10 PROCEDURE — 700102 HCHG RX REV CODE 250 W/ 637 OVERRIDE(OP): Performed by: INTERNAL MEDICINE

## 2017-10-10 PROCEDURE — A9270 NON-COVERED ITEM OR SERVICE: HCPCS | Performed by: INTERNAL MEDICINE

## 2017-10-10 RX ORDER — HEPARIN SODIUM 5000 [USP'U]/ML
5000 INJECTION, SOLUTION INTRAVENOUS; SUBCUTANEOUS EVERY 8 HOURS
Status: COMPLETED | OUTPATIENT
Start: 2017-10-10 | End: 2017-10-10

## 2017-10-10 RX ADMIN — HEPARIN SODIUM 5000 UNITS: 5000 INJECTION, SOLUTION INTRAVENOUS; SUBCUTANEOUS at 12:45

## 2017-10-10 RX ADMIN — STANDARDIZED SENNA CONCENTRATE AND DOCUSATE SODIUM 2 TABLET: 8.6; 5 TABLET, FILM COATED ORAL at 20:21

## 2017-10-10 RX ADMIN — AMOXICILLIN AND CLAVULANATE POTASSIUM 1 TABLET: 875; 125 TABLET, FILM COATED ORAL at 20:21

## 2017-10-10 RX ADMIN — NICOTINE 21 MG: 21 PATCH, EXTENDED RELEASE TRANSDERMAL at 06:03

## 2017-10-10 RX ADMIN — SODIUM CHLORIDE TAB 1 GM 3 G: 1 TAB at 18:05

## 2017-10-10 RX ADMIN — MIDODRINE HYDROCHLORIDE 5 MG: 5 TABLET ORAL at 12:45

## 2017-10-10 RX ADMIN — MIDODRINE HYDROCHLORIDE 5 MG: 5 TABLET ORAL at 18:05

## 2017-10-10 RX ADMIN — SODIUM CHLORIDE TAB 1 GM 3 G: 1 TAB at 12:45

## 2017-10-10 ASSESSMENT — ENCOUNTER SYMPTOMS
SPUTUM PRODUCTION: 0
MYALGIAS: 0
CHILLS: 0
HEADACHES: 0
SHORTNESS OF BREATH: 1
NAUSEA: 0
COUGH: 0
FEVER: 0
MUSCULOSKELETAL NEGATIVE: 1
HEARTBURN: 0
BLURRED VISION: 0
SHORTNESS OF BREATH: 0
DIARRHEA: 0
WEIGHT LOSS: 1
VOMITING: 0
WEIGHT LOSS: 0
DIAPHORESIS: 0
HEMOPTYSIS: 0
FOCAL WEAKNESS: 0
EYES NEGATIVE: 1
WEAKNESS: 0
DOUBLE VISION: 0
CONSTIPATION: 0
DIZZINESS: 0
COUGH: 1
PALPITATIONS: 0
WEAKNESS: 1
ABDOMINAL PAIN: 0
WHEEZING: 0

## 2017-10-10 ASSESSMENT — PAIN SCALES - GENERAL
PAINLEVEL_OUTOF10: 0

## 2017-10-10 NOTE — PROGRESS NOTES
Nataly Tamayo Fall Risk Assessment:     Last Known Fall: Within the last month  Mobility: Immobilized/requires assist of one person  Medications: No meds  Mental Status/LOC/Awareness: Awake, alert, and oriented to date, place, and person  Toileting Needs: Use of assistive device (Bedside commode, bedpan, urinal)  Volume/Electrolyte Status: Low blood sugar/electrolyte imbalances  Communication/Sensory: No deficits  Behavior: Ethanol/substance abuse  Nataly Tamayo Fall Risk Total: 16  Fall Risk Level: HIGH RISK    Universal Fall Precautions:  call light/belongings in reach, bed in low position and locked, siderails up x 2, use non-slip footwear, adequate lighting, educate to call for assistance, clutter free and spill free environment    Fall Risk Level Interventions:   TRIAL ([employer], NEURO, MED BNE 5) Low Fall Risk Interventions  Place yellow fall risk ID band on patient: verified  Provide patient/family education based on risk assessment: completed  Educate patient/family to call staff for assistance when getting out of bed: completed  Place fall precaution signage outside patient door: verifiedTRIAL ([employer], NEURO, MED BEN 5) Moderate Fall Risk Interventions  Place yellow fall risk ID band on patient: verified  Provide patient/family education based on risk assessment : completed  Educate patient/family to call staff for assistance when getting out of bed: completed  Place fall precaution signage outside patient door: verified  Utilize bed/chair fall alarm: verifiedTRIAL ([employer], NEURO, [employer] BEN 5) High Fall Risk Interventions  Place yellow fall risk ID band on patient: verified  Provide patient/family education based on risk assessment: completed  Educate patient/family to call staff for assistance when getting out of bed: completed  Place fall precaution signage outside patient door: verified  Place patient in room close to nursing station: currently not available/charge notified  Utilize bed/chair fall  alarm: verified  Notify charge of high risk for huddle: completed    Patient Specific Interventions:     Medication: review medications with patient and family  Mental Status/LOC/Awareness: reinforce falls education, check on patient hourly, utilize bed/chair fall alarm and reinforce the use of call light  Toileting: provide frquent toileting and instruct patient/family on the need to call for assistance when toileting  Volume/Electrolyte Status: ensure patient remains hydrated and monitor abnormal lab values  Communication/Sensory: update plan of care on whiteboard and ensure proper positioning when transferrng/ambulating  Behavioral: encourage patient to voice feelings, administer medication as ordered and instruct/reinforce fall program rationale  Mobility: provide comfort measures during transport, utilize bed/chair fall alarm, ensure bed is locked and in lowest position, provide appropriate assistive device and instruct patient to exit bed on their strongest side

## 2017-10-10 NOTE — PROGRESS NOTES
[facility] INTERNAL MEDICINE ATTENDING NOTE:      Date & Time note created:   10/9/2017   8:17 PM     Visit Time:   Attending/resident bedside rounds 9-11:30 AM     The patient was evaluated with the resident staff.  I reviewed the resident's note and agree with the resident's findings and plan as documented, except or in addition, as documented below. Please refer to the resident's note for complete information.The chart was reviewed and summarized.  Available labs, imaging, O2 sats, EKGs were reviewed. Available nursing, consultant, and resident notes were reviewed. I am actively involved in the patient's care.                                                                BRIEF DISCUSSION:                                                         Na improving on curent therapy.  Pt looks much better and alert.  Plan for surgical biopsy of the mediastinal mass, likely on Wed.  Change ABX to PO.  ----------------------------------------------------------------------------------------------------------------------  Vitals:    10/09/17 1010 10/09/17 1015 10/09/17 1020 10/09/17 1500   BP:    111/57   Pulse:   87 88   Resp: (!) 23 (!) 25 19 20   Temp:    36.8 °C (98.3 °F)   SpO2: 100% 98% 98% 97%   Weight:       Height:         Weight/BMI: Body mass index is 18.03 kg/m².  Pulse Oximetry: 97 %, O2 (LPM): 2, O2 Delivery: Nasal Cannula    Intake/Output Summary (Last 24 hours) at 10/09/17 2017  Last data filed at 10/09/17 1400   Gross per 24 hour   Intake              240 ml   Output              600 ml   Net             -360 ml       Brodie Villalta MD   [facility]

## 2017-10-10 NOTE — PROGRESS NOTES
Pulmonary Medicine Interval Note    Name Froylan Spain     1960   Age/Sex 57 y.o. male   MRN [id number]   Code Status Full       Attending/Team: Dr. Gibson / Pulmonary         Reason for interval visit  (Principal Problem)   Hyponatremia   Lung masses    ID: Patient is a 57-year-old male admitted following a ground level fall and was found to be severely hyponatremic. Further workup showed a right sided paratracheal and upper lobe lung masses concerning for malignancy in the setting of long-standing smoking history. CAT scan also showed some infiltrates and blood cultures were positive for strep pneumonia. Patient currently on ceftriaxone     Interval Problem Daily Status Update  (24 hours)   Patient reported feeling fine. Denied any shortness of breath or chest pain. Sodium level coming up, 128 this morning. On sodium tabs. Bronchoscopy planned for tomorrow.    Review of Systems   Constitutional: Positive for malaise/fatigue and weight loss. Negative for chills and fever.   HENT: Negative for congestion.    Eyes: Negative for blurred vision and double vision.   Respiratory: Positive for cough. Negative for hemoptysis, sputum production, shortness of breath and wheezing.    Cardiovascular: Negative for chest pain, palpitations and leg swelling.   Gastrointestinal: Negative for constipation, heartburn and nausea.   Genitourinary: Negative for dysuria and urgency.   Musculoskeletal: Negative for myalgias.   Skin: Negative for itching and rash.   Neurological: Positive for weakness. Negative for dizziness, focal weakness and headaches.         Quality Measures    Reviewed items::  EKG reviewed, Labs reviewed, Medications reviewed and Radiology images reviewed          Physical Exam       Vitals:    10/09/17 1500 10/09/17 1905 10/10/17 0356 10/10/17 0733   BP: 111/57 111/61 109/69 114/71   Pulse: 88 87 78 76   Resp: 20 16 16 20   Temp: 36.8 °C (98.3 °F) 37.3 °C (99.2 °F) 37.2 °C (98.9 °F) 36.8 °C (98.2  °F)   SpO2: 97% 99% 97% 95%   Weight:       Height:         Body mass index is 18.03 kg/m².    Oxygen Therapy:  Pulse Oximetry: 95 %, O2 (LPM): 0, O2 Delivery: None (Room Air)    Physical Exam   Constitutional: He is oriented to person, place, and time.   Cachectic   HENT:   Head: Atraumatic.   Temporal wasting   Eyes: Conjunctivae are normal. Pupils are equal, round, and reactive to light.   Neck: Neck supple.   Cardiovascular: Normal rate, regular rhythm and normal heart sounds.    No murmur heard.  Pulmonary/Chest: Effort normal. He has no wheezes.   Abdominal: Soft. He exhibits no distension. There is no tenderness.   Musculoskeletal: Normal range of motion. He exhibits no edema or deformity.   Lymphadenopathy:     He has no cervical adenopathy.   Neurological: He is alert and oriented to person, place, and time.   Skin:   Some skin breaks and dryness on  hands.         Lab Data Review:         10/4/2017  10:44 AM    Recent Labs      10/08/17   0203   10/09/17   1740  10/09/17   2207  10/10/17   0300   SODIUM  124*   < >  130*  128*  128*   POTASSIUM  4.5   < >  3.8  4.3  4.2   CHLORIDE  94*   < >  98  97  96   CO2  22   < >  25  26  24   BUN  7*   < >  14  16  13   CREATININE  0.37*   < >  0.44*  0.40*  0.36*   MAGNESIUM  1.6   --    --    --    --    CALCIUM  9.0   < >  9.0  9.1  9.0    < > = values in this interval not displayed.       Recent Labs      10/08/17   0203   10/09/17   0221   10/09/17   1740  10/09/17   2207  10/10/17   0300   ALTSGPT  26   --   24   --    --    --    --    ASTSGOT  29   --   26   --    --    --    --    ALKPHOSPHAT  67   --   78   --    --    --    --    TBILIRUBIN  0.3   --   0.2   --    --    --    --    GLUCOSE  91   < >  101*   < >  95  94  67    < > = values in this interval not displayed.       Recent Labs      10/08/17   0203  10/09/17   0221  10/10/17   0854   RBC  3.61*  3.43*   --    HEMOGLOBIN  12.5*  11.8*   --    HEMATOCRIT  34.2*  32.6*   --    PLATELETCT  354  399    --    IRON   --    --   67   [facility]   --    --   322       Recent Labs      10/08/17   0203  10/09/17   0221   WBC  6.9  6.8   NEUTSPOLYS  61.10  59.00   LYMPHOCYTES  14.40*  15.60*   MONOCYTES  17.30*  18.70*   EOSINOPHILS  5.60  5.30   BASOPHILS  0.90  1.00   ASTSGOT  29  26   ALTSGPT  26  24   ALKPHOSPHAT  67  78   TBILIRUBIN  0.3  0.2           Assessment/Plan   Patient is a 57-year-old male with history of homelessness and chronic tobacco use presents with a ground-level fall found to be severely hyponatremic and Imaging showed concerning right-sided lung masses. Pulmonary medicine asked to evaluate the patient regarding the lung masses. A tissue diagnosis is definitely required in this situation. IR biopsy of the right upper lobe mass is nondiagnostic showing fibrous tissue and no evidence of malignancy. Bronchial bronchoscopy under sedation could not be performed due to the fact that the lesion is compressing the trachea and it would be a high-risk procedure especially in case of bleeding    Lung mass  Hyponatremia secondary to SIADH  Strep bacteremia  Community acquired pneumonia  Chronic alcohol use  Chronic tobacco use    Recommend:  -Plan on OR bronchoscopy with possible EBUS and biopsy.  -Consider sample testing for actionable driven mutations in case of non-small cell lung cancer.  -Keep nothing by mouth after midnight  -Agree with current antibiotics  -RT and OT treatments per protocols  -Hyponatremia management per primary team.   -Pt has a brother Benjamin Spain in [place] who he would want contacted if needed and could make decisions for him if the pt becomes unable.  -We'll continue to follow patient with you.  -Thank you for allowing us to participate in this patient's care.    Pulmonary attending:  Pt was seen and examined with medical resident. Treatment plan was discussed with the patient, the nurse and with medical resident.     I agree with his progress note

## 2017-10-10 NOTE — PROGRESS NOTES
Internal Medicine Interval Note  Note Author: Christie Snell M.D.     Name Froylan Spain     1960   Age/Sex 57 y.o. male   MRN [id number]   Code Status FULL     After 5PM or if no immediate response to page, please call for cross-coverage  Attending/Team: Dr. Villalta See Patient List for primary contact information  Call [phone] to page    1st Call - Day Intern (R1):   Dr. Christie Snell 2nd Call - Day Sr. Resident (R2/R3):   Dr. Tayler Bianchi   58yo homeless male without significant PMH admitted on 17 for severe hyponatremia to 108 and AMS.  Started on salt tabs, fluid restriction and Na correction per guidelines.   Concern for SIADH.  Smoker with lung masses (5.9 cm rightparatracheal mass and 2 cm RUL) found on CT Chest.  IR biopsy (10/05/17) c/w organizing PNA.  OR biopsy of paratracheal lesion pending.    Reason for interval visit  (Principal Problem)   Hyponatremia    Interval Problem Daily Status Update  (24 hours)   No acute overnight events.  No complaints at bedside.  Hyponatremia is improved to 128 (stable over last few days).  AMS is resolved.  BM last night.  Re-scheduled for OR biopsy of paratracheal lung lesion tomorrow.        Review of Systems   Constitutional: Negative for chills, diaphoresis, fever, malaise/fatigue and weight loss.   Eyes: Negative.    Respiratory: Positive for shortness of breath. Negative for cough.    Cardiovascular: Negative for chest pain and palpitations.   Gastrointestinal: Negative for abdominal pain, diarrhea, nausea and vomiting.   Genitourinary: Negative.    Musculoskeletal: Negative.    Skin: Negative for itching and rash.   Neurological: Negative for focal weakness, weakness and headaches.     Consultants/Specialty  Pulmonary   Nephrology     Disposition  Inpatient    Quality Measures    Reviewed items::  Labs reviewed and Medications reviewed  Garvin catheter::  No Garvin  DVT prophylaxis pharmacological::  Heparin (one AM dose  today (holding PM dose for AM OR biopsy))  DVT prophylaxis - mechanical:  SCDs  Ulcer Prophylaxis::  Not indicated        Physical Exam       Vitals:    10/09/17 1020 10/09/17 1500 10/09/17 1905 10/10/17 0356   BP:  111/57 111/61 109/69   Pulse: 87 88 87 78   Resp: 19 20 16 16   Temp:  36.8 °C (98.3 °F) 37.3 °C (99.2 °F) 37.2 °C (98.9 °F)   SpO2: 98% 97% 99% 97%   Weight:       Height:         Body mass index is 18.03 kg/m².    Oxygen Therapy:  Pulse Oximetry: 97 %, O2 (LPM): 0, O2 Delivery: None (Room Air)    Physical Exam   Constitutional: He is oriented to person, place, and time. No distress.   Appears older than stated age; disheveled   HENT:   Head: Normocephalic and atraumatic.   Cardiovascular: Normal rate, regular rhythm, normal heart sounds and intact distal pulses.  Exam reveals no gallop and no friction rub.    No murmur heard.  Pulmonary/Chest: Effort normal and breath sounds normal. No respiratory distress. He has no wheezes. He has no rales. He exhibits no tenderness.   Abdominal: Soft. Bowel sounds are normal. He exhibits no distension and no mass. There is no tenderness. There is no rebound and no guarding.   Musculoskeletal: He exhibits no edema or tenderness.   Neurological: He is alert and oriented to person, place, and time. No cranial nerve deficit.   Skin: Skin is dry. No rash noted. He is not diaphoretic. No pallor.   Cracked, with areas of peeling skin; bronzed vs. sunburnt   Psychiatric: Mood and affect normal.       Lab Data Review:         10/8/2017  7:32 AM    Recent Labs      10/08/17   0203   10/09/17   1740  10/09/17   2207  10/10/17   0300   SODIUM  124*   < >  130*  128*  128*   POTASSIUM  4.5   < >  3.8  4.3  4.2   CHLORIDE  94*   < >  98  97  96   CO2  22   < >  25  26  24   BUN  7*   < >  14  16  13   CREATININE  0.37*   < >  0.44*  0.40*  0.36*   MAGNESIUM  1.6   --    --    --    --    CALCIUM  9.0   < >  9.0  9.1  9.0    < > = values in this interval not displayed.        Recent Labs      10/08/17   0203   10/09/17   0221   10/09/17   1740  10/09/17   2207  10/10/17   0300   ALTSGPT  26   --   24   --    --    --    --    ASTSGOT  29   --   26   --    --    --    --    ALKPHOSPHAT  67   --   78   --    --    --    --    TBILIRUBIN  0.3   --   0.2   --    --    --    --    GLUCOSE  91   < >  101*   < >  95  94  67    < > = values in this interval not displayed.       Recent Labs      10/08/17   0203  10/09/17   0221   RBC  3.61*  3.43*   HEMOGLOBIN  12.5*  11.8*   HEMATOCRIT  34.2*  32.6*   PLATELETCT  354  399       Recent Labs      10/08/17   0203  10/09/17   0221   WBC  6.9  6.8   NEUTSPOLYS  61.10  59.00   LYMPHOCYTES  14.40*  15.60*   MONOCYTES  17.30*  18.70*   EOSINOPHILS  5.60  5.30   BASOPHILS  0.90  1.00   ASTSGOT  29  26   ALTSGPT  26  24   ALKPHOSPHAT  67  78   TBILIRUBIN  0.3  0.2           Assessment/Plan     Hyponatremia  - Na of 107 and AMS on admission   - likely 2/2 SIADH  - continue salt tabs 3g TID & 800cc fluid restriction  - Qshift neuro checks  - Na up to 128 today, remaining stable; CTM  - AMS resolved, no focal neurological deficits      Lung mass  - Hx of smoking >20 years, 10 pack-year.   - Cough, recent weight loss and low appetite.  - CT chest: 3.6x5.9 cm paratracheal mass compressing on SVC and 2x1.8 cm R upper lobe mass (9/30)  - Highly suspicious for malignant etiology  - IR biopsy of RUL c/w with PNA  - OR biopsy of paratracheal mass scheduled for tomorrow    * giving one dose of heparin today and SCDs   * NPO after midnight    Chronic alcohol use  - Consumes 1 beer daily   - BAL: 0.01 on admission   - PO thiamine, B12 and folic acid supplements   - No signs of EtOH withdrawal; CTM    Fall  - GLF with no reported syncope.  - CT head on admission from other facility negative for bleeding or intracranial pathology  - No evidence of neurological deficits   - CTM    Disorder of electrolytes  - monitor electrolytes  - replete as  necessary    Bacteremia  - blood cultures on admission positive for Strep pneumo  - Repeat blood cultures negative 10/1, 10/2, 10/3  - afebrile  - PO Augmentin BID (until 10/14)

## 2017-10-10 NOTE — CARE PLAN
Problem: Nutritional:  Goal: Achieve adequate nutritional intake  Patient will consume 50% of meals   Outcome: MET Date Met: 10/10/17

## 2017-10-10 NOTE — PROGRESS NOTES
Internal Medicine Interval Note  Note Author: Christie Snell M.D.     Name Froylan Spain     1960   Age/Sex 57 y.o. male   MRN [id number]   Code Status FULL     After 5PM or if no immediate response to page, please call for cross-coverage  Attending/Team: Dr. Villalta See Patient List for primary contact information  Call [phone] to page    1st Call - Day Intern (R1):   Dr. Gale 2nd Call - Day Sr. Resident (R2/R3):   Dr. Blanchard         Reason for interval visit  (Principal Problem)   Hyponatremia    Interval Problem Daily Status Update  (24 hours)   No acute events overnight. Na to 128 this morning.  Symptoms greatly improved and conversant.  No complaints.    Review of Systems   Constitutional: Negative for chills, diaphoresis, fever, malaise/fatigue and weight loss.   Skin: Negative for itching and rash.   Neurological: Negative for weakness and headaches.     Constitutional: Positive for malaise/fatigue (stable). Negative for chills and fever.   HENT: Negative.  Negative for congestion, nosebleeds and sore throat.    Eyes: Negative.    Respiratory: Positive for shortness of breath and cough (stable). Negative forwheezing.    Cardiovascular: Negative for chest pain, palpitations, orthopnea and leg swelling.   Gastrointestinal: Negative for abdominal pain, nausea and vomiting.   Genitourinary: Negative for dysuria, flank pain, frequency, hematuria and urgency.   Musculoskeletal:  Negative for myalgias and neck pain.   Skin: Negative.  Negative for itching and rash.   Neurological: Negative for dizziness, sensory change, focal weakness and headaches.   All other systems reviewed and are negative.    Consultants/Specialty  Pulmonary   Nephrology     Disposition  Inpatient    Quality Measures    Reviewed items::  Labs reviewed and Medications reviewed  Garvin catheter::  No Garvin  DVT prophylaxis - mechanical:  SCDs (patient is scheduled for OR biopsy in the AM)  Ulcer Prophylaxis::  Not  indicated    Reviewed items::  Labs reviewed, Medications reviewed and Radiology images reviewed  Garvin catheter::  No Garvin  DVT prophylaxis pharmacological::  Enoxaparin (Lovenox) - Hold for bronchoscopy on 10/9      Physical Exam       Vitals:    10/09/17 1010 10/09/17 1015 10/09/17 1020 10/09/17 1500   BP:    111/57   Pulse:   87 88   Resp: (!) 23 (!) 25 19 20   Temp:    36.8 °C (98.3 °F)   SpO2: 100% 98% 98% 97%   Weight:       Height:         Body mass index is 18.03 kg/m².    Oxygen Therapy:  Pulse Oximetry: 97 %, O2 (LPM): 2, O2 Delivery: Nasal Cannula    Physical Exam  Constitutional: He is oriented to person, place, and time. No acute distress.  Elderly disheveled  male.  HENT:   Head: Normocephalic and atraumatic.   Eyes: EOM are normal. No scleral icterus.    Neck: Neck supple.   Cardiovascular: Normal rate and regular rhythm.    No murmur heard.  Pulmonary/Chest: Coarse bronchial BS; Effort normal. No respiratory distress. He exhibits no tenderness.   Abdominal: Soft. He exhibits no distension. There is no tenderness.   Musculoskeletal: He exhibits no edema or deformity.   Neurological: He is alert and oriented to person, place, and time. GCS score is 15.   No gross motor or sensory deficits. Cr Nr II to XII grossly intact. Slow speech.  Skin: Skin is dry and peeling   Bronze colored skin/likely sunburn. Bruises/excoriation of the skin in elbow and knee     Lab Data Review:         10/8/2017  7:32 AM    Recent Labs      10/08/17   0203   10/09/17   1054  10/09/17   1447  10/09/17   1740   SODIUM  124*   < >  127*  129*  130*   POTASSIUM  4.5   < >  4.0  3.6  3.8   CHLORIDE  94*   < >  94*  96  98   CO2  22   < >  26  25  25   BUN  7*   < >  9  11  14   CREATININE  0.37*   < >  0.29*  0.44*  0.44*   MAGNESIUM  1.6   --    --    --    --    CALCIUM  9.0   < >  9.5  9.2  9.0    < > = values in this interval not displayed.       Recent Labs      10/08/17   0203   10/09/17   0221   10/09/17   1054   10/09/17   1447  10/09/17   1740   ALTSGPT  26   --   24   --    --    --    --    ASTSGOT  29   --   26   --    --    --    --    ALKPHOSPHAT  67   --   78   --    --    --    --    TBILIRUBIN  0.3   --   0.2   --    --    --    --    GLUCOSE  91   < >  101*   < >  86  169*  95    < > = values in this interval not displayed.       Recent Labs      10/08/17   0203  10/09/17   0221   RBC  3.61*  3.43*   HEMOGLOBIN  12.5*  11.8*   HEMATOCRIT  34.2*  32.6*   PLATELETCT  354  399       Recent Labs      10/08/17   0203  10/09/17   0221   WBC  6.9  6.8   NEUTSPOLYS  61.10  59.00   LYMPHOCYTES  14.40*  15.60*   MONOCYTES  17.30*  18.70*   EOSINOPHILS  5.60  5.30   BASOPHILS  0.90  1.00   ASTSGOT  29  26   ALTSGPT  26  24   ALKPHOSPHAT  67  78   TBILIRUBIN  0.3  0.2           Assessment/Plan     Hyponatremia  - Na of 107 and AMS on admission   - likely 2/2 SIADH  - continue salt tabs 3g TID & 800cc fluid restriction  - decrease to Qshift neuro checks  - Na up to 128 today; CTM      Lung mass  - Hx of smoking >20 years, 10 pack-year.   - Cough, recent weight loss and low appetite.  - CT chest: 3.6x5.9 cm paratracheal mass compressing on SVC and 2x1.8 cm R upper lobe mass (9/30)  - Highly suspicious for malignant etiology  - OR biopsy tomorrow at 9am    Chronic alcohol use  - Consumes 1 beer daily   - BAL: 0.01 on admission   - PO thiamine, B12 and folic acid supplements   - No signs of EtOH withdrawal; CTM    Fall  - GLF with no reported syncope.  - CT head on admission from other facility negative for bleeding or intracranial pathology  - No evidence of neurological deficits   - CTM    Disorder of electrolytes  - monitor electrolytes  - replete as necessary    Bacteremia  - blood cultures positive for Strep pneumo  - changed C3 to PO Augmentin BID (until 10/14)  - Blood cultures negative 10/1, 10/2, 10/3

## 2017-10-10 NOTE — PROGRESS NOTES
Hospital Medicine Progress Note, Adult, Complex               Author: Juancho Najjar Date & Time created: 10/10/2017  1:41 PM     Interval History:  58 y/o male with paratracheal and RUL masses with severe hyponatremia  C/o fatigue, weight loss, poor po intake  Plan for lung biopsy tomorrow    Review of Systems:  Review of Systems   Constitutional: Positive for weight loss. Negative for chills, fever and malaise/fatigue.   Respiratory: Negative for cough and shortness of breath.    Cardiovascular: Negative for chest pain and leg swelling.   Gastrointestinal: Negative for abdominal pain, nausea and vomiting.   Genitourinary: Negative for dysuria, frequency and urgency.   Neurological: Negative for headaches.       Physical Exam:  Physical Exam   Constitutional: He is oriented to person, place, and time. He appears cachectic. No distress.   HENT:   Head: Normocephalic and atraumatic.   Nose: Nose normal.   Mouth/Throat: Oropharynx is clear and moist. No oropharyngeal exudate.   Eyes: No scleral icterus.   Cardiovascular: Normal rate and regular rhythm.    Pulmonary/Chest: Effort normal and breath sounds normal. No respiratory distress. He has no wheezes. He has no rales.   Abdominal: Soft.   Musculoskeletal: Normal range of motion. He exhibits no edema.   Neurological: He is alert and oriented to person, place, and time.   Nursing note and vitals reviewed.      Labs:        Invalid input(s): [id number]      Recent Labs      10/08/17   0203   10/09/17   1740  10/09/17   2207  10/10/17   0300   SODIUM  124*   < >  130*  128*  128*   POTASSIUM  4.5   < >  3.8  4.3  4.2   CHLORIDE  94*   < >  98  97  96   CO2  22   < >  25  26  24   BUN  7*   < >  14  16  13   CREATININE  0.37*   < >  0.44*  0.40*  0.36*   MAGNESIUM  1.6   --    --    --    --    CALCIUM  9.0   < >  9.0  9.1  9.0    < > = values in this interval not displayed.     Recent Labs      10/08/17   0203   10/09/17   0221   10/09/17   1740  10/09/17   2207   10/10/17   0300   ALTSGPT  26   --   24   --    --    --    --    ASTSGOT  29   --   26   --    --    --    --    ALKPHOSPHAT  67   --   78   --    --    --    --    TBILIRUBIN  0.3   --   0.2   --    --    --    --    GLUCOSE  91   < >  101*   < >  95  94  67    < > = values in this interval not displayed.     Recent Labs      10/08/17   0203  10/09/17   0221  10/10/17   0854   RBC  3.61*  3.43*   --    HEMOGLOBIN  12.5*  11.8*   --    HEMATOCRIT  34.2*  32.6*   --    PLATELETCT  354  399   --    IRON   --    --   67   TOTIRONBC   --    --   322     Recent Labs      10/08/17   0203  10/09/17   0221   WBC  6.9  6.8   NEUTSPOLYS  61.10  59.00   LYMPHOCYTES  14.40*  15.60*   MONOCYTES  17.30*  18.70*   EOSINOPHILS  5.60  5.30   BASOPHILS  0.90  1.00   ASTSGOT  29  26   ALTSGPT  26  24   ALKPHOSPHAT  67  78   TBILIRUBIN  0.3  0.2           Hemodynamics:  Temp (24hrs), Av.1 °C (98.7 °F), Min:36.8 °C (98.2 °F), Max:37.3 °C (99.2 °F)  Temperature: 36.8 °C (98.2 °F)  Pulse  Av.6  Min: 65  Max: 99   Blood Pressure: 114/71     Respiratory:    Respiration: 20, Pulse Oximetry: 95 %        RUL Breath Sounds: Rhonchi, RML Breath Sounds: Rhonchi, RLL Breath Sounds: Diminished, LEONARDO Breath Sounds: Rhonchi, LLL Breath Sounds: Diminished  Fluids:    Intake/Output Summary (Last 24 hours) at 10/10/17 1341  Last data filed at 10/10/17 1000   Gross per 24 hour   Intake              700 ml   Output              200 ml   Net              500 ml        GI/Nutrition:  Orders Placed This Encounter   Procedures   • DIET ORDER     Standing Status:   Standing     Number of Occurrences:   1     Order Specific Question:   Diet:     Answer:   Regular [1]   • DIET NPO     Standing Status:   Standing     Number of Occurrences:   8     Order Specific Question:   Restrict to:     Answer:   Sips with Medications [3]   • DIET NPO     Standing Status:   Standing     Number of Occurrences:   8     Order Specific Question:   Restrict to:      Answer:   Sips with Medications [3]     Medical Decision Making, by Problem:  Active Hospital Problems    Diagnosis   • *Hyponatremia [E87.1]   • Lung mass [R91.8]   • Disorder of electrolytes [E87.8]   • Fall [W19.XXXA]   • Chronic alcohol use [F10.10]   • Bacteremia [R78.81]         Reviewed items::  Labs reviewed    Assessment and Plan  1.CKD I  -stable  2.HTN: Pt advised to monitor BP at home  3.Electrolytes: hyponatremia better ,most likely SIADH  4.Anemia: Hb stable  5.Volume: euvolemic  Recs:   continue free water restriction,daily labs  R/O malignancy  Nutrition evaluation  no need for HD  Renal dose all meds  Avoid nephrotoxins  Prognosis poor.

## 2017-10-11 LAB
ANION GAP SERPL CALC-SCNC: 5 MMOL/L (ref 0–11.9)
BUN SERPL-MCNC: 11 MG/DL (ref 8–22)
CALCIUM SERPL-MCNC: 9 MG/DL (ref 8.5–10.5)
CHLORIDE SERPL-SCNC: 98 MMOL/L (ref 96–112)
CO2 SERPL-SCNC: 25 MMOL/L (ref 20–33)
CREAT SERPL-MCNC: 0.31 MG/DL (ref 0.5–1.4)
GFR SERPL CREATININE-BSD FRML MDRD: >60 ML/MIN/1.73 M 2
GLUCOSE SERPL-MCNC: 107 MG/DL (ref 65–99)
GRAM STN SPEC: NORMAL
POTASSIUM SERPL-SCNC: 4.1 MMOL/L (ref 3.6–5.5)
SIGNIFICANT IND 70042: NORMAL
SITE SITE: NORMAL
SODIUM SERPL-SCNC: 128 MMOL/L (ref 135–145)
SOURCE SOURCE: NORMAL

## 2017-10-11 PROCEDURE — 87205 SMEAR GRAM STAIN: CPT

## 2017-10-11 PROCEDURE — 88177 CYTP FNA EVAL EA ADDL: CPT

## 2017-10-11 PROCEDURE — 88341 IMHCHEM/IMCYTCHM EA ADD ANTB: CPT | Mod: 91

## 2017-10-11 PROCEDURE — 160009 HCHG ANES TIME/MIN: Performed by: INTERNAL MEDICINE

## 2017-10-11 PROCEDURE — 0B9C8ZX DRAINAGE OF RIGHT UPPER LUNG LOBE, VIA NATURAL OR ARTIFICIAL OPENING ENDOSCOPIC, DIAGNOSTIC: ICD-10-PCS | Performed by: INTERNAL MEDICINE

## 2017-10-11 PROCEDURE — 160041 HCHG SURGERY MINUTES - EA ADDL 1 MIN LEVEL 4: Performed by: INTERNAL MEDICINE

## 2017-10-11 PROCEDURE — 502573 HCHG PACK, ENT: Performed by: INTERNAL MEDICINE

## 2017-10-11 PROCEDURE — 88112 CYTOPATH CELL ENHANCE TECH: CPT

## 2017-10-11 PROCEDURE — 160002 HCHG RECOVERY MINUTES (STAT): Performed by: INTERNAL MEDICINE

## 2017-10-11 PROCEDURE — 99232 SBSQ HOSP IP/OBS MODERATE 35: CPT | Mod: GC | Performed by: INTERNAL MEDICINE

## 2017-10-11 PROCEDURE — 501536 HCHG TEETHGUARD (ENT): Performed by: INTERNAL MEDICINE

## 2017-10-11 PROCEDURE — 700102 HCHG RX REV CODE 250 W/ 637 OVERRIDE(OP): Performed by: STUDENT IN AN ORGANIZED HEALTH CARE EDUCATION/TRAINING PROGRAM

## 2017-10-11 PROCEDURE — 87102 FUNGUS ISOLATION CULTURE: CPT

## 2017-10-11 PROCEDURE — 87015 SPECIMEN INFECT AGNT CONCNTJ: CPT

## 2017-10-11 PROCEDURE — A9270 NON-COVERED ITEM OR SERVICE: HCPCS | Performed by: STUDENT IN AN ORGANIZED HEALTH CARE EDUCATION/TRAINING PROGRAM

## 2017-10-11 PROCEDURE — 87106 FUNGI IDENTIFICATION YEAST: CPT

## 2017-10-11 PROCEDURE — 88173 CYTOPATH EVAL FNA REPORT: CPT

## 2017-10-11 PROCEDURE — 700102 HCHG RX REV CODE 250 W/ 637 OVERRIDE(OP): Performed by: INTERNAL MEDICINE

## 2017-10-11 PROCEDURE — 770006 HCHG ROOM/CARE - MED/SURG/GYN SEMI*

## 2017-10-11 PROCEDURE — 160048 HCHG OR STATISTICAL LEVEL 1-5: Performed by: INTERNAL MEDICINE

## 2017-10-11 PROCEDURE — 700101 HCHG RX REV CODE 250

## 2017-10-11 PROCEDURE — 87116 MYCOBACTERIA CULTURE: CPT

## 2017-10-11 PROCEDURE — 700111 HCHG RX REV CODE 636 W/ 250 OVERRIDE (IP)

## 2017-10-11 PROCEDURE — 88172 CYTP DX EVAL FNA 1ST EA SITE: CPT

## 2017-10-11 PROCEDURE — 0B9F8ZX DRAINAGE OF RIGHT LOWER LUNG LOBE, VIA NATURAL OR ARTIFICIAL OPENING ENDOSCOPIC, DIAGNOSTIC: ICD-10-PCS | Performed by: INTERNAL MEDICINE

## 2017-10-11 PROCEDURE — 160035 HCHG PACU - 1ST 60 MINS PHASE I: Performed by: INTERNAL MEDICINE

## 2017-10-11 PROCEDURE — 80048 BASIC METABOLIC PNL TOTAL CA: CPT

## 2017-10-11 PROCEDURE — 88342 IMHCHEM/IMCYTCHM 1ST ANTB: CPT | Mod: 91

## 2017-10-11 PROCEDURE — A9270 NON-COVERED ITEM OR SERVICE: HCPCS | Performed by: INTERNAL MEDICINE

## 2017-10-11 PROCEDURE — 0B9D8ZX DRAINAGE OF RIGHT MIDDLE LUNG LOBE, VIA NATURAL OR ARTIFICIAL OPENING ENDOSCOPIC, DIAGNOSTIC: ICD-10-PCS | Performed by: INTERNAL MEDICINE

## 2017-10-11 PROCEDURE — 0BBK8ZX EXCISION OF RIGHT LUNG, VIA NATURAL OR ARTIFICIAL OPENING ENDOSCOPIC, DIAGNOSTIC: ICD-10-PCS | Performed by: INTERNAL MEDICINE

## 2017-10-11 PROCEDURE — 87070 CULTURE OTHR SPECIMN AEROBIC: CPT

## 2017-10-11 PROCEDURE — 160036 HCHG PACU - EA ADDL 30 MINS PHASE I: Performed by: INTERNAL MEDICINE

## 2017-10-11 PROCEDURE — 87206 SMEAR FLUORESCENT/ACID STAI: CPT

## 2017-10-11 PROCEDURE — 88305 TISSUE EXAM BY PATHOLOGIST: CPT

## 2017-10-11 PROCEDURE — 36415 COLL VENOUS BLD VENIPUNCTURE: CPT

## 2017-10-11 PROCEDURE — 160029 HCHG SURGERY MINUTES - 1ST 30 MINS LEVEL 4: Performed by: INTERNAL MEDICINE

## 2017-10-11 RX ORDER — LIDOCAINE HYDROCHLORIDE 20 MG/ML
INJECTION, SOLUTION EPIDURAL; INFILTRATION; INTRACAUDAL; PERINEURAL
Status: DISCONTINUED | OUTPATIENT
Start: 2017-10-11 | End: 2017-10-11 | Stop reason: HOSPADM

## 2017-10-11 RX ORDER — LIDOCAINE HYDROCHLORIDE 10 MG/ML
INJECTION, SOLUTION INFILTRATION; PERINEURAL
Status: DISPENSED
Start: 2017-10-11 | End: 2017-10-12

## 2017-10-11 RX ORDER — SODIUM CHLORIDE, SODIUM LACTATE, POTASSIUM CHLORIDE, CALCIUM CHLORIDE 600; 310; 30; 20 MG/100ML; MG/100ML; MG/100ML; MG/100ML
INJECTION, SOLUTION INTRAVENOUS CONTINUOUS
Status: DISCONTINUED | OUTPATIENT
Start: 2017-10-11 | End: 2017-11-05

## 2017-10-11 RX ORDER — EPINEPHRINE 0.1 MG/ML
SYRINGE (ML) INJECTION
Status: DISPENSED
Start: 2017-10-11 | End: 2017-10-12

## 2017-10-11 RX ADMIN — MIDODRINE HYDROCHLORIDE 5 MG: 5 TABLET ORAL at 18:03

## 2017-10-11 RX ADMIN — AMOXICILLIN AND CLAVULANATE POTASSIUM 1 TABLET: 875; 125 TABLET, FILM COATED ORAL at 08:15

## 2017-10-11 RX ADMIN — AMOXICILLIN AND CLAVULANATE POTASSIUM 1 TABLET: 875; 125 TABLET, FILM COATED ORAL at 21:00

## 2017-10-11 RX ADMIN — THIAMINE HCL TAB 100 MG 100 MG: 100 TAB at 08:16

## 2017-10-11 RX ADMIN — THERA TABS 1 TABLET: TAB at 08:14

## 2017-10-11 RX ADMIN — SODIUM CHLORIDE TAB 1 GM 3 G: 1 TAB at 18:04

## 2017-10-11 RX ADMIN — VITAMIN D, TAB 1000IU (100/BT) 5000 UNITS: 25 TAB at 08:11

## 2017-10-11 RX ADMIN — SODIUM CHLORIDE TAB 1 GM 3 G: 1 TAB at 08:15

## 2017-10-11 RX ADMIN — NICOTINE 21 MG: 21 PATCH, EXTENDED RELEASE TRANSDERMAL at 05:39

## 2017-10-11 RX ADMIN — SODIUM CHLORIDE, POTASSIUM CHLORIDE, SODIUM LACTATE AND CALCIUM CHLORIDE: 600; 310; 30; 20 INJECTION, SOLUTION INTRAVENOUS at 13:00

## 2017-10-11 RX ADMIN — FOLIC ACID 1 MG: 1 TABLET ORAL at 08:16

## 2017-10-11 RX ADMIN — STANDARDIZED SENNA CONCENTRATE AND DOCUSATE SODIUM 2 TABLET: 8.6; 5 TABLET, FILM COATED ORAL at 08:17

## 2017-10-11 ASSESSMENT — LIFESTYLE VARIABLES
NAUSEA AND VOMITING: NO NAUSEA AND NO VOMITING
HEADACHE, FULLNESS IN HEAD: NOT PRESENT
AGITATION: NORMAL ACTIVITY
PAROXYSMAL SWEATS: NO SWEAT VISIBLE
ORIENTATION AND CLOUDING OF SENSORIUM: ORIENTED AND CAN DO SERIAL ADDITIONS
AUDITORY DISTURBANCES: NOT PRESENT
ANXIETY: NO ANXIETY (AT EASE)
TREMOR: NO TREMOR
VISUAL DISTURBANCES: VERY MILD SENSITIVITY
TOTAL SCORE: 1

## 2017-10-11 ASSESSMENT — ENCOUNTER SYMPTOMS
HEADACHES: 0
DIARRHEA: 0
WHEEZING: 0
BLURRED VISION: 0
COUGH: 0
CONSTIPATION: 0
SHORTNESS OF BREATH: 0
VOMITING: 0
NAUSEA: 0
HEMOPTYSIS: 0
DIZZINESS: 0
WEIGHT LOSS: 1
FEVER: 0
WEAKNESS: 0
HEADACHES: 1
SPUTUM PRODUCTION: 0
HEARTBURN: 0
CHILLS: 0
MYALGIAS: 0
PALPITATIONS: 0
ABDOMINAL PAIN: 0
DOUBLE VISION: 0
EYES NEGATIVE: 1
WEIGHT LOSS: 0
FOCAL WEAKNESS: 0

## 2017-10-11 ASSESSMENT — PAIN SCALES - GENERAL
PAINLEVEL_OUTOF10: 0

## 2017-10-11 NOTE — OR NURSING
8796 received from the OR, report from Dr aKthleen, sp bronchoscopy, asleep with LMA, breathing unlabored & clear,  1403 reported off to Tarun KENNEDY

## 2017-10-11 NOTE — PROGRESS NOTES
1503-report from Gi KENNEDY, assumed care of pt.  LMA in place, respirations unlabored.  1506-LMA dc'd. HOB elevated.   1544-pt drowsy, denies pain. denchers back in place  1551-pt tolerating PO, report called to Ashwini KENNEDY  1626-transport at side to take pt to room.

## 2017-10-11 NOTE — PROGRESS NOTES
Rounding completed on pt. Report given at bedside between night shift RN and day shift RN. Assumed care of pt. Pt resting in bed quietly, denies pain, denies needs this am. Call light in reach, bed alarm on.

## 2017-10-11 NOTE — CARE PLAN
Problem: Knowledge Deficit  Goal: Knowledge of disease process/condition, treatment plan, diagnostic tests, and medications will improve    Intervention: Explain information regarding disease process/condition, treatment plan, diagnostic tests, and medications and document in education  Pt to be NPO at midnight. Scheduled for Bronchoscopy procedure in OR on 10/11. Pt updated on POC, all questions answered.       Problem: Skin Integrity  Goal: Risk for impaired skin integrity will decrease    Intervention: Assess and monitor skin integrity, appearance and/or temperature  Redness to sacrum noted. Mepilex to sacrum in place for skin breakdown prevention.

## 2017-10-11 NOTE — PROGRESS NOTES
Hospital Medicine Progress Note, Adult, Complex               Author: Juancho Gironjjar Date & Time created: 10/11/2017  3:25 PM     Interval History:  58 y/o male with paratracheal and RUL masses with severe hyponatremia  C/o fatigue, weight loss, poor po intake  Plan for lung biopsy today    Review of Systems:  Review of Systems   Constitutional: Negative for chills, fever, malaise/fatigue and weight loss.   Respiratory: Negative for cough and shortness of breath.    Cardiovascular: Negative for chest pain and leg swelling.   Gastrointestinal: Negative for abdominal pain, nausea and vomiting.   Genitourinary: Negative for dysuria, frequency and urgency.   Neurological: Negative for headaches.       Physical Exam:  Physical Exam   Constitutional: He is oriented to person, place, and time. He appears cachectic. No distress.   HENT:   Head: Normocephalic and atraumatic.   Nose: Nose normal.   Mouth/Throat: Oropharynx is clear and moist. No oropharyngeal exudate.   Eyes: No scleral icterus.   Cardiovascular: Normal rate and regular rhythm.    Pulmonary/Chest: Effort normal and breath sounds normal. No respiratory distress. He has no wheezes. He has no rales.   Abdominal: Soft.   Musculoskeletal: Normal range of motion. He exhibits no edema.   Neurological: He is alert and oriented to person, place, and time.   Nursing note and vitals reviewed.      Labs:        Invalid input(s): [id number]      Recent Labs      10/09/17   2207  10/10/17   0300  10/11/17   0624   SODIUM  128*  128*  128*   POTASSIUM  4.3  4.2  4.1   CHLORIDE  97  96  98   CO2  26  24  25   BUN  16  13  11   CREATININE  0.40*  0.36*  0.31*   CALCIUM  9.1  9.0  9.0     Recent Labs      10/09/17   0221   10/09/17   2207  10/10/17   0300  10/11/17   0624   ALTSGPT  24   --    --    --    --    ASTSGOT  26   --    --    --    --    ALKPHOSPHAT  78   --    --    --    --    TBILIRUBIN  0.2   --    --    --    --    GLUCOSE  101*   < >  94  67  107*    < > =  values in this interval not displayed.     Recent Labs      10/09/17   0221  10/10/17   0854   RBC  3.43*   --    HEMOGLOBIN  11.8*   --    HEMATOCRIT  32.6*   --    PLATELETCT  399   --    IRON   --   67   TOTIRONBC   --   322     Recent Labs      10/09/17   022   WBC  6.8   NEUTSPOLYS  59.00   LYMPHOCYTES  15.60*   MONOCYTES  18.70*   EOSINOPHILS  5.30   BASOPHILS  1.00   ASTSGOT  26   ALTSGPT  24   ALKPHOSPHAT  78   TBILIRUBIN  0.2           Hemodynamics:  Temp (24hrs), Av °C (98.6 °F), Min:36.6 °C (97.9 °F), Max:37.3 °C (99.2 °F)  Temperature: 36.9 °C (98.4 °F)  Pulse  Av.7  Min: 65  Max: 99Heart Rate (Monitored): 86  Blood Pressure: 113/70, NIBP: 131/77     Respiratory:    Respiration: 16, Pulse Oximetry: 98 %        RUL Breath Sounds: Rhonchi;Clear, RML Breath Sounds: Diminished, RLL Breath Sounds: Diminished, LEONARDO Breath Sounds: Rhonchi;Clear, LLL Breath Sounds: Diminished  Fluids:    Intake/Output Summary (Last 24 hours) at 10/11/17 1525  Last data filed at 10/11/17 1500   Gross per 24 hour   Intake              940 ml   Output              900 ml   Net               40 ml     Weight: 59.4 kg (130 lb 15.3 oz)  GI/Nutrition:  Orders Placed This Encounter   Procedures   • DIET NPO     Standing Status:   Standing     Number of Occurrences:   8     Order Specific Question:   Restrict to:     Answer:   Sips with Medications [3]     Medical Decision Making, by Problem:  Active Hospital Problems    Diagnosis   • *Hyponatremia [E87.1]   • Lung mass [R91.8]   • Disorder of electrolytes [E87.8]   • Fall [W19.XXXA]   • Chronic alcohol use [F10.10]   • Bacteremia [R78.81]         Reviewed items::  Labs reviewed    Assessment and Plan  1.CKD I  -stable  2.HTN: Pt advised to monitor BP at home  3.Electrolytes: hyponatremia better ,most likely SIADH  4.Anemia: Hb stable  5.Volume: euvolemic  Recs:   continue free water restriction,daily labs  R/O malignancy  Nutrition evaluation  no need for HD  Renal dose all  meds  Avoid nephrotoxins  Prognosis poor.  Daily labs

## 2017-10-11 NOTE — PROGRESS NOTES
Report received. Assumed care of pt. A/O x3, disoriented to time. VSS. Responds appropriately. Denies pain, SOB. Assessment complete. On 800ml fluid restriction, npo at midnight. Explained importance of calling before getting OOB. Call light and belongings within reach. Bed alarm on. Bed in the lowest position. Treaded socks in place. Will continue to monitor .

## 2017-10-11 NOTE — CARE PLAN
Problem: Safety  Goal: Will remain free from injury  Outcome: PROGRESSING AS EXPECTED  Treaded socks in place, bed in the lowest position, bed alarm on, call light and belongings within reach, pt call for assistance appropriately    Problem: Mobility  Goal: Risk for activity intolerance will decrease  Outcome: PROGRESSING AS EXPECTED

## 2017-10-11 NOTE — PROGRESS NOTES
Pulmonary Medicine Interval Note    Name Froylan Spain     1960   Age/Sex 57 y.o. male   MRN [id number]   Code Status Full       Attending/Team: Dr. Gibson / Pulmonary         Reason for interval visit  (Principal Problem)   Hyponatremia   Lung masses    ID: Patient is a 57-year-old male admitted following a ground level fall and was found to be severely hyponatremic. Further workup showed a right sided paratracheal and upper lobe lung masses concerning for malignancy in the setting of long-standing smoking history. CAT scan also showed some infiltrates and blood cultures were positive for strep pneumonia. Patient currently on ceftriaxone     Interval Problem Daily Status Update  (24 hours)   Patient reported feeling fine. Denied any shortness of breath or chest pain. Sodium 128 this morning. On sodium tabs. Bronchoscopy planned for today.    Review of Systems   Constitutional: Positive for weight loss. Negative for chills, fever and malaise/fatigue.   HENT: Negative for congestion.    Eyes: Negative for blurred vision and double vision.   Respiratory: Negative for cough, hemoptysis, sputum production, shortness of breath and wheezing.    Cardiovascular: Negative for chest pain, palpitations and leg swelling.   Gastrointestinal: Negative for constipation, heartburn and nausea.   Genitourinary: Negative for dysuria and urgency.   Musculoskeletal: Negative for myalgias.   Skin: Negative for itching and rash.   Neurological: Negative for dizziness, focal weakness, weakness and headaches.         Quality Measures    Reviewed items::  EKG reviewed, Labs reviewed, Medications reviewed and Radiology images reviewed          Physical Exam       Vitals:    10/10/17 2025 10/11/17 0538 10/11/17 0801 10/11/17 1310   BP: (!) 98/60 109/76 118/72 113/70   Pulse: 88 78 76 77   Resp:  16 14   Temp: 36.6 °C (97.9 °F) 36.8 °C (98.2 °F) 37.2 °C (98.9 °F) 37.1 °C (98.7 °F)   SpO2: 95% 96% 100% 99%   Weight:   59.4  kg (130 lb 15.3 oz)    Height:         Body mass index is 18.79 kg/m². Weight: 59.4 kg (130 lb 15.3 oz)  Oxygen Therapy:  Pulse Oximetry: 99 %, O2 (LPM): 0, O2 Delivery: None (Room Air)    Physical Exam   Constitutional: He is oriented to person, place, and time.   Cachectic   HENT:   Head: Atraumatic.   Temporal wasting   Eyes: Conjunctivae are normal. Pupils are equal, round, and reactive to light.   Neck: Neck supple.   Cardiovascular: Normal rate, regular rhythm and normal heart sounds.    No murmur heard.  Pulmonary/Chest: Effort normal. He has no wheezes.   Abdominal: Soft. He exhibits no distension. There is no tenderness.   Musculoskeletal: Normal range of motion. He exhibits no edema or deformity.   Lymphadenopathy:     He has no cervical adenopathy.   Neurological: He is alert and oriented to person, place, and time.   Skin:   Some skin breaks and dryness on  hands.         Lab Data Review:         10/4/2017  10:44 AM    Recent Labs      10/09/17   2207  10/10/17   0300  10/11/17   0624   SODIUM  128*  128*  128*   POTASSIUM  4.3  4.2  4.1   CHLORIDE  97  96  98   CO2  26  24  25   BUN  16  13  11   CREATININE  0.40*  0.36*  0.31*   CALCIUM  9.1  9.0  9.0       Recent Labs      10/09/17   0221   10/09/17   2207  10/10/17   0300  10/11/17   0624   ALTSGPT  24   --    --    --    --    ASTSGOT  26   --    --    --    --    ALKPHOSPHAT  78   --    --    --    --    TBILIRUBIN  0.2   --    --    --    --    GLUCOSE  101*   < >  94  67  107*    < > = values in this interval not displayed.       Recent Labs      10/09/17   0221  10/10/17   0854   RBC  3.43*   --    HEMOGLOBIN  11.8*   --    HEMATOCRIT  32.6*   --    PLATELETCT  399   --    IRON   --   67   TOTIRONBC   --   322       Recent Labs      10/09/17   0221   WBC  6.8   NEUTSPOLYS  59.00   LYMPHOCYTES  15.60*   MONOCYTES  18.70*   EOSINOPHILS  5.30   BASOPHILS  1.00   ASTSGOT  26   ALTSGPT  24   ALKPHOSPHAT  78   TBILIRUBIN  0.2           Assessment/Plan    Patient is a 57-year-old male with history of homelessness and chronic tobacco use presents with a ground-level fall found to be severely hyponatremic and Imaging showed concerning right-sided lung masses. Pulmonary medicine asked to evaluate the patient regarding the lung masses. A tissue diagnosis is definitely required in this situation. IR biopsy of the right upper lobe mass is nondiagnostic showing fibrous tissue and no evidence of malignancy. Bronchial bronchoscopy under sedation could not be performed due to the fact that the lesion is compressing the trachea and it would be a high-risk procedure especially in case of bleeding    Lung mass  Hyponatremia secondary to SIADH  Strep bacteremia  Community acquired pneumonia  Chronic alcohol use  Chronic tobacco use    Recommend:  -To OR for bronchoscopy with possible EBUS and biopsy today.  -Consider sample testing for actionable driven mutations in case of non-small cell lung cancer.  -Keep nothing by mouth after midnight  -Agree with current antibiotics  -RT and OT treatments per protocols  -Hyponatremia management per primary team.   -Pt has a brother Benjamin [place] in [place] who he would want contacted if needed and could make decisions for him if the pt becomes unable.  -We'll continue to follow patient with you.  -Thank you for allowing us to participate in this patient's care.      Pulmonary attending:  Pt was seen and examined with medical resident. Treatment plan was discussed with the patient, the nurse and with medical resident.     I agree with his progress note

## 2017-10-11 NOTE — PROGRESS NOTES
Nataly Tamayo Fall Risk Assessment:     Last Known Fall: Within the last month  Mobility: Immobilized/requires assist of one person  Medications: No meds  Mental Status/LOC/Awareness: Oriented to person and place  Toileting Needs: Use of assistive device (Bedside commode, bedpan, urinal)  Volume/Electrolyte Status: Low blood sugar/electrolyte imbalances  Communication/Sensory: No deficits  Behavior: Appropriate behavior  Nataly Tamayo Fall Risk Total: 14  Fall Risk Level: MODERATE RISK     [place] Fall Precautions:  call light/belongings in reach, bed in low position and locked, wheelchairs and assistive devices out of sight, use non-slip footwear, adequate lighting, clutter free and spill free environment, educate on level of risk, educate to call for assistance     Fall Risk Level Interventions:    TRIAL (TELE 8, NEURO, MED BEN 5) Moderate Fall Risk Interventions  Place yellow fall risk ID band on patient: verified  Provide patient/family education based on risk assessment : completed  Educate patient/family to call staff for assistance when getting out of bed: completed  Place fall precaution signage outside patient door: verified  Utilize bed/chair fall alarm: verified      Patient Specific Interventions:      Medication: review medications with patient and family  Mental Status/LOC/Awareness: reinforce falls education, check on patient hourly, utilize bed/chair fall alarm and reinforce the use of call light  Toileting: consider obtaining elevated toilet seat or bedside commode (BSC), provide frquent toileting, instruct patient/family on the use of grab bars and instruct patient/family on the need to call for assistance when toileting  Volume/Electrolyte Status: ensure patient remains hydrated and monitor abnormal lab values  Communication/Sensory: update plan of care on whiteboard and ensure proper positioning when transferrng/ambulating  Behavioral: encourage patient to voice feelings  Mobility: utilize  bed/chair fall alarm, ensure bed is locked and in lowest position, provide appropriate assistive device and collaborate with doctor for possible PT/OT consult

## 2017-10-11 NOTE — PROGRESS NOTES
Assumed pt care at 0700. Pt A&Ox3, disoriented to time. Denies pain. No SOB or in any acute distress, pt resting comfortably in bed. on 800mL fluid restriction. Up with 1 assist and use of FWW. Bed alarm on, pt wearing treaded socks, personal possessions and call light within reach.

## 2017-10-11 NOTE — PROGRESS NOTES
Nataly Tamayo Fall Risk Assessment:     Last Known Fall: Within the last month  Mobility: Dizziness/generalized weakness  Medications: Cardiovascular or central nervous system meds  Mental Status/LOC/Awareness: Awake, alert, and oriented to date, place, and person  Toileting Needs: Use of assistive device (Bedside commode, bedpan, urinal)  Volume/Electrolyte Status: Low blood sugar/electrolyte imbalances  Communication/Sensory: Visual (Glasses)/hearing deficit  Behavior: Appropriate behavior  Nataly Tamayo Fall Risk Total: 14  Fall Risk Level: MODERATE RISK    [place] Fall Precautions:  call light/belongings in reach, bed in low position and locked, siderails up x 2, use non-slip footwear, adequate lighting, clutter free and spill free environment, educate on level of risk, educate to call for assistance    Fall Risk Level Interventions:   TRIAL ([employer], NEURO, MED BEN 5) Low Fall Risk Interventions  Place yellow fall risk ID band on patient: verified  Provide patient/family education based on risk assessment: completed  Educate patient/family to call staff for assistance when getting out of bed: completed  Place fall precaution signage outside patient door: verifiedTRIAL ([employer] 8, NEURO, MED BEN 5) Moderate Fall Risk Interventions  Place yellow fall risk ID band on patient: verified  Provide patient/family education based on risk assessment : completed  Educate patient/family to call staff for assistance when getting out of bed: completed  Place fall precaution signage outside patient door: verified  Utilize bed/chair fall alarm: verifiedTRIAL ([employer] 8, NEURO, [employer] BEN 5) High Fall Risk Interventions  Place yellow fall risk ID band on patient: verified  Provide patient/family education based on risk assessment: completed  Educate patient/family to call staff for assistance when getting out of bed: completed  Place fall precaution signage outside patient door: verified  Place patient in room close to nursing  station: verified  Utilize bed/chair fall alarm: verified  Notify charge of high risk for huddle: completed    Patient Specific Interventions:     Medication: review medications with patient and family  Mental Status/LOC/Awareness: reorient patient, reinforce falls education and utilize bed/chair fall alarm  Toileting: provide frquent toileting and instruct patient/family on the need to call for assistance when toileting  Volume/Electrolyte Status: monitor abnormal lab values  Communication/Sensory: update plan of care on whiteboard  Behavioral: not applicable  Mobility: provide comfort measures during transport and utilize bed/chair fall alarm

## 2017-10-12 LAB
ANION GAP SERPL CALC-SCNC: 8 MMOL/L (ref 0–11.9)
BUN SERPL-MCNC: 11 MG/DL (ref 8–22)
CALCIUM SERPL-MCNC: 8.8 MG/DL (ref 8.5–10.5)
CHLORIDE SERPL-SCNC: 99 MMOL/L (ref 96–112)
CO2 SERPL-SCNC: 24 MMOL/L (ref 20–33)
CREAT SERPL-MCNC: 0.41 MG/DL (ref 0.5–1.4)
GFR SERPL CREATININE-BSD FRML MDRD: >60 ML/MIN/1.73 M 2
GLUCOSE SERPL-MCNC: 92 MG/DL (ref 65–99)
POTASSIUM SERPL-SCNC: 4.3 MMOL/L (ref 3.6–5.5)
RHODAMINE-AURAMINE STN SPEC: NORMAL
SIGNIFICANT IND 70042: NORMAL
SITE SITE: NORMAL
SODIUM SERPL-SCNC: 131 MMOL/L (ref 135–145)
SOURCE SOURCE: NORMAL

## 2017-10-12 PROCEDURE — 770006 HCHG ROOM/CARE - MED/SURG/GYN SEMI*

## 2017-10-12 PROCEDURE — A9270 NON-COVERED ITEM OR SERVICE: HCPCS | Performed by: INTERNAL MEDICINE

## 2017-10-12 PROCEDURE — 36415 COLL VENOUS BLD VENIPUNCTURE: CPT

## 2017-10-12 PROCEDURE — 700102 HCHG RX REV CODE 250 W/ 637 OVERRIDE(OP): Performed by: STUDENT IN AN ORGANIZED HEALTH CARE EDUCATION/TRAINING PROGRAM

## 2017-10-12 PROCEDURE — 700102 HCHG RX REV CODE 250 W/ 637 OVERRIDE(OP): Performed by: INTERNAL MEDICINE

## 2017-10-12 PROCEDURE — A9270 NON-COVERED ITEM OR SERVICE: HCPCS | Performed by: STUDENT IN AN ORGANIZED HEALTH CARE EDUCATION/TRAINING PROGRAM

## 2017-10-12 PROCEDURE — 99232 SBSQ HOSP IP/OBS MODERATE 35: CPT | Mod: GC | Performed by: INTERNAL MEDICINE

## 2017-10-12 PROCEDURE — 700111 HCHG RX REV CODE 636 W/ 250 OVERRIDE (IP): Performed by: STUDENT IN AN ORGANIZED HEALTH CARE EDUCATION/TRAINING PROGRAM

## 2017-10-12 PROCEDURE — 80048 BASIC METABOLIC PNL TOTAL CA: CPT

## 2017-10-12 RX ORDER — HEPARIN SODIUM 5000 [USP'U]/ML
5000 INJECTION, SOLUTION INTRAVENOUS; SUBCUTANEOUS EVERY 8 HOURS
Status: DISCONTINUED | OUTPATIENT
Start: 2017-10-12 | End: 2017-10-16

## 2017-10-12 RX ADMIN — STANDARDIZED SENNA CONCENTRATE AND DOCUSATE SODIUM 2 TABLET: 8.6; 5 TABLET, FILM COATED ORAL at 21:36

## 2017-10-12 RX ADMIN — HEPARIN SODIUM 5000 UNITS: 5000 INJECTION, SOLUTION INTRAVENOUS; SUBCUTANEOUS at 13:03

## 2017-10-12 RX ADMIN — ACETAMINOPHEN 650 MG: 325 TABLET, FILM COATED ORAL at 05:18

## 2017-10-12 RX ADMIN — SODIUM CHLORIDE TAB 1 GM 3 G: 1 TAB at 09:13

## 2017-10-12 RX ADMIN — MIDODRINE HYDROCHLORIDE 5 MG: 5 TABLET ORAL at 17:11

## 2017-10-12 RX ADMIN — MIDODRINE HYDROCHLORIDE 5 MG: 5 TABLET ORAL at 09:13

## 2017-10-12 RX ADMIN — MIDODRINE HYDROCHLORIDE 5 MG: 5 TABLET ORAL at 11:42

## 2017-10-12 RX ADMIN — THIAMINE HCL TAB 100 MG 100 MG: 100 TAB at 09:00

## 2017-10-12 RX ADMIN — SODIUM CHLORIDE TAB 1 GM 3 G: 1 TAB at 17:10

## 2017-10-12 RX ADMIN — AMOXICILLIN AND CLAVULANATE POTASSIUM 1 TABLET: 875; 125 TABLET, FILM COATED ORAL at 21:36

## 2017-10-12 RX ADMIN — FOLIC ACID 1 MG: 1 TABLET ORAL at 09:14

## 2017-10-12 RX ADMIN — AMOXICILLIN AND CLAVULANATE POTASSIUM 1 TABLET: 875; 125 TABLET, FILM COATED ORAL at 09:14

## 2017-10-12 RX ADMIN — SODIUM CHLORIDE TAB 1 GM 3 G: 1 TAB at 13:03

## 2017-10-12 RX ADMIN — THERA TABS 1 TABLET: TAB at 09:14

## 2017-10-12 RX ADMIN — NICOTINE 21 MG: 21 PATCH, EXTENDED RELEASE TRANSDERMAL at 05:18

## 2017-10-12 RX ADMIN — HEPARIN SODIUM 5000 UNITS: 5000 INJECTION, SOLUTION INTRAVENOUS; SUBCUTANEOUS at 21:36

## 2017-10-12 RX ADMIN — VITAMIN D, TAB 1000IU (100/BT) 5000 UNITS: 25 TAB at 09:13

## 2017-10-12 ASSESSMENT — ENCOUNTER SYMPTOMS
SPUTUM PRODUCTION: 0
PALPITATIONS: 0
DIARRHEA: 0
VOMITING: 0
HEADACHES: 0
NAUSEA: 0
COUGH: 0
BLURRED VISION: 0
HEARTBURN: 0
ABDOMINAL PAIN: 0
WEIGHT LOSS: 1
FEVER: 0
FOCAL WEAKNESS: 0
DOUBLE VISION: 0
WEAKNESS: 0
NEUROLOGICAL NEGATIVE: 1
SHORTNESS OF BREATH: 0
EYES NEGATIVE: 1
WHEEZING: 0
CONSTIPATION: 0
HEMOPTYSIS: 0
CHILLS: 0
DIZZINESS: 0
WEIGHT LOSS: 0
MYALGIAS: 0

## 2017-10-12 ASSESSMENT — PAIN SCALES - GENERAL
PAINLEVEL_OUTOF10: 0

## 2017-10-12 NOTE — DIETARY
Nutrition Services Update:     Admit wt: 62.6 kg per stand up scale   Current wt: 54.8 kg per bed scale  BMI: 17.33    Pt has lost 7.8 kg since admit on 9/30. This is a 12% wt loss in 12 days.  Spoke w/ RN about wt loss and verified that pt came in initially fluid overloaded.   Pt has been on a fluid restricted diet since 10/4, wt loss is most likely r/t fluid status vs poor nutritional intake.   Nutrition was following pt for adequate nutrition intake, and ensured pt is consuming adequate amounts of PO.   Per ADLs and pt report he is consuming 100% of his meals as well as receiving Magic Cup supplements TID.     Plan/Recommend:  1. Continue to encourage PO intake of meals and supplements as pt does have a low BMI  2. Obtain weekly wt to monitor fluid/hydration status and needs  3. Continue Magic Cup supplements TID    RD available prn

## 2017-10-12 NOTE — PROGRESS NOTES
Patient is assessed, safety protocol in place, Patient is monitored. Continue to monitor. Patient has a fluid resctriction and I/O monitored. Continue to monitor

## 2017-10-12 NOTE — CARE PLAN
Problem: Communication  Goal: The ability to communicate needs accurately and effectively will improve  Outcome: PROGRESSING SLOWER THAN EXPECTED  Pt updated on POC and fluid restriction. No evidence of learning observed. Pt requires reinforcement.     Problem: Infection  Goal: Will remain free from infection  Outcome: PROGRESSING AS EXPECTED  Hand hygiene performed before and after pt contact. Sanitary wipes at bedside; pt instructed to use before meals and after using urinal.

## 2017-10-12 NOTE — PROGRESS NOTES
"       Internal Medicine Interval Note  Note Author: Christie Snell M.D.     Name Froylan Spain     1960   Age/Sex 57 y.o. male   MRN [id number]   Code Status FULL     After 5PM or if no immediate response to page, please call for cross-coverage  Attending/Team: Dr. Villalta See Patient List for primary contact information  Call [phone] to page    1st Call - Day Intern (R1):   Dr. Christie Snell 2nd Call - Day Sr. Resident (R2/R3):   Dr. Tayler Bianchi   56yo homeless male without significant PMH admitted on 17 for severe hyponatremia to 108 and AMS.  Started on salt tabs, fluid restriction and Na correction per guidelines.   Concern for SIADH.  Smoker with lung masses (5.9 cm rightparatracheal mass and 2 cm RUL) found on CT Chest.  IR biopsy (10/05/17) c/w organizing PNA.  OR biopsy of paratracheal lesion pending.    Reason for interval visit  (Principal Problem)   Hyponatremia    Interval Problem Daily Status Update  (24 hours)   No acute overnight events.  Fever to 100.5 this AM resolved to 97.3 with Tylenol.  No complaints at bedside  Otherwise feeling well.  Na is 131.  AMS is resolved.  Underwent biopsy of paratracheal mass in the OR yesterday.  BAL shows few RBCs and rare Gram+/Gram- cocci.  Biopsy pathology report pending.        Review of Systems   Constitutional: Negative for chills and fever.   Eyes: Negative.    Respiratory: Negative for cough and shortness of breath.    Cardiovascular: Negative for chest pain and palpitations.   Gastrointestinal: Negative for abdominal pain, diarrhea, nausea and vomiting.   Genitourinary: Negative.    Neurological: Negative.  Headaches: \"little headaches\" in the mornings.     Consultants/Specialty  Pulmonary   Nephrology     Disposition  Inpatient    Quality Measures    Reviewed items::  Labs reviewed and Medications reviewed  Garvin catheter::  No Garvin  DVT prophylaxis pharmacological::  Heparin (one AM dose today (holding PM dose for AM " "OR biopsy))  Ulcer Prophylaxis::  Not indicated        Physical Exam       Vitals:    10/12/17 0349 10/12/17 0500 10/12/17 0720 10/12/17 1000   BP: 107/59  119/67    Pulse: 80  75    Resp: 17  16    Temp: (!) 38.1 °C (100.5 °F) 36.8 °C (98.3 °F) 36.4 °C (97.6 °F)    SpO2: 100%  100%    Weight:    54.8 kg (120 lb 13 oz)   Height:    1.778 m (5' 10\")     Body mass index is 17.33 kg/m². Weight: 54.8 kg (120 lb 13 oz)  Oxygen Therapy:  Pulse Oximetry: 100 %, O2 (LPM): 0, O2 Delivery: None (Room Air)    Physical Exam   Constitutional: He is oriented to person, place, and time. No distress.   Appears older than stated age; disheveled   HENT:   Head: Normocephalic and atraumatic.   Cardiovascular: Normal rate, regular rhythm, normal heart sounds and intact distal pulses.  Exam reveals no gallop and no friction rub.    No murmur heard.  Pulmonary/Chest: Effort normal and breath sounds normal. No respiratory distress. He has no wheezes. He has no rales. He exhibits no tenderness.   Abdominal: Soft. Bowel sounds are normal. He exhibits no distension and no mass. There is no tenderness. There is no rebound and no guarding.   Musculoskeletal: He exhibits no edema or tenderness.   Neurological: He is alert and oriented to person, place, and time. No cranial nerve deficit. GCS score is 15.   Skin: Skin is dry. No rash noted. He is not diaphoretic. No pallor.   Cracked, with areas of peeling skin; bronzed vs. sunburnt   Psychiatric: Mood and affect normal.       Lab Data Review:         10/8/2017  7:32 AM    Recent Labs      10/10/17   0300  10/11/17   0624  10/12/17   0207   SODIUM  128*  128*  131*   POTASSIUM  4.2  4.1  4.3   CHLORIDE  96  98  99   CO2  24  25  24   BUN  13  11  11   CREATININE  0.36*  0.31*  0.41*   CALCIUM  9.0  9.0  8.8       Recent Labs      10/10/17   0300  10/11/17   0624  10/12/17   0207   GLUCOSE  67  107*  92       Recent Labs      10/10/17   0854   IRON  67   [facility]  322           Assessment/Plan "     Hyponatremia  - Na of 107 and AMS on admission   - likely 2/2 SIADH  - continue salt tabs 3g TID  - decrease fluid restriction to 1200cc/day  - Qshift neuro checks  - Na up to 131 today, remaining stable; CTM  - AMS resolved, no focal neurological deficits      Lung mass  - Hx of smoking >20 years, 10 pack-year.   - Cough, recent weight loss and low appetite.  - CT chest: 3.6x5.9 cm paratracheal mass compressing on SVC and 2x1.8 cm R upper lobe mass (9/30)  - Highly suspicious for malignant etiology  - s/p IR biopsy of RUL c/w with PNA  - s/p OR biopsy of paratracheal mass (10/11) f/u results   * restarted Heparin, d/c SCDs   * restarted diet   - BAL (10/11): few RBCs and rare Gram+/Gram- cocci    Chronic alcohol use  - Consumes 1 beer daily   - BAL: 0.01 on admission   - PO thiamine, B12 and folic acid supplements   - No signs of EtOH withdrawal; CTM    Fall  - GLF with no reported syncope.  - CT head on admission from other facility negative for bleeding or intracranial pathology  - No evidence of neurological deficits   - CTM    Disorder of electrolytes  - monitor electrolytes  - replete as necessary    Bacteremia  - blood cultures on admission positive for Strep pneumo  - Repeat blood cultures negative 10/1, 10/2, 10/3  - afebrile  - PO Augmentin BID (until 10/14)

## 2017-10-12 NOTE — NON-PROVIDER
Internal Medicine Medical Student Note    Name Froylan Spain     1960   Age/Sex 57 y.o. male   MRN [id number]   Code Status FULL     After 5PM or if no immediate response to page, please call for cross-coverage  Attending/Team: Dr. Villalta (Purple/Green) See Patient List for primary contact information  Call [phone] to page after hours   1st Call - Day Intern (R1):   Dr. Snell 2nd Call - Day Sr. Resident (R2/R3):   Dr. Bianchi         Reason for interval visit  (Principal Problem)   Hyponatremia    Interval Problem Daily Status Update  (24 hours)   Mr. Spain did well overnight and states that he has noted no symptoms since his biopsy procedure.  He was AOx3 this morning and states that he definitely feels an improvement from time of admission.  The patient does not note any new onset of symptoms.  He denies, n/v/d, SOB, myalgias, LOC, chest pain or extremity swelling/pain.  The patient has been able to ambulate daily and states that his bowel movements have been regular.    The paratracheal mass was biopsied yesterday, currently awaiting pathology results.  Pulmonology will continue to follow.  Nephrology will continue to follow the patient as well.    ROS  Constitutional: Negative for chills and fever.   HENT: Negative.    Eyes: Negative.    Respiratory: Negative for cough and shortness of breath.    Cardiovascular: Negative for chest pain, palpitations and leg swelling.   Gastrointestinal: Negative for abdominal pain, diarrhea, nausea and vomiting.   Genitourinary: Negative for dysuria and hematuria.   Musculoskeletal: Negative.    Skin: Negative.    Neurological: Positive for weakness. Negative for dizziness, sensory change and speech change.   Endo/Heme/Allergies: Negative.        Current Facility-Administered Medications:   •  lactated ringers infusion, , Intravenous, Continuous, Chaya Gibson M.D., Last Rate: 10 mL/hr at 10/11/17 1300  •  amoxicillin-clavulanate (AUGMENTIN) 680-125  MG per tablet 1 Tab, 1 Tab, Oral, Q12HRS, Christie Snell M.D., 1 Tab at 10/11/17 2100  •  midodrine (PROAMATINE) tablet 5 mg, 5 mg, Oral, TID WITH MEALS, Mary Gale M.D., 5 mg at 10/11/17 1803  •  sodium chloride (SALT) tablet 3 g, 3 g, Oral, TID WITH MEALS, Mary Gale M.D., 3 g at 10/11/17 1804  •  vitamin D (cholecalciferol) tablet 5,000 Units, 5,000 Units, Oral, DAILY, Lilly Hood M.D., 5,000 Units at 10/11/17 0811  •  multivitamin (THERAGRAN) tablet 1 Tab, 1 Tab, Oral, DAILY, Trell Curry M.D., 1 Tab at 10/11/17 0814  •  thiamine (THIAMINE) tablet 100 mg, 100 mg, Oral, DAILY, Trell Curry M.D., 100 mg at 10/11/17 0816  •  folic acid (FOLVITE) tablet 1 mg, 1 mg, Oral, DAILY, Trell Curry M.D., 1 mg at 10/11/17 0816  •  senna-docusate (PERICOLACE or SENOKOT S) 8.6-50 MG per tablet 2 Tab, 2 Tab, Oral, BID, 2 Tab at 10/11/17 0817 **AND** polyethylene glycol/lytes (MIRALAX) PACKET 1 Packet, 1 Packet, Oral, QDAY PRN **AND** magnesium hydroxide (MILK OF MAGNESIA) suspension 30 mL, 30 mL, Oral, QDAY PRN **AND** bisacodyl (DULCOLAX) suppository 10 mg, 10 mg, Rectal, QDAY PRN, Jannet Son M.D.  •  Respiratory Care per Protocol, , Nebulization, Continuous RT, Jannet Son M.D.  •  labetalol (NORMODYNE,TRANDATE) injection 10 mg, 10 mg, Intravenous, Q4HRS PRN, Jannet Son M.D.  •  ondansetron (ZOFRAN) syringe/vial injection 4 mg, 4 mg, Intravenous, Q4HRS PRN, Jannet Son M.D.  •  ondansetron (ZOFRAN ODT) dispertab 4 mg, 4 mg, Oral, Q4HRS PRN, Jannet Son M.D.  •  promethazine (PHENERGAN) tablet 12.5-25 mg, 12.5-25 mg, Oral, Q4HRS PRN, Jannet Son M.D.  •  promethazine (PHENERGAN) suppository 12.5-25 mg, 12.5-25 mg, Rectal, Q4HRS PRN, Jannet Son M.D.  •  prochlorperazine (COMPAZINE) injection 5-10 mg, 5-10 mg, Intravenous, Q4HRS PRN, Jannet Son M.D.  •  INITIATE NICOTINE REPLACEMENT PROTOCOL , , , Once **AND** nicotine (NICODERM) 21  MG/24HR 21 mg, 21 mg, Transdermal, Daily-0600, 21 mg at 10/12/17 0518 **AND** Protocol 205 PATIENT EDUCATION MATERIALS, , , Once **AND** Protocol 205 Rotate nicotine patch application sites daily , , , CONTINUOUS **AND** nicotine polacrilex (NICORETTE) 2 MG piece 2 mg, 2 mg, Oral, Q HOUR PRN, Jannet Son M.D.  •  guaifenesin dextromethorphan (ROBITUSSIN DM) 100-10 MG/5ML syrup 10 mL, 10 mL, Oral, Q6HRS PRN, Jannet Son M.D.  •  acetaminophen (TYLENOL) tablet 650 mg, 650 mg, Oral, Q6HRS PRN, Jannet Son M.D., 650 mg at 10/12/17 0518      Physical Exam       Vitals:    10/11/17 1814 10/11/17 1829 10/11/17 1911 10/12/17 0349   BP: 125/70 123/74 120/72 107/59   Pulse: 77 73 75 80   Resp:   17 17   Temp:   36.7 °C (98.1 °F) (!) 38.1 °C (100.5 °F)   SpO2:   99% 100%   Weight:       Height:         Body mass index is 18.79 kg/m². Weight: 59.4 kg (130 lb 15.3 oz)  Oxygen Therapy:  Pulse Oximetry: 100 %, O2 (LPM): 0, O2 Delivery: None (Room Air)    Physical Exam  Constitutional: He is oriented to person, place, and time. He appears unhealthy. No distress.   HENT:   Head: Normocephalic and atraumatic.   Eyes: EOM are normal.   Neck: Normal range of motion.   Cardiovascular: Normal rate, regular rhythm, S1 normal and S2 normal.  Exam reveals no gallop and no friction rub.    No murmur heard.  Pulmonary/Chest: Effort normal and breath sounds normal. No respiratory distress. He exhibits no tenderness.   Coarse bronchial breath sounds   Abdominal: Soft. Bowel sounds are normal. He exhibits no distension and no mass. There is no tenderness. There is no rebound.   Musculoskeletal: Normal range of motion. He exhibits no edema or tenderness.   Neurological: He is alert and oriented to person, place, and time.   Patient appears to be more alert than exams on previous days.   Skin: Skin is warm and dry. No rash noted. No erythema.   Psychiatric: Mood, memory and affect normal.     Assessment/Plan     #  Hyponatremia  - Na: 107 at time of admission  - Na: 131 (10/11)  - No altered mental status at this time, the patient was significantly altered at time of admission.  - No evidence of seizure noted since time of admission.  - Percutaneous CT-guided IR biopsy did not demonstrate evidence of malignancy which would be consistent with SIADH  - Paratracheal mass biopsy: Pending  PLAN:  - Fluid restriction <720mL  - Continue NaCl supplementation, 3 tabs TID  - Pulmonary will follow  - Nephro will follow  - Waiting for pathology results from paratracheal mass     # Lung Mass  - CT report notes paratracheal mass measuring 3.6x5.9cm as well as 2.0x1.8cm lession in right upper lobe.  - Smoking history and hyponatremia increase suspicion that this is a malignant process with paraneoplastic syndrome.  - Percutaneous Biopsy Result: marked mixed inflammation,          granulation tissue, fibrosis and necrosis consistent with          organizing pneumonia.  - Paratracheal mass biopsy: Pending  PLAN:  - Waiting on pathology results from paratracheal mass  - Continue management of SIADH symptoms/hyponatremia  - Pulm will follow     # Bacteremia  - Blood cultures positive for strep pneumo in ICU  - Augmentin continued for bacteremia  - Blood cultures negative  - x1 elevated temperature this am of 100.5, now 97.5  PLAN:  - Augmentin BID x6 days (total abx 14 days, d/c on 10/14)  - Monitor for s/s of sepsis, elevated temperatures     # Chronic Alcohol Use  - Patient states that he has 2-3 drinks/week.  - No s/s of acute alcohol withdrawal.  PLAN:  - no change

## 2017-10-12 NOTE — CARE PLAN
Problem: Bowel/Gastric:  Goal: Will not experience complications related to bowel motility  Pt educated on bowel care/protocol. Pt encouraged to drink fluids per restrictions and ambulate halls.

## 2017-10-12 NOTE — CARE PLAN
Problem: Safety  Goal: Will remain free from injury  Patient will remain free from infection    Problem: Infection  Goal: Will remain free from infection  Monitor s/sx of infection, [place]

## 2017-10-12 NOTE — PROGRESS NOTES
Nataly Tamayo Fall Risk Assessment:     Last Known Fall: Within the last month  Mobility: Immobilized/requires assist of one person  Medications: Cardiovascular or central nervous system meds  Mental Status/LOC/Awareness: Awake, alert, and oriented to date, place, and person  Toileting Needs: Use of assistive device (Bedside commode, bedpan, urinal)  Volume/Electrolyte Status: Low blood sugar/electrolyte imbalances  Communication/Sensory: No deficits  Behavior: Appropriate behavior  Nataly Tamayo Fall Risk Total: 14  Fall Risk Level: MODERATE RISK    Universal Fall Precautions:  call light/belongings in reach, wheelchairs and assistive devices out of sight, bed in low position and locked, siderails up x 2, use non-slip footwear, educate on level of risk    Fall Risk Level Interventions:   TRIAL (Capsule Tech, NEURO, MED BEN 5) Low Fall Risk Interventions  Place yellow fall risk ID band on patient: verified  Provide patient/family education based on risk assessment: completed  Educate patient/family to call staff for assistance when getting out of bed: completed  Place fall precaution signage outside patient door: verifiedTRIAL (Capsule Tech, NEURO, MED BEN 5) Moderate Fall Risk Interventions  Place yellow fall risk ID band on patient: verified  Provide patient/family education based on risk assessment : verified  Educate patient/family to call staff for assistance when getting out of bed: verified  Place fall precaution signage outside patient door: verified  Utilize bed/chair fall alarm: verifiedTRIAL ([employer] 8, NEURO, [employer] BEN 5) High Fall Risk Interventions  Place yellow fall risk ID band on patient: verified  Provide patient/family education based on risk assessment: completed  Educate patient/family to call staff for assistance when getting out of bed: completed  Place fall precaution signage outside patient door: verified  Place patient in room close to nursing station: verified  Utilize bed/chair fall alarm:  verified  Notify charge of high risk for huddle: completed    Patient Specific Interventions:     Medication: review medications with patient and family and limit combination of prn medications  Mental Status/LOC/Awareness: reorient patient, reinforce falls education and check on patient hourly  Toileting: provide frquent toileting and monitor intake and output/use of appropriate interventions  Volume/Electrolyte Status: monitor blood sugars and maintain appropriate blood sugar levels if diabetic and advance diet as tolerated  Communication/Sensory: update plan of care on whiteboard, ensure proper positioning when transferrng/ambulating, ensure patient has glasses/contacts and hearing aids/dentures and for visually impaired patients orient to their room surrounding and do not change their surroundings  Behavioral: encourage patient to voice feelings and engage patient in daily activities  Mobility: utilize bed/chair fall alarm

## 2017-10-12 NOTE — PROGRESS NOTES
Pulmonary Medicine Interval Note    Name Froylan Spain     1960   Age/Sex 57 y.o. male   MRN [id number]   Code Status Full       Attending/Team: Dr. Gibson / Pulmonary         Reason for interval visit  (Principal Problem)   Hyponatremia   Lung masses    ID: Patient is a 57-year-old male admitted following a ground level fall and was found to be severely hyponatremic. Further workup showed a right sided paratracheal and upper lobe lung masses concerning for malignancy in the setting of long-standing smoking history. CAT scan also showed some infiltrates and blood cultures were positive for strep pneumonia.    Interval Problem Daily Status Update  (24 hours)   Patient reported feeling fine. Denied any shortness of breath or chest pain. Sodium 131 this morning. On sodium tabs. Bronchoscopy performed yesterday. Bedside microscopy indicates malignant cells. Pathology report pending.    Review of Systems   Constitutional: Positive for weight loss. Negative for chills, fever and malaise/fatigue.   HENT: Negative for congestion.    Eyes: Negative for blurred vision and double vision.   Respiratory: Negative for cough, hemoptysis, sputum production, shortness of breath and wheezing.    Cardiovascular: Negative for chest pain, palpitations and leg swelling.   Gastrointestinal: Negative for constipation, heartburn and nausea.   Genitourinary: Negative for dysuria and urgency.   Musculoskeletal: Negative for myalgias.   Skin: Negative for itching and rash.   Neurological: Negative for dizziness, focal weakness, weakness and headaches.         Quality Measures    Reviewed items::  EKG reviewed, Labs reviewed, Medications reviewed and Radiology images reviewed          Physical Exam       Vitals:    10/12/17 0349 10/12/17 0500 10/12/17 0720 10/12/17 1000   BP: 107/59  119/67    Pulse: 80  75    Resp: 17  16    Temp: (!) 38.1 °C (100.5 °F) 36.8 °C (98.3 °F) 36.4 °C (97.6 °F)    SpO2: 100%  100%    Weight:    54.8  "kg (120 lb 13 oz)   Height:    1.778 m (5' 10\")     Body mass index is 17.33 kg/m². Weight: 54.8 kg (120 lb 13 oz)  Oxygen Therapy:  Pulse Oximetry: 100 %, O2 (LPM): 0, O2 Delivery: None (Room Air)    Physical Exam   Constitutional: He is oriented to person, place, and time.   Cachectic   HENT:   Head: Atraumatic.   Temporal wasting   Eyes: Conjunctivae are normal. Pupils are equal, round, and reactive to light.   Neck: Neck supple.   Cardiovascular: Normal rate, regular rhythm and normal heart sounds.    No murmur heard.  Pulmonary/Chest: Effort normal. He has no wheezes.   Abdominal: Soft. He exhibits no distension. There is no tenderness.   Musculoskeletal: Normal range of motion. He exhibits no edema or deformity.   Lymphadenopathy:     He has no cervical adenopathy.   Neurological: He is alert and oriented to person, place, and time.   Skin: Skin is warm and dry.         Lab Data Review:         10/4/2017  10:44 AM    Recent Labs      10/10/17   0300  10/11/17   0624  10/12/17   0207   SODIUM  128*  128*  131*   POTASSIUM  4.2  4.1  4.3   CHLORIDE  96  98  99   CO2  24  25  24   BUN  13  11  11   CREATININE  0.36*  0.31*  0.41*   CALCIUM  9.0  9.0  8.8       Recent Labs      10/10/17   0300  10/11/17   0624  10/12/17   0207   GLUCOSE  67  107*  92       Recent Labs      10/10/17   0854   IRON  67   [facility]  322                 Assessment/Plan   Patient is a 57-year-old male with history of homelessness and chronic tobacco use presents with a ground-level fall found to be severely hyponatremic and Imaging showed concerning right-sided lung masses. Pulmonary medicine asked to evaluate the patient regarding the lung masses. A tissue diagnosis is definitely required in this situation. IR biopsy of the right upper lobe mass is nondiagnostic showing fibrous tissue and no evidence of malignancy.  OR bronchoscopy performed. Bedside microscopy indicate malignant cells.    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  - consider Oncology consult based on final path results  -Agree with current antibiotics  -RT and OT treatments per protocols  -Hyponatremia management per primary team.   -Pt has a brother Benjamin Spain in [place] who he would want contacted if needed and could make decisions for him if the pt becomes unable.  -We'll continue to follow patient with you.  -Thank you for allowing us to participate in this patient's care.      Pulmonary attending:  Pt was seen and examined with medical resident. Treatment plan was discussed with the patient, the nurse and with medical resident.     I agree with his progress note

## 2017-10-12 NOTE — PROGRESS NOTES
"       Internal Medicine Interval Note  Note Author: Christie Snell M.D.     Name Froylan Spain     1960   Age/Sex 57 y.o. male   MRN [id number]   Code Status FULL     After 5PM or if no immediate response to page, please call for cross-coverage  Attending/Team: Dr. Villalta See Patient List for primary contact information  Call [phone] to page    1st Call - Day Intern (R1):   Dr. Christie Snell 2nd Call - Day Sr. Resident (R2/R3):   Dr. Tayler Bianchi   56yo homeless male without significant PMH admitted on 17 for severe hyponatremia to 108 and AMS.  Started on salt tabs, fluid restriction and Na correction per guidelines.   Concern for SIADH.  Smoker with lung masses (5.9 cm rightparatracheal mass and 2 cm RUL) found on CT Chest.  IR biopsy (10/05/17) c/w organizing PNA.  OR biopsy of paratracheal lesion pending.    Reason for interval visit  (Principal Problem)   Hyponatremia    Interval Problem Daily Status Update  (24 hours)   No acute overnight events.  No complaints at bedside except \"a little headache\" in the mornings.  Otherwise feeling well and asked for the newspaper.  Hyponatremia is 128 (stable over last few days).  AMS is resolved.  Underwent biopsy of paratracheal mass in the OR today.        Review of Systems   Constitutional: Negative for chills and fever.   Eyes: Negative.    Respiratory: Negative for cough and shortness of breath.    Cardiovascular: Negative for chest pain and palpitations.   Gastrointestinal: Negative for abdominal pain, diarrhea, nausea and vomiting.   Neurological: Positive for headaches (\"little headaches\" in the mornings).     Consultants/Specialty  Pulmonary   Nephrology     Disposition  Inpatient    Quality Measures    Reviewed items::  Labs reviewed and Medications reviewed  Garvin catheter::  No Garvin  DVT prophylaxis pharmacological::  Heparin (one AM dose today (holding PM dose for AM OR biopsy))  Ulcer Prophylaxis::  Not " indicated        Physical Exam       Vitals:    10/11/17 1557 10/11/17 1612 10/11/17 1649 10/11/17 1700   BP:   132/66 121/71   Pulse: 87 80 74 76   Resp: 16 16 17    Temp:   36.7 °C (98 °F)    SpO2: 95% 98% 100% 99%   Weight:       Height:         Body mass index is 18.79 kg/m². Weight: 59.4 kg (130 lb 15.3 oz)  Oxygen Therapy:  Pulse Oximetry: 99 %, O2 (LPM): 0, O2 Delivery: None (Room Air)    Physical Exam   Constitutional: He is oriented to person, place, and time. No distress.   Appears older than stated age; disheveled   HENT:   Head: Normocephalic and atraumatic.   Cardiovascular: Normal rate, regular rhythm, normal heart sounds and intact distal pulses.  Exam reveals no gallop and no friction rub.    No murmur heard.  Pulmonary/Chest: Effort normal and breath sounds normal. No respiratory distress. He has no wheezes. He has no rales. He exhibits no tenderness.   Abdominal: Soft. Bowel sounds are normal. He exhibits no distension and no mass. There is no tenderness. There is no rebound and no guarding.   Musculoskeletal: He exhibits no edema or tenderness.   Neurological: He is alert and oriented to person, place, and time. No cranial nerve deficit. GCS score is 15.   Skin: Skin is dry. No rash noted. He is not diaphoretic. No pallor.   Cracked, with areas of peeling skin; bronzed vs. sunburnt   Psychiatric: Mood and affect normal.       Lab Data Review:         10/8/2017  7:32 AM    Recent Labs      10/09/17   2207  10/10/17   0300  10/11/17   0624   SODIUM  128*  128*  128*   POTASSIUM  4.3  4.2  4.1   CHLORIDE  97  96  98   CO2  26  24  25   BUN  16  13  11   CREATININE  0.40*  0.36*  0.31*   CALCIUM  9.1  9.0  9.0       Recent Labs      10/09/17   0221   10/09/17   2207  10/10/17   0300  10/11/17   0624   ALTSGPT  24   --    --    --    --    ASTSGOT  26   --    --    --    --    ALKPHOSPHAT  78   --    --    --    --    TBILIRUBIN  0.2   --    --    --    --    GLUCOSE  101*   < >  94  67  107*    < > =  values in this interval not displayed.       Recent Labs      10/09/17   0221  10/10/17   0854   RBC  3.43*   --    HEMOGLOBIN  11.8*   --    HEMATOCRIT  32.6*   --    PLATELETCT  399   --    IRON   --   67   TOTIRONBC   --   322       Recent Labs      10/09/17   0221   WBC  6.8   NEUTSPOLYS  59.00   LYMPHOCYTES  15.60*   MONOCYTES  18.70*   EOSINOPHILS  5.30   BASOPHILS  1.00   ASTSGOT  26   ALTSGPT  24   ALKPHOSPHAT  78   TBILIRUBIN  0.2           Assessment/Plan     Hyponatremia  - Na of 107 and AMS on admission   - likely 2/2 SIADH  - continue salt tabs 3g TID & 800cc fluid restriction  - Qshift neuro checks  - Na up to 128 today, remaining stable; CTM  - AMS resolved, no focal neurological deficits      Lung mass  - Hx of smoking >20 years, 10 pack-year.   - Cough, recent weight loss and low appetite.  - CT chest: 3.6x5.9 cm paratracheal mass compressing on SVC and 2x1.8 cm R upper lobe mass (9/30)  - Highly suspicious for malignant etiology  - s/p IR biopsy of RUL c/w with PNA  - s/p OR biopsy of paratracheal mass (10/11) f/u results   * restarted Heparin, d/c SCDs   * restarted diet     Chronic alcohol use  - Consumes 1 beer daily   - BAL: 0.01 on admission   - PO thiamine, B12 and folic acid supplements   - No signs of EtOH withdrawal; CTM    Fall  - GLF with no reported syncope.  - CT head on admission from other facility negative for bleeding or intracranial pathology  - No evidence of neurological deficits   - CTM    Disorder of electrolytes  - monitor electrolytes  - replete as necessary    Bacteremia  - blood cultures on admission positive for Strep pneumo  - Repeat blood cultures negative 10/1, 10/2, 10/3  - afebrile  - PO Augmentin BID (until 10/14)

## 2017-10-12 NOTE — PROGRESS NOTES
Nataly Tamayo Fall Risk Assessment:     Last Known Fall: Within the last month  Mobility: Immobilized/requires assist of one person  Medications: Cardiovascular or central nervous system meds  Mental Status/LOC/Awareness: Awake, alert, and oriented to date, place, and person  Toileting Needs: Use of assistive device (Bedside commode, bedpan, urinal)  Volume/Electrolyte Status: Low blood sugar/electrolyte imbalances  Communication/Sensory: No deficits  Behavior: Appropriate behavior  Nataly Tamayo Fall Risk Total: 14  Fall Risk Level: MODERATE RISK    Universal Fall Precautions:  call light/belongings in reach, bed in low position and locked, wheelchairs and assistive devices out of sight, use non-slip footwear, siderails up x 2, adequate lighting, clutter free and spill free environment, educate on level of risk, educate to call for assistance    Fall Risk Level Interventions:   TRIAL (EVERFANS 8, NEURO, MED BEN 5) Low Fall Risk Interventions  Place yellow fall risk ID band on patient: verified  Provide patient/family education based on risk assessment: completed  Educate patient/family to call staff for assistance when getting out of bed: completed  Place fall precaution signage outside patient door: verifiedTRIAL (EVERFANS 8, NEURO, MED BEN 5) Moderate Fall Risk Interventions  Place yellow fall risk ID band on patient: verified  Provide patient/family education based on risk assessment : verified  Educate patient/family to call staff for assistance when getting out of bed: verified  Place fall precaution signage outside patient door: verified  Utilize bed/chair fall alarm: verifiedTRIAL (EVERFANS 8, NEURO, [employer] BEN 5) High Fall Risk Interventions  Place yellow fall risk ID band on patient: verified  Provide patient/family education based on risk assessment: completed  Educate patient/family to call staff for assistance when getting out of bed: completed  Place fall precaution signage outside patient door:  verified  Place patient in room close to nursing station: verified  Utilize bed/chair fall alarm: verified  Notify charge of high risk for huddle: completed    Patient Specific Interventions:     Medication: review medications with patient and family  Mental Status/LOC/Awareness: not applicable  Toileting: provide frquent toileting, instruct patient/family on the need to call for assistance when toileting and do not leave patient unattended in bathroom/refer to toileting scripting  Volume/Electrolyte Status: monitor abnormal lab values  Communication/Sensory: update plan of care on whiteboard  Behavioral: encourage patient to voice feelings  Mobility: utilize bed/chair fall alarm, ensure bed is locked and in lowest position, provide appropriate assistive device and instruct patient to exit bed on their strongest side

## 2017-10-12 NOTE — PROGRESS NOTES
Received bedside report from day RN and assumed care of patient at 1900. Patient is alert and oriented x4 with no signs of labored breathing or distress. Patient updated on plan of care; verbalizes understanding. Safety precautions in place including patient call light within reach, bed alarm on, personal possessions nearby, bed in low position and locked, hourly rounding in practice, and non-skid socks in place.

## 2017-10-12 NOTE — PROGRESS NOTES
Nataly Tamayo Fall Risk Assessment:     Last Known Fall: Within the last month  Mobility: Dizziness/generalized weakness  Medications: Cardiovascular or central nervous system meds  Mental Status/LOC/Awareness: Awake, alert, and oriented to date, place, and person  Toileting Needs: Use of assistive device (Bedside commode, bedpan, urinal)  Volume/Electrolyte Status: Low blood sugar/electrolyte imbalances  Communication/Sensory: Visual (Glasses)/hearing deficit  Behavior: Appropriate behavior  Nataly Tamayo Fall Risk Total: 14  Fall Risk Level: MODERATE RISK     Universal Fall Precautions:  call light/belongings in reach, bed in low position and locked, siderails up x 2, use non-slip footwear, adequate lighting, clutter free and spill free environment, educate on level of risk, educate to call for assistance     Fall Risk Level Interventions:   TRIAL ([employer], NEURO, MED BEN 5) Low Fall Risk Interventions  Place yellow fall risk ID band on patient: verified  Provide patient/family education based on risk assessment: completed  Educate patient/family to call staff for assistance when getting out of bed: completed  Place fall precaution signage outside patient door: verifiedTRIAL ([employer], NEURO, MED BEN 5) Moderate Fall Risk Interventions  Place yellow fall risk ID band on patient: verified  Provide patient/family education based on risk assessment : completed  Educate patient/family to call staff for assistance when getting out of bed: completed  Place fall precaution signage outside patient door: verified  Utilize bed/chair fall alarm: verifiedTRIAL ([employer] [employer], NEURO, [employer] BEN 5) High Fall Risk Interventions  Place yellow fall risk ID band on patient: verified  Provide patient/family education based on risk assessment: completed  Educate patient/family to call staff for assistance when getting out of bed: completed  Place fall precaution signage outside patient door: verified  Place patient in room close to nursing  station: verified  Utilize bed/chair fall alarm: verified  Notify charge of high risk for huddle: completed     Patient Specific Interventions:      Medication: review medications with patient and family  Mental Status/LOC/Awareness: reorient patient, reinforce falls education and utilize bed/chair fall alarm  Toileting: provide frquent toileting and instruct patient/family on the need to call for assistance when toileting  Volume/Electrolyte Status: monitor abnormal lab values  Communication/Sensory: update plan of care on whiteboard  Behavioral: not applicable  Mobility: provide comfort measures during transport and utilize bed/chair fall alarm

## 2017-10-12 NOTE — PROGRESS NOTES
Pt back to unit post bronchoscopy with biopsy. Pt denies pain, no sob, no n/v. VSS. Pt tolerating po intake. IV site wnl, IVF at TKO. Call light in reach, pt denies other needs at this time.

## 2017-10-13 LAB
ANION GAP SERPL CALC-SCNC: 7 MMOL/L (ref 0–11.9)
BACTERIA SPEC RESP CULT: ABNORMAL
BUN SERPL-MCNC: 11 MG/DL (ref 8–22)
CALCIUM SERPL-MCNC: 9.4 MG/DL (ref 8.5–10.5)
CHLORIDE SERPL-SCNC: 98 MMOL/L (ref 96–112)
CO2 SERPL-SCNC: 25 MMOL/L (ref 20–33)
CREAT SERPL-MCNC: 0.37 MG/DL (ref 0.5–1.4)
GFR SERPL CREATININE-BSD FRML MDRD: >60 ML/MIN/1.73 M 2
GLUCOSE SERPL-MCNC: 93 MG/DL (ref 65–99)
GRAM STN SPEC: ABNORMAL
POTASSIUM SERPL-SCNC: 4 MMOL/L (ref 3.6–5.5)
SIGNIFICANT IND 70042: ABNORMAL
SITE SITE: ABNORMAL
SODIUM SERPL-SCNC: 130 MMOL/L (ref 135–145)
SOURCE SOURCE: ABNORMAL

## 2017-10-13 PROCEDURE — 36415 COLL VENOUS BLD VENIPUNCTURE: CPT

## 2017-10-13 PROCEDURE — 97110 THERAPEUTIC EXERCISES: CPT

## 2017-10-13 PROCEDURE — A9270 NON-COVERED ITEM OR SERVICE: HCPCS | Performed by: INTERNAL MEDICINE

## 2017-10-13 PROCEDURE — 700102 HCHG RX REV CODE 250 W/ 637 OVERRIDE(OP): Performed by: INTERNAL MEDICINE

## 2017-10-13 PROCEDURE — 97116 GAIT TRAINING THERAPY: CPT

## 2017-10-13 PROCEDURE — 700102 HCHG RX REV CODE 250 W/ 637 OVERRIDE(OP): Performed by: STUDENT IN AN ORGANIZED HEALTH CARE EDUCATION/TRAINING PROGRAM

## 2017-10-13 PROCEDURE — 700111 HCHG RX REV CODE 636 W/ 250 OVERRIDE (IP): Performed by: STUDENT IN AN ORGANIZED HEALTH CARE EDUCATION/TRAINING PROGRAM

## 2017-10-13 PROCEDURE — 770006 HCHG ROOM/CARE - MED/SURG/GYN SEMI*

## 2017-10-13 PROCEDURE — A9270 NON-COVERED ITEM OR SERVICE: HCPCS | Performed by: STUDENT IN AN ORGANIZED HEALTH CARE EDUCATION/TRAINING PROGRAM

## 2017-10-13 PROCEDURE — 97535 SELF CARE MNGMENT TRAINING: CPT

## 2017-10-13 PROCEDURE — 99231 SBSQ HOSP IP/OBS SF/LOW 25: CPT | Mod: GC | Performed by: INTERNAL MEDICINE

## 2017-10-13 PROCEDURE — 80048 BASIC METABOLIC PNL TOTAL CA: CPT

## 2017-10-13 RX ADMIN — THERA TABS 1 TABLET: TAB at 08:10

## 2017-10-13 RX ADMIN — AMOXICILLIN AND CLAVULANATE POTASSIUM 1 TABLET: 875; 125 TABLET, FILM COATED ORAL at 20:45

## 2017-10-13 RX ADMIN — HEPARIN SODIUM 5000 UNITS: 5000 INJECTION, SOLUTION INTRAVENOUS; SUBCUTANEOUS at 05:41

## 2017-10-13 RX ADMIN — STANDARDIZED SENNA CONCENTRATE AND DOCUSATE SODIUM 2 TABLET: 8.6; 5 TABLET, FILM COATED ORAL at 08:09

## 2017-10-13 RX ADMIN — NICOTINE 21 MG: 21 PATCH, EXTENDED RELEASE TRANSDERMAL at 05:41

## 2017-10-13 RX ADMIN — MIDODRINE HYDROCHLORIDE 5 MG: 5 TABLET ORAL at 17:50

## 2017-10-13 RX ADMIN — THIAMINE HCL TAB 100 MG 100 MG: 100 TAB at 08:09

## 2017-10-13 RX ADMIN — HEPARIN SODIUM 5000 UNITS: 5000 INJECTION, SOLUTION INTRAVENOUS; SUBCUTANEOUS at 14:21

## 2017-10-13 RX ADMIN — HEPARIN SODIUM 5000 UNITS: 5000 INJECTION, SOLUTION INTRAVENOUS; SUBCUTANEOUS at 20:45

## 2017-10-13 RX ADMIN — SODIUM CHLORIDE TAB 1 GM 3 G: 1 TAB at 17:50

## 2017-10-13 RX ADMIN — SODIUM CHLORIDE TAB 1 GM 3 G: 1 TAB at 11:27

## 2017-10-13 RX ADMIN — VITAMIN D, TAB 1000IU (100/BT) 5000 UNITS: 25 TAB at 08:08

## 2017-10-13 RX ADMIN — AMOXICILLIN AND CLAVULANATE POTASSIUM 1 TABLET: 875; 125 TABLET, FILM COATED ORAL at 08:11

## 2017-10-13 RX ADMIN — FOLIC ACID 1 MG: 1 TABLET ORAL at 08:10

## 2017-10-13 RX ADMIN — SODIUM CHLORIDE TAB 1 GM 3 G: 1 TAB at 08:11

## 2017-10-13 RX ADMIN — MIDODRINE HYDROCHLORIDE 5 MG: 5 TABLET ORAL at 11:26

## 2017-10-13 RX ADMIN — MIDODRINE HYDROCHLORIDE 5 MG: 5 TABLET ORAL at 08:10

## 2017-10-13 ASSESSMENT — COGNITIVE AND FUNCTIONAL STATUS - GENERAL
MOVING FROM LYING ON BACK TO SITTING ON SIDE OF FLAT BED: A LITTLE
DAILY ACTIVITIY SCORE: 22
DRESSING REGULAR LOWER BODY CLOTHING: A LITTLE
TURNING FROM BACK TO SIDE WHILE IN FLAT BAD: A LITTLE
SUGGESTED CMS G CODE MODIFIER DAILY ACTIVITY: CJ
WALKING IN HOSPITAL ROOM: A LITTLE
MOBILITY SCORE: 18
CLIMB 3 TO 5 STEPS WITH RAILING: A LITTLE
SUGGESTED CMS G CODE MODIFIER MOBILITY: CK
MOVING TO AND FROM BED TO CHAIR: A LITTLE
HELP NEEDED FOR BATHING: A LITTLE
STANDING UP FROM CHAIR USING ARMS: A LITTLE

## 2017-10-13 ASSESSMENT — ENCOUNTER SYMPTOMS
WEAKNESS: 0
WHEEZING: 0
COUGH: 0
WEIGHT LOSS: 1
HEMOPTYSIS: 0
MYALGIAS: 0
SHORTNESS OF BREATH: 0
CHILLS: 0
ABDOMINAL PAIN: 0
NAUSEA: 0
PALPITATIONS: 0
HEADACHES: 0
CONSTIPATION: 0
FOCAL WEAKNESS: 0
DIZZINESS: 0
SPUTUM PRODUCTION: 0
DIARRHEA: 0
WEIGHT LOSS: 0
BLURRED VISION: 0
NEUROLOGICAL NEGATIVE: 1
VOMITING: 0
FEVER: 0
DOUBLE VISION: 0
HEARTBURN: 0

## 2017-10-13 ASSESSMENT — GAIT ASSESSMENTS
DISTANCE (FEET): 150
DEVIATION: BRADYKINETIC;DECREASED BASE OF SUPPORT
GAIT LEVEL OF ASSIST: STAND BY ASSIST

## 2017-10-13 ASSESSMENT — PAIN SCALES - GENERAL
PAINLEVEL_OUTOF10: 0

## 2017-10-13 NOTE — PROCEDURES
Date: 10/9/2017    Time:  9:00 am    Procedure: Bronchoscopy    Indication: The patient has Rt upper lobe lung mass with lymphadenopathy compressing the trachea. transthoracic FNA was not diagnostic     Consent: Informed consent obtained from patient or designated decision maker after explaining the benefits/risks of the procedure including but not limited to bleeding, infection, airway trauma or loss therof, pneumothorax/hemothorax, arrythmia, or death. Patient or surrogate expressed understanding and agreement and signed consent which can be found in the patient's chart.    Procedure: After obtaining consent, a time-out was performed. Respiratory therapy and nursing at bedside throughout procedure. Patient provided sedation and analgesia throughout the procedure. Placed on full ventilator support with an FiO2 of 100% throughout the procedure. Using a fiberoptic bronchoscope, trachea entered via oropharynx. 5mL of local anesthetic sprayed at the tyra (1% lidocaine) achieving appropriate comfort level for patient. Airways visualized directly   Patient tolerated procedure well without any difficulties and left in care of bedside nurse/RT.      Medications: 2 mg Versed IV and 100 mcg Fentanyl IV    Findings:    Upper airway - the trachea was compressed from the right from the mid trachea down to the tyra level. No endotracheal lesion no obstruction.   Mucosa is swelled up in the right wall of the trachea no ulcers or bleeding. I was able to bypass to stenosis without difficulties   Right mainstem and right upper lobe - Normal appearing mucosa without mass/lesion/anatomic variance, no secretions  Right lower lobe - Normal appearing mucosa without secretions  Right middle lobe - Normal appearing mucosa without mass/lesion/anatomic variance   L proximal and distal airways - normal appearing mucosa without mass/lesion/anatomic variance, secretions: none       Intervention:    Biopsy to the para tracheal lesion was not  attempted due to the patient uncontrolled cough. I felt it will be safer to obtained under general anesthesia with EBUS help       Plan:  We will arrange for FNA of paratracheal lymphadenopathy with EBUS in the OR     Complications: None    CXR  Not done       Chaya Gibson MD   Pulmonary and Critical Care Medicine

## 2017-10-13 NOTE — CARE PLAN
Problem: Safety  Goal: Will remain free from falls    Intervention: Implement fall precautions  Pt educated on fall risk. Bed in lowest and locked position. Treaded socks on patient, appropriate signs in place.       Problem: Infection  Goal: Will remain free from infection    Intervention: Implement standard precautions and perform hand washing before and after patient contact  Handwashing performed before and after patient interaction. Pt educated on importance of washing hands. Pt verbalized understanding.

## 2017-10-13 NOTE — PROGRESS NOTES
Pulmonary Medicine Interval Note    Name Froylan Spain     1960   Age/Sex 57 y.o. male   MRN [id number]   Code Status Full       Attending/Team: Dr. Gibson / Pulmonary         Reason for interval visit  (Principal Problem)   Hyponatremia   Lung masses    ID: Patient is a 57-year-old male admitted following a ground level fall and was found to be severely hyponatremic. Further workup showed a right sided paratracheal and upper lobe lung masses concerning for malignancy in the setting of long-standing smoking history. CAT scan also showed some infiltrates and blood cultures were positive for strep pneumonia.    Interval Problem Daily Status Update  (24 hours)   Patient reported feeling fine. Denied any shortness of breath or chest pain. Sodium 130 this morning. On sodium tabs. Pathology results from transbronchial biopsy pending.    Review of Systems   Constitutional: Positive for weight loss. Negative for chills, fever and malaise/fatigue.   HENT: Negative for congestion.    Eyes: Negative for blurred vision and double vision.   Respiratory: Negative for cough, hemoptysis, sputum production, shortness of breath and wheezing.    Cardiovascular: Negative for chest pain, palpitations and leg swelling.   Gastrointestinal: Negative for constipation, heartburn and nausea.   Genitourinary: Negative for dysuria and urgency.   Musculoskeletal: Negative for myalgias.   Skin: Negative for itching and rash.   Neurological: Negative for dizziness, focal weakness, weakness and headaches.         Quality Measures    Reviewed items::  EKG reviewed, Labs reviewed, Medications reviewed and Radiology images reviewed          Physical Exam       Vitals:    10/12/17 1500 10/12/17 2000 10/13/17 0400 10/13/17 0750   BP: 113/52 112/54 122/69 120/69   Pulse: 91 67 72 74   Resp:    Temp: 37.4 °C (99.4 °F) 37.6 °C (99.6 °F) 36.7 °C (98 °F) 36.7 °C (98.1 °F)   SpO2: 97% 99% 95% 99%   Weight:       Height:         Body  mass index is 17.33 kg/m².    Oxygen Therapy:  Pulse Oximetry: 99 %, O2 (LPM): 0, O2 Delivery: None (Room Air)    Physical Exam   Constitutional: He is oriented to person, place, and time.   Cachectic   HENT:   Head: Atraumatic.   Temporal wasting   Eyes: Conjunctivae are normal. Pupils are equal, round, and reactive to light.   Neck: Neck supple.   Cardiovascular: Normal rate, regular rhythm and normal heart sounds.    No murmur heard.  Pulmonary/Chest: Effort normal. He has no wheezes.   Abdominal: Soft. He exhibits no distension. There is no tenderness.   Musculoskeletal: Normal range of motion. He exhibits no edema or deformity.   Lymphadenopathy:     He has no cervical adenopathy.   Neurological: He is alert and oriented to person, place, and time.   Skin: Skin is warm and dry.         Lab Data Review:         10/4/2017  10:44 AM    Recent Labs      10/11/17   0624  10/12/17   0207  10/13/17   0240   SODIUM  128*  131*  130*   POTASSIUM  4.1  4.3  4.0   CHLORIDE  98  99  98   CO2  25  24  25   BUN  11  11  11   CREATININE  0.31*  0.41*  0.37*   CALCIUM  9.0  8.8  9.4       Recent Labs      10/11/17   0624  10/12/17   0207  10/13/17   0240   GLUCOSE  107*  92  93       No results for input(s): RBC, HEMOGLOBIN, HEMATOCRIT, PLATELETCT, PROTHROMBTM, APTT, INR, IRON, FERRITIN, TOTIRONBC in the last 72 hours.              Assessment/Plan   Patient is a 57-year-old male with history of homelessness and chronic tobacco use presents with a ground-level fall found to be severely hyponatremic and Imaging showed concerning right-sided lung masses. Pulmonary medicine asked to evaluate the patient regarding the lung masses. A tissue diagnosis is definitely required in this situation. IR biopsy of the right upper lobe mass is nondiagnostic showing fibrous tissue and no evidence of malignancy.  OR bronchoscopy performed. Bedside microscopy indicate malignant cells.    Lung mass  Hyponatremia secondary to SIADH  Strep  bacteremia  Community acquired pneumonia  Chronic alcohol use  Chronic tobacco use    Recommend:  - S/P bronchoscopy and transbronchial biopsy  -Await pathology results.  - consider Oncology consult based on final path results  -Agree with current antibiotics  -RT and OT treatments per protocols  -Hyponatremia management per primary team.   -Pt has a brother Benjamin Spain in [place] who he would want contacted if needed and could make decisions for him if the pt becomes unable.  -We'll continue to follow patient with you.  -Thank you for allowing us to participate in this patient's care.      Pulmonary attending:  Pt was seen and examined with medical resident. Treatment plan was discussed with the patient, the nurse and with medical resident.     I agree with his progress note

## 2017-10-13 NOTE — PROGRESS NOTES
Hospital Medicine Progress Note, Adult, Complex               Author: Juancho Taborr Date & Time created: 10/12/2017  6:32 PM     Interval History:  58 y/o male with paratracheal and RUL masses with severe hyponatremia  C/o fatigue, weight loss, poor po intake  Had a lung biopsy yesterday    Review of Systems:  Review of Systems   Constitutional: Negative for chills, fever, malaise/fatigue and weight loss.   Respiratory: Negative for cough and shortness of breath.    Cardiovascular: Negative for chest pain and leg swelling.   Gastrointestinal: Negative for abdominal pain, nausea and vomiting.   Genitourinary: Negative for dysuria, frequency and urgency.   Neurological: Negative for headaches.       Physical Exam:  Physical Exam   Constitutional: He is oriented to person, place, and time. He appears cachectic. No distress.   HENT:   Head: Normocephalic and atraumatic.   Nose: Nose normal.   Mouth/Throat: Oropharynx is clear and moist. No oropharyngeal exudate.   Eyes: No scleral icterus.   Cardiovascular: Normal rate and regular rhythm.    Pulmonary/Chest: Effort normal and breath sounds normal. No respiratory distress. He has no wheezes. He has no rales.   Abdominal: Soft.   Musculoskeletal: Normal range of motion. He exhibits no edema.   Neurological: He is alert and oriented to person, place, and time.   Nursing note and vitals reviewed.      Labs:        Invalid input(s): [id number]      Recent Labs      10/10/17   0300  10/11/17   0624  10/12/17   0207   SODIUM  128*  128*  131*   POTASSIUM  4.2  4.1  4.3   CHLORIDE  96  98  99   CO2  24  25  24   BUN  13  11  11   CREATININE  0.36*  0.31*  0.41*   CALCIUM  9.0  9.0  8.8     Recent Labs      10/10/17   0300  10/11/17   0624  10/12/17   0207   GLUCOSE  67  107*  92     Recent Labs      10/10/17   0854   IRON  67   TOTIRONBC  322               Hemodynamics:  Temp (24hrs), Av.1 °C (98.8 °F), Min:36.4 °C (97.6 °F), Max:38.1 °C (100.5 °F)  Temperature: 37.4 °C  (99.4 °F)  Pulse  Av.2  Min: 65  Max: 99   Blood Pressure: 113/52     Respiratory:    Respiration: 17, Pulse Oximetry: 97 %        RUL Breath Sounds: Diminished;Expiratory Wheezes, RML Breath Sounds: Diminished, RLL Breath Sounds: Diminished, LEONARDO Breath Sounds: Diminished;Expiratory Wheezes, LLL Breath Sounds: Diminished  Fluids:    Intake/Output Summary (Last 24 hours) at 10/12/17 1832  Last data filed at 10/12/17 1300   Gross per 24 hour   Intake              792 ml   Output             1100 ml   Net             -308 ml     Weight: 54.8 kg (120 lb 13 oz)  GI/Nutrition:  Orders Placed This Encounter   Procedures   • DIET ORDER     Standing Status:   Standing     Number of Occurrences:   1     Order Specific Question:   Diet:     Answer:   Regular [1]     Order Specific Question:   Consistency/Fluid modifications:     Answer:   1200 ml Fluid Restriction [8]     Medical Decision Making, by Problem:  Active Hospital Problems    Diagnosis   • *Hyponatremia [E87.1]   • Lung mass [R91.8]   • Disorder of electrolytes [E87.8]   • Fall [W19.XXXA]   • Chronic alcohol use [F10.10]   • Bacteremia [R78.81]         Reviewed items::  Labs reviewed    Assessment and Plan  1.CKD I  -stable  2.HTN: OK  3.Electrolytes: hyponatremia better,most likely SIADH  4.Anemia: Hb stable  5.Volume: euvolemic  Recs:   continue free water restriction,daily labs  R/O malignancy  Nutrition evaluation  no need for HD  Renal dose all meds  Avoid nephrotoxins  Prognosis poor.  Daily labs

## 2017-10-13 NOTE — PROGRESS NOTES
Rec'd report from day shift RN. Assumed pt care. Assessment completed. AA&OX4. Denies pain at this time. No s/s of discomfort or distress. Educated pt on need to self turn, pt does turn when asleep. Generalized abrasions noted, especially to BLE. Pt removed sacral mepilex, richardson snot want a replacement. Bed in lowest position, bed locked, bed alarm on for safety, treaded sock in place, RN and CNA numbers provided, call light within reach.

## 2017-10-13 NOTE — PROGRESS NOTES
Hospital Medicine Progress Note, Adult, Complex               Author: Juancho Gironjjar Date & Time created: 10/13/2017  3:00 PM     Interval History:  56 y/o male with paratracheal and RUL masses with severe hyponatremia  C/o fatigue, weight loss, poor po intake  No complaints      Review of Systems:  Review of Systems   Constitutional: Negative for malaise/fatigue and weight loss.   Respiratory: Negative for cough and shortness of breath.    Cardiovascular: Negative for chest pain and leg swelling.   Gastrointestinal: Negative for nausea and vomiting.   Genitourinary: Negative for dysuria and urgency.       Physical Exam:  Physical Exam   Constitutional: He is oriented to person, place, and time. He appears cachectic. No distress.   HENT:   Nose: Nose normal.   Mouth/Throat: Oropharynx is clear and moist. No oropharyngeal exudate.   Eyes: No scleral icterus.   Cardiovascular: Normal rate and regular rhythm.    Pulmonary/Chest: Effort normal and breath sounds normal. No respiratory distress. He has no wheezes.   Musculoskeletal: Normal range of motion. He exhibits no edema.   Neurological: He is alert and oriented to person, place, and time.   Nursing note and vitals reviewed.      Labs:        Invalid input(s): [id number]      Recent Labs      10/11/17   0624  10/12/17   0207  10/13/17   0240   SODIUM  128*  131*  130*   POTASSIUM  4.1  4.3  4.0   CHLORIDE  98  99  98   CO2  25  24  25   BUN  11  11  11   CREATININE  0.31*  0.41*  0.37*   CALCIUM  9.0  8.8  9.4     Recent Labs      10/11/17   0624  10/12/17   0207  10/13/17   0240   GLUCOSE  107*  92  93     No results for input(s): RBC, HEMOGLOBIN, HEMATOCRIT, PLATELETCT, PROTHROMBTM, APTT, INR, IRON, FERRITIN, TOTIRONBC in the last 72 hours.            Hemodynamics:  Temp (24hrs), Av °C (98.6 °F), Min:36.7 °C (98 °F), Max:37.6 °C (99.6 °F)  Temperature: 36.7 °C (98.1 °F)  Pulse  Av.9  Min: 65  Max: 99   Blood Pressure: 120/69     Respiratory:     Respiration: 17, Pulse Oximetry: 99 %        RUL Breath Sounds: Diminished, RML Breath Sounds: Diminished, RLL Breath Sounds: Diminished, LEONARDO Breath Sounds: Diminished, LLL Breath Sounds: Diminished  Fluids:    Intake/Output Summary (Last 24 hours) at 10/13/17 1500  Last data filed at 10/13/17 1100   Gross per 24 hour   Intake             1590 ml   Output             1110 ml   Net              480 ml        GI/Nutrition:  Orders Placed This Encounter   Procedures   • DIET ORDER     Standing Status:   Standing     Number of Occurrences:   1     Order Specific Question:   Diet:     Answer:   Regular [1]     Order Specific Question:   Consistency/Fluid modifications:     Answer:   1200 ml Fluid Restriction [8]     Medical Decision Making, by Problem:  Active Hospital Problems    Diagnosis   • *Hyponatremia [E87.1]   • Lung mass [R91.8]   • Disorder of electrolytes [E87.8]   • Fall [W19.XXXA]   • Chronic alcohol use [F10.10]   • Bacteremia [R78.81]         Reviewed items::  Labs reviewed    Assessment and Plan  1.CKD I :stable  2.HTN: OK  3.Electrolytes: hyponatremia better  4.Anemia: Hb stable  5.Volume: euvolemic  Recs:   continue free water restriction,daily labs  R/O malignancy  Nutrition evaluation  no need for HD  Renal dose all meds  Avoid nephrotoxins  Prognosis poor.  Daily labs

## 2017-10-13 NOTE — NON-PROVIDER
Internal Medicine Medical Student Note    Name Froylan Spain     1960   Age/Sex 57 y.o. male   MRN [id number]   Code Status FULL     After 5PM or if no immediate response to page, please call for cross-coverage  Attending/Team: Dr. Villalta (Green/Purple) See Patient List for primary contact information  Call [phone] to page after hours   1st Call - Day Intern (R1):   Dr. Snell 2nd Call - Day Sr. Resident (R2/R3):   Dr. Bianchi         Reason for interval visit  (Principal Problem)   Hyponatremia    Interval Problem Daily Status Update  (24 hours)   Mr. Spain did well overnight and he was AOx4 for the exam. Nursing noted no acute changes overnight and he has been tolerating his increased fluid limit well without any changes in symptoms.  At this time, we are waiting for results of the paratracheal mass biopsies.  We will continue to monitor daily labs and examine the patient for acute changes.    Nephrology will continue to follow the patient, no changes to his management at this time are recommended.    Pulmonology will continue to follow the patient.  RT and OT treatments are suggested per protocols.  Pending the final pathology results, oncology consult was recommended as well.      ROS  Constitutional: Negative for chills and fever.   HENT: Negative.    Eyes: Negative.    Respiratory: Negative for cough and shortness of breath.    Cardiovascular: Negative for chest pain, palpitations and leg swelling.   Gastrointestinal: Negative for abdominal pain, diarrhea, nausea and vomiting.   Genitourinary: Negative for dysuria and hematuria.   Musculoskeletal: Negative.    Skin: Negative.    Neurological: Positive for weakness. Negative for dizziness, sensory change and speech change.   Endo/Heme/Allergies: Negative.        Current Facility-Administered Medications:   •  heparin injection 5,000 Units, 5,000 Units, Subcutaneous, Q8HRS, Sukhjinder Saldana M.D., 5,000 Units at 10/13/17 0541  •   lactated ringers infusion, , Intravenous, Continuous, Chaya Gibson M.D., Last Rate: 10 mL/hr at 10/11/17 1300  •  amoxicillin-clavulanate (AUGMENTIN) 875-125 MG per tablet 1 Tab, 1 Tab, Oral, Q12HRS, Christie Snell M.D., 1 Tab at 10/12/17 2136  •  midodrine (PROAMATINE) tablet 5 mg, 5 mg, Oral, TID WITH MEALS, Mary Gale M.D., 5 mg at 10/12/17 1711  •  sodium chloride (SALT) tablet 3 g, 3 g, Oral, TID WITH MEALS, Mary Gale M.D., 3 g at 10/12/17 1710  •  vitamin D (cholecalciferol) tablet 5,000 Units, 5,000 Units, Oral, DAILY, Lilly Hood M.D., 5,000 Units at 10/12/17 0913  •  multivitamin (THERAGRAN) tablet 1 Tab, 1 Tab, Oral, DAILY, Trell Curry M.D., 1 Tab at 10/12/17 0914  •  thiamine (THIAMINE) tablet 100 mg, 100 mg, Oral, DAILY, Trell Curry M.D., 100 mg at 10/12/17 0900  •  folic acid (FOLVITE) tablet 1 mg, 1 mg, Oral, DAILY, Trell Curry M.D., 1 mg at 10/12/17 0914  •  senna-docusate (PERICOLACE or SENOKOT S) 8.6-50 MG per tablet 2 Tab, 2 Tab, Oral, BID, 2 Tab at 10/12/17 2136 **AND** polyethylene glycol/lytes (MIRALAX) PACKET 1 Packet, 1 Packet, Oral, QDAY PRN **AND** magnesium hydroxide (MILK OF MAGNESIA) suspension 30 mL, 30 mL, Oral, QDAY PRN **AND** bisacodyl (DULCOLAX) suppository 10 mg, 10 mg, Rectal, QDAY PRN, Jannet Son M.D.  •  Respiratory Care per Protocol, , Nebulization, Continuous RT, Jannet Son M.D.  •  labetalol (NORMODYNE,TRANDATE) injection 10 mg, 10 mg, Intravenous, Q4HRS PRN, Jannet Son M.D.  •  ondansetron (ZOFRAN) syringe/vial injection 4 mg, 4 mg, Intravenous, Q4HRS PRN, Jannet Son M.D.  •  ondansetron (ZOFRAN ODT) dispertab 4 mg, 4 mg, Oral, Q4HRS PRN, Jannet Son M.D.  •  promethazine (PHENERGAN) tablet 12.5-25 mg, 12.5-25 mg, Oral, Q4HRS PRN, Jannet Son M.D.  •  promethazine (PHENERGAN) suppository 12.5-25 mg, 12.5-25 mg, Rectal, Q4HRS PRN, Jannet Son M.D.  •  prochlorperazine (COMPAZINE)  "injection 5-10 mg, 5-10 mg, Intravenous, Q4HRS PRN, Jannet Son M.D.  •  INITIATE NICOTINE REPLACEMENT PROTOCOL , , , Once **AND** nicotine (NICODERM) 21 MG/24HR 21 mg, 21 mg, Transdermal, Daily-0600, 21 mg at 10/13/17 0541 **AND** Protocol 205 PATIENT EDUCATION MATERIALS, , , Once **AND** Protocol 205 Rotate nicotine patch application sites daily , , , CONTINUOUS **AND** nicotine polacrilex (NICORETTE) 2 MG piece 2 mg, 2 mg, Oral, Q HOUR PRN, Jannet Son M.D.  •  guaifenesin dextromethorphan (ROBITUSSIN DM) 100-10 MG/5ML syrup 10 mL, 10 mL, Oral, Q6HRS PRN, Jannet Son M.D.  •  acetaminophen (TYLENOL) tablet 650 mg, 650 mg, Oral, Q6HRS PRN, Jannet Son M.D., 650 mg at 10/12/17 0518      Physical Exam       Vitals:    10/12/17 1000 10/12/17 1500 10/12/17 2000 10/13/17 0400   BP:  113/52 112/54 122/69   Pulse:  91 67 72   Resp:  17 18 18   Temp:  37.4 °C (99.4 °F) 37.6 °C (99.6 °F) 36.7 °C (98 °F)   SpO2:  97% 99% 95%   Weight: 54.8 kg (120 lb 13 oz)      Height: 1.778 m (5' 10\")        Body mass index is 17.33 kg/m². Weight: 54.8 kg (120 lb 13 oz)  Oxygen Therapy:  Pulse Oximetry: 95 %, O2 (LPM): 0, O2 Delivery: None (Room Air)    Physical Exam  Constitutional: He is oriented to person, place, and time. He appears unhealthy. No distress. Patient appears older than stated age.  HENT:   Head: Normocephalic and atraumatic.   Eyes: EOM are normal.   Neck: Normal range of motion.   Cardiovascular: Normal rate, regular rhythm, S1 normal and S2 normal.  Exam reveals no gallop and no friction rub.    No murmur heard.  Pulmonary/Chest: Effort normal and breath sounds normal. No respiratory distress. He exhibits no tenderness.   Abdominal: Soft. Bowel sounds are normal. He exhibits no distension and no mass. There is no tenderness. There is no rebound.   Musculoskeletal: Normal range of motion. He exhibits no edema or tenderness.   Neurological: He is alert and oriented to person, place, and time. "   Skin: Skin is warm and dry. No rash noted. No erythema.   Psychiatric: Mood, memory and affect normal.     Labs:    Bronchial Washing Cultures  Component Results     Component   Significant Indicator   NEG   Source   RESP   Site   Bronchial Washings   Respiratory Culture   Light growth usual upper respiratory yaneli   Gram Stain Result   Few WBCs.   Rare Gram positive cocci.   Rare Gram negative cocci.          Assessment/Plan     # Hyponatremia  - Na: 107 at time of admission  - Na: 130 (10/13)  - No altered mental status at this time, significant improvement from time of admission  - No evidence of seizure noted since time of admission.  - Percutaneous CT-guided IR biopsy did not demonstrate evidence of malignancy which would be consistent with SIADH  - s/p bronchoscopy and transbronchial biopsy  - Paratracheal mass biopsy: Pending  PLAN:  - Fluid restriction <1200mL  - Continue NaCl supplementation, 3 tabs TID  - Pulmonary will follow  - Nephro will follow  - Waiting for pathology results from paratracheal mass     # Lung Mass  - CT report notes paratracheal mass measuring 3.6x5.9cm as well as 2.0x1.8cm lession in right upper lobe.  - Smoking history and hyponatremia increase suspicion that this is a malignant process with paraneoplastic syndrome.  - Percutaneous Biopsy Result: marked mixed inflammation,          granulation tissue, fibrosis and necrosis consistent with          organizing pneumonia.  - s/p bronchoscopy and transbronchial biopsy  - Paratracheal mass biopsy: Pending  PLAN:  - Waiting on pathology results from paratracheal mass  - Continue management of SIADH symptoms/hyponatremia  - Pulm will follow     # Bacteremia  - Blood cultures positive for strep pneumo in ICU  - Augmentin continued for bacteremia  - Blood cultures negative  - Bronchial washings continue to return negative results  - x1 elevated temperature this am of 100.5, now 97.5  PLAN:  - Augmentin BID x6 days (total abx 14 days, d/c  on 10/14)  - Monitor for s/s of sepsis, elevated temperatures     # Chronic Alcohol Use  - Patient states that he has 2-3 drinks/week.  - No s/s of acute alcohol withdrawal.  PLAN:  - Patient stable

## 2017-10-13 NOTE — PROGRESS NOTES
"Assumed care of patient at 0700 . Received report from RN. Patient is AOX4 . Assessment complete. Labs reviewed.Patient and RN discussed plan of care. Patient questions answered. Patient needs are met at this time. Bed in lowest and locked position. Upper bed rails up.  Call light is within reach. Hourly rounding in place.  /69   Pulse 72   Temp 36.7 °C (98 °F)   Resp 18   Ht 1.778 m (5' 10\")   Wt 54.8 kg (120 lb 13 oz)   SpO2 95%   BMI 17.33 kg/m²     "

## 2017-10-13 NOTE — THERAPY
"Occupational Therapy Treatment completed with focus on ADLs, ADL transfers and patient education.  Functional Status:  Pt seen for OT tx. Pt was SBA supine to sit, SBA to don/doff LB dressing. Pt amb to BR w/ CGA, pt w/ 1 significant LOB and required assistance from therapist for correction. Completed toileting and toilet transfer w/ SBA. SBA to don gown, and return back to supine. Encouraged pt to continue amb w/ nrsg staff. Pt accepting to education.   Plan of Care: Will benefit from Occupational Therapy 3 times per week  Discharge Recommendations:  Equipment Will Continue to Assess for Equipment Needs.     See \"Rehab Therapy-Acute\" Patient Summary Report for complete documentation.   "

## 2017-10-13 NOTE — PROGRESS NOTES
Nataly Tamayo Fall Risk Assessment:     Last Known Fall: Within the last month  Mobility: Immobilized/requires assist of one person  Medications: Cardiovascular or central nervous system meds  Mental Status/LOC/Awareness: Awake, alert, and oriented to date, place, and person  Toileting Needs: Use of assistive device (Bedside commode, bedpan, urinal)  Volume/Electrolyte Status: Low blood sugar/electrolyte imbalances  Communication/Sensory: Visual (Glasses)/hearing deficit  Behavior: Appropriate behavior  Nataly Tamayo Fall Risk Total: 15  Fall Risk Level: HIGH RISK    Universal Fall Precautions:  call light/belongings in reach, bed in low position and locked, wheelchairs and assistive devices out of sight, siderails up x 2, use non-slip footwear, adequate lighting, clutter free and spill free environment, educate on level of risk, educate to call for assistance    Fall Risk Level Interventions:   TRIAL (TELE 8, NEURO, MED BEN 5) Low Fall Risk Interventions  Place yellow fall risk ID band on patient: verified  Provide patient/family education based on risk assessment: completed  Educate patient/family to call staff for assistance when getting out of bed: completed  Place fall precaution signage outside patient door: verifiedTRIAL (TELE 8, NEURO, MED BEN 5) Moderate Fall Risk Interventions  Place yellow fall risk ID band on patient: verified  Provide patient/family education based on risk assessment : verified  Educate patient/family to call staff for assistance when getting out of bed: verified  Place fall precaution signage outside patient door: verified  Utilize bed/chair fall alarm: verifiedTRIAL (TELE 8, NEURO, [employer] BEN 5) High Fall Risk Interventions  Place yellow fall risk ID band on patient: verified  Provide patient/family education based on risk assessment: completed  Educate patient/family to call staff for assistance when getting out of bed: completed  Place fall precaution signage outside patient  door: completed  Place patient in room close to nursing station: currently not available/charge notified  Utilize bed/chair fall alarm: verified  Notify charge of high risk for huddle: completed    Patient Specific Interventions:     Medication: review medications with patient and family, assess for medications that can be discontinued or dosage decreased and limit combination of prn medications  Mental Status/LOC/Awareness: reinforce falls education, check on patient hourly, utilize bed/chair fall alarm, reinforce the use of call light and provide activity  Toileting: monitor intake and output/use of appropriate interventions, instruct male patients prone to dizziness to void while sitting, instruct patient/family on the use of grab bars and instruct patient/family on the need to call for assistance when toileting  Volume/Electrolyte Status: ensure patient remains hydrated, advance diet as tolerated, administer medications as ordered for nausea and vomiting, monitor abnormal lab values and ensure IV fluids are appropriate  Communication/Sensory: update plan of care on whiteboard, ensure proper positioning when transferrng/ambulating, ensure patient has glasses/contacts and hearing aids/dentures and for visually impaired patients orient to their room surrounding and do not change their surroundings  Behavioral: encourage patient to voice feelings, engage patient in daily activities and instruct/reinforce fall program rationale  Mobility: dangle prior to standing, utilize bed/chair fall alarm, ensure bed is locked and in lowest position, provide appropriate assistive device, instruct patient to exit bed on their strongest side and collaborate with doctor for possible PT/OT consult

## 2017-10-13 NOTE — PROGRESS NOTES
Assumed care of pt at 0700. Received report from RN. Pt A&Ox4. Assessment complete. Labs reviewed. Pt and RN discussed plan of care. Pt questions answered. Pt needs are met at this time. Pt denies pain at this time. Pt on 1200 ml fluid restriction. Bed in lowest and locked position. Call light within reach. Upper bed rails up. Hourly rounding in place.

## 2017-10-13 NOTE — THERAPY
"Physical Therapy Treatment completed.   Bed Mobility:  Supine to Sit: Stand by Assist  Transfers: Sit to Stand: Stand by Assist  Gait: Level Of Assist: Stand by Assist with No Equipment Needed       Plan of Care: Will benefit from Physical Therapy 1-2 more sessions prior to DC and Plan to complete next treatment by Wednesday 10/18  Discharge Recommendations: Equipment: Will Continue to Assess for Equipment Needs. Post-acute therapy Discharge to home with outpatient or home health for additional skilled therapy services.    Pt demonstrating significant improvements with functional  mobility and strength today compared to initial evaluation. Pt performing all mobility at SBA level. Pt would benefit from 1-2 more PT treatment session prior to DC to address dynamic balance activities and ensure understanding of HEP    See \"Rehab Therapy-Acute\" Patient Summary Report for complete documentation.       "

## 2017-10-13 NOTE — PROGRESS NOTES
Nataly Tamayo Fall Risk Assessment:     Last Known Fall: Within the last month  Mobility: Immobilized/requires assist of one person  Medications: Cardiovascular or central nervous system meds  Mental Status/LOC/Awareness: Awake, alert, and oriented to date, place, and person  Toileting Needs: Use of assistive device (Bedside commode, bedpan, urinal)  Volume/Electrolyte Status: Low blood sugar/electrolyte imbalances  Communication/Sensory: Visual (Glasses)/hearing deficit  Behavior: Appropriate behavior  Nataly Tamayo Fall Risk Total: 15  Fall Risk Level: HIGH RISK    Universal Fall Precautions:  call light/belongings in reach, bed in low position and locked, wheelchairs and assistive devices out of sight, siderails up x 2, use non-slip footwear, adequate lighting, clutter free and spill free environment, educate on level of risk, educate to call for assistance    Fall Risk Level Interventions:   TRIAL (TELE 8, NEURO, MED BEN 5) Low Fall Risk Interventions  Place yellow fall risk ID band on patient: verified  Provide patient/family education based on risk assessment: completed  Educate patient/family to call staff for assistance when getting out of bed: completed  Place fall precaution signage outside patient door: verifiedTRIAL (TELE 8, NEURO, MED BEN 5) Moderate Fall Risk Interventions  Place yellow fall risk ID band on patient: verified  Provide patient/family education based on risk assessment : verified  Educate patient/family to call staff for assistance when getting out of bed: verified  Place fall precaution signage outside patient door: verified  Utilize bed/chair fall alarm: verifiedTRIAL (TELE 8, NEURO, [employer] BEN 5) High Fall Risk Interventions  Place yellow fall risk ID band on patient: verified  Provide patient/family education based on risk assessment: completed  Educate patient/family to call staff for assistance when getting out of bed: completed  Place fall precaution signage outside patient  door: verified  Place patient in room close to nursing station: currently not available/charge notified  Utilize bed/chair fall alarm: verified  Notify charge of high risk for huddle: completed    Patient Specific Interventions:     Medication: review medications with patient and family  Mental Status/LOC/Awareness: check on patient hourly, utilize bed/chair fall alarm and reinforce the use of call light  Toileting: provide frquent toileting  Volume/Electrolyte Status: monitor abnormal lab values  Communication/Sensory: update plan of care on whiteboard  Behavioral: administer medication as ordered  Mobility: utilize bed/chair fall alarm and ensure bed is locked and in lowest position

## 2017-10-13 NOTE — CARE PLAN
Problem: Discharge Barriers/Planning  Goal: Patient's continuum of care needs will be met    Intervention: Assess potential discharge barriers on admission and throughout hospital stay  Pending results of bronchoscopy. Pt would need SW consult for D/C planning.       Problem: Skin Integrity  Goal: Risk for impaired skin integrity will decrease    Intervention: Assess risk factors for impaired skin integrity and/or pressure ulcers  Pt removed sacral mepilex, does not want mepilex replaced. Pt turns appropriately while in bed.

## 2017-10-13 NOTE — PROGRESS NOTES
"       Internal Medicine Interval Note  Note Author: Christie Snell M.D.     Name Froylan Spain     1960   Age/Sex 57 y.o. male   MRN [id number]   Code Status FULL     After 5PM or if no immediate response to page, please call for cross-coverage  Attending/Team: Dr. Villalta See Patient List for primary contact information  Call [phone] to page    1st Call - Day Intern (R1):   Dr. Christie Snell 2nd Call - Day Sr. Resident (R2/R3):   Dr. Tayler Bianchi   56yo homeless male without significant PMH admitted on 17 for severe hyponatremia to 108 and AMS.  Started on salt tabs, fluid restriction and Na correction per guidelines.   Concern for SIADH.  Smoker with lung masses (5.9 cm rightparatracheal mass and 2 cm RUL) found on CT Chest.  IR biopsy (10/05/17) c/w organizing PNA.  OR biopsy of paratracheal lesion (10/11).    Reason for interval visit  (Principal Problem)   Hyponatremia    Interval Problem Daily Status Update  (24 hours)   No acute overnight events.  Hemodynamically stable and tolerating diet.    Otherwise feeling well.  Na is 130.  AMS is resolved.  Underwent biopsy of paratracheal mass in the OR.  BAL shows few RBCs and rare Gram+/Gram- cocci.  Biopsy pathology report pending.  Nephrology and Pulmonology following.  No changes in recommendations.  PT/OT on board.      Review of Systems   Constitutional: Negative for chills and fever.   Respiratory: Negative for cough and shortness of breath.    Cardiovascular: Negative for chest pain and palpitations.   Gastrointestinal: Negative for abdominal pain, diarrhea, nausea and vomiting.   Genitourinary: Negative.    Neurological: Negative.  Headaches: \"little headaches\" in the mornings.     Consultants/Specialty  Pulmonary   Nephrology     Disposition  Inpatient    Quality Measures    Reviewed items::  Labs reviewed and Medications reviewed  Garvin catheter::  No Garvin  DVT prophylaxis pharmacological::  Heparin  Ulcer Prophylaxis::  " Not indicated        Physical Exam       Vitals:    10/12/17 1500 10/12/17 2000 10/13/17 0400 10/13/17 0750   BP: 113/52 112/54 122/69 120/69   Pulse: 91 67 72 74   Resp: 17 18 18 17   Temp: 37.4 °C (99.4 °F) 37.6 °C (99.6 °F) 36.7 °C (98 °F) 36.7 °C (98.1 °F)   SpO2: 97% 99% 95% 99%   Weight:       Height:         Body mass index is 17.33 kg/m².    Oxygen Therapy:  Pulse Oximetry: 99 %, O2 (LPM): 0, O2 Delivery: None (Room Air)    Physical Exam   Constitutional: He is oriented to person, place, and time. No distress.   Appears older than stated age; disheveled   HENT:   Head: Normocephalic and atraumatic.   Cardiovascular: Normal rate, regular rhythm, normal heart sounds and intact distal pulses.  Exam reveals no gallop and no friction rub.    No murmur heard.  Pulmonary/Chest: Effort normal and breath sounds normal. No respiratory distress. He has no wheezes. He has no rales. He exhibits no tenderness.   Abdominal: Soft. Bowel sounds are normal. He exhibits no distension and no mass. There is no tenderness. There is no rebound and no guarding.   Musculoskeletal: He exhibits no edema or tenderness.   Neurological: He is alert and oriented to person, place, and time. No cranial nerve deficit. GCS score is 15.   Skin: Skin is dry. No rash noted. He is not diaphoretic. No pallor.   Cracked, with areas of peeling skin; bronzed vs. sunburnt   Psychiatric: Mood and affect normal.       Lab Data Review:         10/8/2017  7:32 AM    Recent Labs      10/11/17   0624  10/12/17   0207  10/13/17   0240   SODIUM  128*  131*  130*   POTASSIUM  4.1  4.3  4.0   CHLORIDE  98  99  98   CO2  25  24  25   BUN  11  11  11   CREATININE  0.31*  0.41*  0.37*   CALCIUM  9.0  8.8  9.4       Recent Labs      10/11/17   0624  10/12/17   0207  10/13/17   0240   GLUCOSE  107*  92  93       No results for input(s): RBC, HEMOGLOBIN, HEMATOCRIT, PLATELETCT, PROTHROMBTM, APTT, INR, IRON, FERRITIN, TOTIRONBC in the last 72 hours.         Assessment/Plan     Hyponatremia  - Na of 107 and AMS on admission   - likely 2/2 SIADH  - continue salt tabs 3g TID  - decrease fluid restriction to 1200cc/day  - Qshift neuro checks  - Na 128-131, remaining stable; CTM  - AMS resolved, no focal neurological deficits  - PT/OT: PT 1-2 more sessions prior to d/c; OT 3 times per week      Lung mass  - Hx of smoking >20 years, 10 pack-year.   - Cough, recent weight loss and low appetite.  - CT chest: 3.6x5.9 cm paratracheal mass compressing on SVC and 2x1.8 cm R upper lobe mass (9/30)  - Highly suspicious for malignant etiology  - s/p IR biopsy of RUL c/w with PNA  - s/p OR biopsy of paratracheal mass (10/11) f/u results  - BAL (10/11): few RBCs and rare Gram+/Gram- cocci    Chronic alcohol use  - Consumes 1 beer daily   - BAL: 0.01 on admission   - PO thiamine, B12 and folic acid supplements   - No signs of EtOH withdrawal; CTM    Fall  - GLF with no reported syncope.  - CT head on admission from other facility negative for bleeding or intracranial pathology  - No evidence of neurological deficits   - CTM    Disorder of electrolytes  - monitor electrolytes  - replete as necessary    Bacteremia  - blood cultures on admission positive for Strep pneumo  - Repeat blood cultures negative 10/1, 10/2, 10/3  - afebrile  - PO Augmentin BID (until 10/14)

## 2017-10-14 ENCOUNTER — APPOINTMENT (OUTPATIENT)
Dept: RADIOLOGY | Facility: MEDICAL CENTER | Age: 57
DRG: 166 | End: 2017-10-14
Attending: STUDENT IN AN ORGANIZED HEALTH CARE EDUCATION/TRAINING PROGRAM
Payer: COMMERCIAL

## 2017-10-14 LAB
ANION GAP SERPL CALC-SCNC: 7 MMOL/L (ref 0–11.9)
BUN SERPL-MCNC: 13 MG/DL (ref 8–22)
CALCIUM SERPL-MCNC: 9.3 MG/DL (ref 8.5–10.5)
CHLORIDE SERPL-SCNC: 101 MMOL/L (ref 96–112)
CO2 SERPL-SCNC: 24 MMOL/L (ref 20–33)
CREAT SERPL-MCNC: 0.44 MG/DL (ref 0.5–1.4)
GFR SERPL CREATININE-BSD FRML MDRD: >60 ML/MIN/1.73 M 2
GLUCOSE SERPL-MCNC: 92 MG/DL (ref 65–99)
POTASSIUM SERPL-SCNC: 4 MMOL/L (ref 3.6–5.5)
SODIUM SERPL-SCNC: 132 MMOL/L (ref 135–145)

## 2017-10-14 PROCEDURE — 700102 HCHG RX REV CODE 250 W/ 637 OVERRIDE(OP): Performed by: INTERNAL MEDICINE

## 2017-10-14 PROCEDURE — 80048 BASIC METABOLIC PNL TOTAL CA: CPT

## 2017-10-14 PROCEDURE — A9270 NON-COVERED ITEM OR SERVICE: HCPCS | Performed by: INTERNAL MEDICINE

## 2017-10-14 PROCEDURE — 700111 HCHG RX REV CODE 636 W/ 250 OVERRIDE (IP): Performed by: STUDENT IN AN ORGANIZED HEALTH CARE EDUCATION/TRAINING PROGRAM

## 2017-10-14 PROCEDURE — 700117 HCHG RX CONTRAST REV CODE 255: Performed by: STUDENT IN AN ORGANIZED HEALTH CARE EDUCATION/TRAINING PROGRAM

## 2017-10-14 PROCEDURE — 700102 HCHG RX REV CODE 250 W/ 637 OVERRIDE(OP): Performed by: STUDENT IN AN ORGANIZED HEALTH CARE EDUCATION/TRAINING PROGRAM

## 2017-10-14 PROCEDURE — A9270 NON-COVERED ITEM OR SERVICE: HCPCS | Performed by: STUDENT IN AN ORGANIZED HEALTH CARE EDUCATION/TRAINING PROGRAM

## 2017-10-14 PROCEDURE — 99232 SBSQ HOSP IP/OBS MODERATE 35: CPT | Mod: GC | Performed by: INTERNAL MEDICINE

## 2017-10-14 PROCEDURE — 770006 HCHG ROOM/CARE - MED/SURG/GYN SEMI*

## 2017-10-14 PROCEDURE — A9579 GAD-BASE MR CONTRAST NOS,1ML: HCPCS | Performed by: STUDENT IN AN ORGANIZED HEALTH CARE EDUCATION/TRAINING PROGRAM

## 2017-10-14 PROCEDURE — 36415 COLL VENOUS BLD VENIPUNCTURE: CPT

## 2017-10-14 PROCEDURE — 70553 MRI BRAIN STEM W/O & W/DYE: CPT

## 2017-10-14 RX ADMIN — SODIUM CHLORIDE TAB 1 GM 3 G: 1 TAB at 18:38

## 2017-10-14 RX ADMIN — GADODIAMIDE: 287 INJECTION INTRAVENOUS at 18:23

## 2017-10-14 RX ADMIN — STANDARDIZED SENNA CONCENTRATE AND DOCUSATE SODIUM 2 TABLET: 8.6; 5 TABLET, FILM COATED ORAL at 20:57

## 2017-10-14 RX ADMIN — STANDARDIZED SENNA CONCENTRATE AND DOCUSATE SODIUM 2 TABLET: 8.6; 5 TABLET, FILM COATED ORAL at 08:54

## 2017-10-14 RX ADMIN — MIDODRINE HYDROCHLORIDE 5 MG: 5 TABLET ORAL at 18:38

## 2017-10-14 RX ADMIN — SODIUM CHLORIDE TAB 1 GM 3 G: 1 TAB at 08:54

## 2017-10-14 RX ADMIN — THERA TABS 1 TABLET: TAB at 08:54

## 2017-10-14 RX ADMIN — FOLIC ACID 1 MG: 1 TABLET ORAL at 08:54

## 2017-10-14 RX ADMIN — MIDODRINE HYDROCHLORIDE 5 MG: 5 TABLET ORAL at 08:54

## 2017-10-14 RX ADMIN — SODIUM CHLORIDE TAB 1 GM 3 G: 1 TAB at 12:12

## 2017-10-14 RX ADMIN — VITAMIN D, TAB 1000IU (100/BT) 5000 UNITS: 25 TAB at 08:54

## 2017-10-14 RX ADMIN — ACETAMINOPHEN 650 MG: 325 TABLET, FILM COATED ORAL at 08:54

## 2017-10-14 RX ADMIN — AMOXICILLIN AND CLAVULANATE POTASSIUM 1 TABLET: 875; 125 TABLET, FILM COATED ORAL at 08:54

## 2017-10-14 RX ADMIN — HEPARIN SODIUM 5000 UNITS: 5000 INJECTION, SOLUTION INTRAVENOUS; SUBCUTANEOUS at 14:55

## 2017-10-14 RX ADMIN — NICOTINE 21 MG: 21 PATCH, EXTENDED RELEASE TRANSDERMAL at 05:35

## 2017-10-14 RX ADMIN — HEPARIN SODIUM 5000 UNITS: 5000 INJECTION, SOLUTION INTRAVENOUS; SUBCUTANEOUS at 20:56

## 2017-10-14 RX ADMIN — MIDODRINE HYDROCHLORIDE 5 MG: 5 TABLET ORAL at 12:12

## 2017-10-14 RX ADMIN — HEPARIN SODIUM 5000 UNITS: 5000 INJECTION, SOLUTION INTRAVENOUS; SUBCUTANEOUS at 05:34

## 2017-10-14 RX ADMIN — AMOXICILLIN AND CLAVULANATE POTASSIUM 1 TABLET: 875; 125 TABLET, FILM COATED ORAL at 20:57

## 2017-10-14 RX ADMIN — THIAMINE HCL TAB 100 MG 100 MG: 100 TAB at 09:00

## 2017-10-14 ASSESSMENT — ENCOUNTER SYMPTOMS
COUGH: 0
ORTHOPNEA: 0
EYES NEGATIVE: 1
CARDIOVASCULAR NEGATIVE: 1
PALPITATIONS: 0
WHEEZING: 0
SENSORY CHANGE: 0
HEMOPTYSIS: 0
WEIGHT LOSS: 1
DOUBLE VISION: 0
COUGH: 1
PSYCHIATRIC NEGATIVE: 1
WEAKNESS: 0
LOSS OF CONSCIOUSNESS: 0
CONSTIPATION: 0
DIARRHEA: 0
BLURRED VISION: 0
ABDOMINAL PAIN: 0
SPEECH CHANGE: 0
MUSCULOSKELETAL NEGATIVE: 1
FEVER: 0
HEADACHES: 1
VOMITING: 0
NAUSEA: 0
SHORTNESS OF BREATH: 0
CHILLS: 0
HEADACHES: 0
DIZZINESS: 0
FOCAL WEAKNESS: 0
HEARTBURN: 0
SPUTUM PRODUCTION: 0
WEIGHT LOSS: 0
MYALGIAS: 0

## 2017-10-14 ASSESSMENT — PAIN SCALES - GENERAL
PAINLEVEL_OUTOF10: 0
PAINLEVEL_OUTOF10: 0
PAINLEVEL_OUTOF10: 7
PAINLEVEL_OUTOF10: 0

## 2017-10-14 ASSESSMENT — LIFESTYLE VARIABLES: DO YOU DRINK ALCOHOL: YES

## 2017-10-14 NOTE — NON-PROVIDER
Internal Medicine Medical Student Note    Name Froylan Spain     1960   Age/Sex 57 y.o. male   MRN [id number]   Code Status FULL     After 5PM or if no immediate response to page, please call for cross-coverage  Attending/Team: Dr. Villalta (Purple/Green) See Patient List for primary contact information  Call [phone] to page after hours   1st Call - Day Intern (R1):   Dr. Snell 2nd Call - Day Sr. Resident (R2/R3):   Dr. Bianchi         Reason for interval visit  (Principal Problem)   Hyponatremia    Interval Problem Daily Status Update  (24 hours)   Mr. Spain did well overnight and reports no acute events or changes in symptoms.  The patient has been tolerating his increased fluid limit without issue.  He notes no changes in mental status and denies SOB, CP, peripheral swelling.  The patient has been ambulating 2x daily without weakness, syncope or difficulty.  We discussed the biopsy results at length and mentioned that they might take another couple days to be finalized.    Pulmonology will continue to follow the patient.  No change in management, awaiting finalized biopsy results.    Nephrology will continue to follow patient.  No changes in management at this time.    Review of Systems   Constitutional: Negative for chills, fever and malaise/fatigue.   Eyes: Negative.    Respiratory: Positive for cough (Patient states that this has been present for 10 years). Negative for hemoptysis, sputum production and shortness of breath.    Cardiovascular: Negative.  Negative for chest pain, palpitations, orthopnea and leg swelling.   Gastrointestinal: Negative for abdominal pain, constipation, diarrhea, heartburn, nausea and vomiting.   Genitourinary: Negative.    Musculoskeletal: Negative.    Skin: Negative.    Neurological: Negative for sensory change, speech change, loss of consciousness, weakness and headaches.   Endo/Heme/Allergies: Negative.    Psychiatric/Behavioral: Negative.        Current  Facility-Administered Medications:   •  heparin injection 5,000 Units, 5,000 Units, Subcutaneous, Q8HRS, Sukhjinder Saldana M.D., 5,000 Units at 10/14/17 0534  •  lactated ringers infusion, , Intravenous, Continuous, Chaya Gibson M.D., Last Rate: 10 mL/hr at 10/11/17 1300  •  amoxicillin-clavulanate (AUGMENTIN) 875-125 MG per tablet 1 Tab, 1 Tab, Oral, Q12HRS, Christie Snell M.D., 1 Tab at 10/13/17 2045  •  midodrine (PROAMATINE) tablet 5 mg, 5 mg, Oral, TID WITH MEALS, Mary Gale M.D., 5 mg at 10/13/17 1750  •  sodium chloride (SALT) tablet 3 g, 3 g, Oral, TID WITH MEALS, Mary Gale M.D., 3 g at 10/13/17 1750  •  vitamin D (cholecalciferol) tablet 5,000 Units, 5,000 Units, Oral, DAILY, Lilly Hood M.D., 5,000 Units at 10/13/17 0808  •  multivitamin (THERAGRAN) tablet 1 Tab, 1 Tab, Oral, DAILY, Trell Curry M.D., 1 Tab at 10/13/17 0810  •  thiamine (THIAMINE) tablet 100 mg, 100 mg, Oral, DAILY, Trell Curry M.D., 100 mg at 10/13/17 0809  •  folic acid (FOLVITE) tablet 1 mg, 1 mg, Oral, DAILY, Trell Curry M.D., 1 mg at 10/13/17 0810  •  senna-docusate (PERICOLACE or SENOKOT S) 8.6-50 MG per tablet 2 Tab, 2 Tab, Oral, BID, 2 Tab at 10/13/17 0809 **AND** polyethylene glycol/lytes (MIRALAX) PACKET 1 Packet, 1 Packet, Oral, QDAY PRN **AND** magnesium hydroxide (MILK OF MAGNESIA) suspension 30 mL, 30 mL, Oral, QDAY PRN **AND** bisacodyl (DULCOLAX) suppository 10 mg, 10 mg, Rectal, QDAY PRN, Jannet Son M.D.  •  Respiratory Care per Protocol, , Nebulization, Continuous RT, Jannet Son M.D.  •  labetalol (NORMODYNE,TRANDATE) injection 10 mg, 10 mg, Intravenous, Q4HRS PRN, Jannet Son M.D.  •  ondansetron (ZOFRAN) syringe/vial injection 4 mg, 4 mg, Intravenous, Q4HRS PRN, Jannet Son M.D.  •  ondansetron (ZOFRAN ODT) dispertab 4 mg, 4 mg, Oral, Q4HRS PRN, Jannet Son M.D.  •  promethazine (PHENERGAN) tablet 12.5-25 mg, 12.5-25 mg, Oral, Q4HRS PRN,  Jannet Son M.D.  •  promethazine (PHENERGAN) suppository 12.5-25 mg, 12.5-25 mg, Rectal, Q4HRS PRN, Jannet Son M.D.  •  prochlorperazine (COMPAZINE) injection 5-10 mg, 5-10 mg, Intravenous, Q4HRS PRN, Jannet Son M.D.  •  INITIATE NICOTINE REPLACEMENT PROTOCOL , , , Once **AND** nicotine (NICODERM) 21 MG/24HR 21 mg, 21 mg, Transdermal, Daily-0600, 21 mg at 10/14/17 0535 **AND** Protocol 205 PATIENT EDUCATION MATERIALS, , , Once **AND** Protocol 205 Rotate nicotine patch application sites daily , , , CONTINUOUS **AND** nicotine polacrilex (NICORETTE) 2 MG piece 2 mg, 2 mg, Oral, Q HOUR PRN, Jannet Son M.D.  •  guaifenesin dextromethorphan (ROBITUSSIN DM) 100-10 MG/5ML syrup 10 mL, 10 mL, Oral, Q6HRS PRN, Jannet Son M.D.  •  acetaminophen (TYLENOL) tablet 650 mg, 650 mg, Oral, Q6HRS PRN, Jannet Son M.D., 650 mg at 10/12/17 0518      Physical Exam       Vitals:    10/13/17 0750 10/13/17 1600 10/13/17 2015 10/14/17 0450   BP: 120/69 123/73 101/50 120/76   Pulse: 74 84 86 65   Resp: 17 16 17 19   Temp: 36.7 °C (98.1 °F) 36.7 °C (98.1 °F) 37.6 °C (99.6 °F) 37.1 °C (98.8 °F)   SpO2: 99% 99% 95% 97%   Weight:    57.3 kg (126 lb 5.2 oz)   Height:         Body mass index is 18.13 kg/m². Weight: 57.3 kg (126 lb 5.2 oz)  Oxygen Therapy:  Pulse Oximetry: 97 %, O2 (LPM): 0, O2 Delivery: None (Room Air)    Physical Exam  Constitutional: He is oriented to person, place, and time. He appears unhealthy. No distress. Patient appears older than stated age.  HENT:   Head: Normocephalic and atraumatic.   Eyes: EOM are normal.   Neck: Normal range of motion.   Cardiovascular: Normal rate, regular rhythm, S1 normal and S2 normal.  Exam reveals no gallop and no friction rub.    No murmur heard.  Pulmonary/Chest: Effort normal and breath sounds normal. No respiratory distress. He exhibits no tenderness.   Abdominal: Soft. Bowel sounds are normal. He exhibits no distension and no mass. There is  no tenderness. There is no rebound.   Musculoskeletal: Normal range of motion. He exhibits no edema or tenderness.   Neurological: He is alert and oriented to person, place, and time.   Skin: Skin is warm and dry. No rash noted. No erythema.   Psychiatric: Mood, memory and affect normal.     Labs:    Paratracheal Mass Biopsy:  SURGICAL PATHOLOGY CONSULTATION                                                          **PRELIMINARY REPORT**      PRELIMINARY DIAGNOSIS:            A. Station 10 right side lung mass:          Rare clusters of atypical cells not diagnostic of malignancy.           (See comment)          The smears demonstrate background blood showing scattered areas           of small mature lymphocytes including scattered thick clusters           of lymphocytes, suggestive for aspiration of a lymph node.           Numerous bronchial ciliated columnar cells and squamous           epithelial cells are also noted.          No definite malignant cells are seen.   B. Bronchial washings:          No evidence of malignancy.          The ThinPrep slide demonstrates predominantly ciliated columnar           bronchial epithelial cells, squamous epithelial cells,           macrophages, small lymphocytes and moderate numbers of           neutrophils. The cell block slide shows abundant blood with           similar cells including a few fragments of benign squamous           mucosal tissue.          No atypical or malignant cells are seen.          See comment.      Comment: The an IHC panel is performed on the cell block from station   10 and bronchial washing. There is crush artifact in both specimens.   Some partially preserved cells in the station 10 biopsy demonstrate   only weak synaptophysin positivity. These cells are not definitely seen   on the keratine/AE1:3 immunohistochemical. While the raises the   possibility of small cell carcinoma, it is not diagnostic due to the   scant material and crush artifact. A  final report will be issues after   additional studies are performed. I discussed this case with Dr. Christie Stahl. The slides are reviewed with Dr. Lennon with agreement   on the interpretation.                                                Diagnosis performed by:                                       KWABENA OVALLE MD         Assessment/Plan     # Hyponatremia  - Na: 107 at time of admission  - Na: 132 (10/14)  - No altered mental status at this time, significant improvement from time of admission  - No evidence of seizure noted since time of admission.  - Percutaneous CT-guided IR biopsy did not demonstrate evidence of malignancy which would be consistent with SIADH  - s/p bronchoscopy and transbronchial biopsy  - Paratracheal mass biopsy: awaiting finalized report  PLAN:  - Fluid restriction <1500ml  - Daily CMP  - Continue NaCl supplementation, 3 tabs TID  - Pulmonary will follow  - Nephro will follow  - Waiting for final pathology results from paratracheal mass     # Lung Mass  - CT report notes paratracheal mass measuring 3.6x5.9cm as well as 2.0x1.8cm lession in right upper lobe.  - Smoking history and hyponatremia increase suspicion that this is a malignant process with paraneoplastic syndrome.  - Percutaneous Biopsy Result: marked mixed inflammation,          granulation tissue, fibrosis and necrosis consistent with          organizing pneumonia.  - s/p bronchoscopy and transbronchial biopsy  - Paratracheal mass biopsy: Pending (preliminary results were nondiagnostic, rebiopsy vs. Retesting samples)  PLAN:  - Consult oncology  - Waiting on pathology results from paratracheal mass  - Continue management of SIADH symptoms/hyponatremia  - Pulm will follow     # Bacteremia  - Blood cultures positive for strep pneumo in ICU (10/1)  - Augmentin continued for bacteremia  - Blood cultures negative  - Bronchial washing culture: NGTD  - Patient afebrile since temp of 100.5  PLAN:  - Augmentin BID, d/c after  today  - Monitor for s/s of sepsis, elevated temperatures     # Chronic Alcohol Use  - Patient states that he has 2-3 drinks/week.  - No s/s of acute alcohol withdrawal.  PLAN:  - Patient stable

## 2017-10-14 NOTE — CARE PLAN
Problem: Bowel/Gastric:  Goal: Will not experience complications related to bowel motility    Intervention: Assess baseline bowel pattern  Pt declined stool softener for the evening, reported baseline BM.       Problem: Knowledge Deficit  Goal: Knowledge of disease process/condition, treatment plan, diagnostic tests, and medications will improve    Intervention: Assess knowledge level of disease process/condition, treatment plan, diagnostic tests, and medications  Pt keeps requesting fluids and goes over the limit allowed for his fluid restriction. Educated pt on need to follow fluid restriction for low sodium. Pt verbalized understanding.

## 2017-10-14 NOTE — PROGRESS NOTES
Nataly Tamayo Fall Risk Assessment:     Last Known Fall: Within the last month  Mobility: Immobilized/requires assist of one person  Medications: Cardiovascular or central nervous system meds  Mental Status/LOC/Awareness: Awake, alert, and oriented to date, place, and person  Toileting Needs: Use of assistive device (Bedside commode, bedpan, urinal)  Volume/Electrolyte Status: Low blood sugar/electrolyte imbalances  Communication/Sensory: Visual (Glasses)/hearing deficit  Behavior: Appropriate behavior  Nataly Tamayo Fall Risk Total: 15  Fall Risk Level: HIGH RISK    Universal Fall Precautions:  call light/belongings in reach, bed in low position and locked, wheelchairs and assistive devices out of sight, siderails up x 2, use non-slip footwear, adequate lighting, clutter free and spill free environment, educate on level of risk, educate to call for assistance    Fall Risk Level Interventions:   TRIAL (TELE 8, NEURO, MED BEN 5) Low Fall Risk Interventions  Place yellow fall risk ID band on patient: verified  Provide patient/family education based on risk assessment: completed  Educate patient/family to call staff for assistance when getting out of bed: completed  Place fall precaution signage outside patient door: verifiedTRIAL (TELE 8, NEURO, MED BEN 5) Moderate Fall Risk Interventions  Place yellow fall risk ID band on patient: verified  Provide patient/family education based on risk assessment : verified  Educate patient/family to call staff for assistance when getting out of bed: verified  Place fall precaution signage outside patient door: verified  Utilize bed/chair fall alarm: verifiedTRIAL (TELE 8, NEURO, [employer] BEN 5) High Fall Risk Interventions  Place yellow fall risk ID band on patient: verified  Provide patient/family education based on risk assessment: completed  Educate patient/family to call staff for assistance when getting out of bed: completed  Place fall precaution signage outside patient  door: completed  Place patient in room close to nursing station: currently not available/charge notified  Utilize bed/chair fall alarm: verified  Notify charge of high risk for huddle: completed    Patient Specific Interventions:     Medication: assess for medications that can be discontinued or dosage decreased and limit combination of prn medications  Mental Status/LOC/Awareness: reinforce falls education, check on patient hourly, utilize bed/chair fall alarm, reinforce the use of call light and provide activity  Toileting: not applicable  Volume/Electrolyte Status: ensure patient remains hydrated, monitor abnormal lab values and ensure IV fluids are appropriate  Communication/Sensory: update plan of care on whiteboard  Behavioral: engage patient in daily activities, administer medication as ordered and instruct/reinforce fall program rationale  Mobility: schedule physical activity throughout the day, utilize bed/chair fall alarm, ensure bed is locked and in lowest position, provide appropriate assistive device, instruct patient to exit bed on their strongest side and collaborate with doctor for possible PT/OT consult

## 2017-10-14 NOTE — PROCEDURES
Date: 10/11/2017  Time: 13:00    Procedure: Bronchoscopy    Indication: lung mass with para tracheal and mediastinal lymphadenopathy     Consent: Informed consent obtained from patient or designated decision maker after explaining the benefits/risks of the procedure including but not limited to bleeding, infection, airway trauma or loss therof, pneumothorax/hemothorax, arrythmia, or death. Patient or surrogate expressed understanding and agreement and signed consent which can be found in the patient's chart.    Procedure: After obtaining consent, a time-out was performed. Procedure was performed under general anaesthesia. LMA used to secure the patient's airways.      Medications: iv general anaesthesia     Findings:    Upper airway - the trachea was compressed from the right from the mid trachea down to the tyra level. No endotracheal lesion no obstruction.   Mucosa is swelled up in the right wall of the trachea no ulcers or bleeding. I was able to bypass to stenosis without difficulties   Right mainstem and right upper lobe - Normal appearing mucosa without mass/lesion/anatomic variance, no secretions  Right lower lobe - Normal appearing mucosa without secretions  Right middle lobe - Normal appearing mucosa without mass/lesion/anatomic variance   L proximal and distal airways - normal appearing mucosa without mass/lesion/anatomic variance, secretions: none    Intervention:    Using EBUS and 21 gauge needle multiple specimens were aspirated from station 10R and 4R.   Initial on site evaluation was positive for atypical small cells suspicious of small cell carcinoma vs lymphoma. additional tissue was obtained from the some area and sent for pathology.     Complications: no immediate complications observed       Plan:    Await biopsy results     Chaya Gibson MD   Pulmonary and Critical Care Medicine    .[place]

## 2017-10-14 NOTE — PROGRESS NOTES
Hospital Medicine Progress Note, Adult, Complex               Author: Juancho Gironjjar Date & Time created: 10/14/2017  1:23 PM     Interval History:  58 y/o male with paratracheal and RUL masses with severe hyponatremia  C/o fatigue, weight loss, poor po intake  No complaints      Review of Systems:  Review of Systems   Constitutional: Negative for malaise/fatigue and weight loss.   Respiratory: Negative for cough and shortness of breath.    Cardiovascular: Negative for chest pain and leg swelling.   Gastrointestinal: Negative for nausea and vomiting.   Genitourinary: Negative for dysuria and urgency.       Physical Exam:  Physical Exam   Constitutional: He is oriented to person, place, and time. He appears cachectic. No distress.   HENT:   Nose: Nose normal.   Mouth/Throat: Oropharynx is clear and moist. No oropharyngeal exudate.   Eyes: No scleral icterus.   Cardiovascular: Normal rate and regular rhythm.    Pulmonary/Chest: Effort normal and breath sounds normal. No respiratory distress. He has no wheezes.   Musculoskeletal: Normal range of motion. He exhibits no edema.   Neurological: He is alert and oriented to person, place, and time.   Nursing note and vitals reviewed.      Labs:        Invalid input(s): [id number]      Recent Labs      10/12/17   0207  10/13/17   0240  10/14/17   0019   SODIUM  131*  130*  132*   POTASSIUM  4.3  4.0  4.0   CHLORIDE  99  98  101   CO2  24  25  24   BUN  11  11  13   CREATININE  0.41*  0.37*  0.44*   CALCIUM  8.8  9.4  9.3     Recent Labs      10/12/17   0207  10/13/17   0240  10/14/17   0019   GLUCOSE  92  93  92     No results for input(s): RBC, HEMOGLOBIN, HEMATOCRIT, PLATELETCT, PROTHROMBTM, APTT, INR, IRON, FERRITIN, TOTIRONBC in the last 72 hours.            Hemodynamics:  Temp (24hrs), Av °C (98.6 °F), Min:36.7 °C (98 °F), Max:37.6 °C (99.6 °F)  Temperature: 36.7 °C (98 °F)  Pulse  Av.8  Min: 65  Max: 99   Blood Pressure: 122/75     Respiratory:    Respiration:  18, Pulse Oximetry: 98 %        RUL Breath Sounds: Clear, RML Breath Sounds: Diminished, RLL Breath Sounds: Diminished, LEONARDO Breath Sounds: Clear, LLL Breath Sounds: Diminished  Fluids:    Intake/Output Summary (Last 24 hours) at 10/14/17 1323  Last data filed at 10/14/17 1100   Gross per 24 hour   Intake             1234 ml   Output              300 ml   Net              934 ml     Weight: 57.3 kg (126 lb 5.2 oz)  GI/Nutrition:  Orders Placed This Encounter   Procedures   • DIET ORDER     Standing Status:   Standing     Number of Occurrences:   1     Order Specific Question:   Diet:     Answer:   Regular [1]     Order Specific Question:   Consistency/Fluid modifications:     Answer:   1200 ml Fluid Restriction [8]     Medical Decision Making, by Problem:  Active Hospital Problems    Diagnosis   • *Hyponatremia [E87.1]   • Lung mass [R91.8]   • Disorder of electrolytes [E87.8]   • Fall [W19.XXXA]   • Chronic alcohol use [F10.10]   • Bacteremia [R78.81]         Reviewed items::  Labs reviewed    Assessment and Plan  1.CKD I :stable  2.HTN: OK  3.Electrolytes: hyponatremia improving  4.Anemia: Hb stable  5.Volume: euvolemic  Recs:   continue free water restriction,daily labs  Lung biopsy report reviewed   Nutrition evaluation  no need for HD  Renal dose all meds  Avoid nephrotoxins

## 2017-10-14 NOTE — PROGRESS NOTES
"Assumed care of patient @ 0700, report received at bedside,  assessment done, labs and orders noted.  Pt A & Ox4, PIV running LR at TKO.  Pt is resting comfortably in bed, no signs or symptoms of distress.  Pt reports pain is a 4/10, states he \"just has a headache\" and was provided with tylenol as per MAR.  Pt has been updated on the plan of care.    Bed is in lowest position, fall risk socks in place, call light within reach. Pt verbalized all needs are met at this time.  "

## 2017-10-14 NOTE — PROGRESS NOTES
Nataly Tamayo Fall Risk Assessment:     Last Known Fall: Within the last month  Mobility: Immobilized/requires assist of one person  Medications: Cardiovascular or central nervous system meds  Mental Status/LOC/Awareness: Awake, alert, and oriented to date, place, and person  Toileting Needs: Use of assistive device (Bedside commode, bedpan, urinal)  Volume/Electrolyte Status: Low blood sugar/electrolyte imbalances  Communication/Sensory: Visual (Glasses)/hearing deficit  Behavior: Appropriate behavior  Nataly Tamayo Fall Risk Total: 15  Fall Risk Level: HIGH RISK    Universal Fall Precautions:  call light/belongings in reach, bed in low position and locked, wheelchairs and assistive devices out of sight, siderails up x 2, use non-slip footwear, adequate lighting, clutter free and spill free environment, educate on level of risk, educate to call for assistance    Fall Risk Level Interventions:   TRIAL (TELE 8, NEURO, MED BEN 5) Low Fall Risk Interventions  Place yellow fall risk ID band on patient: verified  Provide patient/family education based on risk assessment: completed  Educate patient/family to call staff for assistance when getting out of bed: completed  Place fall precaution signage outside patient door: verifiedTRIAL (TELE 8, NEURO, MED BEN 5) Moderate Fall Risk Interventions  Place yellow fall risk ID band on patient: verified  Provide patient/family education based on risk assessment : verified  Educate patient/family to call staff for assistance when getting out of bed: verified  Place fall precaution signage outside patient door: verified  Utilize bed/chair fall alarm: verifiedTRIAL (TELE 8, NEURO, [employer] BEN 5) High Fall Risk Interventions  Place yellow fall risk ID band on patient: verified  Provide patient/family education based on risk assessment: completed  Educate patient/family to call staff for assistance when getting out of bed: completed  Place fall precaution signage outside patient  door: completed  Place patient in room close to nursing station: currently not available/charge notified  Utilize bed/chair fall alarm: verified  Notify charge of high risk for huddle: completed    Patient Specific Interventions:     Medication: review medications with patient and family  Mental Status/LOC/Awareness: check on patient hourly, utilize bed/chair fall alarm and reinforce the use of call light  Toileting: provide frquent toileting  Volume/Electrolyte Status: monitor abnormal lab values and ensure IV fluids are appropriate  Communication/Sensory: update plan of care on whiteboard  Behavioral: administer medication as ordered  Mobility: utilize bed/chair fall alarm and ensure bed is locked and in lowest position

## 2017-10-14 NOTE — PROGRESS NOTES
Internal Medicine Interval Note  Note Author: Christie Snell M.D.     Name Froylan Spain     1960   Age/Sex 57 y.o. male   MRN [id number]   Code Status FULL     After 5PM or if no immediate response to page, please call for cross-coverage  Attending/Team: Dr. Villalta See Patient List for primary contact information  Call [phone] to page    1st Call - Day Intern (R1):   Dr. Chritsie Snell 2nd Call - Day Sr. Resident (R2/R3):   Dr. Tayler Bianchi   56yo homeless male without significant PMH admitted on 17 for severe hyponatremia to 108 and AMS.  Started on salt tabs, fluid restriction and Na correction per guidelines.   Concern for SIADH.  Smoker with lung masses (5.9 cm rightparatracheal mass and 2 cm RUL) found on CT Chest.  IR biopsy (10/05/17) c/w organizing PNA.  OR biopsy of paratracheal lesion (10/11).  BAL shows few RBCs and rare Gram+/Gram- cocci.  Biopsy pathology inconclusive    Reason for interval visit  (Principal Problem)   Hyponatremia    Interval Problem Daily Status Update  (24 hours)   No acute overnight events.  Hemodynamically stable and tolerating diet.    Otherwise feeling well.  Na is 132.  AMS is resolved.  pending.  Nephrology and Pulmonology following.  No changes in recommendations.  PT/OT on board.  Paratracheal mass biopsy pathology was inconclusive.  Patient is eager to know results and is willing to undergo another biopsy.  Spoke to Dr. Antonio (Heme/Onc) who requested an MRI head and will see the patient tomorrow.      Review of Systems   Constitutional: Negative for chills and fever.   Eyes: Negative.    Respiratory: Negative for cough and shortness of breath.    Cardiovascular: Negative for chest pain and palpitations.   Gastrointestinal: Negative for abdominal pain, diarrhea, nausea and vomiting.   Genitourinary: Negative.    Neurological: Positive for headaches (7/10).     Consultants/Specialty  Pulmonary    Nephrology     Disposition  Inpatient    Quality Measures    Reviewed items::  Labs reviewed and Medications reviewed  Garvin catheter::  No Garvin  DVT prophylaxis pharmacological::  Heparin  Ulcer Prophylaxis::  Not indicated        Physical Exam       Vitals:    10/13/17 0750 10/13/17 1600 10/13/17 2015 10/14/17 0450   BP: 120/69 123/73 101/50 120/76   Pulse: 74 84 86 65   Resp: 17 16 17 19   Temp: 36.7 °C (98.1 °F) 36.7 °C (98.1 °F) 37.6 °C (99.6 °F) 37.1 °C (98.8 °F)   SpO2: 99% 99% 95% 97%   Weight:    57.3 kg (126 lb 5.2 oz)   Height:         Body mass index is 18.13 kg/m². Weight: 57.3 kg (126 lb 5.2 oz)  Oxygen Therapy:  Pulse Oximetry: 97 %, O2 (LPM): 0, O2 Delivery: None (Room Air)    Physical Exam   Constitutional: He is oriented to person, place, and time. No distress.   Appears older than stated age; disheveled   HENT:   Head: Normocephalic and atraumatic.   Cardiovascular: Normal rate, regular rhythm, normal heart sounds and intact distal pulses.  Exam reveals no gallop and no friction rub.    No murmur heard.  Pulmonary/Chest: Effort normal and breath sounds normal. No respiratory distress. He has no wheezes. He has no rales. He exhibits no tenderness.   Abdominal: Soft. Bowel sounds are normal. He exhibits no distension and no mass. There is no tenderness. There is no rebound and no guarding.   Musculoskeletal: He exhibits no edema or tenderness.   Neurological: He is alert and oriented to person, place, and time. No cranial nerve deficit. GCS score is 15.   Skin: Skin is dry. No rash noted. He is not diaphoretic. No pallor.   Cracked, with areas of peeling skin; bronzed vs. sunburnt   Psychiatric: Mood and affect normal.       Lab Data Review:         10/8/2017  7:32 AM    Recent Labs      10/12/17   0207  10/13/17   0240  10/14/17   0019   SODIUM  131*  130*  132*   POTASSIUM  4.3  4.0  4.0   CHLORIDE  99  98  101   CO2  24  25  24   BUN  11  11  13   CREATININE  0.41*  0.37*  0.44*   CALCIUM  8.8   9.4  9.3       Recent Labs      10/12/17   0207  10/13/17   0240  10/14/17   0019   GLUCOSE  92  93  92       No results for input(s): RBC, HEMOGLOBIN, HEMATOCRIT, PLATELETCT, PROTHROMBTM, APTT, INR, IRON, FERRITIN, TOTIRONBC in the last 72 hours.        Assessment/Plan     Hyponatremia  - Na of 107 and AMS on admission   - likely 2/2 SIADH  - continue salt tabs 3g TID  - decrease fluid restriction to 1500cc/day  - Qshift neuro checks  - Na 128-132, remaining stable; CTM  - AMS resolved, no focal neurological deficits  - PT/OT: PT 1-2 more sessions prior to d/c; OT 3 times per week      Lung mass  - Hx of smoking >20 years, 10 pack-year.   - Cough, recent weight loss and low appetite.  - CT chest: 3.6x5.9 cm paratracheal mass compressing on SVC and 2x1.8 cm R upper lobe mass (9/30)  - Highly suspicious for malignant etiology  - s/p IR biopsy of RUL c/w with PNA  - BAL (10/11): few RBCs and rare Gram+/Gram- cocci  - s/p OR biopsy of paratracheal mass (10/11), pathology inconclusive  - contacted Dr. Antonio who requested an MRI of the brain and will see the patient tomorrow    Chronic alcohol use  - Consumes 1 beer daily   - BAL: 0.01 on admission   - PO thiamine, B12 and folic acid supplements   - No signs of EtOH withdrawal; CTM    Fall  - GLF with no reported syncope.  - CT head on admission from other facility negative for bleeding or intracranial pathology  - No evidence of neurological deficits   - CTM    Disorder of electrolytes  - monitor electrolytes  - replete as necessary    Bacteremia  - blood cultures on admission positive for Strep pneumo  - Repeat blood cultures negative 10/1, 10/2, 10/3  - afebrile  - PO Augmentin BID (until 10/14)

## 2017-10-14 NOTE — PROGRESS NOTES
Spoke with pathology, stated wanted to speak with [facility] team. I gave her pager number listed in notes.

## 2017-10-14 NOTE — CARE PLAN
Problem: Safety  Goal: Will remain free from falls  Outcome: PROGRESSING AS EXPECTED  HD assessment complete.  Interventions in place.  No falls this admit.    Problem: Venous Thromboembolism (VTW)/Deep Vein Thrombosis (DVT) Prevention:  Goal: Patient will participate in Venous Thrombosis (VTE)/Deep Vein Thrombosis (DVT)Prevention Measures  Outcome: PROGRESSING AS EXPECTED      Problem: Knowledge Deficit  Goal: Knowledge of disease process/condition, treatment plan, diagnostic tests, and medications will improve  Outcome: PROGRESSING AS EXPECTED  MD discussed with pt that biopsy may need to be redone for pathology.  Pt verbalized understanding and a desire to redo biopsy.  Pulmonology continuing to follow.

## 2017-10-14 NOTE — PROGRESS NOTES
Rec'd report from day shift RN. Assumed pt care. Assessment completed. AA&OX4. Denies pain at this time. No s/s of discomfort or distress. Educated pt on need to self turn, pt does turn when asleep. Generalized abrasions noted, especially to BLE. Bed in lowest position, bed locked, bed alarm on for safety, treaded sock in place, RN and CNA numbers provided, call light within reach.

## 2017-10-15 LAB
ANION GAP SERPL CALC-SCNC: 8 MMOL/L (ref 0–11.9)
BUN SERPL-MCNC: 10 MG/DL (ref 8–22)
CALCIUM SERPL-MCNC: 9.3 MG/DL (ref 8.5–10.5)
CHLORIDE SERPL-SCNC: 103 MMOL/L (ref 96–112)
CO2 SERPL-SCNC: 25 MMOL/L (ref 20–33)
CREAT SERPL-MCNC: 0.39 MG/DL (ref 0.5–1.4)
GFR SERPL CREATININE-BSD FRML MDRD: >60 ML/MIN/1.73 M 2
GLUCOSE SERPL-MCNC: 86 MG/DL (ref 65–99)
POTASSIUM SERPL-SCNC: 3.8 MMOL/L (ref 3.6–5.5)
SODIUM SERPL-SCNC: 136 MMOL/L (ref 135–145)

## 2017-10-15 PROCEDURE — A9270 NON-COVERED ITEM OR SERVICE: HCPCS | Performed by: INTERNAL MEDICINE

## 2017-10-15 PROCEDURE — 700102 HCHG RX REV CODE 250 W/ 637 OVERRIDE(OP): Performed by: INTERNAL MEDICINE

## 2017-10-15 PROCEDURE — 83735 ASSAY OF MAGNESIUM: CPT

## 2017-10-15 PROCEDURE — 99232 SBSQ HOSP IP/OBS MODERATE 35: CPT | Mod: GC | Performed by: INTERNAL MEDICINE

## 2017-10-15 PROCEDURE — 36415 COLL VENOUS BLD VENIPUNCTURE: CPT

## 2017-10-15 PROCEDURE — 85027 COMPLETE CBC AUTOMATED: CPT

## 2017-10-15 PROCEDURE — 770006 HCHG ROOM/CARE - MED/SURG/GYN SEMI*

## 2017-10-15 PROCEDURE — 85007 BL SMEAR W/DIFF WBC COUNT: CPT

## 2017-10-15 PROCEDURE — 700111 HCHG RX REV CODE 636 W/ 250 OVERRIDE (IP): Performed by: STUDENT IN AN ORGANIZED HEALTH CARE EDUCATION/TRAINING PROGRAM

## 2017-10-15 PROCEDURE — 700102 HCHG RX REV CODE 250 W/ 637 OVERRIDE(OP): Performed by: STUDENT IN AN ORGANIZED HEALTH CARE EDUCATION/TRAINING PROGRAM

## 2017-10-15 PROCEDURE — A9270 NON-COVERED ITEM OR SERVICE: HCPCS | Performed by: STUDENT IN AN ORGANIZED HEALTH CARE EDUCATION/TRAINING PROGRAM

## 2017-10-15 PROCEDURE — 80048 BASIC METABOLIC PNL TOTAL CA: CPT | Mod: 91

## 2017-10-15 RX ADMIN — MIDODRINE HYDROCHLORIDE 5 MG: 5 TABLET ORAL at 17:43

## 2017-10-15 RX ADMIN — SODIUM CHLORIDE TAB 1 GM 3 G: 1 TAB at 06:35

## 2017-10-15 RX ADMIN — SODIUM CHLORIDE TAB 1 GM 3 G: 1 TAB at 17:43

## 2017-10-15 RX ADMIN — NICOTINE 21 MG: 21 PATCH, EXTENDED RELEASE TRANSDERMAL at 06:36

## 2017-10-15 RX ADMIN — VITAMIN D, TAB 1000IU (100/BT) 5000 UNITS: 25 TAB at 09:06

## 2017-10-15 RX ADMIN — HEPARIN SODIUM 5000 UNITS: 5000 INJECTION, SOLUTION INTRAVENOUS; SUBCUTANEOUS at 06:35

## 2017-10-15 RX ADMIN — FOLIC ACID 1 MG: 1 TABLET ORAL at 09:07

## 2017-10-15 RX ADMIN — HEPARIN SODIUM 5000 UNITS: 5000 INJECTION, SOLUTION INTRAVENOUS; SUBCUTANEOUS at 21:28

## 2017-10-15 RX ADMIN — MIDODRINE HYDROCHLORIDE 5 MG: 5 TABLET ORAL at 11:33

## 2017-10-15 RX ADMIN — SODIUM CHLORIDE TAB 1 GM 3 G: 1 TAB at 12:41

## 2017-10-15 RX ADMIN — MIDODRINE HYDROCHLORIDE 5 MG: 5 TABLET ORAL at 06:35

## 2017-10-15 RX ADMIN — THERA TABS 1 TABLET: TAB at 09:08

## 2017-10-15 RX ADMIN — HEPARIN SODIUM 5000 UNITS: 5000 INJECTION, SOLUTION INTRAVENOUS; SUBCUTANEOUS at 14:24

## 2017-10-15 RX ADMIN — THIAMINE HCL TAB 100 MG 100 MG: 100 TAB at 09:08

## 2017-10-15 ASSESSMENT — ENCOUNTER SYMPTOMS
NAUSEA: 0
WEIGHT LOSS: 0
EYES NEGATIVE: 1
DIARRHEA: 0
HEADACHES: 1
CHILLS: 0
FEVER: 0
COUGH: 0
ABDOMINAL PAIN: 0
PALPITATIONS: 0
VOMITING: 0
SHORTNESS OF BREATH: 0

## 2017-10-15 ASSESSMENT — PAIN SCALES - GENERAL
PAINLEVEL_OUTOF10: 0
PAINLEVEL_OUTOF10: 0

## 2017-10-15 NOTE — PROGRESS NOTES
Internal Medicine Interval Note  Note Author: Tayler Bianchi M.D.     Name Froylan Spain     1960   Age/Sex 57 y.o. male   MRN [id number]   Code Status FULL     After 5PM or if no immediate response to page, please call for cross-coverage  Attending/Team: Dr. Villalta See Patient List for primary contact information  Call [phone] to page    1st Call - Day Intern (R1):   Dr. Christie Snell 2nd Call - Day Sr. Resident (R2/R3):   Dr. Tayler Bianchi   56yo homeless male without significant PMH admitted on 17 for severe hyponatremia to 108 and AMS.  Started on salt tabs, fluid restriction and Na correction per guidelines.   Concern for SIADH.  Smoker with lung masses (5.9 cm rightparatracheal mass and 2 cm RUL) found on CT Chest.  IR biopsy (10/05/17) c/w organizing PNA.  OR biopsy of paratracheal lesion (10/11).  BAL shows few RBCs and rare Gram+/Gram- cocci.  Biopsy pathology inconclusive    Reason for interval visit  (Principal Problem)   Hyponatremia    Interval Problem Daily Status Update  (24 hours)   No acute overnight events.  Hemodynamically stable and tolerating diet.    Otherwise feeling well.  Na is 135.  AMS is resolved.    -Paratracheal mass biopsy pathology was inconclusive.    -Dr. Antonio (Heme/Onc) requested an MRI head results pending  -Per pulmonary consulted surgery today -  they will be seeing him for possible mediastinoscopy with biopsy    Review of Systems   Constitutional: Negative for chills and fever.   Eyes: Negative.    Respiratory: Negative for cough and shortness of breath.    Cardiovascular: Negative for chest pain and palpitations.   Gastrointestinal: Negative for abdominal pain, diarrhea, nausea and vomiting.   Genitourinary: Negative.    Neurological: Positive for headaches (7/10).     Consultants/Specialty  Pulmonary   Nephrology     Disposition  Inpatient    Quality Measures    Reviewed items::  Labs reviewed and Medications reviewed  Bharathi  catheter::  No Garvin  DVT prophylaxis pharmacological::  Heparin  Ulcer Prophylaxis::  Not indicated        Physical Exam       Vitals:    10/14/17 1600 10/14/17 1913 10/15/17 0352 10/15/17 0905   BP: 118/78 124/78 110/61 134/67   Pulse: 76 80 72 78   Resp: 18 16 17 18   Temp: 36.4 °C (97.6 °F) 37.4 °C (99.3 °F) 37.6 °C (99.6 °F) 36.7 °C (98 °F)   SpO2: 95% 100% 98% 100%   Weight:       Height:         Body mass index is 18.13 kg/m².    Oxygen Therapy:  Pulse Oximetry: 100 %, O2 (LPM): 0, O2 Delivery: None (Room Air)    Physical Exam   Constitutional: He is oriented to person, place, and time. No distress.   Appears older than stated age; disheveled   HENT:   Head: Normocephalic and atraumatic.   Cardiovascular: Normal rate, regular rhythm, normal heart sounds and intact distal pulses.  Exam reveals no gallop and no friction rub.    No murmur heard.  Pulmonary/Chest: Effort normal and breath sounds normal. No respiratory distress. He has no wheezes. He has no rales. He exhibits no tenderness.   Abdominal: Soft. Bowel sounds are normal. He exhibits no distension and no mass. There is no tenderness. There is no rebound and no guarding.   Musculoskeletal: He exhibits no edema or tenderness.   Neurological: He is alert and oriented to person, place, and time. No cranial nerve deficit. GCS score is 15.   Skin: Skin is dry. No rash noted. He is not diaphoretic. No pallor.   Cracked, with areas of peeling skin; bronzed vs. sunburnt   Psychiatric: Mood and affect normal.       Lab Data Review:         10/8/2017  7:32 AM    Recent Labs      10/13/17   0240  10/14/17   0019  10/15/17   0301   SODIUM  130*  132*  136   POTASSIUM  4.0  4.0  3.8   CHLORIDE  98  101  103   CO2  25  24  25   BUN  11  13  10   CREATININE  0.37*  0.44*  0.39*   CALCIUM  9.4  9.3  9.3       Recent Labs      10/13/17   0240  10/14/17   0019  10/15/17   0301   GLUCOSE  93  92  86       No results for input(s): RBC, HEMOGLOBIN, HEMATOCRIT, PLATELETCT,  PROTHROMBTM, APTT, INR, IRON, FERRITIN, TOTIRONBC in the last 72 hours.        Assessment/Plan     Hyponatremia  - Na of 107 and AMS on admission   - likely 2/2 SIADH  - continue salt tabs 3g TID  - cont fluid restriction 1500cc/day  - Qshift neuro checks  - Na 135, remaining stable; CTM  - AMS resolved, no focal neurological deficits  - PT/OT: PT 1-2 more sessions prior to d/c; OT 3 times per week      Lung mass  - Hx of smoking >20 years, 10 pack-year.   - Cough, recent weight loss and low appetite.  - CT chest: 3.6x5.9 cm paratracheal mass compressing on SVC and 2x1.8 cm R upper lobe mass (9/30)  - Highly suspicious for malignant etiology  - s/p IR biopsy of RUL c/w with PNA  - BAL (10/11): few RBCs and rare Gram+/Gram- cocci  - s/p OR biopsy of paratracheal mass (10/11), pathology inconclusive  - 10/15 consulted surgery Dr. Ganser for mediastinoscopy  - Dr. Antonio requested MRI of the brain - results pending - f/u on recs    Chronic alcohol use  - Consumes 1 beer daily   - BAL: 0.01 on admission   - PO thiamine, B12 and folic acid supplements   - No signs of EtOH withdrawal; CTM    Fall  - GLF with no reported syncope.  - CT head on admission from other facility negative for bleeding or intracranial pathology  - No evidence of neurological deficits   - CTM    Disorder of electrolytes  - monitor electrolytes  - replete as necessary    Bacteremia  - blood cultures on admission positive for Strep pneumo  - Repeat blood cultures negative 10/1, 10/2, 10/3  - afebrile  - PO Augmentin BID (until 10/14)

## 2017-10-15 NOTE — PROGRESS NOTES
Assumed care of pt at 0700. Received report from RN. Pt A&Ox4. Assessment complete. Labs reviewed. Pt and RN discussed plan of care. Pt questions answered. Pt needs are met at this time. Pt denies any pain. Bed in lowest and locked position. Call light within reach. Upper bed rails up. Hourly rounding in place.

## 2017-10-15 NOTE — CARE PLAN
Problem: Infection  Goal: Will remain free from infection    Intervention: Assess signs and symptoms of infection  Pt assessed. No signs of infections at this time.       Problem: Knowledge Deficit  Goal: Knowledge of the prescribed therapeutic regimen will improve  Pt meds discussed. Pt verbalized understanding as taking medications as prescribed.

## 2017-10-15 NOTE — PROGRESS NOTES
Nataly Tamayo Fall Risk Assessment:     Last Known Fall: Within the last month  Mobility: Immobilized/requires assist of one person  Medications: Cardiovascular or central nervous system meds  Mental Status/LOC/Awareness: Awake, alert, and oriented to date, place, and person  Toileting Needs: Use of assistive device (Bedside commode, bedpan, urinal)  Volume/Electrolyte Status: Low blood sugar/electrolyte imbalances  Communication/Sensory: Visual (Glasses)/hearing deficit  Behavior: Appropriate behavior  Nataly Tamayo Fall Risk Total: 15  Fall Risk Level: HIGH RISK     Universal Fall Precautions:  call light/belongings in reach, bed in low position and locked, wheelchairs and assistive devices out of sight, siderails up x 2, use non-slip footwear, adequate lighting, clutter free and spill free environment, educate on level of risk, educate to call for assistance     Fall Risk Level Interventions:   TRIAL (TELE 8, NEURO, MED BEN 5) Low Fall Risk Interventions  Place yellow fall risk ID band on patient: verified  Provide patient/family education based on risk assessment: completed  Educate patient/family to call staff for assistance when getting out of bed: completed  Place fall precaution signage outside patient door: verifiedTRIAL (TELE 8, NEURO, MED BEN 5) Moderate Fall Risk Interventions  Place yellow fall risk ID band on patient: verified  Provide patient/family education based on risk assessment : verified  Educate patient/family to call staff for assistance when getting out of bed: verified  Place fall precaution signage outside patient door: verified  Utilize bed/chair fall alarm: verifiedTRIAL (TELE 8, NEURO, "[employer] BEN 5) High Fall Risk Interventions  Place yellow fall risk ID band on patient: verified  Provide patient/family education based on risk assessment: completed  Educate patient/family to call staff for assistance when getting out of bed: completed  Place fall precaution signage outside patient  door: completed  Place patient in room close to nursing station: currently not available/charge notified  Utilize bed/chair fall alarm: verified  Notify charge of high risk for huddle: completed     Patient Specific Interventions:      Medication: assess for medications that can be discontinued or dosage decreased and limit combination of prn medications  Mental Status/LOC/Awareness: reinforce falls education, check on patient hourly, utilize bed/chair fall alarm, reinforce the use of call light and provide activity  Toileting: not applicable  Volume/Electrolyte Status: ensure patient remains hydrated, monitor abnormal lab values and ensure IV fluids are appropriate  Communication/Sensory: update plan of care on whiteboard  Behavioral: engage patient in daily activities, administer medication as ordered and instruct/reinforce fall program rationale  Mobility: schedule physical activity throughout the day, utilize bed/chair fall alarm, ensure bed is locked and in lowest position, provide appropriate assistive device, instruct patient to exit bed on their strongest side and collaborate with doctor for possible PT/OT consult

## 2017-10-15 NOTE — PROGRESS NOTES
Nataly Tamayo Fall Risk Assessment:     Last Known Fall: Within the last month  Mobility: No limitations  Medications: Cardiovascular or central nervous system meds  Mental Status/LOC/Awareness: Awake, alert, and oriented to date, place, and person  Toileting Needs: Use of assistive device (Bedside commode, bedpan, urinal)  Volume/Electrolyte Status: Low blood sugar/electrolyte imbalances  Communication/Sensory: Visual (Glasses)/hearing deficit  Behavior: Appropriate behavior  Nataly Tamayo Fall Risk Total: 13  Fall Risk Level: MODERATE RISK    Universal Fall Precautions:  call light/belongings in reach, bed in low position and locked, wheelchairs and assistive devices out of sight, siderails up x 2, use non-slip footwear, adequate lighting, clutter free and spill free environment, educate on level of risk, educate to call for assistance    Fall Risk Level Interventions:   TRIAL (TELE 8, NEURO, MED BEN 5) Low Fall Risk Interventions  Place yellow fall risk ID band on patient: verified  Provide patient/family education based on risk assessment: completed  Educate patient/family to call staff for assistance when getting out of bed: completed  Place fall precaution signage outside patient door: verifiedTRIAL ([employer], NEURO, MED BEN 5) Moderate Fall Risk Interventions  Place yellow fall risk ID band on patient: verified  Provide patient/family education based on risk assessment : verified  Educate patient/family to call staff for assistance when getting out of bed: verified  Place fall precaution signage outside patient door: verified  Utilize bed/chair fall alarm: verifiedTRIAL ([employer] 8, NEURO, [employer] BEN 5) High Fall Risk Interventions  Place yellow fall risk ID band on patient: verified  Provide patient/family education based on risk assessment: completed  Educate patient/family to call staff for assistance when getting out of bed: completed  Place fall precaution signage outside patient door: completed  Place  patient in room close to nursing station: currently not available/charge notified  Utilize bed/chair fall alarm: verified  Notify charge of high risk for huddle: completed    Patient Specific Interventions:     Medication: assess for medications that can be discontinued or dosage decreased, limit combination of prn medications and provide patients who received diuretics/laxatives frequent toileting  Mental Status/LOC/Awareness: reorient patient, reinforce falls education, check on patient hourly, reinforce the use of call light and provide activity  Toileting: provide frquent toileting, monitor intake and output/use of appropriate interventions, instruct male patients prone to dizziness to void while sitting and instruct patient/family on the need to call for assistance when toileting  Volume/Electrolyte Status: ensure patient remains hydrated, monitor blood sugars and maintain appropriate blood sugar levels if diabetic, advance diet as tolerated, administer medications as ordered for nausea and vomiting and monitor abnormal lab values  Communication/Sensory: update plan of care on whiteboard, ensure proper positioning when transferrng/ambulating, ensure patient has glasses/contacts and hearing aids/dentures and for visually impaired patients orient to their room surrounding and do not change their surroundings  Behavioral: encourage patient to voice feelings, administer medication as ordered and instruct/reinforce fall program rationale  Mobility: schedule physical activity throughout the day, dangle prior to standing, utilize bed/chair fall alarm, ensure bed is locked and in lowest position, provide appropriate assistive device, instruct patient to exit bed on their strongest side and collaborate with doctor for possible PT/OT consult

## 2017-10-15 NOTE — PROGRESS NOTES
Hospital Medicine Progress Note, Adult, Complex               Author: Juancho Gironjjar Date & Time created: 10/15/2017  3:19 PM     Interval History:  56 y/o male with paratracheal and RUL masses with severe hyponatremia  C/o fatigue, weight loss, poor po intake  No complaints      Review of Systems:  Review of Systems   Constitutional: Negative for malaise/fatigue and weight loss.   Respiratory: Negative for cough and shortness of breath.    Cardiovascular: Negative for chest pain and leg swelling.   Gastrointestinal: Negative for nausea and vomiting.   Genitourinary: Negative for dysuria and urgency.       Physical Exam:  Physical Exam   Constitutional: He is oriented to person, place, and time. He appears cachectic. No distress.   HENT:   Nose: Nose normal.   Mouth/Throat: Oropharynx is clear and moist. No oropharyngeal exudate.   Eyes: No scleral icterus.   Cardiovascular: Normal rate and regular rhythm.    Pulmonary/Chest: Effort normal and breath sounds normal. No respiratory distress. He has no wheezes.   Musculoskeletal: Normal range of motion. He exhibits no edema.   Neurological: He is alert and oriented to person, place, and time.   Nursing note and vitals reviewed.      Labs:        Invalid input(s): [id number]      Recent Labs      10/13/17   0240  10/14/17   0019  10/15/17   0301   SODIUM  130*  132*  136   POTASSIUM  4.0  4.0  3.8   CHLORIDE  98  101  103   CO2  25  24  25   BUN  11  13  10   CREATININE  0.37*  0.44*  0.39*   CALCIUM  9.4  9.3  9.3     Recent Labs      10/13/17   0240  10/14/17   0019  10/15/17   0301   GLUCOSE  93  92  86     No results for input(s): RBC, HEMOGLOBIN, HEMATOCRIT, PLATELETCT, PROTHROMBTM, APTT, INR, IRON, FERRITIN, TOTIRONBC in the last 72 hours.            Hemodynamics:  Temp (24hrs), Av °C (98.6 °F), Min:36.4 °C (97.6 °F), Max:37.6 °C (99.6 °F)  Temperature: 36.7 °C (98 °F)  Pulse  Av.6  Min: 65  Max: 99   Blood Pressure: 134/67     Respiratory:     Respiration: 18, Pulse Oximetry: 100 %        RUL Breath Sounds: Clear, RML Breath Sounds: Diminished, RLL Breath Sounds: Diminished, LEONARDO Breath Sounds: Clear, LLL Breath Sounds: Diminished  Fluids:    Intake/Output Summary (Last 24 hours) at 10/15/17 1519  Last data filed at 10/15/17 1319   Gross per 24 hour   Intake             2708 ml   Output                0 ml   Net             2708 ml        GI/Nutrition:  Orders Placed This Encounter   Procedures   • DIET ORDER     Standing Status:   Standing     Number of Occurrences:   1     Order Specific Question:   Diet:     Answer:   Regular [1]     Order Specific Question:   Consistency/Fluid modifications:     Answer:   1500 ml Fluid Restriction [9]     Medical Decision Making, by Problem:  Active Hospital Problems    Diagnosis   • *Hyponatremia [E87.1]   • Lung mass [R91.8]   • Disorder of electrolytes [E87.8]   • Fall [W19.XXXA]   • Chronic alcohol use [F10.10]   • Bacteremia [R78.81]         Reviewed items::  Labs reviewed    Assessment and Plan  1.CKD I :stable  2.HTN: OK  3.Electrolytes: hyponatremia improved  4.Anemia: Hb stable  5.Volume: euvolemic  Recs:   continue free water restriction,daily labs  Renal dose all meds  Avoid nephrotoxins  We will sign off the case, please call if needed

## 2017-10-15 NOTE — PROGRESS NOTES
"Assumed care of patient at 0700 . Received report from RN. Patient is AOX4 . Assessment complete. Labs reviewed.Patient and RN discussed plan of care. Patient questions answered. Patient needs are met at this time. Bed in lowest and locked position. Upper bed rails up.  Call light is within reach. Hourly rounding in place.  /61   Pulse 72   Temp 37.6 °C (99.6 °F)   Resp 17   Ht 1.778 m (5' 10\")   Wt 57.3 kg (126 lb 5.2 oz)   SpO2 98%   BMI 18.13 kg/m²     "

## 2017-10-16 PROBLEM — R51.9 MORNING HEADACHE: Status: ACTIVE | Noted: 2017-10-16

## 2017-10-16 LAB
ANION GAP SERPL CALC-SCNC: 7 MMOL/L (ref 0–11.9)
BASOPHILS # BLD AUTO: 4.3 % (ref 0–1.8)
BASOPHILS # BLD: 0.25 K/UL (ref 0–0.12)
BUN SERPL-MCNC: 9 MG/DL (ref 8–22)
BURR CELLS BLD QL SMEAR: NORMAL
CALCIUM SERPL-MCNC: 9.1 MG/DL (ref 8.5–10.5)
CHLORIDE SERPL-SCNC: 99 MMOL/L (ref 96–112)
CO2 SERPL-SCNC: 23 MMOL/L (ref 20–33)
CREAT SERPL-MCNC: 0.38 MG/DL (ref 0.5–1.4)
EOSINOPHIL # BLD AUTO: 0.81 K/UL (ref 0–0.51)
EOSINOPHIL NFR BLD: 13.7 % (ref 0–6.9)
ERYTHROCYTE [DISTWIDTH] IN BLOOD BY AUTOMATED COUNT: 40.8 FL (ref 35.9–50)
GFR SERPL CREATININE-BSD FRML MDRD: >60 ML/MIN/1.73 M 2
GLUCOSE SERPL-MCNC: 114 MG/DL (ref 65–99)
HCT VFR BLD AUTO: 31.9 % (ref 42–52)
HGB BLD-MCNC: 11.1 G/DL (ref 14–18)
LYMPHOCYTES # BLD AUTO: 2.12 K/UL (ref 1–4.8)
LYMPHOCYTES NFR BLD: 35.9 % (ref 22–41)
MAGNESIUM SERPL-MCNC: 1.6 MG/DL (ref 1.5–2.5)
MANUAL DIFF BLD: NORMAL
MCH RBC QN AUTO: 33.3 PG (ref 27–33)
MCHC RBC AUTO-ENTMCNC: 34.8 G/DL (ref 33.7–35.3)
MCV RBC AUTO: 95.8 FL (ref 81.4–97.8)
MONOCYTES # BLD AUTO: 0.6 K/UL (ref 0–0.85)
MONOCYTES NFR BLD AUTO: 10.2 % (ref 0–13.4)
MORPHOLOGY BLD-IMP: NORMAL
NEUTROPHILS # BLD AUTO: 2.12 K/UL (ref 1.82–7.42)
NEUTROPHILS NFR BLD: 35.9 % (ref 44–72)
NRBC # BLD AUTO: 0 K/UL
NRBC BLD AUTO-RTO: 0 /100 WBC
PLATELET # BLD AUTO: 559 K/UL (ref 164–446)
PLATELET BLD QL SMEAR: NORMAL
PMV BLD AUTO: 8.8 FL (ref 9–12.9)
POIKILOCYTOSIS BLD QL SMEAR: NORMAL
POTASSIUM SERPL-SCNC: 3.5 MMOL/L (ref 3.6–5.5)
RBC # BLD AUTO: 3.33 M/UL (ref 4.7–6.1)
RBC BLD AUTO: PRESENT
SODIUM SERPL-SCNC: 129 MMOL/L (ref 135–145)
WBC # BLD AUTO: 5.9 K/UL (ref 4.8–10.8)

## 2017-10-16 PROCEDURE — 700102 HCHG RX REV CODE 250 W/ 637 OVERRIDE(OP): Performed by: STUDENT IN AN ORGANIZED HEALTH CARE EDUCATION/TRAINING PROGRAM

## 2017-10-16 PROCEDURE — 770006 HCHG ROOM/CARE - MED/SURG/GYN SEMI*

## 2017-10-16 PROCEDURE — 700102 HCHG RX REV CODE 250 W/ 637 OVERRIDE(OP): Performed by: INTERNAL MEDICINE

## 2017-10-16 PROCEDURE — 700111 HCHG RX REV CODE 636 W/ 250 OVERRIDE (IP): Performed by: STUDENT IN AN ORGANIZED HEALTH CARE EDUCATION/TRAINING PROGRAM

## 2017-10-16 PROCEDURE — A9270 NON-COVERED ITEM OR SERVICE: HCPCS | Performed by: STUDENT IN AN ORGANIZED HEALTH CARE EDUCATION/TRAINING PROGRAM

## 2017-10-16 PROCEDURE — A9270 NON-COVERED ITEM OR SERVICE: HCPCS | Performed by: INTERNAL MEDICINE

## 2017-10-16 PROCEDURE — 99232 SBSQ HOSP IP/OBS MODERATE 35: CPT | Mod: GC | Performed by: HOSPITALIST

## 2017-10-16 RX ORDER — POTASSIUM CHLORIDE 20 MEQ/1
40 TABLET, EXTENDED RELEASE ORAL ONCE
Status: COMPLETED | OUTPATIENT
Start: 2017-10-16 | End: 2017-10-16

## 2017-10-16 RX ADMIN — MIDODRINE HYDROCHLORIDE 5 MG: 5 TABLET ORAL at 17:06

## 2017-10-16 RX ADMIN — THIAMINE HCL TAB 100 MG 100 MG: 100 TAB at 08:21

## 2017-10-16 RX ADMIN — POTASSIUM CHLORIDE 40 MEQ: 1500 TABLET, EXTENDED RELEASE ORAL at 08:21

## 2017-10-16 RX ADMIN — MIDODRINE HYDROCHLORIDE 5 MG: 5 TABLET ORAL at 11:35

## 2017-10-16 RX ADMIN — SODIUM CHLORIDE TAB 1 GM 3 G: 1 TAB at 08:22

## 2017-10-16 RX ADMIN — MIDODRINE HYDROCHLORIDE 5 MG: 5 TABLET ORAL at 08:22

## 2017-10-16 RX ADMIN — FOLIC ACID 1 MG: 1 TABLET ORAL at 08:22

## 2017-10-16 RX ADMIN — SODIUM CHLORIDE TAB 1 GM 3 G: 1 TAB at 17:06

## 2017-10-16 RX ADMIN — NICOTINE 21 MG: 21 PATCH, EXTENDED RELEASE TRANSDERMAL at 05:40

## 2017-10-16 RX ADMIN — HEPARIN SODIUM 5000 UNITS: 5000 INJECTION, SOLUTION INTRAVENOUS; SUBCUTANEOUS at 05:41

## 2017-10-16 RX ADMIN — SODIUM CHLORIDE TAB 1 GM 3 G: 1 TAB at 11:34

## 2017-10-16 RX ADMIN — THERA TABS 1 TABLET: TAB at 08:21

## 2017-10-16 RX ADMIN — VITAMIN D, TAB 1000IU (100/BT) 5000 UNITS: 25 TAB at 08:19

## 2017-10-16 ASSESSMENT — ENCOUNTER SYMPTOMS
WEIGHT LOSS: 1
FEVER: 0
CHILLS: 0
PALPITATIONS: 0
HEADACHES: 1
WEAKNESS: 0
HEMOPTYSIS: 0
WHEEZING: 0
HEARTBURN: 0
MYALGIAS: 0
COUGH: 0
BLURRED VISION: 0
NAUSEA: 0
CONSTIPATION: 0
VOMITING: 0
SHORTNESS OF BREATH: 0
SPUTUM PRODUCTION: 0
DIARRHEA: 0
DIZZINESS: 0
ABDOMINAL PAIN: 0
FOCAL WEAKNESS: 0
DOUBLE VISION: 0
HEADACHES: 0
EYES NEGATIVE: 1

## 2017-10-16 ASSESSMENT — PAIN SCALES - GENERAL
PAINLEVEL_OUTOF10: 0
PAINLEVEL_OUTOF10: 0
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
## 2017-10-16 NOTE — PROGRESS NOTES
Pulmonary Medicine Interval Note    Name Froylan Spain     1960   Age/Sex 57 y.o. male   MRN [id number]   Code Status Full       Attending/Team: Dr. Madrigal / Pulmonary         Reason for interval visit  (Principal Problem)   Hyponatremia   Lung/Mediastinal masses    ID: Patient is a 57-year-old male admitted following a ground level fall and was found to be severely hyponatremic. Further workup showed a right sided paratracheal and upper lobe lung masses concerning for malignancy in the setting of long-standing smoking history. CAT scan also showed some infiltrates and blood cultures were positive for strep pneumonia.    Interval Problem Daily Status Update  (24 hours)   Patient reported feeling fine. Denied any shortness of breath or chest pain. Sodium 129 this morning. On sodium tabs. Pathology results from transbronchial biopsy is non-conclusive. Seen by surgery, plan on Mediastinoscopy.     Review of Systems   Constitutional: Positive for weight loss. Negative for chills, fever and malaise/fatigue.   HENT: Negative for congestion.    Eyes: Negative for blurred vision and double vision.   Respiratory: Negative for cough, hemoptysis, sputum production, shortness of breath and wheezing.    Cardiovascular: Negative for chest pain, palpitations and leg swelling.   Gastrointestinal: Negative for constipation, heartburn and nausea.   Genitourinary: Negative for dysuria and urgency.   Musculoskeletal: Negative for myalgias.   Skin: Negative for itching and rash.   Neurological: Negative for dizziness, focal weakness, weakness and headaches.         Quality Measures    Reviewed items::  EKG reviewed, Labs reviewed, Medications reviewed and Radiology images reviewed          Physical Exam       Vitals:    10/15/17 1911 10/16/17 0334 10/16/17 0730 10/16/17 1609   BP: 122/75 112/66 127/80 128/71   Pulse: 92 85 78 64   Resp:    Temp: 36.6 °C (97.8 °F) 36.7 °C (98 °F) 36.3 °C (97.4 °F) 36.6 °C (97.9  °F)   SpO2: 99% 94% 100% 97%   Weight:       Height:         Body mass index is 18.13 kg/m².    Oxygen Therapy:  Pulse Oximetry: 97 %, O2 (LPM): 0, O2 Delivery: None (Room Air)    Physical Exam   Constitutional: He is oriented to person, place, and time.   Cachectic   HENT:   Head: Atraumatic.   Temporal wasting   Eyes: Conjunctivae are normal. Pupils are equal, round, and reactive to light.   Neck: Neck supple.   Cardiovascular: Normal rate, regular rhythm and normal heart sounds.    No murmur heard.  Pulmonary/Chest: Effort normal. He has no wheezes.   Abdominal: Soft. He exhibits no distension. There is no tenderness.   Musculoskeletal: Normal range of motion. He exhibits no edema or deformity.   Lymphadenopathy:     He has no cervical adenopathy.   Neurological: He is alert and oriented to person, place, and time.   Skin: Skin is warm and dry.         Lab Data Review:         10/4/2017  10:44 AM    Recent Labs      10/14/17   0019  10/15/17   0301  10/15/17   2354   SODIUM  132*  136  129*   POTASSIUM  4.0  3.8  3.5*   CHLORIDE  101  103  99   CO2  24  25  23   BUN  13  10  9   CREATININE  0.44*  0.39*  0.38*   MAGNESIUM   --    --   1.6   CALCIUM  9.3  9.3  9.1       Recent Labs      10/14/17   0019  10/15/17   0301  10/15/17   2354   GLUCOSE  92  86  114*       Recent Labs      10/15/17   2354   RBC  3.33*   HEMOGLOBIN  11.1*   HEMATOCRIT  31.9*   PLATELETCT  559*       Recent Labs      10/15/17   2354   WBC  5.9   NEUTSPOLYS  35.90*   LYMPHOCYTES  35.90   MONOCYTES  10.20   EOSINOPHILS  13.70*   BASOPHILS  4.30*           Assessment/Plan   Patient is a 57-year-old male with history of homelessness and chronic tobacco use presents with a ground-level fall found to be severely hyponatremic and Imaging showed concerning right-sided lung masses. Pulmonary medicine asked to evaluate the patient regarding the lung masses. A tissue diagnosis is definitely required in this situation. IR biopsy of the right upper lobe  mass is nondiagnostic showing fibrous tissue and no evidence of malignancy.  OR bronchoscopy performed. Bedside microscopy indicated possible malignant cells. However, final path with no evidence of malignancy    Lung mass  Hyponatremia secondary to SIADH  Strep bacteremia  Community acquired pneumonia  Chronic alcohol use  Chronic tobacco use    Recommend:  - S/P bronchoscopy and transbronchial biopsy  - Pathology results without evidence of malignancy  - Oncology consult with recommendation of MRI brain that was negative for metastasis  -Thoracic surgery team following with plan on mediastinoscopy tomorrow.  -Finished a course of antibiotics  -RT and OT treatments per protocols  -Hyponatremia management per primary team.   -Pt has a brother Benjamin Spain in [place] who he would want contacted if needed and could make decisions for him if the pt becomes unable.  -We'll continue to follow patient with you.  -Thank you for allowing us to participate in this patient's care.
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
## 2017-10-16 NOTE — PROGRESS NOTES
Internal Medicine Interval Note  Note Author: Christie Snell M.D.     Name Froylan Spain     1960   Age/Sex 57 y.o. male   MRN [id number]   Code Status FULL     After 5PM or if no immediate response to page, please call for cross-coverage  Attending/Team: Dr. Ang Tolentino/Nikolai See Patient List for primary contact information  Call [phone] to page    1st Call - Day Intern (R1):   Dr. Christie Snell 2nd Call - Day Sr. Resident (R2/R3):   Dr. Tayler Bianchi   56yo homeless male without significant PMH admitted on 17 for severe hyponatremia to 108 and AMS.  Started on salt tabs, fluid restriction and Na correction per guidelines.   Concern for SIADH.  Smoker with lung masses (5.9 cm rightparatracheal mass and 2 cm RUL) found on CT Chest.  IR biopsy (10/05/17) c/w organizing PNA.  OR biopsy of paratracheal lesion (10/11).  BAL shows few RBCs and rare Gram+/Gram- cocci.  Biopsy pathology inconclusive.    Reason for interval visit  (Principal Problem)   Hyponatremia    Interval Problem Daily Status Update  (24 hours)   No acute overnight events  Complaining of daily AM headache with history of witnessed apnea (per Patient)  Na is 129.  AMS is resolved.    MRI brain unremarkable  Scheduled for Mediastinoscopy and Biopsy tomorrow    Review of Systems   Constitutional: Negative for chills and fever.   Eyes: Negative.    Respiratory: Negative for cough and shortness of breath.    Cardiovascular: Negative for chest pain and palpitations.   Gastrointestinal: Negative for abdominal pain, diarrhea, nausea and vomiting.   Genitourinary: Negative.    Neurological: Positive for headaches (7/10).     Consultants/Specialty  Pulmonary   Nephrology  Thoracic Surgery  Hematology/Oncology     Disposition  Inpatient    Quality Measures    Reviewed items::  Labs reviewed and Medications reviewed  Garvin catheter::  No Garvin  DVT: holding chemical prophylaxis for surgery in AM.  DVT prophylaxis -  mechanical:  SCDs (for surgery)  Ulcer Prophylaxis::  Not indicated        Physical Exam       Vitals:    10/15/17 0905 10/15/17 1605 10/15/17 1911 10/16/17 0334   BP: 134/67 125/77 122/75 112/66   Pulse: 78 69 92 85   Resp: 18 18 17 17   Temp: 36.7 °C (98 °F) 36.7 °C (98.1 °F) 36.6 °C (97.8 °F) 36.7 °C (98 °F)   SpO2: 100% 94% 99% 94%   Weight:       Height:         Body mass index is 18.13 kg/m².    Oxygen Therapy:  Pulse Oximetry: 94 %, O2 (LPM): 0, O2 Delivery: None (Room Air)    Physical Exam   Constitutional: He is oriented to person, place, and time. No distress.   Appears older than stated age; disheveled   HENT:   Head: Normocephalic and atraumatic.   Cardiovascular: Normal rate, regular rhythm, normal heart sounds and intact distal pulses.  Exam reveals no gallop and no friction rub.    No murmur heard.  Pulmonary/Chest: Effort normal and breath sounds normal. No respiratory distress. He has no wheezes. He has no rales. He exhibits no tenderness.   Abdominal: Soft. Bowel sounds are normal. He exhibits no distension and no mass. There is no tenderness. There is no rebound and no guarding.   Musculoskeletal: He exhibits no edema or tenderness.   Neurological: He is alert and oriented to person, place, and time. No cranial nerve deficit. GCS score is 15.   Skin: Skin is dry. No rash noted. He is not diaphoretic. No pallor.   Cracked, with areas of peeling skin; bronzed vs. sunburnt   Psychiatric: Mood and affect normal.   Nursing note and vitals reviewed.      Lab Data Review:         10/8/2017  7:32 AM    Recent Labs      10/14/17   0019  10/15/17   0301  10/15/17   2354   SODIUM  132*  136  129*   POTASSIUM  4.0  3.8  3.5*   CHLORIDE  101  103  99   CO2  24  25  23   BUN  13  10  9   CREATININE  0.44*  0.39*  0.38*   MAGNESIUM   --    --   1.6   CALCIUM  9.3  9.3  9.1       Recent Labs      10/14/17   0019  10/15/17   0301  10/15/17   2354   GLUCOSE  92  86  114*       Recent Labs      10/15/17   2354   RBC   3.33*   HEMOGLOBIN  11.1*   HEMATOCRIT  31.9*   PLATELETCT  559*       Recent Labs      10/15/17   2354   WBC  5.9   NEUTSPOLYS  35.90*   LYMPHOCYTES  35.90   MONOCYTES  10.20   EOSINOPHILS  13.70*   BASOPHILS  4.30*     Assessment/Plan     Hyponatremia  - Na of 107 and AMS on admission   - likely 2/2 SIADH  - continue salt tabs 3g TID  - cont fluid restriction 1500cc/day  - Qshift neuro checks  - Na 128-135, remaining stable; CTM  - AMS resolved, no focal neurological deficits  - PT/OT: PT 1-2 more sessions prior to d/c; OT 3 times per week      Lung mass  - Hx of smoking >20 years, 10 pack-year.   - Cough, recent weight loss and low appetite.  - CT chest: 3.6x5.9 cm paratracheal mass compressing on SVC and 2x1.8 cm R upper lobe mass (9/30)  - Highly suspicious for malignant etiology  - s/p IR biopsy of RUL c/w with PNA  - BAL (10/11): few RBCs and rare Gram+/Gram- cocci  - s/p OR biopsy of paratracheal mass (10/11), pathology inconclusive  - MRI Brain unremarkable  - Mediastinoscopy tomorrow    * NPO after midnight   * hold Heparin, start SCDs    Chronic alcohol use  - Consumes 1 beer daily   - BAL: 0.01 on admission   - PO thiamine, B12 and folic acid supplements   - No signs of EtOH withdrawal; CTM    Fall  - GLF with no reported syncope.  - CT head on admission from other facility negative for bleeding or intracranial pathology  - No evidence of neurological deficits   - CTM    Disorder of electrolytes  - monitor electrolytes  - replete as necessary    Bacteremia  - blood cultures on admission positive for Strep pneumo  - Repeat blood cultures negative 10/1, 10/2, 10/3  - afebrile  - PO Augmentin BID (until 10/14)      Body mass index (BMI) 19.9 or less, adult  - Possibly 2/2 homelessness vs. Unknown malignancy  - Exhibits good appetite  - Nutrition consult    Morning headache  - Mild AM headaches relieved with Tylenol  - 2/2 sleep apnea vs. Withdrawal headaches  - questionable history of sleep apnea  -  nocturnal oximetry study

## 2017-10-16 NOTE — CONSULTS
DATE OF SERVICE:  10/15/2017    REASON FOR CONSULTATION:  1.  Right upper lobe nodule in the lung along with a right paratracheal mass   measuring 3.6x5.9 cm along with subcarinal and right hilar adenopathy in a   smoker.  2.  Hyponatremia.    HISTORY OF PRESENT ILLNESS:  The patient is a 57-year-old gentleman from   [place] who was seen by me as an inpatient at [facility] at   the request of Dr. Villalta on 10/15/2017.  The patient was admitted to the   hospital with some respiratory symptoms who was actually transferred from   [facility] with altered mental status and a sodium of 107.  The   patient is otherwise homeless and has been a fairly heavy smoker.  A CT scan   done in the ER showed the above mentioned right upper lung mass and hilar mass   along with mediastinal adenopathy.  He was admitted to the hospital and was   treated in the ICU for his hyponatremia, which has now resolved.  The patient   underwent a bronchoscopy on 10/09/2017, which revealed that the trachea was   compressed from the mid trachea downwards to the tyra level with no   endotracheal lesion or obstruction being seen, right-sided bronchus otherwise   appeared to be unremarkable.  He also underwent a right lung mass biopsy with   interventional radiology on 10/05/2017.  Both of these were unrevealing and   did not show any equivocal evidence of malignancy and a heme/onc consultation   was called.  The patient is otherwise alert and oriented, but is feeling very   weak.  He denies any headaches or any visual symptoms.  The patient was   smoking till the day of his admission and MRI of the brain done on 10/14/2017   did not show any evidence of intracranial metastatic disease.  He has lost   some weight in the recent past, although he is not exactly sure how much.    PAST MEDICAL HISTORY:  Nil.  The patient has not been to a physician for the   past many years.    PAST SURGICAL HISTORY:  Surgery for facial  trauma and fractured ankles.    FAMILY HISTORY:  Both the parents  from lung cancer.  One of his siblings   also  from lung cancer.  They were all very heavy smokers.  He has no   children, one of his half-sisters also had unknown malignancy.    PERSONAL AND SOCIAL HISTORY:  Homeless, single, smokes 10-15 cigarettes every   day.  He rolls his cigarettes himself and also drinks everyday.    REVIEW OF SYSTEMS:  GENERAL AND CONSTITUTIONAL:  He is complaining of fatigue.  No fevers.  HEAD AND NECK, EARS, NOSE AND THROAT:  Denies any headaches or any visual   symptoms.  RESPIRATORY:  Has cough, but denies any hemoptysis.  CARDIOVASCULAR:  No chest pain or palpitations.  GASTROINTESTINAL:  No nausea or vomiting.  He denies any diarrhea.  There is   no pain in the abdomen.  NEUROLOGICAL:  Denies any seizures or any weakness at this time, although he   was admitted to the hospital with altered mental status.  PSYCHIATRIC:  He is anxious, but denies any depression.  SKIN AND INTEGUMENTARY:  No rash or any bruising.  HEMATOLOGICAL AND IMMUNOLOGICAL:  No complaints pertaining to this system.    Rest of the review systems is negative per CMS/AMA criteria unless as   mentioned in history of present illness.    PHYSICAL EXAMINATION:  GENERAL:  He is alert and oriented x3, appears to be slightly lethargic, lying   comfortably in the bed, not in any distress.  VITAL SIGNS:  Temperature is 36.7, respirations 18, BP is 125/77, oxygen   saturation is 94%.  HEENT:  Pupils are equal.  Oral mucosa is unremarkable.  He has both upper and   lower dentures.  NECK:  Supple, with no JVD or lymphadenopathy.  LUNGS:  Clear to auscultation with minimal wheezing on the right, no rales.  HEART:  Reveals no S3, S4.  ABDOMEN:  Shows no hepatosplenomegaly.  There is no tenderness.  EXTREMITIES:  There is no edema of lower extremities.  NEUROLOGIC:  Reveals he is able to move all 4 extremities.  Cranial nerves   appear intact.  PSYCHIATRIC:   Evaluation reveals anxiety, but no depression.  BACK:  Reveals no kyphoscoliosis.  SKIN:  Dry, but there is no rash or bruising.    LABORATORY DATA:  Reviewed.    RADIOLOGICAL STUDIES:  Reviewed.    ASSESSMENT AND PLAN:  1.  Lung mass.  The patient has a lung mass.  His bronchoscopic examination   was fairly unremarkable, biopsy from his lung mass did not show any equivocal   malignant cells.  The patient will need a repeat biopsy, possibly we can   either under EBUS or under endoscopic ultrasound guidance.  I will discuss   this issue with Dr. Villalta.  The likelihood of him having underlying   malignancy is very high considering his diagnosis of SIADH and his history of smoking.  2.  SIADH, this has resolved.  The main concern with this patient is going to   be his social issue.  He does not have a driving license and does not drive   and lives far away.  I will continue to follow him up.       ____________________________________     MD LILY JAY / WILD    DD:  10/15/2017 17:22:10  DT:  10/15/2017 18:00:41    D#:  [id number]  Job#:  [id number]

## 2017-10-16 NOTE — PROGRESS NOTES
Nataly Tamayo Fall Risk Assessment:     Last Known Fall: Within the last month  Mobility: No limitations  Medications: No meds  Mental Status/LOC/Awareness: Awake, alert, and oriented to date, place, and person  Toileting Needs: Use of assistive device (Bedside commode, bedpan, urinal)  Volume/Electrolyte Status: Low blood sugar/electrolyte imbalances  Communication/Sensory: No deficits  Behavior: Appropriate behavior  Nataly Tamayo Fall Risk Total: 11  Fall Risk Level: MODERATE RISK    Universal Fall Precautions:  call light/belongings in reach, bed in low position and locked, wheelchairs and assistive devices out of sight, siderails up x 2, use non-slip footwear, adequate lighting, clutter free and spill free environment, educate on level of risk, educate to call for assistance    Fall Risk Level Interventions:   TRIAL (eEvent, NEURO, MED BEN 5) Low Fall Risk Interventions  Place yellow fall risk ID band on patient: verified  Provide patient/family education based on risk assessment: completed  Educate patient/family to call staff for assistance when getting out of bed: completed  Place fall precaution signage outside patient door: verifiedTRIAL (eEvent, NEURO, MED BEN 5) Moderate Fall Risk Interventions  Place yellow fall risk ID band on patient: verified  Provide patient/family education based on risk assessment : verified  Educate patient/family to call staff for assistance when getting out of bed: verified  Place fall precaution signage outside patient door: verified  Utilize bed/chair fall alarm: verifiedTRIAL ([employer] 8, NEURO, [employer] BEN 5) High Fall Risk Interventions  Place yellow fall risk ID band on patient: verified  Provide patient/family education based on risk assessment: completed  Educate patient/family to call staff for assistance when getting out of bed: completed  Place fall precaution signage outside patient door: completed  Place patient in room close to nursing station: currently not  available/charge notified  Utilize bed/chair fall alarm: verified  Notify charge of high risk for huddle: completed    Patient Specific Interventions:     Medication: review medications with patient and family  Mental Status/LOC/Awareness: reinforce falls education  Toileting: monitor intake and output/use of appropriate interventions  Volume/Electrolyte Status: monitor abnormal lab values  Communication/Sensory: update plan of care on whiteboard  Behavioral: administer medication as ordered  Mobility: ensure bed is locked and in lowest position

## 2017-10-16 NOTE — PROGRESS NOTES
Nataly Tamayo Fall Risk Assessment:     Last Known Fall: Within the last month  Mobility: No limitations  Medications: No meds  Mental Status/LOC/Awareness: Awake, alert, and oriented to date, place, and person  Toileting Needs: Use of assistive device (Bedside commode, bedpan, urinal)  Volume/Electrolyte Status: Low blood sugar/electrolyte imbalances  Communication/Sensory: No deficits  Behavior: Appropriate behavior  Nataly Tamayo Fall Risk Total: 11  Fall Risk Level: MODERATE RISK    Universal Fall Precautions:  call light/belongings in reach, bed in low position and locked, wheelchairs and assistive devices out of sight, siderails up x 2, use non-slip footwear, adequate lighting, clutter free and spill free environment, educate on level of risk, educate to call for assistance    Fall Risk Level Interventions:   TRIAL ([employer], NEURO, MED BEN 5) Low Fall Risk Interventions  Place yellow fall risk ID band on patient: verified  Provide patient/family education based on risk assessment: completed  Educate patient/family to call staff for assistance when getting out of bed: completed  Place fall precaution signage outside patient door: verifiedTRIAL ([employer], NEURO, MED BEN 5) Moderate Fall Risk Interventions  Place yellow fall risk ID band on patient: verified  Provide patient/family education based on risk assessment : verified  Educate patient/family to call staff for assistance when getting out of bed: verified  Place fall precaution signage outside patient door: verified  Utilize bed/chair fall alarm: verifiedTRIAL ([employer] 8, NEURO, [employer] BEN 5) High Fall Risk Interventions  Place yellow fall risk ID band on patient: verified  Provide patient/family education based on risk assessment: completed  Educate patient/family to call staff for assistance when getting out of bed: completed  Place fall precaution signage outside patient door: completed  Place patient in room close to nursing station: currently not  available/charge notified  Utilize bed/chair fall alarm: verified  Notify charge of high risk for huddle: completed    Patient Specific Interventions:     Medication: review medications with patient and family  Mental Status/LOC/Awareness: reinforce the use of call light  Toileting: consider obtaining elevated toilet seat or bedside commode (BSC)  Volume/Electrolyte Status: ensure patient remains hydrated  Communication/Sensory: update plan of care on whiteboard  Behavioral: administer medication as ordered  Mobility: schedule physical activity throughout the day

## 2017-10-16 NOTE — CARE PLAN
Problem: Communication  Goal: The ability to communicate needs accurately and effectively will improve  Outcome: PROGRESSING AS EXPECTED  Patient alert, oriented, and cooperative. Verbalizes understanding of rational for care measures.

## 2017-10-16 NOTE — CARE PLAN
Problem: Safety  Goal: Will remain free from falls  Outcome: PROGRESSING AS EXPECTED      Problem: Skin Integrity  Goal: Risk for impaired skin integrity will decrease  Outcome: PROGRESSING AS EXPECTED

## 2017-10-17 LAB
ANION GAP SERPL CALC-SCNC: 7 MMOL/L (ref 0–11.9)
BUN SERPL-MCNC: 7 MG/DL (ref 8–22)
CALCIUM SERPL-MCNC: 9.3 MG/DL (ref 8.5–10.5)
CHLORIDE SERPL-SCNC: 99 MMOL/L (ref 96–112)
CO2 SERPL-SCNC: 23 MMOL/L (ref 20–33)
CREAT SERPL-MCNC: 0.4 MG/DL (ref 0.5–1.4)
ERYTHROCYTE [DISTWIDTH] IN BLOOD BY AUTOMATED COUNT: 41.1 FL (ref 35.9–50)
GFR SERPL CREATININE-BSD FRML MDRD: >60 ML/MIN/1.73 M 2
GLUCOSE SERPL-MCNC: 81 MG/DL (ref 65–99)
HCT VFR BLD AUTO: 32.5 % (ref 42–52)
HGB BLD-MCNC: 11.5 G/DL (ref 14–18)
MCH RBC QN AUTO: 33.8 PG (ref 27–33)
MCHC RBC AUTO-ENTMCNC: 35.4 G/DL (ref 33.7–35.3)
MCV RBC AUTO: 95.6 FL (ref 81.4–97.8)
PLATELET # BLD AUTO: 589 K/UL (ref 164–446)
PMV BLD AUTO: 8.7 FL (ref 9–12.9)
POTASSIUM SERPL-SCNC: 3.9 MMOL/L (ref 3.6–5.5)
RBC # BLD AUTO: 3.4 M/UL (ref 4.7–6.1)
SODIUM SERPL-SCNC: 129 MMOL/L (ref 135–145)
WBC # BLD AUTO: 6.1 K/UL (ref 4.8–10.8)

## 2017-10-17 PROCEDURE — 36415 COLL VENOUS BLD VENIPUNCTURE: CPT

## 2017-10-17 PROCEDURE — 160036 HCHG PACU - EA ADDL 30 MINS PHASE I: Performed by: SURGERY

## 2017-10-17 PROCEDURE — 85027 COMPLETE CBC AUTOMATED: CPT

## 2017-10-17 PROCEDURE — 80048 BASIC METABOLIC PNL TOTAL CA: CPT

## 2017-10-17 PROCEDURE — 500448 HCHG DRESSING, TELFA 3X4: Performed by: SURGERY

## 2017-10-17 PROCEDURE — 501445 HCHG STAPLER, SKIN DISP: Performed by: SURGERY

## 2017-10-17 PROCEDURE — 88342 IMHCHEM/IMCYTCHM 1ST ANTB: CPT | Mod: 91

## 2017-10-17 PROCEDURE — 700111 HCHG RX REV CODE 636 W/ 250 OVERRIDE (IP): Performed by: STUDENT IN AN ORGANIZED HEALTH CARE EDUCATION/TRAINING PROGRAM

## 2017-10-17 PROCEDURE — 160035 HCHG PACU - 1ST 60 MINS PHASE I: Performed by: SURGERY

## 2017-10-17 PROCEDURE — 160029 HCHG SURGERY MINUTES - 1ST 30 MINS LEVEL 4: Performed by: SURGERY

## 2017-10-17 PROCEDURE — 500445 HCHG HEMOSTAT, SURGICEL 4X8: Performed by: SURGERY

## 2017-10-17 PROCEDURE — A9270 NON-COVERED ITEM OR SERVICE: HCPCS | Performed by: INTERNAL MEDICINE

## 2017-10-17 PROCEDURE — 160002 HCHG RECOVERY MINUTES (STAT): Performed by: SURGERY

## 2017-10-17 PROCEDURE — 160041 HCHG SURGERY MINUTES - EA ADDL 1 MIN LEVEL 4: Performed by: SURGERY

## 2017-10-17 PROCEDURE — 07B74ZX EXCISION OF THORAX LYMPHATIC, PERCUTANEOUS ENDOSCOPIC APPROACH, DIAGNOSTIC: ICD-10-PCS | Performed by: SURGERY

## 2017-10-17 PROCEDURE — 700101 HCHG RX REV CODE 250

## 2017-10-17 PROCEDURE — 700111 HCHG RX REV CODE 636 W/ 250 OVERRIDE (IP)

## 2017-10-17 PROCEDURE — A9270 NON-COVERED ITEM OR SERVICE: HCPCS | Performed by: STUDENT IN AN ORGANIZED HEALTH CARE EDUCATION/TRAINING PROGRAM

## 2017-10-17 PROCEDURE — 501838 HCHG SUTURE GENERAL: Performed by: SURGERY

## 2017-10-17 PROCEDURE — 99232 SBSQ HOSP IP/OBS MODERATE 35: CPT | Mod: GC | Performed by: HOSPITALIST

## 2017-10-17 PROCEDURE — 700102 HCHG RX REV CODE 250 W/ 637 OVERRIDE(OP): Performed by: INTERNAL MEDICINE

## 2017-10-17 PROCEDURE — 700102 HCHG RX REV CODE 250 W/ 637 OVERRIDE(OP): Performed by: STUDENT IN AN ORGANIZED HEALTH CARE EDUCATION/TRAINING PROGRAM

## 2017-10-17 PROCEDURE — 88341 IMHCHEM/IMCYTCHM EA ADD ANTB: CPT

## 2017-10-17 PROCEDURE — 0WBC4ZX EXCISION OF MEDIASTINUM, PERCUTANEOUS ENDOSCOPIC APPROACH, DIAGNOSTIC: ICD-10-PCS | Performed by: SURGERY

## 2017-10-17 PROCEDURE — 88307 TISSUE EXAM BY PATHOLOGIST: CPT

## 2017-10-17 PROCEDURE — 88305 TISSUE EXAM BY PATHOLOGIST: CPT

## 2017-10-17 PROCEDURE — 160048 HCHG OR STATISTICAL LEVEL 1-5: Performed by: SURGERY

## 2017-10-17 PROCEDURE — 770006 HCHG ROOM/CARE - MED/SURG/GYN SEMI*

## 2017-10-17 PROCEDURE — 160009 HCHG ANES TIME/MIN: Performed by: SURGERY

## 2017-10-17 RX ORDER — HEPARIN SODIUM 5000 [USP'U]/ML
5000 INJECTION, SOLUTION INTRAVENOUS; SUBCUTANEOUS EVERY 8 HOURS
Status: DISCONTINUED | OUTPATIENT
Start: 2017-10-17 | End: 2017-11-17 | Stop reason: HOSPADM

## 2017-10-17 RX ORDER — BUPIVACAINE HYDROCHLORIDE AND EPINEPHRINE 5; 5 MG/ML; UG/ML
INJECTION, SOLUTION EPIDURAL; INTRACAUDAL; PERINEURAL
Status: DISCONTINUED | OUTPATIENT
Start: 2017-10-17 | End: 2017-10-17 | Stop reason: HOSPADM

## 2017-10-17 RX ADMIN — HEPARIN SODIUM 5000 UNITS: 5000 INJECTION, SOLUTION INTRAVENOUS; SUBCUTANEOUS at 21:15

## 2017-10-17 RX ADMIN — ACETAMINOPHEN 650 MG: 325 TABLET, FILM COATED ORAL at 10:14

## 2017-10-17 RX ADMIN — VITAMIN D, TAB 1000IU (100/BT) 5000 UNITS: 25 TAB at 08:37

## 2017-10-17 RX ADMIN — NICOTINE 21 MG: 21 PATCH, EXTENDED RELEASE TRANSDERMAL at 05:55

## 2017-10-17 RX ADMIN — FOLIC ACID 1 MG: 1 TABLET ORAL at 08:39

## 2017-10-17 RX ADMIN — MIDODRINE HYDROCHLORIDE 5 MG: 5 TABLET ORAL at 16:54

## 2017-10-17 RX ADMIN — ACETAMINOPHEN 650 MG: 325 TABLET, FILM COATED ORAL at 16:54

## 2017-10-17 RX ADMIN — SODIUM CHLORIDE TAB 1 GM 3 G: 1 TAB at 16:54

## 2017-10-17 RX ADMIN — THIAMINE HCL TAB 100 MG 100 MG: 100 TAB at 08:38

## 2017-10-17 RX ADMIN — SODIUM CHLORIDE TAB 1 GM 3 G: 1 TAB at 08:37

## 2017-10-17 RX ADMIN — THERA TABS 1 TABLET: TAB at 08:37

## 2017-10-17 RX ADMIN — ACETAMINOPHEN 650 MG: 325 TABLET, FILM COATED ORAL at 22:58

## 2017-10-17 ASSESSMENT — ENCOUNTER SYMPTOMS
ABDOMINAL PAIN: 0
PALPITATIONS: 0
INSOMNIA: 0
HEMOPTYSIS: 0
WHEEZING: 0
NAUSEA: 0
HEARTBURN: 0
VOMITING: 0
DIARRHEA: 0
DOUBLE VISION: 0
FOCAL WEAKNESS: 0
COUGH: 0
MYALGIAS: 0
CONSTIPATION: 0
SHORTNESS OF BREATH: 0
TINGLING: 1
DIZZINESS: 0
EYES NEGATIVE: 1
BLURRED VISION: 0
WEAKNESS: 0
FEVER: 0
WEIGHT LOSS: 1
HEADACHES: 1
HEADACHES: 0
CHILLS: 0
SPUTUM PRODUCTION: 0

## 2017-10-17 ASSESSMENT — PAIN SCALES - GENERAL
PAINLEVEL_OUTOF10: 4
PAINLEVEL_OUTOF10: 0
PAINLEVEL_OUTOF10: 4
PAINLEVEL_OUTOF10: 4
PAINLEVEL_OUTOF10: 0

## 2017-10-17 NOTE — OR SURGEON
Operative Report    PreOp Diagnosis: R lung mass, mediastinal & hilar lymphadenopathy    PostOp Diagnosis: same    Procedure(s):  CERVICAL MEDIASTINOSCOPY - Wound Class: Clean    Surgeon(s):  Benjamin Caraballo M.D.    Anesthesiologist/Type of Anesthesia:  Anesthesiologist: Letty Manning M.D./General    Surgical Staff:  Circulator: Kellen Payan R.N.  Relief Circulator: Carolyn Montanez R.N.  Relief Scrub: Janessa Zarco  Scrub Person: ANDREAS ConnorsNEUSEBIA    Specimens:  -precarinal tissue biopsy x2  -level 7 lymph node x2    Estimated Blood Loss: 25mL    Findings: enlarged subcarinal nodes, diffuse dense fibrous tissue mass to Right anterior of trachea.  Good hemostasis    Complications: none apparent        10/17/2017 3:18 PM Benjamin Caraballo

## 2017-10-17 NOTE — PROGRESS NOTES
"Progress Note:  10/17/2017, 11:12 AM    S: No acute overnight events. Mild headache this morning. No shortness of breath    O:  Blood pressure 109/79, pulse 78, temperature 36.7 °C (98.1 °F), resp. rate 18, height 1.778 m (5' 10\"), weight 57.3 kg (126 lb 5.2 oz), SpO2 96 %.    NAD, awake and alert  Neck is supple, normal range of motion extension. Mild fullness right lateral neck base  Breathing nonlabored    A:   Active Hospital Problems    Diagnosis   • Hyponatremia [E87.1]     Priority: High   • Lung mass [R91.8]     Priority: High   • Disorder of electrolytes [E87.8]     Priority: Medium   • Fall [W19.XXXA]     Priority: Medium   • Chronic alcohol use [F10.10]     Priority: Low   • Body mass index (BMI) 19.9 or less, adult [Z68.1]   • Morning headache [R51]   • Bacteremia [R78.81]     57-year-old male with a right upper lobe mass and mediastinal and hilar lymphadenopathy. Clinical findings concerning for malignancy. So far biopsies have been nondiagnostic although small cell lung cancer is suspected.    P:   -NPO for Cervical mediastinoscopy today and attempt biopsy right paratracheal node. This node causes leftward compression on the trachea. It is in close proximity to the head vessels as they come off the aortic arch and innominate vein.    -The operation was discussed along with the risks, which including but are not limited to bleeding, infection, damage to surrounding structures, need for further procedures including alternative methods of obtaining tissue, need for sternotomy, death.  The benefits, alternatives  were also discussed and the patient wishes to proceed.    Benjamin Caraballo M.D.  [facility]  [phone]    "

## 2017-10-17 NOTE — CARE PLAN
Problem: Respiratory:  Goal: Respiratory status will improve  Outcome: PROGRESSING AS EXPECTED  spo2 remains WNL on RA  Intervention: Administer and titrate oxygen therapy  spo2 remains WNL on RA

## 2017-10-17 NOTE — PROGRESS NOTES
Pulmonary Medicine Interval Note    Name Froylan Spain     1960   Age/Sex 57 y.o. male   MRN [id number]   Code Status Full       Attending/Team: Dr. Madrigal / Pulmonary         Reason for interval visit  (Principal Problem)   Hyponatremia   Lung/Mediastinal masses    ID: Patient is a 57-year-old male admitted following a ground level fall and was found to be severely hyponatremic. Further workup showed a right sided paratracheal and upper lobe lung masses concerning for malignancy in the setting of long-standing smoking history. CAT scan also showed some infiltrates and blood cultures were positive for strep pneumonia.    Interval Problem Daily Status Update  (24 hours)   Patient reported feeling fine. Denied any shortness of breath or chest pain. Sodium 129 this morning. On sodium tabs. Pathology results from transbronchial biopsy is non-conclusive. Cervical Mediastinoscopy performed today.    Review of Systems   Constitutional: Positive for weight loss. Negative for chills, fever and malaise/fatigue.   HENT: Negative for congestion.    Eyes: Negative for blurred vision and double vision.   Respiratory: Negative for cough, hemoptysis, sputum production, shortness of breath and wheezing.    Cardiovascular: Negative for chest pain, palpitations and leg swelling.   Gastrointestinal: Negative for constipation, heartburn and nausea.   Genitourinary: Negative for dysuria and urgency.   Musculoskeletal: Negative for myalgias.   Skin: Negative for itching and rash.   Neurological: Negative for dizziness, focal weakness, weakness and headaches.         Quality Measures    Reviewed items::  EKG reviewed, Labs reviewed, Medications reviewed and Radiology images reviewed          Physical Exam       Vitals:    10/17/17 1528 10/17/17 1530 10/17/17 1545 10/17/17 1600   BP:       Pulse: 93 91 91 85   Resp:  17 12   Temp: 36.3 °C (97.3 °F)      SpO2: 99% 100% 100% 100%   Weight:       Height:         Body mass  index is 18.13 kg/m².    Oxygen Therapy:  Pulse Oximetry: 100 %, O2 (LPM): 2, O2 Delivery: Nasal Cannula    Physical Exam   Constitutional: He is oriented to person, place, and time.   Cachectic   HENT:   Head: Atraumatic.   Temporal wasting   Eyes: Conjunctivae are normal. Pupils are equal, round, and reactive to light.   Neck: Neck supple.   Cardiovascular: Normal rate, regular rhythm and normal heart sounds.    No murmur heard.  Pulmonary/Chest: Effort normal. He has no wheezes.   Abdominal: Soft. He exhibits no distension. There is no tenderness.   Musculoskeletal: Normal range of motion. He exhibits no edema or deformity.   Lymphadenopathy:     He has no cervical adenopathy.   Neurological: He is alert and oriented to person, place, and time.   Skin: Skin is warm and dry.         Lab Data Review:         10/4/2017  10:44 AM    Recent Labs      10/15/17   0301  10/15/17   2354  10/17/17   0304   SODIUM  136  129*  129*   POTASSIUM  3.8  3.5*  3.9   CHLORIDE  103  99  99   CO2  25  23  23   BUN  10  9  7*   CREATININE  0.39*  0.38*  0.40*   MAGNESIUM   --   1.6   --    CALCIUM  9.3  9.1  9.3       Recent Labs      10/15/17   0301  10/15/17   2354  10/17/17   0304   GLUCOSE  86  114*  81       Recent Labs      10/15/17   2354  10/17/17   0304   RBC  3.33*  3.40*   HEMOGLOBIN  11.1*  11.5*   HEMATOCRIT  31.9*  32.5*   PLATELETCT  559*  589*       Recent Labs      10/15/17   2354  10/17/17   0304   WBC  5.9  6.1   NEUTSPOLYS  35.90*   --    LYMPHOCYTES  35.90   --    MONOCYTES  10.20   --    EOSINOPHILS  13.70*   --    BASOPHILS  4.30*   --            Assessment/Plan   Patient is a 57-year-old male with history of homelessness and chronic tobacco use presents with a ground-level fall found to be severely hyponatremic and Imaging showed concerning right-sided lung masses. Pulmonary medicine asked to evaluate the patient regarding the lung masses. A tissue diagnosis is definitely required in this situation. IR biopsy  of the right upper lobe mass is nondiagnostic showing fibrous tissue and no evidence of malignancy.  OR bronchoscopy performed. Bedside microscopy indicated possible malignant cells. However, final path with no evidence of malignancy    Lung mass  Hyponatremia secondary to SIADH  Strep bacteremia  Community acquired pneumonia  Chronic alcohol use  Chronic tobacco use    Recommend:  - S/P bronchoscopy and transbronchial biopsy  - Pathology results without evidence of malignancy  - Oncology consult with recommendation of MRI brain that was negative for metastasis  -Mediastinoscopy performed today with findings of enlarged subcarinal nodes, diffuse dense fibrous tissue mass to Right anterior of trachea. Biopsy performed.   - Await pathology result  -Finished a course of antibiotics  -RT and OT treatments per protocols  -Hyponatremia management per primary team.   -Pt has a brother Benjamin Spain in [place] who he would want contacted if needed and could make decisions for him if the pt becomes unable.  -We'll continue to follow patient with you.  -Thank you for allowing us to participate in this patient's care.

## 2017-10-17 NOTE — PROGRESS NOTES
Patient alert and oriented at baseline throughout shift. Vital signs remain stable. All safety precautions in place - bed in lowest position, call light within reach, lighting appropriate - .     Patient NPO this AM for scheduled mediastinoscopy. Surgeon reviews procedure with patient. Patient verbalizes understanding. Consent signed and placed in chart.    Patient currently off of unit in OR.

## 2017-10-17 NOTE — PROGRESS NOTES
Internal Medicine Interval Note  Note Author: Christie Snell M.D.     Name Froylan Spain     1960   Age/Sex 57 y.o. male   MRN [id number]   Code Status FULL     After 5PM or if no immediate response to page, please call for cross-coverage  Attending/Team: Dr. Ang Tolentino/Nikolai See Patient List for primary contact information  Call [phone] to page    1st Call - Day Intern (R1):   Dr. Christie Snell 2nd Call - Day Sr. Resident (R2/R3):   Dr. Tayler Bianchi   56yo homeless male without significant PMH admitted on 17 for severe hyponatremia to 108 and AMS.  Started on salt tabs, fluid restriction and Na correction per guidelines.   Concern for SIADH.  Smoker with lung masses (5.9 cm rightparatracheal mass and 2 cm RUL) found on CT Chest.  IR biopsy (10/05/17) c/w organizing PNA.  OR biopsy of paratracheal lesion (10/11).  BAL shows few RBCs and rare Gram+/Gram- cocci.  Biopsy pathology inconclusive.  S/p repeat mediastinoscopy with biopsy (10/17).    Reason for interval visit  (Principal Problem)   Hyponatremia    Interval Problem Daily Status Update  (24 hours)   No acute overnight events  Complaining of daily AM headache   Awaiting nocturnal oximetry study   Na is 129.  AMS is resolved.    Underwent repeat cervical mediastinoscopy with biopsy today with findings of enlarged subcarinal nodes, diffuse dense fibrous tissue mass to right anterior of trachea    Review of Systems   Constitutional: Negative for chills, fever and malaise/fatigue.   Eyes: Negative.    Respiratory: Negative for cough and shortness of breath.    Cardiovascular: Negative for chest pain and palpitations.   Gastrointestinal: Negative for abdominal pain, diarrhea, nausea and vomiting.   Genitourinary: Negative.    Musculoskeletal: Negative for myalgias.   Neurological: Positive for tingling (chronic) and headaches (7/10). Negative for dizziness and weakness.   Endo/Heme/Allergies: Negative for environmental  allergies.   Psychiatric/Behavioral: The patient does not have insomnia.      Consultants/Specialty  Pulmonary   Nephrology  Thoracic Surgery  Hematology/Oncology     Disposition  Inpatient    Quality Measures    Reviewed items::  Labs reviewed and Medications reviewed  Garvin catheter::  No Garvin  DVT prophylaxis pharmacological::  Heparin  Ulcer Prophylaxis::  Not indicated        Physical Exam       Vitals:    10/16/17 1609 10/16/17 2000 10/17/17 0224 10/17/17 0400   BP: 128/71 141/72  134/69   Pulse: 64 82 73 98   Resp: 16 18 16 18   Temp: 36.6 °C (97.9 °F) 36.1 °C (97 °F)  36.1 °C (97 °F)   SpO2: 97% 95% 98% 95%   Weight:       Height:         Body mass index is 18.13 kg/m².    Oxygen Therapy:  Pulse Oximetry: 95 %, O2 (LPM): 0, O2 Delivery: None (Room Air)    Physical Exam   Constitutional: He is oriented to person, place, and time. No distress.   Appears older than stated age; disheveled   HENT:   Head: Normocephalic and atraumatic.   Eyes: Conjunctivae and EOM are normal. Right eye exhibits no discharge. Left eye exhibits no discharge. No scleral icterus.   Neck: Normal range of motion.   Cardiovascular: Normal rate, regular rhythm, normal heart sounds and intact distal pulses.  Exam reveals no gallop and no friction rub.    No murmur heard.  Pulmonary/Chest: Effort normal and breath sounds normal. No respiratory distress. He has no wheezes. He has no rales. He exhibits no tenderness.   Coughing, chronic per patient   Abdominal: Soft. Bowel sounds are normal. He exhibits no distension and no mass. There is no tenderness. There is no rebound and no guarding.   Musculoskeletal: He exhibits no edema or tenderness.   Neurological: He is alert and oriented to person, place, and time. No cranial nerve deficit. GCS score is 15.   Skin: Skin is warm and dry. No rash noted. He is not diaphoretic. No pallor.   Cracked, with areas of peeling skin; appears sun damaged   Psychiatric: Mood and affect normal.   Nursing note  and vitals reviewed.      Lab Data Review:         10/8/2017  7:32 AM    Recent Labs      10/15/17   0301  10/15/17   2354  10/17/17   0304   SODIUM  136  129*  129*   POTASSIUM  3.8  3.5*  3.9   CHLORIDE  103  99  99   CO2  25  23  23   BUN  10  9  7*   CREATININE  0.39*  0.38*  0.40*   MAGNESIUM   --   1.6   --    CALCIUM  9.3  9.1  9.3       Recent Labs      10/15/17   0301  10/15/17   2354  10/17/17   0304   GLUCOSE  86  114*  81       Recent Labs      10/15/17   2354  10/17/17   0304   RBC  3.33*  3.40*   HEMOGLOBIN  11.1*  11.5*   HEMATOCRIT  31.9*  32.5*   PLATELETCT  559*  589*       Recent Labs      10/15/17   2354  10/17/17   0304   WBC  5.9  6.1   NEUTSPOLYS  35.90*   --    LYMPHOCYTES  35.90   --    MONOCYTES  10.20   --    EOSINOPHILS  13.70*   --    BASOPHILS  4.30*   --      Assessment/Plan     Hyponatremia  - Na of 107 and AMS on admission   - likely 2/2 SIADH  - continue salt tabs 3g TID  - cont fluid restriction 1500cc/day  - GCS stable, d/c neuro checks  - Na 128-135, remaining stable; CTM  - AMS resolved, no focal neurological deficits  - PT/OT: PT 1-2 more sessions prior to d/c; OT 3 times per week      Lung mass  - Hx of smoking >20 years, 10 pack-year.   - Cough, recent weight loss and low appetite.  - CT chest: 3.6x5.9 cm paratracheal mass compressing on SVC and 2x1.8 cm R upper lobe mass (9/30)  - Highly suspicious for malignant etiology  - s/p IR biopsy of RUL c/w with PNA  - BAL (10/11): few RBCs and rare Gram+/Gram- cocci  - s/p OR biopsy of paratracheal mass (10/11), pathology inconclusive  - MRI Brain unremarkable  - Repeat Mediastinoscopy (10/17) & biopsy; f/u pathology    Chronic alcohol use  - Consumes 1 beer daily   - BAL: 0.01 on admission   - PO thiamine, B12 and folic acid supplements   - No signs of EtOH withdrawal; CTM    Fall  - GLF with no reported syncope.  - CT head on admission from other facility negative for bleeding or intracranial pathology  - No evidence of  neurological deficits   - CTM    Disorder of electrolytes  - monitor electrolytes  - replete as necessary    Bacteremia  - blood cultures on admission positive for Strep pneumo  - Repeat blood cultures negative 10/1, 10/2, 10/3  - afebrile  - s/p PO Augmentin BID (10/9-10/14)      Body mass index (BMI) 19.9 or less, adult  - Possibly 2/2 homelessness vs. Unknown malignancy  - Exhibits good appetite  - Nutrition consult    Morning headache  - Mild AM headaches relieved with Tylenol  - 2/2 sleep apnea vs. Withdrawal headaches  - questionable history of sleep apnea  - nocturnal oximetry study

## 2017-10-17 NOTE — NON-PROVIDER
Internal Medicine Medical Student Note    Name Froylan Spain     1960   Age/Sex 57 y.o. male   MRN [id number]   Code Status FULL     After 5PM or if no immediate response to page, please call for cross-coverage  Attending/Team: Dr. Tolentino (Purple/Green) See Patient List for primary contact information  Call [phone] to page after hours   1st Call - Day Intern (R1):   Dr. Snell 2nd Call - Day Sr. Resident (R2/R3):   Dr. Bianchi         Reason for interval visit  (Principal Problem)   Hyponatremia    Interval Problem Daily Status Update  (24 hours)   Mr. Spain did well overnight and denies any new onset of symptoms.  Thoracic surgery has planned for a mediastinoscopy today to re-biopsy the mediastinal mass that was noted on CT.  The patient has been NPO overnight and has been afebrile.  He continues to deny nausea/vomiting/diarrhea and feels that he was previously tolerating fluids well.    Patient continues to c/o AM headaches that are described as pressure in the frontal area superior to the eyes.  This is not accompanied by any neurologic findings on exam.  Nocturnal pulse ox ordered overnight. Patient continues to sat well on room air during the day.    Pulmonology will continue to follow the patient as well, no changes in management.    Dr. Antonio will continue to follow from hematology.     ROS  Constitutional: Negative for chills, fever and malaise/fatigue. Headaches noted by patient  Eyes: Pupils equal, round, left reactive to light and accomodating, but no consensual light reflex.  Right eye is normal.   Respiratory: Positive for cough (Patient states that this has been present for 10 years). Negative for hemoptysis, sputum production and shortness of breath.    Cardiovascular: Negative.  Negative for chest pain, palpitations, orthopnea and leg swelling.   Gastrointestinal: Negative for abdominal pain, constipation, diarrhea, heartburn, nausea and vomiting.   Genitourinary: Negative.     Musculoskeletal: Negative.    Skin: Negative.    Neurological: Negative for sensory change, speech change, loss of consciousness, weakness and headaches.   Endo/Heme/Allergies: Negative.    Psychiatric/Behavioral: Negative.       Current Facility-Administered Medications:   •  lactated ringers infusion, , Intravenous, Continuous, Chaya Gibson M.D., Last Rate: 10 mL/hr at 10/11/17 1300  •  midodrine (PROAMATINE) tablet 5 mg, 5 mg, Oral, TID WITH MEALS, Mary Gale M.D., 5 mg at 10/16/17 1706  •  sodium chloride (SALT) tablet 3 g, 3 g, Oral, TID WITH MEALS, Mary Gale M.D., 3 g at 10/16/17 1706  •  vitamin D (cholecalciferol) tablet 5,000 Units, 5,000 Units, Oral, DAILY, Lilly Hood M.D., 5,000 Units at 10/16/17 0819  •  multivitamin (THERAGRAN) tablet 1 Tab, 1 Tab, Oral, DAILY, Trell Curry M.D., 1 Tab at 10/16/17 0821  •  thiamine (THIAMINE) tablet 100 mg, 100 mg, Oral, DAILY, Trell Curry M.D., 100 mg at 10/16/17 0821  •  folic acid (FOLVITE) tablet 1 mg, 1 mg, Oral, DAILY, Trell Curry M.D., 1 mg at 10/16/17 0822  •  senna-docusate (PERICOLACE or SENOKOT S) 8.6-50 MG per tablet 2 Tab, 2 Tab, Oral, BID, 2 Tab at 10/14/17 2057 **AND** polyethylene glycol/lytes (MIRALAX) PACKET 1 Packet, 1 Packet, Oral, QDAY PRN **AND** magnesium hydroxide (MILK OF MAGNESIA) suspension 30 mL, 30 mL, Oral, QDAY PRN **AND** bisacodyl (DULCOLAX) suppository 10 mg, 10 mg, Rectal, QDAY PRN, Jannet Son M.D.  •  Respiratory Care per Protocol, , Nebulization, Continuous RT, Jannet Son M.D.  •  labetalol (NORMODYNE,TRANDATE) injection 10 mg, 10 mg, Intravenous, Q4HRS PRN, Jannet Son M.D.  •  ondansetron (ZOFRAN) syringe/vial injection 4 mg, 4 mg, Intravenous, Q4HRS PRN, Jannet Son M.D.  •  ondansetron (ZOFRAN ODT) dispertab 4 mg, 4 mg, Oral, Q4HRS PRN, Jannet Son M.D.  •  promethazine (PHENERGAN) tablet 12.5-25 mg, 12.5-25 mg, Oral, Q4HRS PRN, Jannet Son  M.D.  •  promethazine (PHENERGAN) suppository 12.5-25 mg, 12.5-25 mg, Rectal, Q4HRS PRN, Jannet Son M.D.  •  prochlorperazine (COMPAZINE) injection 5-10 mg, 5-10 mg, Intravenous, Q4HRS PRN, Jannet Son M.D.  •  INITIATE NICOTINE REPLACEMENT PROTOCOL , , , Once **AND** nicotine (NICODERM) 21 MG/24HR 21 mg, 21 mg, Transdermal, Daily-0600, 21 mg at 10/16/17 0540 **AND** Protocol 205 PATIENT EDUCATION MATERIALS, , , Once **AND** Protocol 205 Rotate nicotine patch application sites daily , , , CONTINUOUS **AND** nicotine polacrilex (NICORETTE) 2 MG piece 2 mg, 2 mg, Oral, Q HOUR PRN, Jannet Son M.D.  •  guaifenesin dextromethorphan (ROBITUSSIN DM) 100-10 MG/5ML syrup 10 mL, 10 mL, Oral, Q6HRS PRN, Jannet Son M.D.  •  acetaminophen (TYLENOL) tablet 650 mg, 650 mg, Oral, Q6HRS PRN, Jannet Son M.D., 650 mg at 10/14/17 0854      Physical Exam       Vitals:    10/16/17 1609 10/16/17 2000 10/17/17 0224 10/17/17 0400   BP: 128/71 141/72  134/69   Pulse: 64 82 73 98   Resp: 16 18 16 18   Temp: 36.6 °C (97.9 °F) 36.1 °C (97 °F)  36.1 °C (97 °F)   SpO2: 97% 95% 98% 95%   Weight:       Height:         Body mass index is 18.13 kg/m².    Oxygen Therapy:  Pulse Oximetry: 95 %, O2 (LPM): 0, O2 Delivery: None (Room Air)    Physical Exam  Constitutional: He is oriented to person, place, and time. No distress.   57-year-old disheveled male appearing older than stated age.   HENT:   Head: Normocephalic and atraumatic.   Mouth/Throat: No oropharyngeal exudate.   Eyes: EOM are normal.   Neck: Normal range of motion.   Cardiovascular: Normal rate, regular rhythm and intact distal pulses.  Exam reveals no gallop and no friction rub.    No murmur heard.  Pulmonary/Chest: Effort normal and breath sounds normal. No respiratory distress. He has no wheezes. He has no rales. He exhibits no tenderness.   Abdominal: Soft. Bowel sounds are normal. He exhibits no distension. There is no tenderness. There is no  rebound and no guarding.   Genitourinary:   Genitourinary Comments: deferred   Musculoskeletal: Normal range of motion. He exhibits no edema, tenderness or deformity.   Neurological: He is alert and oriented to person, place, and time. He has a normal Cerebellar Exam and a normal Romberg Test. GCS score is 15.   Skin: Skin is warm and dry. No rash noted. No erythema.   Patient has areas of cracking/peeling skin on fingers and hands   Psychiatric: Mood, memory, affect and judgment normal.    Assessment/Plan     # Hyponatremia  - Na: 107 at time of admission  - Na: 129 (10/17)  - No altered mental status at this time, significant improvement from time of admission  - No evidence of seizure noted since time of admission.  - Percutaneous CT-guided IR biopsy did not demonstrate evidence of malignancy which would be consistent with SIADH  - s/p bronchoscopy and transbronchial biopsy  - Paratracheal mass biopsy: awaiting finalized report  PLAN:  - Fluid restriction <1500ml  - Daily CMP  - Continue NaCl supplementation, 3 tabs TID  - Pulmonary will follow  - Waiting for final pathology results from paratracheal mass     # Lung Mass  - CT report notes paratracheal mass measuring 3.6x5.9cm as well as 2.0x1.8cm lession in right upper lobe.  - Smoking history and hyponatremia increase suspicion that this is a malignant process with paraneoplastic syndrome.  - Percutaneous Biopsy Result: marked mixed inflammation,          granulation tissue, fibrosis and necrosis consistent with          organizing pneumonia.  - s/p bronchoscopy and transbronchial biopsy  - Paratracheal mass biopsy: Pending  - Brain MRI requested by Dr. Antonio on 10/14 demonstrates no evidence of intracranial metastatic lesions.  PLAN:  - Oncology will follow  - Mediastinoscopy today to re-biopsy medistinal mass  - NPO until after procedure  - d/c heparin, initiate SCDs for DVT prophylaxis.  - Waiting on pathology results from paratracheal mass  - Continue  management of SIADH symptoms/hyponatremia  - Heme/onc will follow  - Pulm will follow     # Headaches  - Patient has noted that he continues to wake up with moderate headaches each day  - No hx of sleep apnea, but given hx there is a concern for this as the cause.  - prior to admission, patient did not c/o headache  PLAN:  - Nocturnal pulse oximetry monitoring for desatting  - If patient demonstrates apneic episodes consider use of cpap      # Bacteremia - Resolved  - Blood cultures positive for strep pneumo in ICU (10/1)  - Augmentin continued for bacteremia  - Blood cultures negative  - Bronchial washing culture: NGTD  - Patient afebrile since temp of 100.5  PLAN:  - Monitor for s/s of sepsis, elevated temperatures     # Chronic Alcohol Use - stable  - Patient states that he has 2-3 drinks/week.  - No s/s of acute alcohol withdrawal.  PLAN:  - Problem stable

## 2017-10-17 NOTE — ASSESSMENT & PLAN NOTE
- Previously complained of mild AM headaches relieved with Tylenol  - 2/2 sleep apnea vs. Withdrawal headaches  - questionable history of sleep apnea  - Patient admits to drinking a lot of coffee at home  - nocturnal oximetry study shows sat >88% throughout night; consider outpatient sleep study on d/c  - headaches have not recurred

## 2017-10-17 NOTE — OP REPORT
DATE OF OPERATION: 10/17/2017     PREOPERATIVE DIAGNOSES:   1. Right Lung mass  2. Mediastinal & hilar lymphadenopathy    POSTOPERATIVE DIAGNOSES:   1. Same     PROCEDURES PERFORMED:   1. Cervical Mediastinoscopy     SURGEON: Benjamin Caraballo M.D.     ANESTHESIOLOGIST: Letty Manning MD    ANESTHESIA: General endotracheal anesthesia.    ASA CLASSIFICATION: III.    INDICATIONS: The patient is a 57 y.o. male with a right upper lobe mass and mediastinal and hilar lymphadenopathy. Interventional bronchoscopic biopsies have been nondiagnostic to this point.     FINDINGS: enlarged subcarinal nodes, diffuse dense fibrous tissue mass to Right anterior of trachea.  Good hemostasis    WOUND CLASSIFICATION: Class I, Clean.    SPECIMEN:   -Precarinal tissue biopsy ×2 for permanent  -Level 7 nodes for permanent    ESTIMATED BLOOD LOSS: 25 mL.    PROCEDURE:   After having been properly identified and consented, the patient was brought to the operating room. A timeout was performed to identify the correct patient identification operative procedure and surgical site. After obtaining general anesthesia, the suprasternal notch area was anesthetized with half percent Marcaine with epinephrine 3 cm transverse incision was made a fingerbreadth above the sternal notch. Subcutaneous tissues dissected with Bovie electrocautery. The platysma was divided transversely and the median raphe was then identified between the strap muscles. This was were divided vertically and the pretracheal fascia was identified and entered using Metzenbaums scissors. An anterior plane of the trachea was developed with finger dissection and the mediastinoscope was inserted.     There is a large very dense fibrous mass like structure to the right and anterior to trachea at the precarinal level. Portion of this was biopsied and sent as permanent specimen. Level VII lymph nodes were then identified and biopsied, and sent for permanent pathology. Hemostasis was  ensured and scope was withdrawn. The strap muscles were reapproximated with 3-0 Vicryl interrupted suture, and then the subcutaneous tissues were reapproximated with interrupted 3-0 Vicryl suture in a similar fashion. The skin was closed with running 4-0 Vicryl and Dermabond was applied. The sponge, instrument, and needle and instrument counts were correct. The patient tolerated this procedure well, and was taken to the PACU in stable condition.    Benjamin Caraballo M.D.  [facility]  [phone]

## 2017-10-17 NOTE — PROGRESS NOTES
Nataly Tamayo Fall Risk Assessment:     Last Known Fall: Within the last month  Mobility: No limitations  Medications: No meds  Mental Status/LOC/Awareness: Awake, alert, and oriented to date, place, and person  Toileting Needs: Use of assistive device (Bedside commode, bedpan, urinal)  Volume/Electrolyte Status: Low blood sugar/electrolyte imbalances  Communication/Sensory: No deficits  Behavior: Appropriate behavior  Nataly Tamayo Fall Risk Total: 11  Fall Risk Level: MODERATE RISK    Universal Fall Precautions:  call light/belongings in reach, bed in low position and locked    Fall Risk Level Interventions:   TRIAL (AXS-One 8, NEURO, MED BEN 5) Low Fall Risk Interventions  Place yellow fall risk ID band on patient: verified  Provide patient/family education based on risk assessment: completed  Educate patient/family to call staff for assistance when getting out of bed: completed  Place fall precaution signage outside patient door: verifiedTRIAL (AXS-One 8, NEURO, MED BEN 5) Moderate Fall Risk Interventions  Place yellow fall risk ID band on patient: verified  Provide patient/family education based on risk assessment : completed  Educate patient/family to call staff for assistance when getting out of bed: completed  Place fall precaution signage outside patient door: verified  Utilize bed/chair fall alarm: verifiedTRIAL (AXS-One 8, NEURO, [employer] BEN 5) High Fall Risk Interventions  Place yellow fall risk ID band on patient: verified  Provide patient/family education based on risk assessment: completed  Educate patient/family to call staff for assistance when getting out of bed: completed  Place fall precaution signage outside patient door: completed  Place patient in room close to nursing station: currently not available/charge notified  Utilize bed/chair fall alarm: verified  Notify charge of high risk for huddle: completed    Patient Specific Interventions:     Medication: review medications with patient and  family  Mental Status/LOC/Awareness: check on patient hourly  Toileting: not applicable  Volume/Electrolyte Status: ensure patient remains hydrated  Communication/Sensory: update plan of care on whiteboard  Behavioral: administer medication as ordered  Mobility: ensure bed is locked and in lowest position

## 2017-10-18 LAB
ANION GAP SERPL CALC-SCNC: 8 MMOL/L (ref 0–11.9)
BUN SERPL-MCNC: 9 MG/DL (ref 8–22)
CALCIUM SERPL-MCNC: 9.1 MG/DL (ref 8.5–10.5)
CHLORIDE SERPL-SCNC: 94 MMOL/L (ref 96–112)
CO2 SERPL-SCNC: 25 MMOL/L (ref 20–33)
CREAT SERPL-MCNC: 0.46 MG/DL (ref 0.5–1.4)
ERYTHROCYTE [DISTWIDTH] IN BLOOD BY AUTOMATED COUNT: 40.8 FL (ref 35.9–50)
GFR SERPL CREATININE-BSD FRML MDRD: >60 ML/MIN/1.73 M 2
GLUCOSE SERPL-MCNC: 88 MG/DL (ref 65–99)
HCT VFR BLD AUTO: 33.6 % (ref 42–52)
HGB BLD-MCNC: 12.1 G/DL (ref 14–18)
MCH RBC QN AUTO: 34.3 PG (ref 27–33)
MCHC RBC AUTO-ENTMCNC: 36 G/DL (ref 33.7–35.3)
MCV RBC AUTO: 95.2 FL (ref 81.4–97.8)
PLATELET # BLD AUTO: 611 K/UL (ref 164–446)
PMV BLD AUTO: 8.9 FL (ref 9–12.9)
POTASSIUM SERPL-SCNC: 4.2 MMOL/L (ref 3.6–5.5)
RBC # BLD AUTO: 3.53 M/UL (ref 4.7–6.1)
SODIUM SERPL-SCNC: 127 MMOL/L (ref 135–145)
WBC # BLD AUTO: 8.3 K/UL (ref 4.8–10.8)

## 2017-10-18 PROCEDURE — A9270 NON-COVERED ITEM OR SERVICE: HCPCS | Performed by: INTERNAL MEDICINE

## 2017-10-18 PROCEDURE — A9270 NON-COVERED ITEM OR SERVICE: HCPCS | Performed by: STUDENT IN AN ORGANIZED HEALTH CARE EDUCATION/TRAINING PROGRAM

## 2017-10-18 PROCEDURE — 99232 SBSQ HOSP IP/OBS MODERATE 35: CPT | Mod: GC | Performed by: HOSPITALIST

## 2017-10-18 PROCEDURE — 700102 HCHG RX REV CODE 250 W/ 637 OVERRIDE(OP): Performed by: SURGERY

## 2017-10-18 PROCEDURE — 700102 HCHG RX REV CODE 250 W/ 637 OVERRIDE(OP): Performed by: STUDENT IN AN ORGANIZED HEALTH CARE EDUCATION/TRAINING PROGRAM

## 2017-10-18 PROCEDURE — 700102 HCHG RX REV CODE 250 W/ 637 OVERRIDE(OP): Performed by: INTERNAL MEDICINE

## 2017-10-18 PROCEDURE — 770006 HCHG ROOM/CARE - MED/SURG/GYN SEMI*

## 2017-10-18 PROCEDURE — A9270 NON-COVERED ITEM OR SERVICE: HCPCS | Performed by: SURGERY

## 2017-10-18 PROCEDURE — 36415 COLL VENOUS BLD VENIPUNCTURE: CPT

## 2017-10-18 PROCEDURE — 80048 BASIC METABOLIC PNL TOTAL CA: CPT

## 2017-10-18 PROCEDURE — 97535 SELF CARE MNGMENT TRAINING: CPT

## 2017-10-18 PROCEDURE — 85027 COMPLETE CBC AUTOMATED: CPT

## 2017-10-18 PROCEDURE — 700111 HCHG RX REV CODE 636 W/ 250 OVERRIDE (IP): Performed by: STUDENT IN AN ORGANIZED HEALTH CARE EDUCATION/TRAINING PROGRAM

## 2017-10-18 RX ORDER — HYDROCODONE BITARTRATE AND ACETAMINOPHEN 5; 325 MG/1; MG/1
1 TABLET ORAL
Status: COMPLETED | OUTPATIENT
Start: 2017-10-18 | End: 2017-10-18

## 2017-10-18 RX ORDER — IBUPROFEN 400 MG/1
600 TABLET ORAL EVERY 6 HOURS PRN
Status: DISPENSED | OUTPATIENT
Start: 2017-10-18 | End: 2017-10-21

## 2017-10-18 RX ADMIN — NICOTINE 21 MG: 21 PATCH, EXTENDED RELEASE TRANSDERMAL at 05:27

## 2017-10-18 RX ADMIN — ACETAMINOPHEN 650 MG: 325 TABLET, FILM COATED ORAL at 08:04

## 2017-10-18 RX ADMIN — MIDODRINE HYDROCHLORIDE 5 MG: 5 TABLET ORAL at 08:03

## 2017-10-18 RX ADMIN — VITAMIN D, TAB 1000IU (100/BT) 5000 UNITS: 25 TAB at 08:00

## 2017-10-18 RX ADMIN — THERA TABS 1 TABLET: TAB at 08:02

## 2017-10-18 RX ADMIN — IBUPROFEN 600 MG: 600 TABLET, FILM COATED ORAL at 12:59

## 2017-10-18 RX ADMIN — THIAMINE HCL TAB 100 MG 100 MG: 100 TAB at 08:02

## 2017-10-18 RX ADMIN — HYDROCODONE BITARTRATE AND ACETAMINOPHEN 1 TABLET: 5; 325 TABLET ORAL at 17:57

## 2017-10-18 RX ADMIN — SODIUM CHLORIDE TAB 1 GM 3 G: 1 TAB at 12:52

## 2017-10-18 RX ADMIN — FOLIC ACID 1 MG: 1 TABLET ORAL at 08:02

## 2017-10-18 RX ADMIN — HEPARIN SODIUM 5000 UNITS: 5000 INJECTION, SOLUTION INTRAVENOUS; SUBCUTANEOUS at 05:27

## 2017-10-18 RX ADMIN — SODIUM CHLORIDE TAB 1 GM 3 G: 1 TAB at 08:01

## 2017-10-18 RX ADMIN — SODIUM CHLORIDE TAB 1 GM 3 G: 1 TAB at 17:58

## 2017-10-18 RX ADMIN — MIDODRINE HYDROCHLORIDE 5 MG: 5 TABLET ORAL at 12:53

## 2017-10-18 RX ADMIN — MIDODRINE HYDROCHLORIDE 5 MG: 5 TABLET ORAL at 17:57

## 2017-10-18 ASSESSMENT — COGNITIVE AND FUNCTIONAL STATUS - GENERAL
SUGGESTED CMS G CODE MODIFIER DAILY ACTIVITY: CJ
DAILY ACTIVITIY SCORE: 22
HELP NEEDED FOR BATHING: A LITTLE
DRESSING REGULAR LOWER BODY CLOTHING: A LITTLE

## 2017-10-18 ASSESSMENT — ENCOUNTER SYMPTOMS
DIZZINESS: 0
NAUSEA: 0
WEAKNESS: 0
SPUTUM PRODUCTION: 0
MYALGIAS: 0
CONSTIPATION: 0
FOCAL WEAKNESS: 0
HEADACHES: 0
PALPITATIONS: 0
CHILLS: 0
ABDOMINAL PAIN: 0
HEARTBURN: 0
SHORTNESS OF BREATH: 0
DIARRHEA: 0
COUGH: 0
HEMOPTYSIS: 0
EYES NEGATIVE: 1
TINGLING: 1
VOMITING: 0
DOUBLE VISION: 0
BLURRED VISION: 0
WHEEZING: 0
HEADACHES: 1
FEVER: 0
WEIGHT LOSS: 1

## 2017-10-18 ASSESSMENT — PAIN SCALES - GENERAL
PAINLEVEL_OUTOF10: 4
PAINLEVEL_OUTOF10: 2
PAINLEVEL_OUTOF10: 4
PAINLEVEL_OUTOF10: 7

## 2017-10-18 NOTE — PROGRESS NOTES
"Progress Note:  10/18/2017, 8:07 AM    S: no acute events. Reports discomfort in neck, shoulders, R chest. No fever.    O:  Blood pressure 136/72, pulse 78, temperature 36.4 °C (97.5 °F), resp. rate 17, height 1.778 m (5' 10\"), weight 57.3 kg (126 lb 5.2 oz), SpO2 98 %.    NAD  Breathing is nonlabored  Neck incisions intact, clean and dry      A:   Active Hospital Problems    Diagnosis   • Hyponatremia [E87.1]     Priority: High   • Lung mass [R91.8]     Priority: High   • Disorder of electrolytes [E87.8]     Priority: Medium   • Fall [W19.XXXA]     Priority: Medium   • Chronic alcohol use [F10.10]     Priority: Low   • Body mass index (BMI) 19.9 or less, adult [Z68.1]   • Morning headache [R51]   • Bacteremia [R78.81]         P:   Added ibuprofen (3 days) to tylenol for pain  May shower, diet as tolerated  Awaiting pathology    Benjamin Caraballo M.D.  [facility]  [phone]    "

## 2017-10-18 NOTE — PROGRESS NOTES
Assumed care of pt at 0730am. Report received and bedside rounding completed with noc RN. Pt in bed C/O pain in chest and throat. 7/10.  No SOB, or in any acute distress. Fall precautions in place, refusing bed alarm. Pt steady with ambulation. - Treaded non slip socks. Call light and pt belongings within reach - pt makes needs known - hourly rounding in place. See flowsheets for further assessment.

## 2017-10-18 NOTE — NON-PROVIDER
Internal Medicine Medical Student Note    Name Froylan Spain     1960   Age/Sex 57 y.o. male   MRN [id number]   Code Status FULL     After 5PM or if no immediate response to page, please call for cross-coverage  Attending/Team: Dr. Tolentino (Purple/Green) See Patient List for primary contact information  Call [phone] to page after hours   1st Call - Day Intern (R1):   Dr. Snell 2nd Call - Day Sr. Resident (R2/R3):   Dr. Bianchi         Reason for interval visit  (Principal Problem)   Hyponatremia    Interval Problem Daily Status Update  (24 hours)     Mr. Sapin did well overnight and no acute events were reported. The patient c/o post-surgical pain that is described as sharp and is rated at a 7/10, the pain is mainly distributed over the medial portion of the chest and extends from the neck to xyphoid.  The pain is described as constant and is worsened by movement and cough.  He denies any nausea or shortness of breath overnight.  He states that his bowel movements have been regular and he denies urinary urgency/dysuria/hematuria.    The patient denies any changes in mental status overnight and physical exam was negative for any neurologic deficits. At this time, the patient's sodium has decreased to 127.  Fluid restriction may have to be changed to the original 1200ml/day which seemed to keep the patient at a higher level.    Pulmonology will follow the patient, no changes in management at this time.    ROS  Constitutional: Negative for chills, fever and malaise/fatigue. Headaches noted by patient  Eyes: Pupils equal, round, left reactive to light and accomodating, but no consensual light reflex.  Right eye is normal.   Respiratory: Positive for cough (Patient states that this has been present for 10 years). Negative for hemoptysis, sputum production and shortness of breath.    Cardiovascular: Negative.  Negative for chest pain, palpitations, orthopnea and leg swelling.   Gastrointestinal:  Negative for abdominal pain, constipation, diarrhea, heartburn, nausea and vomiting.   Genitourinary: Negative.    Musculoskeletal: Negative.    Skin: Negative.    Neurological: Negative for sensory change, speech change, loss of consciousness, weakness and headaches.   Endo/Heme/Allergies: Negative.    Psychiatric/Behavioral: Negative.       Current Facility-Administered Medications:   •  heparin injection 5,000 Units, 5,000 Units, Subcutaneous, Q8HRS, Christie Snell M.D., 5,000 Units at 10/18/17 0527  •  lactated ringers infusion, , Intravenous, Continuous, Chaya Gibson M.D., Last Rate: 10 mL/hr at 10/11/17 1300  •  midodrine (PROAMATINE) tablet 5 mg, 5 mg, Oral, TID WITH MEALS, Mary Gale M.D., 5 mg at 10/17/17 1654  •  sodium chloride (SALT) tablet 3 g, 3 g, Oral, TID WITH MEALS, Mary Gale M.D., 3 g at 10/17/17 1654  •  vitamin D (cholecalciferol) tablet 5,000 Units, 5,000 Units, Oral, DAILY, Lilly Hood M.D., 5,000 Units at 10/17/17 0837  •  multivitamin (THERAGRAN) tablet 1 Tab, 1 Tab, Oral, DAILY, Trell Curry M.D., 1 Tab at 10/17/17 0837  •  thiamine (THIAMINE) tablet 100 mg, 100 mg, Oral, DAILY, Trell Curry M.D., 100 mg at 10/17/17 0838  •  folic acid (FOLVITE) tablet 1 mg, 1 mg, Oral, DAILY, Trell Curry M.D., 1 mg at 10/17/17 0839  •  senna-docusate (PERICOLACE or SENOKOT S) 8.6-50 MG per tablet 2 Tab, 2 Tab, Oral, BID, 2 Tab at 10/14/17 2057 **AND** polyethylene glycol/lytes (MIRALAX) PACKET 1 Packet, 1 Packet, Oral, QDAY PRN **AND** magnesium hydroxide (MILK OF MAGNESIA) suspension 30 mL, 30 mL, Oral, QDAY PRN **AND** bisacodyl (DULCOLAX) suppository 10 mg, 10 mg, Rectal, QDAY PRN, Jannet Son M.D.  •  Respiratory Care per Protocol, , Nebulization, Continuous RT, Jannet Son M.D.  •  labetalol (NORMODYNE,TRANDATE) injection 10 mg, 10 mg, Intravenous, Q4HRS PRN, Jannet Son M.D.  •  ondansetron (ZOFRAN) syringe/vial injection 4 mg, 4 mg,  Intravenous, Q4HRS PRN, Jannet Son M.D.  •  ondansetron (ZOFRAN ODT) dispertab 4 mg, 4 mg, Oral, Q4HRS PRN, Jannet Son M.D.  •  promethazine (PHENERGAN) tablet 12.5-25 mg, 12.5-25 mg, Oral, Q4HRS PRNJannet M.D.  •  promethazine (PHENERGAN) suppository 12.5-25 mg, 12.5-25 mg, Rectal, Q4HRS PRN, Jannet Son M.D.  •  prochlorperazine (COMPAZINE) injection 5-10 mg, 5-10 mg, Intravenous, Q4HRS PRN, Jannet Son M.D.  •  INITIATE NICOTINE REPLACEMENT PROTOCOL , , , Once **AND** nicotine (NICODERM) 21 MG/24HR 21 mg, 21 mg, Transdermal, Daily-0600, 21 mg at 10/18/17 0527 **AND** Protocol 205 PATIENT EDUCATION MATERIALS, , , Once **AND** Protocol 205 Rotate nicotine patch application sites daily , , , CONTINUOUS **AND** nicotine polacrilex (NICORETTE) 2 MG piece 2 mg, 2 mg, Oral, Q HOUR PRN, Jannet Son M.D.  •  guaifenesin dextromethorphan (ROBITUSSIN DM) 100-10 MG/5ML syrup 10 mL, 10 mL, Oral, Q6HRS PRN, Jannet Son M.D.  •  acetaminophen (TYLENOL) tablet 650 mg, 650 mg, Oral, Q6HRS PRN, Jannet Son M.D., 650 mg at 10/17/17 2258      Physical Exam       Vitals:    10/17/17 1630 10/17/17 1700 10/17/17 1936 10/18/17 0002   BP:  142/87 134/89 125/75   Pulse: 87 84 74 78   Resp: 14 15 18 20   Temp:  36.4 °C (97.5 °F) 36.5 °C (97.7 °F) 36.8 °C (98.2 °F)   SpO2: 93% 94% 96% 98%   Weight:       Height:         Body mass index is 18.13 kg/m².    Oxygen Therapy:  Pulse Oximetry: 98 %, O2 (LPM): 0, O2 Delivery: None (Room Air)    Physical Exam  Constitutional: He is oriented to person, place, and time. No distress.   57-year-old disheveled male appearing older than stated age.   HENT:   Head: Normocephalic and atraumatic.   Mouth/Throat: No oropharyngeal exudate.   Eyes: EOM are normal.   Neck: Normal range of motion.   Cardiovascular: Normal rate, regular rhythm and intact distal pulses.  Exam reveals no gallop and no friction rub.    No murmur heard.  Pulmonary/Chest:  Effort normal and breath sounds normal. No respiratory distress. He has no wheezes. He has no rales. He exhibits no tenderness.   Abdominal: Soft. Bowel sounds are normal. He exhibits no distension. There is no tenderness. There is no rebound and no guarding.   Genitourinary:   Genitourinary Comments: deferred   Musculoskeletal: Normal range of motion. He exhibits no edema, tenderness or deformity.   Neurological: He is alert and oriented to person, place, and time. He has a normal Cerebellar Exam and a normal Romberg Test. GCS score is 15.   Skin: Skin is warm and dry. No rash noted. No erythema.   Patient has areas of cracking/peeling skin on fingers and hands   Psychiatric: Mood, memory, affect and judgment normal.    MR-BRAIN-WITH & W/O   Final Result      1.  MRI of the brain without and with contrast within normal limits for age with mild atrophy and mild white matter changes.   2.  No evidence of intracranial metastatic disease      DX-CHEST-PORTABLE (1 VIEW)   Final Result      No evidence of pneumothorax status post right lung biopsy.      DX-CHEST-PORTABLE (1 VIEW)   Final Result      No pneumothorax identified.      Right paratracheal mass with mass effect and narrowing of the trachea is again noted.      Airspace opacity in the right upper lobe is increased compared to prior. Hazy opacity in the peripheral left midlung is also increased.      Patchy bilateral lower lobe hazy opacities are again noted. Findings likely represent pneumonitis.         CT-NEEDLE BX-LUNG-MEDIASTINUM RIGHT   Final Result      1.  CT guided right upper lobe lung biopsy.   2.  Followup serial chest radiographs are forthcoming in 1 and 3 hours.      CT-CHEST (THORAX) WITH   Final Result      Right paratracheal mass measuring 3.6 x 5.9 cm which causes mass effect on the SVC and trachea with mild tracheal deviation to the left. Subcarinal and right hilar lymphadenopathy is also noted.      2 x 1.8 cm right upper lobe nodule which is  centrally hypodense and may be necrotic worrisome for malignancy.      Ill-defined areas of groundglass opacity may be infectious or inflammatory.      Tree-in-bud nodularity in the left lower lobe is likely infectious or inflammatory.      Small pericardial effusion.      Atherosclerotic plaque.      There is likely mild esophageal wall thickening with mucosal hyperenhancement. This can be seen in the setting of esophagitis.      Healing left-sided rib fractures.      Hepatic steatosis.      Hypodensity along the falciform ligament may represent focal fat but a small 1.1 cm lesion is not excluded.            OUTSIDE IMAGES-DX CHEST   Final Result      OUTSIDE IMAGES-CT CERVICAL SPINE   Final Result      OUTSIDE IMAGES-CT FACE   Final Result      OUTSIDE IMAGES-CT HEAD   Final Result            Assessment/Plan     # Hyponatremia  - Na: 107 at time of admission  - Na: 127 (10/18), continuing to trend down  - No altered mental status at this time, significant improvement from time of admission  - No evidence of seizure noted since time of admission.  - Percutaneous CT-guided IR biopsy did not demonstrate evidence of malignancy which would be consistent with SIADH  - s/p bronchoscopy and transbronchial biopsy  - Paratracheal mass biopsy: awaiting finalized report  PLAN:  - Fluid restriction <1200mL/day  - Daily CMP  - Continue NaCl supplementation, 3 tabs TID  - Pulmonary will follow  - Waiting for final pathology results from paratracheal mass     # Lung Mass  - CT report notes paratracheal mass measuring 3.6x5.9cm as well as 2.0x1.8cm lession in right upper lobe.  - Smoking history and hyponatremia increase suspicion that this is a malignant process with paraneoplastic syndrome.  - Percutaneous Biopsy Result: marked mixed inflammation,          granulation tissue, fibrosis and necrosis consistent with          organizing pneumonia.  - s/p bronchoscopy and transbronchial biopsy  - s/p mediastinoscopy  - Paratracheal  mass biopsy: Pending  - Brain MRI requested by Dr. Antonio on 10/14 demonstrates no evidence of intracranial metastatic lesions.  PLAN:  - Oncology will follow  - Consult Rad/Onc regarding intiating radiation therapy.  - Norco for pain management  - CXR if pain continues into PM  - d/c heparin, initiate SCDs for DVT prophylaxis.  - Waiting on pathology results from paratracheal mass  - Continue management of SIADH symptoms/hyponatremia  - Heme/onc will follow  - Pulm will follow     # Headaches  - Patient has noted that he continues to wake up with moderate headaches each day  - No hx of sleep apnea, but given hx there is a concern for this as the cause.  - prior to admission, patient did not c/o headache  PLAN:  - Nocturnal pulse oximetry monitoring for desatting  - If patient demonstrates apneic episodes consider use of cpap      # Bacteremia - Resolved  - Blood cultures positive for strep pneumo in ICU (10/1)  - Augmentin continued for bacteremia  - Blood cultures negative  - Bronchial washing culture: NGTD  - Patient afebrile since temp of 100.5  PLAN:  - Monitor for s/s of sepsis, elevated temperatures     # Chronic Alcohol Use - stable  - Patient states that he has 2-3 drinks/week.  - No s/s of acute alcohol withdrawal.  PLAN:  - Problem stable

## 2017-10-18 NOTE — PROGRESS NOTES
Pulmonary Medicine Interval Note    Name Froylan Spain     1960   Age/Sex 57 y.o. male   MRN [id number]   Code Status Full       Attending/Team: Dr. Madrigal / Pulmonary         Reason for interval visit  (Principal Problem)   Hyponatremia   Lung/Mediastinal masses    ID: Patient is a 57-year-old male admitted following a ground level fall and was found to be severely hyponatremic. Further workup showed a right sided paratracheal and upper lobe lung masses concerning for malignancy in the setting of long-standing smoking history. CAT scan also showed some infiltrates and blood cultures were positive for strep pneumonia.    Interval Problem Daily Status Update  (24 hours)   Patient reported feeling fine. Denied any shortness of breath. Mild chest pain likely related to the procedure. Sodium 127 this morning. On sodium tabs. Cervical Mediastinoscopy performed yesterday. No complications. Pathology results pending.    Review of Systems   Constitutional: Positive for weight loss. Negative for chills, fever and malaise/fatigue.   HENT: Negative for congestion.    Eyes: Negative for blurred vision and double vision.   Respiratory: Negative for cough, hemoptysis, sputum production, shortness of breath and wheezing.    Cardiovascular: Negative for chest pain, palpitations and leg swelling.   Gastrointestinal: Negative for constipation, heartburn and nausea.   Genitourinary: Negative for dysuria and urgency.   Musculoskeletal: Negative for myalgias.   Skin: Negative for itching and rash.   Neurological: Negative for dizziness, focal weakness, weakness and headaches.         Quality Measures    Reviewed items::  EKG reviewed, Labs reviewed, Medications reviewed and Radiology images reviewed          Physical Exam       Vitals:    10/17/17 1936 10/18/17 0002 10/18/17 0600 10/18/17 0740   BP: 134/89 125/75 133/79 136/72   Pulse: 74 78 79 78   Resp:    Temp: 36.5 °C (97.7 °F) 36.8 °C (98.2 °F) 36.4 °C  (97.5 °F) 36.4 °C (97.5 °F)   SpO2: 96% 98% 99% 98%   Weight:       Height:         Body mass index is 18.13 kg/m².    Oxygen Therapy:  Pulse Oximetry: 98 %, O2 (LPM): 0, O2 Delivery: None (Room Air)    Physical Exam   Constitutional: He is oriented to person, place, and time.   Cachectic   HENT:   Head: Atraumatic.   Temporal wasting   Eyes: Conjunctivae are normal. Pupils are equal, round, and reactive to light.   Neck: Neck supple.   Cardiovascular: Normal rate, regular rhythm and normal heart sounds.    No murmur heard.  Pulmonary/Chest: Effort normal. He has no wheezes.   Abdominal: Soft. He exhibits no distension. There is no tenderness.   Musculoskeletal: Normal range of motion. He exhibits no edema or deformity.   Lymphadenopathy:     He has no cervical adenopathy.   Neurological: He is alert and oriented to person, place, and time.   Skin: Skin is warm and dry.         Lab Data Review:         10/4/2017  10:44 AM    Recent Labs      10/15/17   2354  10/17/17   0304  10/18/17   0345   SODIUM  129*  129*  127*   POTASSIUM  3.5*  3.9  4.2   CHLORIDE  99  99  94*   CO2  23  23  25   BUN  9  7*  9   CREATININE  0.38*  0.40*  0.46*   MAGNESIUM  1.6   --    --    CALCIUM  9.1  9.3  9.1       Recent Labs      10/15/17   2354  10/17/17   0304  10/18/17   0345   GLUCOSE  114*  81  88       Recent Labs      10/15/17   2354  10/17/17   0304  10/18/17   0344   RBC  3.33*  3.40*  3.53*   HEMOGLOBIN  11.1*  11.5*  12.1*   HEMATOCRIT  31.9*  32.5*  33.6*   PLATELETCT  559*  589*  611*       Recent Labs      10/15/17   2354  10/17/17   0304  10/18/17   0344   WBC  5.9  6.1  8.3   NEUTSPOLYS  35.90*   --    --    LYMPHOCYTES  35.90   --    --    MONOCYTES  10.20   --    --    EOSINOPHILS  13.70*   --    --    BASOPHILS  4.30*   --    --            Assessment/Plan   Patient is a 57-year-old male with history of homelessness and chronic tobacco use presents with a ground-level fall found to be severely hyponatremic and Imaging  showed concerning right-sided lung masses. Pulmonary medicine asked to evaluate the patient regarding the lung masses. A tissue diagnosis is definitely required in this situation. IR biopsy of the right upper lobe mass is nondiagnostic showing fibrous tissue and no evidence of malignancy.  OR bronchoscopy performed. Bedside microscopy indicated possible malignant cells. However, final path with no evidence of malignancy    Lung mass  Hyponatremia secondary to SIADH  Strep bacteremia  Community acquired pneumonia  Chronic alcohol use  Chronic tobacco use    Recommend:  - S/P bronchoscopy and transbronchial biopsy  - Pathology results without evidence of malignancy  - Oncology consult with recommendation of MRI brain that was negative for metastasis  -Mediastinoscopy performed today with findings of enlarged subcarinal nodes, diffuse dense fibrous tissue mass to Right anterior of trachea. Biopsy performed.   - Await pathology result  -Finished a course of antibiotics  -RT and OT treatments per protocols  -Hyponatremia management per primary team.   -Pt has a brother Benjamin Spain in [place] who he would want contacted if needed and could make decisions for him if the pt becomes unable.  -We'll continue to follow patient with you.  -Thank you for allowing us to participate in this patient's care.

## 2017-10-18 NOTE — CARE PLAN
Problem: Venous Thromboembolism (VTW)/Deep Vein Thrombosis (DVT) Prevention:  Goal: Patient will participate in Venous Thrombosis (VTE)/Deep Vein Thrombosis (DVT)Prevention Measures  Outcome: PROGRESSING AS EXPECTED  Patient ambulates in room without difficulty. Patient educated on rationale for SCDs and patient refuses SCDs since he ambulates frequently in room. Patient is receiving heparin SC for VTE prophylaxis.

## 2017-10-18 NOTE — PROGRESS NOTES
Nataly Tamayo Fall Risk Assessment:     Last Known Fall: Within the last month  Mobility: No limitations  Medications: No meds  Mental Status/LOC/Awareness: Awake, alert, and oriented to date, place, and person  Toileting Needs: Use of assistive device (Bedside commode, bedpan, urinal)  Volume/Electrolyte Status: Low blood sugar/electrolyte imbalances  Communication/Sensory: No deficits  Behavior: Appropriate behavior  Nataly Tamayo Fall Risk Total: 11  Fall Risk Level: MODERATE RISK    Universal Fall Precautions:  call light/belongings in reach, bed in low position and locked    Fall Risk Level Interventions:   TRIAL (TELE 8, NEURO, MED BEN 5) Low Fall Risk Interventions  Place yellow fall risk ID band on patient: verified  Provide patient/family education based on risk assessment: completed  Educate patient/family to call staff for assistance when getting out of bed: completed  Place fall precaution signage outside patient door: verifiedTRIAL ([place] 8, NEURO, MED BEN 5) Moderate Fall Risk Interventions  Place yellow fall risk ID band on patient: verified  Provide patient/family education based on risk assessment : completed  Educate patient/family to call staff for assistance when getting out of bed: completed  Place fall precaution signage outside patient door: verified  Utilize bed/chair fall alarm: verifiedTRIAL ([place]acia 8, NEURO, [employer] BEN 5) High Fall Risk Interventions  Place yellow fall risk ID band on patient: verified  Provide patient/family education based on risk assessment: completed  Educate patient/family to call staff for assistance when getting out of bed: completed  Place fall precaution signage outside patient door: completed  Place patient in room close to nursing station: currently not available/charge notified  Utilize bed/chair fall alarm: verified  Notify charge of high risk for huddle: completed    Patient Specific Interventions:     Medication: review medications with patient and family  and assess for medications that can be discontinued or dosage decreased  Mental Status/LOC/Awareness: reinforce falls education, check on patient hourly and provide activity  Toileting: monitor intake and output/use of appropriate interventions  Volume/Electrolyte Status: ensure patient remains hydrated  Communication/Sensory: update plan of care on whiteboard  Behavioral: encourage patient to voice feelings  Mobility: instruct patient to exit bed on their strongest side

## 2017-10-18 NOTE — THERAPY
Attempted PT treatment session this am. Pt reports that he is feeling sore after his procedue, removal of R lung mass. Pt declined PT treatment session stating he has been compliant with HEP and already ambulated this am. Will complete PT treatment session as able.
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
## 2017-10-18 NOTE — THERAPY
Pt seen for OT tx. Pt feeling fatigued from mass removal sx. Per RN and pt, pt is up self in room and amb in hallway w/o assistance. Pt is up to BR w/o assistance and completes tasks mod-I. Will see pt 1-2 more times while in-house for d/c planning as appropriate. Should pt have change in status or POC, please re-consult OT as appropriate.

## 2017-10-18 NOTE — CARE PLAN
Problem: Nutritional:  Goal: Achieve adequate nutritional intake  Patient will consume 50% of meals   Outcome: MET Date Met: 10/18/17

## 2017-10-18 NOTE — PROGRESS NOTES
Internal Medicine Interval Note  Note Author: Christie Snell M.D.     Name Froylan Spain     1960   Age/Sex 57 y.o. male   MRN [id number]   Code Status FULL     After 5PM or if no immediate response to page, please call for cross-coverage  Attending/Team: Dr. Ang Tolentino/Nikolai See Patient List for primary contact information  Call [phone] to page    1st Call - Day Intern (R1):   Dr. Christie Snell 2nd Call - Day Sr. Resident (R2/R3):   Dr. Tayler Bianchi   56yo homeless male without significant PMH admitted on 17 for severe hyponatremia to 108 and AMS.  Started on salt tabs, fluid restriction and Na correction per guidelines.   Concern for SIADH.  Smoker with lung masses (5.9 cm rightparatracheal mass and 2 cm RUL) found on CT Chest.  IR biopsy (10/05/17) c/w organizing PNA.  BAL shows few RBCs and rare Gram+/Gram- cocci.  OR biopsy of paratracheal lesion (10/11) was inconclusive..  S/p repeat mediastinoscopy with biopsy (10/17).    Reason for interval visit  (Principal Problem)   Hyponatremia    Interval Problem Daily Status Update  (24 hours)   - No acute overnight events  - Underwent repeat cervical mediastinoscopy with biopsy yesterday; findings of enlarged subcarinal nodes, diffuse dense fibrous tissue mass to right anterior of trachea yesterday without complication  - Complaining of right sternal chest pain exacerbated by cough  - Na downtrending to 127, restarted 1200mL fluid restriction; AMS resolved  - Complaining of daily AM headache   - Awaiting nocturnal oximetry study   - Given biopsy findings suspicious for malignancy, will d/w Heme/Onc whether they would like to involve Radiation Oncology ahead of final pathology report    Review of Systems   Constitutional: Negative for chills, fever and malaise/fatigue.   Eyes: Negative.    Respiratory: Negative for cough and shortness of breath.    Cardiovascular: Negative for chest pain and palpitations.   Gastrointestinal:  Negative for abdominal pain, diarrhea, nausea and vomiting.   Genitourinary: Negative.    Musculoskeletal: Negative for myalgias.   Skin: Negative for itching and rash.   Neurological: Positive for tingling (chronic) and headaches. Negative for dizziness and weakness.     Consultants/Specialty  Pulmonary   Nephrology  Thoracic Surgery  Hematology/Oncology     Disposition  Inpatient    Quality Measures    Reviewed items::  Labs reviewed and Medications reviewed  Garvin catheter::  No Garvin  DVT prophylaxis pharmacological::  Heparin  Ulcer Prophylaxis::  Not indicated        Physical Exam       Vitals:    10/17/17 1630 10/17/17 1700 10/17/17 1936 10/18/17 0002   BP:  142/87 134/89 125/75   Pulse: 87 84 74 78   Resp: 14 15 18 20   Temp:  36.4 °C (97.5 °F) 36.5 °C (97.7 °F) 36.8 °C (98.2 °F)   SpO2: 93% 94% 96% 98%   Weight:       Height:         Body mass index is 18.13 kg/m².    Oxygen Therapy:  Pulse Oximetry: 98 %, O2 (LPM): 0, O2 Delivery: None (Room Air)    Physical Exam   Constitutional: He is oriented to person, place, and time. No distress.   Appears older than stated age; disheveled, thin   HENT:   Head: Normocephalic and atraumatic.   Mediastinoscopy scar across suprasternal notch, healing well   Eyes: Conjunctivae and EOM are normal. Right eye exhibits no discharge. Left eye exhibits no discharge. No scleral icterus.   Neck: Normal range of motion.   Cardiovascular: Normal rate, regular rhythm, normal heart sounds and intact distal pulses.  Exam reveals no gallop and no friction rub.    No murmur heard.  Pulmonary/Chest: Effort normal and breath sounds normal. No respiratory distress. He has no wheezes. He has no rales. He exhibits no tenderness.   Coughing, chronic per patient; right chest is TTP, non-pleuritic   Abdominal: Soft. Bowel sounds are normal. He exhibits no distension and no mass. There is no tenderness. There is no rebound and no guarding.   Musculoskeletal: Normal range of motion. He exhibits  no edema or tenderness.   Neurological: He is alert and oriented to person, place, and time. No cranial nerve deficit. GCS score is 15.   Skin: Skin is warm and dry. No rash noted. He is not diaphoretic. No pallor.   Cracked, with areas of peeling skin; appears sun damaged   Psychiatric: Mood and affect normal.   Nursing note and vitals reviewed.      Lab Data Review:         10/8/2017  7:32 AM    Recent Labs      10/15/17   2354  10/17/17   0304  10/18/17   0345   SODIUM  129*  129*  127*   POTASSIUM  3.5*  3.9  4.2   CHLORIDE  99  99  94*   CO2  23  23  25   BUN  9  7*  9   CREATININE  0.38*  0.40*  0.46*   MAGNESIUM  1.6   --    --    CALCIUM  9.1  9.3  9.1       Recent Labs      10/15/17   2354  10/17/17   0304  10/18/17   0345   GLUCOSE  114*  81  88       Recent Labs      10/15/17   2354  10/17/17   0304  10/18/17   0344   RBC  3.33*  3.40*  3.53*   HEMOGLOBIN  11.1*  11.5*  12.1*   HEMATOCRIT  31.9*  32.5*  33.6*   PLATELETCT  559*  589*  611*       Recent Labs      10/15/17   2354  10/17/17   0304  10/18/17   0344   WBC  5.9  6.1  8.3   NEUTSPOLYS  35.90*   --    --    LYMPHOCYTES  35.90   --    --    MONOCYTES  10.20   --    --    EOSINOPHILS  13.70*   --    --    BASOPHILS  4.30*   --    --      Assessment/Plan     Hyponatremia  - Na of 107 and AMS on admission   - likely 2/2 SIADH  - continue salt tabs 3g TID  - cont fluid restriction 1200cc/day  - AMS resolved, no focal neurological deficits, GCS stable, d/c neuro checks  - Na downtrending, possibly 2/2 fluid restriction order dropping off.  Will restart restriction and continue to monitor  - PT/OT: PT 1-2 more sessions prior to d/c; OT 3 times per week      Lung mass  - Hx of smoking >20 years, 10 pack-year.   - Cough, recent weight loss and low appetite.  - CT chest: 3.6x5.9 cm paratracheal mass compressing on SVC and 2x1.8 cm R upper lobe mass (9/30)  - Highly suspicious for malignant etiology  - s/p IR biopsy of RUL c/w with PNA  - BAL (10/11): few  RBCs and rare Gram+/Gram- cocci  - s/p OR biopsy of paratracheal mass (10/11), pathology inconclusive  - MRI Brain unremarkable  - Repeat Mediastinoscopy (10/17) & biopsy; f/u pathology    Chronic alcohol use  - Consumes 1 beer daily   - BAL: 0.01 on admission   - PO thiamine, B12 and folic acid supplements   - No signs of EtOH withdrawal; CTM    Fall  - GLF with no reported syncope.  - CT head on admission from other facility negative for bleeding or intracranial pathology  - No evidence of neurological deficits   - CTM    Disorder of electrolytes  - monitor electrolytes  - replete as necessary    Bacteremia  - blood cultures on admission positive for Strep pneumo  - Repeat blood cultures negative 10/1, 10/2, 10/3  - afebrile  - s/p PO Augmentin BID (10/9-10/14)      Body mass index (BMI) 19.9 or less, adult  - Possibly 2/2 homelessness vs. Unknown malignancy  - Exhibits good appetite  - Nutrition following    Morning headache  - Mild AM headaches relieved with Tylenol  - 2/2 sleep apnea vs. Withdrawal headaches  - questionable history of sleep apnea  - Patient admits to drinking a lot of coffee at home  - nocturnal oximetry study  - encourage coffee to see if headaches resolve

## 2017-10-18 NOTE — CARE PLAN
Problem: Discharge Barriers/Planning  Goal: Patient's continuum of care needs will be met    Intervention: Assess potential discharge barriers on admission and throughout hospital stay  POC discussed with pt. All questions answered at bedside.       Problem: Mobility  Goal: Risk for activity intolerance will decrease    Intervention: Assess and monitor signs of activity intolerance  Pt ambulating hallways and steady. Getting OOB frequently and for all meals.

## 2017-10-18 NOTE — CARE PLAN
Problem: Mobility  Goal: Risk for activity intolerance will decrease  Outcome: PROGRESSING AS EXPECTED  Patient ambulated in room without difficulty to bathroom. Patient refuses bed alarm. Patient educated on importance of calling if feeling weak or dizzy and to call for assistance. Patient verbalizes understanding. Call light and patient belongings within reach of patient.

## 2017-10-19 ENCOUNTER — APPOINTMENT (OUTPATIENT)
Dept: RADIOLOGY | Facility: MEDICAL CENTER | Age: 57
DRG: 166 | End: 2017-10-19
Attending: STUDENT IN AN ORGANIZED HEALTH CARE EDUCATION/TRAINING PROGRAM
Payer: COMMERCIAL

## 2017-10-19 PROBLEM — C34.10 LUNG CANCER, UPPER LOBE (HCC): Status: ACTIVE | Noted: 2017-10-19

## 2017-10-19 LAB
ANION GAP SERPL CALC-SCNC: 9 MMOL/L (ref 0–11.9)
BASOPHILS # BLD AUTO: 1 % (ref 0–1.8)
BASOPHILS # BLD: 0.08 K/UL (ref 0–0.12)
BUN SERPL-MCNC: 9 MG/DL (ref 8–22)
CALCIUM SERPL-MCNC: 9.4 MG/DL (ref 8.5–10.5)
CHLORIDE SERPL-SCNC: 97 MMOL/L (ref 96–112)
CO2 SERPL-SCNC: 25 MMOL/L (ref 20–33)
CREAT SERPL-MCNC: 0.44 MG/DL (ref 0.5–1.4)
EOSINOPHIL # BLD AUTO: 0.55 K/UL (ref 0–0.51)
EOSINOPHIL NFR BLD: 6.8 % (ref 0–6.9)
ERYTHROCYTE [DISTWIDTH] IN BLOOD BY AUTOMATED COUNT: 42.2 FL (ref 35.9–50)
GFR SERPL CREATININE-BSD FRML MDRD: >60 ML/MIN/1.73 M 2
GLUCOSE SERPL-MCNC: 90 MG/DL (ref 65–99)
HCT VFR BLD AUTO: 33.6 % (ref 42–52)
HGB BLD-MCNC: 11.9 G/DL (ref 14–18)
IMM GRANULOCYTES # BLD AUTO: 0.03 K/UL (ref 0–0.11)
IMM GRANULOCYTES NFR BLD AUTO: 0.4 % (ref 0–0.9)
LYMPHOCYTES # BLD AUTO: 2.1 K/UL (ref 1–4.8)
LYMPHOCYTES NFR BLD: 25.9 % (ref 22–41)
MCH RBC QN AUTO: 34.3 PG (ref 27–33)
MCHC RBC AUTO-ENTMCNC: 35.4 G/DL (ref 33.7–35.3)
MCV RBC AUTO: 96.8 FL (ref 81.4–97.8)
MONOCYTES # BLD AUTO: 1.2 K/UL (ref 0–0.85)
MONOCYTES NFR BLD AUTO: 14.8 % (ref 0–13.4)
NEUTROPHILS # BLD AUTO: 4.15 K/UL (ref 1.82–7.42)
NEUTROPHILS NFR BLD: 51.1 % (ref 44–72)
NRBC # BLD AUTO: 0 K/UL
NRBC BLD AUTO-RTO: 0 /100 WBC
PLATELET # BLD AUTO: 604 K/UL (ref 164–446)
PMV BLD AUTO: 8.9 FL (ref 9–12.9)
POTASSIUM SERPL-SCNC: 4.4 MMOL/L (ref 3.6–5.5)
RBC # BLD AUTO: 3.47 M/UL (ref 4.7–6.1)
SODIUM SERPL-SCNC: 131 MMOL/L (ref 135–145)
WBC # BLD AUTO: 8.1 K/UL (ref 4.8–10.8)

## 2017-10-19 PROCEDURE — 700102 HCHG RX REV CODE 250 W/ 637 OVERRIDE(OP): Performed by: INTERNAL MEDICINE

## 2017-10-19 PROCEDURE — 80048 BASIC METABOLIC PNL TOTAL CA: CPT

## 2017-10-19 PROCEDURE — A9270 NON-COVERED ITEM OR SERVICE: HCPCS | Performed by: STUDENT IN AN ORGANIZED HEALTH CARE EDUCATION/TRAINING PROGRAM

## 2017-10-19 PROCEDURE — A9270 NON-COVERED ITEM OR SERVICE: HCPCS | Performed by: INTERNAL MEDICINE

## 2017-10-19 PROCEDURE — 700117 HCHG RX CONTRAST REV CODE 255: Performed by: HOSPITALIST

## 2017-10-19 PROCEDURE — 99232 SBSQ HOSP IP/OBS MODERATE 35: CPT | Mod: GC | Performed by: HOSPITALIST

## 2017-10-19 PROCEDURE — 700102 HCHG RX REV CODE 250 W/ 637 OVERRIDE(OP): Performed by: STUDENT IN AN ORGANIZED HEALTH CARE EDUCATION/TRAINING PROGRAM

## 2017-10-19 PROCEDURE — 770006 HCHG ROOM/CARE - MED/SURG/GYN SEMI*

## 2017-10-19 PROCEDURE — 74177 CT ABD & PELVIS W/CONTRAST: CPT

## 2017-10-19 PROCEDURE — 85025 COMPLETE CBC W/AUTO DIFF WBC: CPT

## 2017-10-19 PROCEDURE — 700111 HCHG RX REV CODE 636 W/ 250 OVERRIDE (IP): Performed by: STUDENT IN AN ORGANIZED HEALTH CARE EDUCATION/TRAINING PROGRAM

## 2017-10-19 PROCEDURE — 36415 COLL VENOUS BLD VENIPUNCTURE: CPT

## 2017-10-19 RX ADMIN — HEPARIN SODIUM 5000 UNITS: 5000 INJECTION, SOLUTION INTRAVENOUS; SUBCUTANEOUS at 06:00

## 2017-10-19 RX ADMIN — MIDODRINE HYDROCHLORIDE 5 MG: 5 TABLET ORAL at 12:05

## 2017-10-19 RX ADMIN — THIAMINE HCL TAB 100 MG 100 MG: 100 TAB at 09:19

## 2017-10-19 RX ADMIN — FOLIC ACID 1 MG: 1 TABLET ORAL at 09:18

## 2017-10-19 RX ADMIN — IOHEXOL 50 ML: 240 INJECTION, SOLUTION INTRATHECAL; INTRAVASCULAR; INTRAVENOUS; ORAL at 21:45

## 2017-10-19 RX ADMIN — THERA TABS 1 TABLET: TAB at 09:18

## 2017-10-19 RX ADMIN — SODIUM CHLORIDE TAB 1 GM 3 G: 1 TAB at 18:13

## 2017-10-19 RX ADMIN — HEPARIN SODIUM 5000 UNITS: 5000 INJECTION, SOLUTION INTRAVENOUS; SUBCUTANEOUS at 20:46

## 2017-10-19 RX ADMIN — SODIUM CHLORIDE TAB 1 GM 3 G: 1 TAB at 12:05

## 2017-10-19 RX ADMIN — VITAMIN D, TAB 1000IU (100/BT) 5000 UNITS: 25 TAB at 09:18

## 2017-10-19 RX ADMIN — IOHEXOL 90 ML: 350 INJECTION, SOLUTION INTRAVENOUS at 21:45

## 2017-10-19 RX ADMIN — MIDODRINE HYDROCHLORIDE 5 MG: 5 TABLET ORAL at 09:18

## 2017-10-19 RX ADMIN — MIDODRINE HYDROCHLORIDE 5 MG: 5 TABLET ORAL at 18:13

## 2017-10-19 RX ADMIN — SODIUM CHLORIDE TAB 1 GM 3 G: 1 TAB at 09:18

## 2017-10-19 ASSESSMENT — ENCOUNTER SYMPTOMS
HEMOPTYSIS: 0
WEAKNESS: 0
DIARRHEA: 0
WHEEZING: 0
TINGLING: 1
COUGH: 0
HEADACHES: 0
CONSTIPATION: 0
FOCAL WEAKNESS: 0
HEADACHES: 1
MYALGIAS: 0
SHORTNESS OF BREATH: 0
SPUTUM PRODUCTION: 0
PALPITATIONS: 0
ABDOMINAL PAIN: 0
FEVER: 0
DIZZINESS: 0
CHILLS: 0
VOMITING: 0
NAUSEA: 0
BLURRED VISION: 0
WEIGHT LOSS: 1
EYES NEGATIVE: 1
HEARTBURN: 0
DOUBLE VISION: 0

## 2017-10-19 ASSESSMENT — PAIN SCALES - GENERAL
PAINLEVEL_OUTOF10: 0
PAINLEVEL_OUTOF10: 0

## 2017-10-19 NOTE — CARE PLAN
Problem: Communication  Goal: The ability to communicate needs accurately and effectively will improve  Discussed POC, pt communicates questions and poc well. Pt and family understand poc.    Problem: Safety  Goal: Will remain free from injury  Educated on safety measures, using call light ect    Problem: Infection  Goal: Will remain free from infection  Assessed for infx risk, no infx signs

## 2017-10-19 NOTE — PROGRESS NOTES
Oncology/Hematology Progress Note               Author: Apollo Vickers Date & Time created: 10/19/2017  3:24 PM     Interval History: Small Cell Lung Cancer Stage IIIA  10/19?17- Mediastinoscopy - precarinal node  Pos    Review of Systems:  Review of Systems   All other systems reviewed and are negative.      Physical Exam:  Physical Exam   Constitutional: He is oriented to person, place, and time. He appears well-developed and well-nourished.   HENT:   Head: Normocephalic.   Mouth/Throat: Oropharynx is clear and moist.   Eyes: Conjunctivae and EOM are normal.   Cardiovascular: Normal rate and regular rhythm.    Pulmonary/Chest: Effort normal and breath sounds normal.   Abdominal: Soft.   Musculoskeletal: Normal range of motion.   Neurological: He is alert and oriented to person, place, and time.       Labs:        Invalid input(s): [id number]      Recent Labs      10/17/17   0304  10/18/17   0345  10/19/17   0238   SODIUM  129*  127*  131*   POTASSIUM  3.9  4.2  4.4   CHLORIDE  99  94*  97   CO2  23  25  25   BUN  7*  9  9   CREATININE  0.40*  0.46*  0.44*   CALCIUM  9.3  9.1  9.4     Recent Labs      10/17/17   0304  10/18/17   0345  10/19/17   023   GLUCOSE  81  88  90     Recent Labs      10/17/17   0304  10/18/17   0344  10/19/17   0238   RBC  3.40*  3.53*  3.47*   HEMOGLOBIN  11.5*  12.1*  11.9*   HEMATOCRIT  32.5*  33.6*  33.6*   PLATELETCT  589*  611*  604*     Recent Labs      10/17/17   0304  10/18/17   0344  10/19/17   0238   WBC  6.1  8.3  8.1   NEUTSPOLYS   --    --   51.10   LYMPHOCYTES   --    --   25.90   MONOCYTES   --    --   14.80*   EOSINOPHILS   --    --   6.80   BASOPHILS   --    --   1.00     Recent Labs      10/17/17   0304  10/18/17   0345  10/19/17   0238   SODIUM  129*  127*  131*   POTASSIUM  3.9  4.2  4.4   CHLORIDE  99  94*  97   CO2  23  25  25   GLUCOSE  81  88  90   BUN  7*  9  9   CREATININE  0.40*  0.46*  0.44*   CALCIUM  9.3  9.1  9.4     Hemodynamics:  Temp (24hrs), Av.7  °C (98.1 °F), Min:36.5 °C (97.7 °F), Max:37 °C (98.6 °F)  Temperature: 36.6 °C (97.8 °F)  Pulse  Av.4  Min: 64  Max: 99   Blood Pressure: 128/71     Respiratory:    Respiration: 17, Pulse Oximetry: 96 %        RUL Breath Sounds: Clear, RML Breath Sounds: Clear, RLL Breath Sounds: Diminished, LEONARDO Breath Sounds: Clear, LLL Breath Sounds: Diminished  Fluids:    Intake/Output Summary (Last 24 hours) at 10/19/17 1524  Last data filed at 10/19/17 1300   Gross per 24 hour   Intake              761 ml   Output             1450 ml   Net             -689 ml        GI/Nutrition:  Orders Placed This Encounter   Procedures   • DIET ORDER     Standing Status:   Standing     Number of Occurrences:   1     Order Specific Question:   Diet:     Answer:   Regular [1]     Order Specific Question:   Consistency/Fluid modifications:     Answer:   1200 ml Fluid Restriction [8]     Medical Decision Making, by Problem:  Active Hospital Problems    Diagnosis   • *Hyponatremia [E87.1]   • Lung mass [R91.8]   • Disorder of electrolytes [E87.8]   • Fall [W19.XXXA]   • Chronic alcohol use [F10.10]   • Body mass index (BMI) 19.9 or less, adult [Z68.1]   • Morning headache [R51]   • Bacteremia [R78.81]       Plan:  1. Stage IIIA Small cell lung cancer   just diagnosed on precarinal node biopsy  MRI brain negative  CT Abdomen/Pelvis will be ordered  Will call radiation therapy and transfer to oncology for chemotherapy. I discussed the diagnosis with the patient and treatment with combined modality chemoradiation. I assume his CT abdomen will be neg but he has SVC compression but not syndrome.     2. SIADH - controlled    3. Long term tobacco abuse    Quality-Core Measures

## 2017-10-19 NOTE — PROGRESS NOTES
"Progress Note:  10/19/2017, 9:46 AM    S: No acute events, afebrile, pain improved, eating.     O:  Blood pressure 128/71, pulse 76, temperature 36.6 °C (97.8 °F), resp. rate 17, height 1.778 m (5' 10\"), weight 57.3 kg (126 lb 5.2 oz), SpO2 96 %.    NAD, awake and alert  Breathing without difficulty  Neck incision clean, dry, intact        Pathology:  FINAL DIAGNOSIS:    A. Precarinal lymph nodes:         One lymph node demonstrates metastatic small cell carcinoma with          extensive crush artifact distortion, within fibroadipose tissue          adjacent to the lymph node.         A second fragment of fibroadipose tissue demonstrates focal          involvement by metastatic small cell carcinoma showing          extensive crush artifact distortion.  B. Level 7 node:         One lymph node demonstrates focal weak positive staining for          cytokeratin within crushed cellular tissue at the periphery of          the anthracotic lymph node, suspicious for metastatic small          cell carcinoma.         A second lymph node demonstrates marked cautery artifact          distortion at the periphery of the lymph node. There is weak          equivocal staining for cytokeratin within the cauterized tissue          at the periphery of the lymph node. The synaptophysin stain is          negative.         See comment.    Comment: The patient's chest CT scan demonstrated a right paratracheal  mass measuring 3.6 x 5.9 cm which causes mass effect on the SVC and  trachea with mild tracheal deviation to the left. There is subcarinal  and right hilar lymphadenopathy. A 2 x 1.8 cm right upper lobe nodule  is also identified and may be necrotic and is worrisome for malignancy.  The patient is a current every day smoker who presents with  hyponatremia and approximately 40 pound weight loss in the last three  months. The patient's previous Station 10 right side lung mass EBUS  procedure demonstrated rare clusters of atypical cells " showing weak  synaptophysin positivity, suspicious for small cell carcinoma. The  current precarinal and level 7 lymph node biopsies demonstrate a total  of three lymph nodes and portions of surrounding fibroadipose tissue  demonstrating focal areas of metastatic small cell carcinoma showing  extensive crush artifact distortion.    A:   Active Hospital Problems    Diagnosis   • Hyponatremia [E87.1]     Priority: High   • Lung mass [R91.8]     Priority: High   • Disorder of electrolytes [E87.8]     Priority: Medium   • Fall [W19.XXXA]     Priority: Medium   • Chronic alcohol use [F10.10]     Priority: Low   • Body mass index (BMI) 19.9 or less, adult [Z68.1]   • Morning headache [R51]   • Bacteremia [R78.81]         P:   Pathology showing metastatic small cell lung cancer  Further management by primary team and oncology   Doing well from surgical standpoint  May shower  Patient may follow up with me PRN for wound care questions, contact info given  I will sign off at this time  Please call for further questions or concerns    Benjamin Caraballo M.D.  [facility]  [phone]

## 2017-10-19 NOTE — PROGRESS NOTES
Nataly Tamayo Fall Risk Assessment:     Last Known Fall: Within the last month  Mobility: No limitations  Medications: No meds  Mental Status/LOC/Awareness: Awake, alert, and oriented to date, place, and person  Toileting Needs: Use of assistive device (Bedside commode, bedpan, urinal)  Volume/Electrolyte Status: Low blood sugar/electrolyte imbalances  Communication/Sensory: No deficits  Behavior: Appropriate behavior  Nataly Tamayo Fall Risk Total: 11  Fall Risk Level: MODERATE RISK     Universal Fall Precautions:  call light/belongings in reach, bed in low position and locked     Fall Risk Level Interventions:   TRIAL ([employer] 8, NEURO, MED BEN 5) Low Fall Risk Interventions  Place yellow fall risk ID band on patient: verified  Provide patient/family education based on risk assessment: completed  Educate patient/family to call staff for assistance when getting out of bed: completed  Place fall precaution signage outside patient door: verifiedTRIAL ([employer] 8, NEURO, MED BEN 5) Moderate Fall Risk Interventions  Place yellow fall risk ID band on patient: verified  Provide patient/family education based on risk assessment : completed  Educate patient/family to call staff for assistance when getting out of bed: completed  Place fall precaution signage outside patient door: verified  Utilize bed/chair fall alarm: verifiedTRIAL ([employer] 8, NEURO, [place] BEN 5) High Fall Risk Interventions  Place yellow fall risk ID band on patient: verified  Provide patient/family education based on risk assessment: completed  Educate patient/family to call staff for assistance when getting out of bed: completed  Place fall precaution signage outside patient door: completed  Place patient in room close to nursing station: currently not available/charge notified  Utilize bed/chair fall alarm: verified  Notify charge of high risk for huddle: completed     Patient Specific Interventions:      Medication: review medications with patient and  family and assess for medications that can be discontinued or dosage decreased  Mental Status/LOC/Awareness: reinforce falls education, check on patient hourly and provide activity  Toileting: monitor intake and output/use of appropriate interventions  Volume/Electrolyte Status: ensure patient remains hydrated  Communication/Sensory: update plan of care on whiteboard  Behavioral: encourage patient to voice feelings  Mobility: instruct patient to exit bed on their strongest side

## 2017-10-19 NOTE — PROGRESS NOTES
Pulmonary Medicine Interval Note    Name Froylan Spain     1960   Age/Sex 57 y.o. male   MRN [id number]   Code Status Full       Attending/Team: Dr. Madrigal / Pulmonary         Reason for interval visit  (Principal Problem)   Hyponatremia   Lung/Mediastinal masses    ID: Patient is a 57-year-old male admitted following a ground level fall and was found to be severely hyponatremic. Further workup showed a right sided paratracheal and upper lobe lung masses concerning for malignancy in the setting of long-standing smoking history. CAT scan also showed some infiltrates and blood cultures were positive for strep pneumonia.    Interval Problem Daily Status Update  (24 hours)   Patient reported feeling fine. Denied any shortness of breath. Mild chest pain likely related to the procedure. Sodium 127 this morning. On sodium tabs. Cervical Mediastinoscopy performed yesterday. No complications. Pathology results pending.    Review of Systems   Constitutional: Positive for weight loss. Negative for chills, fever and malaise/fatigue.   HENT: Negative for congestion.    Eyes: Negative for blurred vision and double vision.   Respiratory: Negative for cough, hemoptysis, sputum production, shortness of breath and wheezing.    Cardiovascular: Negative for chest pain, palpitations and leg swelling.   Gastrointestinal: Negative for constipation, heartburn and nausea.   Genitourinary: Negative for dysuria and urgency.   Musculoskeletal: Negative for myalgias.   Skin: Negative for itching and rash.   Neurological: Negative for dizziness, focal weakness, weakness and headaches.         Quality Measures    Reviewed items::  EKG reviewed, Labs reviewed, Medications reviewed and Radiology images reviewed          Physical Exam       Vitals:    10/18/17 1600 10/18/17 1935 10/19/17 0419 10/19/17 0755   BP: 136/82 126/71 123/81 128/71   Pulse: 79 86 78 76   Resp:    Temp: 36.5 °C (97.7 °F) 36.8 °C (98.3 °F) 37 °C (98.6  °F) 36.6 °C (97.8 °F)   SpO2: 99% 96% 96% 96%   Weight:       Height:         Body mass index is 18.13 kg/m².    Oxygen Therapy:  Pulse Oximetry: 96 %, O2 (LPM): 0, O2 Delivery: None (Room Air)    Physical Exam   Constitutional: He is oriented to person, place, and time.   Cachectic   HENT:   Head: Atraumatic.   Temporal wasting   Eyes: Conjunctivae are normal. Pupils are equal, round, and reactive to light.   Neck: Neck supple.   Cardiovascular: Normal rate, regular rhythm and normal heart sounds.    No murmur heard.  Pulmonary/Chest: Effort normal. He has no wheezes.   Abdominal: Soft. He exhibits no distension. There is no tenderness.   Musculoskeletal: Normal range of motion. He exhibits no edema or deformity.   Lymphadenopathy:     He has no cervical adenopathy.   Neurological: He is alert and oriented to person, place, and time.   Skin: Skin is warm and dry.         Lab Data Review:         10/4/2017  10:44 AM    Recent Labs      10/17/17   0304  10/18/17   0345  10/19/17   0238   SODIUM  129*  127*  131*   POTASSIUM  3.9  4.2  4.4   CHLORIDE  99  94*  97   CO2  23  25  25   BUN  7*  9  9   CREATININE  0.40*  0.46*  0.44*   CALCIUM  9.3  9.1  9.4       Recent Labs      10/17/17   0304  10/18/17   0345  10/19/17   0238   GLUCOSE  81  88  90       Recent Labs      10/17/17   0304  10/18/17   0344  10/19/17   0238   RBC  3.40*  3.53*  3.47*   HEMOGLOBIN  11.5*  12.1*  11.9*   HEMATOCRIT  32.5*  33.6*  33.6*   PLATELETCT  589*  611*  604*       Recent Labs      10/17/17   0304  10/18/17   0344  10/19/17   0238   WBC  6.1  8.3  8.1   NEUTSPOLYS   --    --   51.10   LYMPHOCYTES   --    --   25.90   MONOCYTES   --    --   14.80*   EOSINOPHILS   --    --   6.80   BASOPHILS   --    --   1.00           Assessment/Plan   Patient is a 57-year-old male with history of homelessness and chronic tobacco use presents with a ground-level fall found to be severely hyponatremic and Imaging showed concerning right-sided lung  masses. Pulmonary medicine asked to evaluate the patient regarding the lung masses. A tissue diagnosis is definitely required in this situation. IR biopsy of the right upper lobe mass is nondiagnostic showing fibrous tissue and no evidence of malignancy.  OR bronchoscopy performed. Bedside microscopy indicated possible malignant cells. However, final path with no evidence of malignancy    Lung mass  Hyponatremia secondary to SIADH  Strep bacteremia  Community acquired pneumonia  Chronic alcohol use  Chronic tobacco use    Recommend:  - S/P bronchoscopy and transbronchial biopsy  - Pathology results indicate SC Lung Ca  - Oncology consult with recommendation of MRI brain that was negative for metastasis  -Mediastinoscopy performed today with findings of enlarged subcarinal nodes, diffuse dense fibrous tissue mass to Right anterior of trachea. Biopsy performed.   -Finished a course of antibiotics  -RT and OT treatments per protocols  -Hyponatremia management per primary team.   -Pt has a brother Benjamin Spain in [place] who he would want contacted if needed and could make decisions for him if the pt becomes unable.    We will s/o

## 2017-10-19 NOTE — NON-PROVIDER
Internal Medicine Medical Student Note    Name Froylan Spain     1960   Age/Sex 57 y.o. male   MRN [id number]   Code Status FULL     After 5PM or if no immediate response to page, please call for cross-coverage  Attending/Team: Dr. Tolentino (Purple/Green) See Patient List for primary contact information  Call [phone] to page after hours   1st Call - Day Intern (R1):   Dr. Snell 2nd Call - Day Sr. Resident (R2/R3):   Dr. Bianchi         Reason for interval visit  (Principal Problem)   Hyponatremia    Interval Problem Daily Status Update  (24 hours)   Mr. Spain did well overnight and nursing notes no acute events.  The patient feels that his chest pain following the surgery has improved.  He denies SOB, increasing chest pain, nausea, vomiting, dysphagia or dysphonia. The patient does not note any changes in consciousness, and denies any seizures.    Heme/Onc will continue to follow the patient and determine management for the metastatic small cell carcinoma. Rad-onc will likely also be consulted regarding their opinion of whether or not to radiate the large paratracheal mass and if the patient would benefit from this.    Thoracic surgery will sign off at this time, and from their standpoint the patient is clear to shower.    Pulmonary will continue to follow, no change in management from their perspective.    ROS  Constitutional: Negative for chills, fever and malaise/fatigue. Headaches noted by patient  Eyes: Pupils equal, round, left reactive to light and accomodating, but no consensual light reflex.  Right eye is normal.   Respiratory: Positive for cough (Patient states that this has been present for 10 years). Negative for hemoptysis, sputum production and shortness of breath.    Cardiovascular: Negative.  Negative for chest pain, palpitations, orthopnea and leg swelling.   Gastrointestinal: Negative for abdominal pain, constipation, diarrhea, heartburn, nausea and vomiting.    Genitourinary: Negative.    Musculoskeletal: Negative.    Skin: Negative.    Neurological: Negative for sensory change, speech change, loss of consciousness, weakness and headaches.   Endo/Heme/Allergies: Negative.    Psychiatric/Behavioral: Negative.       Current Facility-Administered Medications:   •  ibuprofen (MOTRIN) tablet 600 mg, 600 mg, Oral, Q6HRS PRN, Benjamin Caraballo M.D., 600 mg at 10/18/17 1259  •  heparin injection 5,000 Units, 5,000 Units, Subcutaneous, Q8HRS, Christie Snell M.D., 5,000 Units at 10/18/17 0527  •  lactated ringers infusion, , Intravenous, Continuous, Chaya Gibson M.D., Last Rate: 10 mL/hr at 10/11/17 1300  •  midodrine (PROAMATINE) tablet 5 mg, 5 mg, Oral, TID WITH MEALS, Mary Gale M.D., 5 mg at 10/18/17 1757  •  sodium chloride (SALT) tablet 3 g, 3 g, Oral, TID WITH MEALS, Mary Gale M.D., 3 g at 10/18/17 1758  •  vitamin D (cholecalciferol) tablet 5,000 Units, 5,000 Units, Oral, DAILY, Lilly Hood M.D., 5,000 Units at 10/18/17 0800  •  multivitamin (THERAGRAN) tablet 1 Tab, 1 Tab, Oral, DAILY, Trell Curry M.D., 1 Tab at 10/18/17 0802  •  thiamine (THIAMINE) tablet 100 mg, 100 mg, Oral, DAILY, Trell Curry M.D., 100 mg at 10/18/17 0802  •  folic acid (FOLVITE) tablet 1 mg, 1 mg, Oral, DAILY, Trell Curry M.D., 1 mg at 10/18/17 0802  •  senna-docusate (PERICOLACE or SENOKOT S) 8.6-50 MG per tablet 2 Tab, 2 Tab, Oral, BID, 2 Tab at 10/14/17 2057 **AND** polyethylene glycol/lytes (MIRALAX) PACKET 1 Packet, 1 Packet, Oral, QDAY PRN **AND** magnesium hydroxide (MILK OF MAGNESIA) suspension 30 mL, 30 mL, Oral, QDAY PRN **AND** bisacodyl (DULCOLAX) suppository 10 mg, 10 mg, Rectal, QDAY PRN, Jannet Son M.D.  •  Respiratory Care per Protocol, , Nebulization, Continuous RT, Jannet Son M.D.  •  labetalol (NORMODYNE,TRANDATE) injection 10 mg, 10 mg, Intravenous, Q4HRS PRN, Jannet Sno M.D.  •  ondansetron (ZOFRAN) syringe/vial  injection 4 mg, 4 mg, Intravenous, Q4HRS PRN, Jannet Son M.D.  •  ondansetron (ZOFRAN ODT) dispertab 4 mg, 4 mg, Oral, Q4HRS PRN, Jannet Son M.D.  •  promethazine (PHENERGAN) tablet 12.5-25 mg, 12.5-25 mg, Oral, Q4HRS PRN, Jannet Son M.D.  •  promethazine (PHENERGAN) suppository 12.5-25 mg, 12.5-25 mg, Rectal, Q4HRS PRN, Jannet Son M.D.  •  prochlorperazine (COMPAZINE) injection 5-10 mg, 5-10 mg, Intravenous, Q4HRS PRN, Jannet Son M.D.  •  INITIATE NICOTINE REPLACEMENT PROTOCOL , , , Once **AND** nicotine (NICODERM) 21 MG/24HR 21 mg, 21 mg, Transdermal, Daily-0600, 21 mg at 10/18/17 0527 **AND** Protocol 205 PATIENT EDUCATION MATERIALS, , , Once **AND** Protocol 205 Rotate nicotine patch application sites daily , , , CONTINUOUS **AND** nicotine polacrilex (NICORETTE) 2 MG piece 2 mg, 2 mg, Oral, Q HOUR PRN, Jannet Son M.D.  •  guaifenesin dextromethorphan (ROBITUSSIN DM) 100-10 MG/5ML syrup 10 mL, 10 mL, Oral, Q6HRS PRN, Jannet Son M.D.  •  acetaminophen (TYLENOL) tablet 650 mg, 650 mg, Oral, Q6HRS PRN, Jannet Son M.D., 650 mg at 10/18/17 0804      Physical Exam       Vitals:    10/18/17 0740 10/18/17 1600 10/18/17 1935 10/19/17 0419   BP: 136/72 136/82 126/71 123/81   Pulse: 78 79 86 78   Resp: 17 16 16 17   Temp: 36.4 °C (97.5 °F) 36.5 °C (97.7 °F) 36.8 °C (98.3 °F) 37 °C (98.6 °F)   SpO2: 98% 99% 96% 96%   Weight:       Height:         Body mass index is 18.13 kg/m².    Oxygen Therapy:  Pulse Oximetry: 96 %, O2 (LPM): 0, O2 Delivery: None (Room Air)    Physical Exam  Constitutional: He is oriented to person, place, and time. No distress.   57-year-old disheveled male appearing older than stated age.   HENT:   Head: Normocephalic and atraumatic.   Mouth/Throat: No oropharyngeal exudate.   Eyes: EOM are normal.   Neck: Normal range of motion.   Cardiovascular: Normal rate, regular rhythm and intact distal pulses.  Exam reveals no gallop and no friction  rub.    No murmur heard.  Pulmonary/Chest: Effort normal and breath sounds normal. No respiratory distress. He has no wheezes. He has no rales. He exhibits no tenderness.   Abdominal: Soft. Bowel sounds are normal. He exhibits no distension. There is no tenderness. There is no rebound and no guarding.   Genitourinary:   Genitourinary Comments: deferred   Musculoskeletal: Normal range of motion. He exhibits no edema, tenderness or deformity.   Neurological: He is alert and oriented to person, place, and time. He has a normal Cerebellar Exam and a normal Romberg Test. GCS score is 15.   Skin: Skin is warm and dry. No rash noted. No erythema.   Patient has areas of cracking/peeling skin on fingers and hands   Psychiatric: Mood, memory, affect and judgment normal.    Pathology:  3.6 x 5.9 cm Paratracheal Mass  FINAL DIAGNOSIS:    A. Precarinal lymph nodes:         One lymph node demonstrates metastatic small cell carcinoma with          extensive crush artifact distortion, within fibroadipose tissue          adjacent to the lymph node.         A second fragment of fibroadipose tissue demonstrates focal          involvement by metastatic small cell carcinoma showing          extensive crush artifact distortion.  B. Level 7 node:         One lymph node demonstrates focal weak positive staining for          cytokeratin within crushed cellular tissue at the periphery of          the anthracotic lymph node, suspicious for metastatic small          cell carcinoma.         A second lymph node demonstrates marked cautery artifact          distortion at the periphery of the lymph node. There is weak          equivocal staining for cytokeratin within the cauterized tissue          at the periphery of the lymph node. The synaptophysin stain is          negative.         See comment.    TNM: Extensive Disease    Assessment/Plan     # Hyponatremia  - Na: 107 at time of admission  - Na: 131 (10/19), continuing to trend down  - No  altered mental status at this time, significant improvement from time of admission  - No evidence of seizure noted since time of admission.  - Percutaneous CT-guided IR biopsy did not demonstrate evidence of malignancy which would be consistent with SIADH  - s/p bronchoscopy and transbronchial biopsy  - Paratracheal mass biopsy: metastatic small cell carcinoma  PLAN:  - Fluid restriction <1200mL/day  - Daily CMP  - Continue NaCl supplementation, 3 tabs TID  - Pulmonary will follow  - Waiting for final pathology results from paratracheal mass     # Lung Mass  - CT report notes paratracheal mass measuring 3.6x5.9cm as well as 2.0x1.8cm lession in right upper lobe.  - Smoking history and hyponatremia increase suspicion that this is a malignant process with paraneoplastic syndrome.  - Percutaneous Biopsy Result: marked mixed inflammation,          granulation tissue, fibrosis and necrosis consistent with          organizing pneumonia.  - s/p bronchoscopy and transbronchial biopsy  - s/p mediastinoscopy  - Paratracheal mass biopsy: metastatic small cell carcinoma  - Brain MRI requested by Dr. Antonio on 10/14 demonstrates no evidence of intracranial metastatic lesions.  PLAN:  - Oncology will follow  - Consult Rad/Onc regarding intiating radiation therapy.  - Norco for pain management  - CXR if pain continues into PM  - d/c heparin, initiate SCDs for DVT prophylaxis.  - Continue management of SIADH symptoms/hyponatremia  - Heme/onc will follow  - Pulm will follow     # Headaches  - Patient has noted that he continues to wake up with moderate headaches each day  - No hx of sleep apnea, but given hx there is a concern for this as the cause.  - prior to admission, patient did not c/o headache  - Patient continues to c/o  PLAN:  - Nocturnal pulse oximetry monitoring for desatting  - If patient demonstrates apneic episodes consider use of cpap      # Bacteremia - Resolved  - Blood cultures positive for strep pneumo in ICU  (10/1)  - Augmentin continued for bacteremia  - Blood cultures negative  - Bronchial washing culture: NGTD  - Patient afebrile since temp of 100.5  PLAN:  - Monitor for s/s of sepsis, elevated temperatures     # Chronic Alcohol Use - stable  - Patient states that he has 2-3 drinks/week.  - No s/s of acute alcohol withdrawal.  PLAN:  - Problem stable

## 2017-10-20 DIAGNOSIS — C34.92 SMALL CELL CARCINOMA OF LEFT LUNG (HCC): ICD-10-CM

## 2017-10-20 PROBLEM — D75.839 THROMBOCYTOSIS: Status: ACTIVE | Noted: 2017-10-20

## 2017-10-20 LAB
ALBUMIN SERPL BCP-MCNC: 3.3 G/DL (ref 3.2–4.9)
ALBUMIN/GLOB SERPL: 1.1 G/DL
ALP SERPL-CCNC: 77 U/L (ref 30–99)
ALT SERPL-CCNC: 12 U/L (ref 2–50)
ANION GAP SERPL CALC-SCNC: 7 MMOL/L (ref 0–11.9)
AST SERPL-CCNC: 15 U/L (ref 12–45)
BILIRUB SERPL-MCNC: 0.2 MG/DL (ref 0.1–1.5)
BUN SERPL-MCNC: 10 MG/DL (ref 8–22)
CALCIUM SERPL-MCNC: 9.1 MG/DL (ref 8.5–10.5)
CHLORIDE SERPL-SCNC: 98 MMOL/L (ref 96–112)
CO2 SERPL-SCNC: 24 MMOL/L (ref 20–33)
CREAT SERPL-MCNC: 0.37 MG/DL (ref 0.5–1.4)
ERYTHROCYTE [DISTWIDTH] IN BLOOD BY AUTOMATED COUNT: 41.8 FL (ref 35.9–50)
GFR SERPL CREATININE-BSD FRML MDRD: >60 ML/MIN/1.73 M 2
GLOBULIN SER CALC-MCNC: 3 G/DL (ref 1.9–3.5)
GLUCOSE SERPL-MCNC: 97 MG/DL (ref 65–99)
HCT VFR BLD AUTO: 32.2 % (ref 42–52)
HGB BLD-MCNC: 11.4 G/DL (ref 14–18)
MAGNESIUM SERPL-MCNC: 1.7 MG/DL (ref 1.5–2.5)
MCH RBC QN AUTO: 33.8 PG (ref 27–33)
MCHC RBC AUTO-ENTMCNC: 35.4 G/DL (ref 33.7–35.3)
MCV RBC AUTO: 95.5 FL (ref 81.4–97.8)
PHOSPHATE SERPL-MCNC: 4.1 MG/DL (ref 2.5–4.5)
PLATELET # BLD AUTO: 602 K/UL (ref 164–446)
PMV BLD AUTO: 8.8 FL (ref 9–12.9)
POTASSIUM SERPL-SCNC: 4 MMOL/L (ref 3.6–5.5)
PROT SERPL-MCNC: 6.3 G/DL (ref 6–8.2)
RBC # BLD AUTO: 3.37 M/UL (ref 4.7–6.1)
SODIUM SERPL-SCNC: 129 MMOL/L (ref 135–145)
WBC # BLD AUTO: 8.3 K/UL (ref 4.8–10.8)

## 2017-10-20 PROCEDURE — 77470 SPECIAL RADIATION TREATMENT: CPT

## 2017-10-20 PROCEDURE — A9270 NON-COVERED ITEM OR SERVICE: HCPCS | Performed by: INTERNAL MEDICINE

## 2017-10-20 PROCEDURE — A9270 NON-COVERED ITEM OR SERVICE: HCPCS | Performed by: STUDENT IN AN ORGANIZED HEALTH CARE EDUCATION/TRAINING PROGRAM

## 2017-10-20 PROCEDURE — 77470 SPECIAL RADIATION TREATMENT: CPT | Mod: 26 | Performed by: RADIOLOGY

## 2017-10-20 PROCEDURE — 77300 RADIATION THERAPY DOSE PLAN: CPT | Mod: 26 | Performed by: RADIOLOGY

## 2017-10-20 PROCEDURE — 77300 RADIATION THERAPY DOSE PLAN: CPT | Performed by: RADIOLOGY

## 2017-10-20 PROCEDURE — 99223 1ST HOSP IP/OBS HIGH 75: CPT | Mod: 25 | Performed by: RADIOLOGY

## 2017-10-20 PROCEDURE — 77338 DESIGN MLC DEVICE FOR IMRT: CPT | Performed by: RADIOLOGY

## 2017-10-20 PROCEDURE — 77263 THER RADIOLOGY TX PLNG CPLX: CPT | Performed by: RADIOLOGY

## 2017-10-20 PROCEDURE — 700111 HCHG RX REV CODE 636 W/ 250 OVERRIDE (IP): Performed by: STUDENT IN AN ORGANIZED HEALTH CARE EDUCATION/TRAINING PROGRAM

## 2017-10-20 PROCEDURE — 700102 HCHG RX REV CODE 250 W/ 637 OVERRIDE(OP): Performed by: INTERNAL MEDICINE

## 2017-10-20 PROCEDURE — 36415 COLL VENOUS BLD VENIPUNCTURE: CPT

## 2017-10-20 PROCEDURE — 77334 RADIATION TREATMENT AID(S): CPT | Mod: 26,XU | Performed by: RADIOLOGY

## 2017-10-20 PROCEDURE — 77290 THER RAD SIMULAJ FIELD CPLX: CPT

## 2017-10-20 PROCEDURE — 77338 DESIGN MLC DEVICE FOR IMRT: CPT | Mod: 26 | Performed by: RADIOLOGY

## 2017-10-20 PROCEDURE — 700102 HCHG RX REV CODE 250 W/ 637 OVERRIDE(OP): Performed by: STUDENT IN AN ORGANIZED HEALTH CARE EDUCATION/TRAINING PROGRAM

## 2017-10-20 PROCEDURE — 83735 ASSAY OF MAGNESIUM: CPT

## 2017-10-20 PROCEDURE — 770004 HCHG ROOM/CARE - ONCOLOGY PRIVATE *

## 2017-10-20 PROCEDURE — 85027 COMPLETE CBC AUTOMATED: CPT

## 2017-10-20 PROCEDURE — 77301 RADIOTHERAPY DOSE PLAN IMRT: CPT | Mod: 26 | Performed by: RADIOLOGY

## 2017-10-20 PROCEDURE — 84100 ASSAY OF PHOSPHORUS: CPT

## 2017-10-20 PROCEDURE — 77301 RADIOTHERAPY DOSE PLAN IMRT: CPT | Performed by: RADIOLOGY

## 2017-10-20 PROCEDURE — 99232 SBSQ HOSP IP/OBS MODERATE 35: CPT | Mod: GC | Performed by: HOSPITALIST

## 2017-10-20 PROCEDURE — 700105 HCHG RX REV CODE 258: Performed by: STUDENT IN AN ORGANIZED HEALTH CARE EDUCATION/TRAINING PROGRAM

## 2017-10-20 PROCEDURE — 77334 RADIATION TREATMENT AID(S): CPT

## 2017-10-20 PROCEDURE — 80053 COMPREHEN METABOLIC PANEL: CPT

## 2017-10-20 RX ORDER — POLYETHYLENE GLYCOL 3350 17 G/17G
1 POWDER, FOR SOLUTION ORAL
Status: DISCONTINUED | OUTPATIENT
Start: 2017-10-20 | End: 2017-11-17 | Stop reason: HOSPADM

## 2017-10-20 RX ORDER — AMOXICILLIN 250 MG
2 CAPSULE ORAL 2 TIMES DAILY
Status: DISCONTINUED | OUTPATIENT
Start: 2017-10-20 | End: 2017-11-17 | Stop reason: HOSPADM

## 2017-10-20 RX ORDER — SODIUM CHLORIDE 9 MG/ML
1000 INJECTION, SOLUTION INTRAVENOUS ONCE
Status: COMPLETED | OUTPATIENT
Start: 2017-10-20 | End: 2017-10-20

## 2017-10-20 RX ORDER — BISACODYL 10 MG
10 SUPPOSITORY, RECTAL RECTAL
Status: DISCONTINUED | OUTPATIENT
Start: 2017-10-20 | End: 2017-11-17 | Stop reason: HOSPADM

## 2017-10-20 RX ADMIN — SODIUM CHLORIDE 1000 ML: 9 INJECTION, SOLUTION INTRAVENOUS at 12:09

## 2017-10-20 RX ADMIN — THERA TABS 1 TABLET: TAB at 08:06

## 2017-10-20 RX ADMIN — HEPARIN SODIUM 5000 UNITS: 5000 INJECTION, SOLUTION INTRAVENOUS; SUBCUTANEOUS at 06:06

## 2017-10-20 RX ADMIN — SODIUM CHLORIDE TAB 1 GM 3 G: 1 TAB at 18:02

## 2017-10-20 RX ADMIN — SODIUM CHLORIDE TAB 1 GM 3 G: 1 TAB at 08:06

## 2017-10-20 RX ADMIN — HEPARIN SODIUM 5000 UNITS: 5000 INJECTION, SOLUTION INTRAVENOUS; SUBCUTANEOUS at 20:11

## 2017-10-20 RX ADMIN — THIAMINE HCL TAB 100 MG 100 MG: 100 TAB at 08:06

## 2017-10-20 RX ADMIN — HEPARIN SODIUM 5000 UNITS: 5000 INJECTION, SOLUTION INTRAVENOUS; SUBCUTANEOUS at 15:40

## 2017-10-20 RX ADMIN — MAGNESIUM HYDROXIDE 30 ML: 400 SUSPENSION ORAL at 12:07

## 2017-10-20 RX ADMIN — MIDODRINE HYDROCHLORIDE 5 MG: 5 TABLET ORAL at 12:06

## 2017-10-20 RX ADMIN — SODIUM CHLORIDE TAB 1 GM 3 G: 1 TAB at 12:06

## 2017-10-20 RX ADMIN — MIDODRINE HYDROCHLORIDE 5 MG: 5 TABLET ORAL at 18:02

## 2017-10-20 RX ADMIN — FOLIC ACID 1 MG: 1 TABLET ORAL at 08:06

## 2017-10-20 RX ADMIN — STANDARDIZED SENNA CONCENTRATE AND DOCUSATE SODIUM 2 TABLET: 8.6; 5 TABLET, FILM COATED ORAL at 08:06

## 2017-10-20 RX ADMIN — VITAMIN D, TAB 1000IU (100/BT) 5000 UNITS: 25 TAB at 08:06

## 2017-10-20 RX ADMIN — MIDODRINE HYDROCHLORIDE 5 MG: 5 TABLET ORAL at 08:06

## 2017-10-20 ASSESSMENT — ENCOUNTER SYMPTOMS
EYES NEGATIVE: 1
TINGLING: 1
CHILLS: 0
WEAKNESS: 0
VOMITING: 0
MYALGIAS: 0
ABDOMINAL PAIN: 0
SHORTNESS OF BREATH: 0
PALPITATIONS: 0
NAUSEA: 0
FEVER: 0
COUGH: 0
WEIGHT LOSS: 1
DIZZINESS: 0
HEADACHES: 0
DIARRHEA: 0

## 2017-10-20 ASSESSMENT — PAIN SCALES - GENERAL
PAINLEVEL_OUTOF10: 0
PAINLEVEL_OUTOF10: 0

## 2017-10-20 ASSESSMENT — LIFESTYLE VARIABLES: DO YOU DRINK ALCOHOL: YES

## 2017-10-20 NOTE — CARE PLAN
Problem: Safety  Goal: Will remain free from falls  Outcome: PROGRESSING AS EXPECTED  HD fall risk complete.  Interventions in place.  No falls this admit.     Problem: Knowledge Deficit  Goal: Knowledge of the prescribed therapeutic regimen will improve  Outcome: PROGRESSING AS EXPECTED  Hematology, Oncology and Palliative consults all completed today.  Pt will be transferred to ONC for dual modality therapy as soon as there is a bed open.   Radiation scheduled for Monday.
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
## 2017-10-20 NOTE — PROGRESS NOTES
Nataly Tamayo Fall Risk Assessment:     Last Known Fall: Within the last month  Mobility: No limitations  Medications: No meds  Mental Status/LOC/Awareness: Awake, alert, and oriented to date, place, and person  Toileting Needs: Use of assistive device (Bedside commode, bedpan, urinal)  Volume/Electrolyte Status: No problems  Communication/Sensory: No deficits  Behavior: Appropriate behavior  Nataly Tamayo Fall Risk Total: 7  Fall Risk Level: LOW RISK    Universal Fall Precautions:  call light/belongings in reach, bed in low position and locked, wheelchairs and assistive devices out of sight, siderails up x 2, adequate lighting, use non-slip footwear, clutter free and spill free environment, educate on level of risk, educate to call for assistance    Fall Risk Level Interventions:   TRIAL ([employer], NEURO, MED BEN 5) Low Fall Risk Interventions  Place yellow fall risk ID band on patient: verified  Provide patient/family education based on risk assessment: completed  Educate patient/family to call staff for assistance when getting out of bed: completed  Place fall precaution signage outside patient door: verifiedTRIAL ([employer], NEURO, MED BEN 5) Moderate Fall Risk Interventions  Place yellow fall risk ID band on patient: verified  Provide patient/family education based on risk assessment : verified  Educate patient/family to call staff for assistance when getting out of bed: verified  Place fall precaution signage outside patient door: verified  Utilize bed/chair fall alarm: verifiedTRIAL (FireScope, NEURO, [employer] BEN 5) High Fall Risk Interventions  Place yellow fall risk ID band on patient: verified  Provide patient/family education based on risk assessment: completed  Educate patient/family to call staff for assistance when getting out of bed: completed  Place fall precaution signage outside patient door: completed  Place patient in room close to nursing station: currently not available/charge notified  Utilize  bed/chair fall alarm: verified  Notify charge of high risk for huddle: completed    Patient Specific Interventions:     Medication: review medications with patient and family  Mental Status/LOC/Awareness: reinforce falls education and check on patient hourly  Toileting: not applicable  Volume/Electrolyte Status: monitor abnormal lab values  Communication/Sensory: update plan of care on whiteboard  Behavioral: engage patient in daily activities, administer medication as ordered and instruct/reinforce fall program rationale  Mobility: schedule physical activity throughout the day and ensure bed is locked and in lowest position

## 2017-10-20 NOTE — NON-PROVIDER
Internal Medicine Medical Student Note    Name Froylan Spain     1960   Age/Sex 57 y.o. male   MRN [id number]   Code Status FULL     After 5PM or if no immediate response to page, please call for cross-coverage  Attending/Team: Dr. Tolentino (Purple/Green) See Patient List for primary contact information  Call [phone] to page after hours   1st Call - Day Intern (R1):   Dr. Snell 2nd Call - Day Sr. Resident (R2/R3):   Dr. Bianchi         Reason for interval visit  (Principal Problem)   Hyponatremia    Interval Problem Daily Status Update  (24 hours)   Mr. Spain did well overnight and there have been no acute changes reported by nursing.  We discussed the plan for transfer to [facility] oncology and initiation of chemotherapy.  The patient seems to be tolerating the news well and has stated that he wants to pursue every option.  We will have to revisit the patient's code status due to the worsened prognosis following biopsy and pathology report.    Heme/onc has seen the patient and they are wanting to start the patient on chemotherapy today.  The patient will receive Cisplatin and Etoposide for therapy.    Rad/onc is tentatively scheduled to begin therapy on Monday/early next week due to his worsened prognosis.  The patient will receive 4500cGy in 30 fractions of 150 cGy twice a day over 15 days.  The patient is agreeable to this plan and understands the risks and benefits.    ROS  Constitutional: Negative for chills, fever and malaise/fatigue. Headaches noted by patient  Eyes: Pupils equal, round, left reactive to light and accomodating, but no consensual light reflex.  Right eye is normal.   Respiratory: Positive for cough (very minimal at this time). Negative for hemoptysis, sputum production and shortness of breath.    Cardiovascular: Negative.  Negative for chest pain, palpitations, orthopnea and leg swelling.   Gastrointestinal: Negative for abdominal pain, constipation, diarrhea,  heartburn, nausea and vomiting.   Genitourinary: Negative.    Musculoskeletal: Negative.    Skin: Negative.    Neurological: Negative for sensory change, speech change, loss of consciousness, weakness and headaches.   Endo/Heme/Allergies: Negative.    Psychiatric/Behavioral: Negative.       Current Facility-Administered Medications:   •  ibuprofen (MOTRIN) tablet 600 mg, 600 mg, Oral, Q6HRS PRN, Benjamin Caraballo M.D., 600 mg at 10/18/17 1259  •  heparin injection 5,000 Units, 5,000 Units, Subcutaneous, Q8HRS, Christie Snell M.D., 5,000 Units at 10/19/17 2046  •  lactated ringers infusion, , Intravenous, Continuous, Chaya Gibson M.D., Last Rate: 10 mL/hr at 10/11/17 1300  •  midodrine (PROAMATINE) tablet 5 mg, 5 mg, Oral, TID WITH MEALS, Mary Gale M.D., 5 mg at 10/19/17 1813  •  sodium chloride (SALT) tablet 3 g, 3 g, Oral, TID WITH MEALS, Mary Gale M.D., 3 g at 10/19/17 1813  •  vitamin D (cholecalciferol) tablet 5,000 Units, 5,000 Units, Oral, DAILY, Lilly Hood M.D., 5,000 Units at 10/19/17 0918  •  multivitamin (THERAGRAN) tablet 1 Tab, 1 Tab, Oral, DAILY, Trell Curry M.D., 1 Tab at 10/19/17 0918  •  thiamine (THIAMINE) tablet 100 mg, 100 mg, Oral, DAILY, Trell Curry M.D., 100 mg at 10/19/17 0919  •  folic acid (FOLVITE) tablet 1 mg, 1 mg, Oral, DAILY, Trell Curry M.D., 1 mg at 10/19/17 0918  •  senna-docusate (PERICOLACE or SENOKOT S) 8.6-50 MG per tablet 2 Tab, 2 Tab, Oral, BID, 2 Tab at 10/14/17 2057 **AND** polyethylene glycol/lytes (MIRALAX) PACKET 1 Packet, 1 Packet, Oral, QDAY PRN **AND** magnesium hydroxide (MILK OF MAGNESIA) suspension 30 mL, 30 mL, Oral, QDAY PRN **AND** bisacodyl (DULCOLAX) suppository 10 mg, 10 mg, Rectal, QDAY PRN, Jannet Son M.D.  •  Respiratory Care per Protocol, , Nebulization, Continuous RT, Jannet Son M.D.  •  labetalol (NORMODYNE,TRANDATE) injection 10 mg, 10 mg, Intravenous, Q4HRS PRN, Jannet Son M.D.  •   ondansetron (ZOFRAN) syringe/vial injection 4 mg, 4 mg, Intravenous, Q4HRS PRN, Jannet Son M.D.  •  ondansetron (ZOFRAN ODT) dispertab 4 mg, 4 mg, Oral, Q4HRS PRN, Jannet Son M.D.  •  promethazine (PHENERGAN) tablet 12.5-25 mg, 12.5-25 mg, Oral, Q4HRS PRN, Jannet Son M.D.  •  promethazine (PHENERGAN) suppository 12.5-25 mg, 12.5-25 mg, Rectal, Q4HRS PRN, Jannet Son M.D.  •  prochlorperazine (COMPAZINE) injection 5-10 mg, 5-10 mg, Intravenous, Q4HRS PRN, Jannet Son M.D.  •  INITIATE NICOTINE REPLACEMENT PROTOCOL , , , Once **AND** nicotine (NICODERM) 21 MG/24HR 21 mg, 21 mg, Transdermal, Daily-0600, 21 mg at 10/18/17 0527 **AND** Protocol 205 PATIENT EDUCATION MATERIALS, , , Once **AND** Protocol 205 Rotate nicotine patch application sites daily , , , CONTINUOUS **AND** nicotine polacrilex (NICORETTE) 2 MG piece 2 mg, 2 mg, Oral, Q HOUR PRN, Jannet Son M.D.  •  guaifenesin dextromethorphan (ROBITUSSIN DM) 100-10 MG/5ML syrup 10 mL, 10 mL, Oral, Q6HRS PRN, Jannet Son M.D.  •  acetaminophen (TYLENOL) tablet 650 mg, 650 mg, Oral, Q6HRS PRN, Jannet Son M.D., 650 mg at 10/18/17 0804      Physical Exam       Vitals:    10/19/17 0419 10/19/17 0755 10/19/17 1540 10/19/17 1950   BP: 123/81 128/71 114/69 128/70   Pulse: 78 76 95 82   Resp: 17 17 16 18   Temp: 37 °C (98.6 °F) 36.6 °C (97.8 °F) 37 °C (98.6 °F) 37.7 °C (99.8 °F)   SpO2: 96% 96% 97% 99%   Weight:       Height:         Body mass index is 18.13 kg/m².    Oxygen Therapy:  Pulse Oximetry: 99 %, O2 (LPM): 0, O2 Delivery: None (Room Air)    Physical Exam  Constitutional: He is oriented to person, place, and time. No distress.   57-year-old appearing older than stated age   HENT:   Head: Normocephalic and atraumatic.   Mouth/Throat: No oropharyngeal exudate.   Eyes: EOM are normal.   Neck: Normal range of motion.   Cardiovascular: Normal rate, regular rhythm and intact distal pulses.  Exam reveals no  gallop and no friction rub.    No murmur heard.  Pulmonary/Chest: Effort normal and breath sounds normal. No respiratory distress. He has no wheezes. He has no rales. He exhibits no tenderness.   Abdominal: Soft. Bowel sounds are normal. He exhibits no distension. There is no tenderness. There is no rebound and no guarding.   Genitourinary: deferred   Musculoskeletal: Normal range of motion. He exhibits no edema, tenderness or deformity.   Neurological: He is alert and oriented to person, place, and time. He has a normal Cerebellar Exam and a normal Romberg Test. GCS score is 15.   Skin: Skin is warm and dry. No rash noted. No erythema.   Patient has areas of cracking/peeling skin on fingers and hands   Psychiatric: Mood, memory, affect and judgment normal.    IMAGING:    CT-ABDOMEN-PELVIS WITH   Final Result         1.  No acute or significant findings.   2.  Increased colonic fecal material, consistent with constipation.      MR-BRAIN-WITH & W/O   Final Result      1.  MRI of the brain without and with contrast within normal limits for age with mild atrophy and mild white matter changes.   2.  No evidence of intracranial metastatic disease      DX-CHEST-PORTABLE (1 VIEW)   Final Result      No evidence of pneumothorax status post right lung biopsy.      DX-CHEST-PORTABLE (1 VIEW)   Final Result      No pneumothorax identified.      Right paratracheal mass with mass effect and narrowing of the trachea is again noted.      Airspace opacity in the right upper lobe is increased compared to prior. Hazy opacity in the peripheral left midlung is also increased.      Patchy bilateral lower lobe hazy opacities are again noted. Findings likely represent pneumonitis.         CT-NEEDLE BX-LUNG-MEDIASTINUM RIGHT   Final Result      1.  CT guided right upper lobe lung biopsy.   2.  Followup serial chest radiographs are forthcoming in 1 and 3 hours.      CT-CHEST (THORAX) WITH   Final Result      Right paratracheal mass  measuring 3.6 x 5.9 cm which causes mass effect on the SVC and trachea with mild tracheal deviation to the left. Subcarinal and right hilar lymphadenopathy is also noted.      2 x 1.8 cm right upper lobe nodule which is centrally hypodense and may be necrotic worrisome for malignancy.      Ill-defined areas of groundglass opacity may be infectious or inflammatory.      Tree-in-bud nodularity in the left lower lobe is likely infectious or inflammatory.      Small pericardial effusion.      Atherosclerotic plaque.      There is likely mild esophageal wall thickening with mucosal hyperenhancement. This can be seen in the setting of esophagitis.      Healing left-sided rib fractures.      Hepatic steatosis.      Hypodensity along the falciform ligament may represent focal fat but a small 1.1 cm lesion is not excluded.            OUTSIDE IMAGES-DX CHEST   Final Result      OUTSIDE IMAGES-CT CERVICAL SPINE   Final Result      OUTSIDE IMAGES-CT FACE   Final Result      OUTSIDE IMAGES-CT HEAD   Final Result          Assessment/Plan      # Lung Mass  - CT report notes paratracheal mass measuring 3.6x5.9cm as well as 2.0x1.8cm lession in right upper lobe.  - Smoking history and hyponatremia increase suspicion that this is a malignant process with paraneoplastic syndrome.  - Percutaneous Biopsy Result: marked mixed inflammation,          granulation tissue, fibrosis and necrosis consistent with          organizing pneumonia.  - s/p bronchoscopy and transbronchial biopsy  - s/p mediastinoscopy  - Paratracheal mass biopsy: metastatic small cell carcinoma  - Brain MRI requested by Dr. Antonio on 10/14 demonstrates no evidence of intracranial metastatic lesions.  - CT abdomen shows no evidence of metastatic disease  PLAN:  - Transfer to oncology for chemotherapy  - Induction of chemotherapy (cisplatin and etoposide), will begin today  - Radiation therapy with rad/onc tentatively planned for early next week  - Norco for pain  management  - Heparin for DVT prophylaxis  - Continue management of SIADH symptoms/hyponatremia  - Daily CBC  - Heme/onc will follow  - Pulm will follow  - reassess code status    # Hyponatremia   - Likely due to small cell carcinoma of the lung  - Na: 107 at time of admission  - Na: 129 (10/20), continuing to trend down  - No altered mental status at this time, significant improvement from time of admission  - No evidence of seizure noted since time of admission.  - Percutaneous CT-guided IR biopsy did not demonstrate evidence of malignancy which would be consistent with SIADH  - s/p bronchoscopy and transbronchial biopsy  - Paratracheal mass biopsy: metastatic small cell carcinoma  PLAN:  - Fluid restriction <1200mL/day  - Daily CMP  - Continue NaCl supplementation, 3 tabs TID  - Pulmonary will follow     # Headaches  - Patient has noted that he continues to wake up with moderate headaches each day  - No hx of sleep apnea, but given hx there is a concern for this as the cause.  - prior to admission, patient did not c/o headache  - Patient continues to c/o  - Nocturnal pulse oximetry study did not demonstrate desatting below 88%.  There is still a possibility that the patient could be having frequent apneic events.  PLAN:  - If patient demonstrates apneic episodes consider use of cpap     # Thrombocytosis  - patient admitted with platelet count of 262  - Continues to trend upward during admission and has leveled off around 600K  PLAN:  - continue to monitor  - hydroxyurea if worsening >1000K     # Bacteremia - Resolved  - Blood cultures positive for strep pneumo in ICU (10/1)  - Augmentin continued for bacteremia  - Blood cultures negative  - Bronchial washing culture: NGTD  - Patient afebrile since temp of 100.5  PLAN:  - Monitor for s/s of sepsis, elevated temperatures     # Chronic Alcohol Use - stable  - Patient states that he has 2-3 drinks/week.  - No s/s of acute alcohol withdrawal.  PLAN:  - Problem  stable
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
## 2017-10-20 NOTE — PROGRESS NOTES
Oncology/Hematology Progress Note               Author: Apollo Vickers Date & Time created: 10/20/2017  11:31 AM     Interval History: Small Cell Lung Cancer Stage IIIA  10/19?17- Mediastinoscopy - precarinal node  Pos  10/20/17 - Limited disease small cell lung cancer. Dr. Young planning to start XRT on Monday 10/23/17    Review of Systems:  Review of Systems   All other systems reviewed and are negative.      Physical Exam:  Physical Exam   Constitutional: He is oriented to person, place, and time. He appears well-developed and well-nourished.   HENT:   Head: Normocephalic.   Mouth/Throat: Oropharynx is clear and moist.   Eyes: Conjunctivae and EOM are normal.   Cardiovascular: Normal rate and regular rhythm.    Pulmonary/Chest: Effort normal and breath sounds normal.   Abdominal: Soft.   Musculoskeletal: Normal range of motion.   Neurological: He is alert and oriented to person, place, and time.       Labs:        Invalid input(s): [id number]      Recent Labs      10/18/17   0345  10/19/17   0238  10/20/17   0002   SODIUM  127*  131*  129*   POTASSIUM  4.2  4.4  4.0   CHLORIDE  94*  97  98   CO2  25  25  24   BUN  9  9  10   CREATININE  0.46*  0.44*  0.37*   MAGNESIUM   --    --   1.7   PHOSPHORUS   --    --   4.1   CALCIUM  9.1  9.4  9.1     Recent Labs      10/18/17   0345  10/19/17   0238  10/20/17   0002   ALTSGPT   --    --   12   ASTSGOT   --    --   15   ALKPHOSPHAT   --    --   77   TBILIRUBIN   --    --   0.2   GLUCOSE  88  90  97     Recent Labs      10/18/17   0344  10/19/17   0238  10/20/17   0002   RBC  3.53*  3.47*  3.37*   HEMOGLOBIN  12.1*  11.9*  11.4*   HEMATOCRIT  33.6*  33.6*  32.2*   PLATELETCT  611*  604*  602*     Recent Labs      10/18/17   0344  10/19/17   0238  10/20/17   0002   WBC  8.3  8.1  8.3   NEUTSPOLYS   --   51.10   --    LYMPHOCYTES   --   25.90   --    MONOCYTES   --   14.80*   --    EOSINOPHILS   --   6.80   --    BASOPHILS   --   1.00   --    ASTSGOT   --    --   15    ALTSGPT   --    --   12   ALKPHOSPHAT   --    --   77   TBILIRUBIN   --    --   0.2     Recent Labs      10/18/17   0345  10/19/17   0238  10/20/17   0002   SODIUM  127*  131*  129*   POTASSIUM  4.2  4.4  4.0   CHLORIDE  94*  97  98   CO2  25  25  24   GLUCOSE  88  90  97   BUN  9  9  10   CREATININE  0.46*  0.44*  0.37*   CALCIUM  9.1  9.4  9.1     Hemodynamics:  Temp (24hrs), Av.2 °C (99 °F), Min:36.9 °C (98.5 °F), Max:37.7 °C (99.8 °F)  Temperature: 37.2 °C (98.9 °F)  Pulse  Av.5  Min: 64  Max: 99   Blood Pressure: 115/88     Respiratory:    Respiration: 16, Pulse Oximetry: 99 %        RUL Breath Sounds: Clear, RML Breath Sounds: Diminished, RLL Breath Sounds: Diminished, LEONARDO Breath Sounds: Clear, LLL Breath Sounds: Diminished  Fluids:    Intake/Output Summary (Last 24 hours) at 10/19/17 1524  Last data filed at 10/19/17 1300   Gross per 24 hour   Intake              761 ml   Output             1450 ml   Net             -689 ml        GI/Nutrition:  Orders Placed This Encounter   Procedures   • DIET ORDER     Standing Status:   Standing     Number of Occurrences:   1     Order Specific Question:   Diet:     Answer:   Regular [1]     Order Specific Question:   Consistency/Fluid modifications:     Answer:   1200 ml Fluid Restriction [8]     Medical Decision Making, by Problem:  Active Hospital Problems    Diagnosis   • *Hyponatremia [E87.1]   • Lung mass [R91.8]   • Disorder of electrolytes [E87.8]   • Fall [W19.XXXA]   • Chronic alcohol use [F10.10]   • Body mass index (BMI) 19.9 or less, adult [Z68.1]   • Morning headache [R51]   • Bacteremia [R78.81]       Plan:  1. Stage IIIA Small cell lung cancer   just diagnosed on precarinal node biopsy  MRI brain negative  CT Abdomen/Pelvis will be ordered  Will call radiation therapy and transfer to oncology for chemotherapy. I discussed the diagnosis with the patient and treatment with combined modality chemoradiation. I assume his CT abdomen will be neg but  he has SVC compression but not syndrome.   10/20/17- XRT planned for Monday 10/23. He needs to start chemotherapy ASAP and orders placed in [facility]. He needs to be moved to Oncology today    2. SIADH - controlled    3. Long term tobacco abuse    Quality-Core Measures

## 2017-10-20 NOTE — PROGRESS NOTES
Assumed care of patient @ 0700, report received at bedside,  assessment done, labs and orders noted.  Pt A & Ox4, PIV saline locked and patent.  Pt is resting comfortably in bed, no signs or symptoms of distress.  Pt reports pain is a 0/10.  Pt has been updated on the plan of care: will be headed to radiology with transport this morning to set up treatment, eventual transfer to onc for dual therapy.    Bed is in lowest position, fall risk socks in place, call light within reach. Pt verbalized all needs are met at this time.

## 2017-10-20 NOTE — PROGRESS NOTES
Internal Medicine Interval Note  Note Author: Christie Snell M.D.     Name Froylan Spain     1960   Age/Sex 57 y.o. male   MRN [id number]   Code Status FULL     After 5PM or if no immediate response to page, please call for cross-coverage  Attending/Team: Dr. Ang Tolentino/Nikolai See Patient List for primary contact information  Call [phone] to page    1st Call - Day Intern (R1):   Dr. Christie Snell 2nd Call - Day Sr. Resident (R2/R3):   Dr. Tayler Bianchi   56yo homeless male without significant PMH admitted on 17 for severe hyponatremia to 108 and AMS.  Started on salt tabs, fluid restriction and Na correction per guidelines.   Concern for SIADH.  Smoker with lung masses (5.9 cm rightparatracheal mass and 2 cm RUL) found on CT Chest.  IR biopsy (10/05/17) c/w organizing PNA.  BAL shows few RBCs and rare Gram+/Gram- cocci.  OR biopsy of paratracheal lesion (10/11) was inconclusive..  S/p repeat mediastinoscopy with biopsy (10/17).    Reason for interval visit  (Principal Problem)   Hyponatremia    Interval Problem Daily Status Update  (24 hours)   - No acute overnight events  - CT Abdomen/Pelvis negative for metastatis; shows constipation  - Chemo (Cisplatin and Etoposide)/Radiation to start on Monday x 15 days per Dr. Arango  - Transfer to [facility] today  - Seen by Palliative Care  - Na to 129, currently on 1200mL fluid restriction; AMS resolved  - No complaints of headache today  - Nocturnal oximetry study shows sat >88% throughout night      Review of Systems   Constitutional: Positive for weight loss. Negative for chills and fever.   Eyes: Negative.    Respiratory: Negative for cough and shortness of breath.    Cardiovascular: Negative for chest pain and palpitations.   Gastrointestinal: Negative for abdominal pain, diarrhea, nausea and vomiting.   Genitourinary: Negative.    Musculoskeletal: Negative for myalgias.   Skin: Negative for itching and rash.   Neurological:  Positive for tingling (chronic). Negative for dizziness, weakness and headaches.     Consultants/Specialty  Pulmonary   Nephrology  Thoracic Surgery  Hematology/Oncology     Disposition  Inpatient    Quality Measures    Reviewed items::  Labs reviewed, Medications reviewed and Radiology images reviewed  Garvin catheter::  No Garvin  DVT prophylaxis pharmacological::  Heparin  Ulcer Prophylaxis::  Not indicated        Physical Exam       Vitals:    10/19/17 0755 10/19/17 1540 10/19/17 1950 10/20/17 0450   BP: 128/71 114/69 128/70 122/80   Pulse: 76 95 82 75   Resp: 17 16 18 19   Temp: 36.6 °C (97.8 °F) 37 °C (98.6 °F) 37.7 °C (99.8 °F) 36.9 °C (98.5 °F)   SpO2: 96% 97% 99% 97%   Weight:       Height:         Body mass index is 18.13 kg/m².    Oxygen Therapy:  Pulse Oximetry: 97 %, O2 (LPM): 0, O2 Delivery: None (Room Air)    Physical Exam   Constitutional: He is oriented to person, place, and time. No distress.   Appears older than stated age; disheveled, thin   HENT:   Head: Normocephalic and atraumatic.   Mediastinoscopy scar across suprasternal notch, healing well   Eyes: Conjunctivae and EOM are normal. Right eye exhibits no discharge. Left eye exhibits no discharge. No scleral icterus.   Neck: Normal range of motion.   Cardiovascular: Normal rate, regular rhythm, normal heart sounds and intact distal pulses.  Exam reveals no gallop and no friction rub.    No murmur heard.  Pulmonary/Chest: Effort normal and breath sounds normal. No respiratory distress. He has no wheezes. He has no rales.   Coughing, chronic per patient   Abdominal: Soft. Bowel sounds are normal. He exhibits no distension and no mass. There is no tenderness. There is no rebound and no guarding.   Musculoskeletal: Normal range of motion. He exhibits no edema or tenderness.   Neurological: He is alert and oriented to person, place, and time. No cranial nerve deficit. GCS score is 15.   Skin: Skin is warm and dry. No rash noted. He is not  diaphoretic. No pallor.   Cracked, with areas of peeling skin; appears sun damaged   Psychiatric: Mood and affect normal.   Nursing note and vitals reviewed.      Lab Data Review:         10/8/2017  7:32 AM    Recent Labs      10/18/17   0345  10/19/17   0238  10/20/17   0002   SODIUM  127*  131*  129*   POTASSIUM  4.2  4.4  4.0   CHLORIDE  94*  97  98   CO2  25  25  24   BUN  9  9  10   CREATININE  0.46*  0.44*  0.37*   MAGNESIUM   --    --   1.7   PHOSPHORUS   --    --   4.1   CALCIUM  9.1  9.4  9.1       Recent Labs      10/18/17   0345  10/19/17   0238  10/20/17   0002   ALTSGPT   --    --   12   ASTSGOT   --    --   15   ALKPHOSPHAT   --    --   77   TBILIRUBIN   --    --   0.2   GLUCOSE  88  90  97       Recent Labs      10/18/17   0344  10/19/17   0238  10/20/17   0002   RBC  3.53*  3.47*  3.37*   HEMOGLOBIN  12.1*  11.9*  11.4*   HEMATOCRIT  33.6*  33.6*  32.2*   PLATELETCT  611*  604*  602*       Recent Labs      10/18/17   0344  10/19/17   0238  10/20/17   0002   WBC  8.3  8.1  8.3   NEUTSPOLYS   --   51.10   --    LYMPHOCYTES   --   25.90   --    MONOCYTES   --   14.80*   --    EOSINOPHILS   --   6.80   --    BASOPHILS   --   1.00   --    ASTSGOT   --    --   15   ALTSGPT   --    --   12   ALKPHOSPHAT   --    --   77   TBILIRUBIN   --    --   0.2     Assessment/Plan     Hyponatremia  - Na of 107 and AMS on admission   - likely 2/2 SIADH  - continue salt tabs 3g TID  - Na ranging 128-132  - cont fluid restriction 1200cc/day  - AMS resolved, no focal neurological deficits, GCS stable, d/c neuro checks  - Na downtrending, possibly 2/2 fluid restriction order dropping off.  Will restart restriction and continue to monitor  - PT/OT: PT 1-2 more sessions prior to d/c; OT 3 times per week      Lung mass  - Hx of smoking >20 years, 10 pack-year.   - Cough, recent weight loss and low appetite.  - CT chest: 3.6x5.9 cm paratracheal mass compressing on SVC and 2x1.8 cm R upper lobe mass (9/30)  - s/p IR biopsy of RUL  c/w with PNA  - BAL (10/11): few RBCs and rare Gram+/Gram- cocci  - s/p OR biopsy of paratracheal mass (10/11), pathology inconclusive  - MRI Brain unremarkable  - Repeat Mediastinoscopy (10/17) & biopsy; repeat pathology consistent with SCLC  - CT Abdomen/Pelvis pending  - Heme/Onc following and coordinating radiation/chemotherapy  - Chemo (Cisplatin and Etoposide)/Radiation to start on Monday x 15 days per Dr. Arango  - Palliative Care following    Chronic alcohol use  - Consumes 1 beer daily   - BAL: 0.01 on admission   - PO thiamine, B12 and folic acid supplements   - No signs of EtOH withdrawal; CTM    Fall  - GLF with no reported syncope.  - CT head on admission from other facility negative for bleeding or intracranial pathology  - No evidence of neurological deficits   - CTM    Disorder of electrolytes  - monitor electrolytes  - replete as necessary    Bacteremia  - blood cultures on admission positive for Strep pneumo  - Repeat blood cultures negative 10/1, 10/2, 10/3  - afebrile  - s/p PO Augmentin BID (10/9-10/14)      Body mass index (BMI) 19.9 or less, adult  - Possibly 2/2 homelessness vs. Unknown malignancy  - Exhibits good appetite  - Nutrition following    Morning headache  - Mild AM headaches relieved with Tylenol  - 2/2 sleep apnea vs. Withdrawal headaches  - questionable history of sleep apnea  - Patient admits to drinking a lot of coffee at home  - nocturnal oximetry study shows sat >88% throughout night; consider outpatient sleep study on d/c  - encourage coffee to see if headaches resolve

## 2017-10-20 NOTE — PROGRESS NOTES
Internal Medicine Interval Note  Note Author: Christie Snell M.D.     Name Froylan Spain     1960   Age/Sex 57 y.o. male   MRN [id number]   Code Status FULL     After 5PM or if no immediate response to page, please call for cross-coverage  Attending/Team: Dr. Ang Tolentino/Nikolai See Patient List for primary contact information  Call [phone] to page    1st Call - Day Intern (R1):   Dr. Christie Snell 2nd Call - Day Sr. Resident (R2/R3):   Dr. Tayler Bianchi   58yo homeless male without significant PMH admitted on 17 for severe hyponatremia to 108 and AMS.  Started on salt tabs, fluid restriction and Na correction per guidelines.   Concern for SIADH.  Smoker with lung masses (5.9 cm rightparatracheal mass and 2 cm RUL) found on CT Chest.  IR biopsy (10/05/17) c/w organizing PNA.  BAL shows few RBCs and rare Gram+/Gram- cocci.  OR biopsy of paratracheal lesion (10/11) was inconclusive..  S/p repeat mediastinoscopy with biopsy (10/17).    Reason for interval visit  (Principal Problem)   Hyponatremia    Interval Problem Daily Status Update  (24 hours)   - No acute overnight events  - s/p mediastinoscopy with paratracheal biopsy   - Precarinal node pathology consistent with SCLC  - Patient is very interested in receiving whatever treatment is available   - Heme/Onc following and will coordinate with radiation/chemotherapy: CT Abdomen/Pelvis pending  - Inpatient consult to Palliative Care, Patient in agreement  - Right sternal chest pain resolved  - Na to 131, currently on 1200mL fluid restriction; AMS resolved  - Complaining of daily AM headache   - Awaiting nocturnal oximetry study       Review of Systems   Constitutional: Positive for weight loss. Negative for chills and fever.   Eyes: Negative.    Respiratory: Negative for cough and shortness of breath.    Cardiovascular: Negative for chest pain and palpitations.   Gastrointestinal: Negative for abdominal pain, diarrhea, nausea  and vomiting.   Genitourinary: Negative.    Musculoskeletal: Negative for myalgias.   Skin: Negative for itching and rash.   Neurological: Positive for tingling (chronic) and headaches. Negative for dizziness and weakness.     Consultants/Specialty  Pulmonary   Nephrology  Thoracic Surgery  Hematology/Oncology     Disposition  Inpatient    Quality Measures    Reviewed items::  Labs reviewed and Medications reviewed  Garvin catheter::  No Garvin  DVT prophylaxis pharmacological::  Heparin  Ulcer Prophylaxis::  Not indicated        Physical Exam       Vitals:    10/18/17 1935 10/19/17 0419 10/19/17 0755 10/19/17 1540   BP: 126/71 123/81 128/71 114/69   Pulse: 86 78 76 95   Resp: 16 17 17 16   Temp: 36.8 °C (98.3 °F) 37 °C (98.6 °F) 36.6 °C (97.8 °F) 37 °C (98.6 °F)   SpO2: 96% 96% 96% 97%   Weight:       Height:         Body mass index is 18.13 kg/m².    Oxygen Therapy:  Pulse Oximetry: 97 %, O2 (LPM): 0, O2 Delivery: None (Room Air)    Physical Exam   Constitutional: He is oriented to person, place, and time. No distress.   Appears older than stated age; disheveled, thin   HENT:   Head: Normocephalic and atraumatic.   Mediastinoscopy scar across suprasternal notch, healing well   Eyes: Conjunctivae and EOM are normal. Right eye exhibits no discharge. Left eye exhibits no discharge. No scleral icterus.   Neck: Normal range of motion.   Cardiovascular: Normal rate, regular rhythm, normal heart sounds and intact distal pulses.  Exam reveals no gallop and no friction rub.    No murmur heard.  Pulmonary/Chest: Effort normal and breath sounds normal. No respiratory distress. He has no wheezes. He has no rales. He exhibits no tenderness.   Coughing, chronic per patient; right chest is TTP, non-pleuritic   Abdominal: Soft. Bowel sounds are normal. He exhibits no distension and no mass. There is no tenderness. There is no rebound and no guarding.   Musculoskeletal: Normal range of motion. He exhibits no edema or tenderness.    Neurological: He is alert and oriented to person, place, and time. No cranial nerve deficit. GCS score is 15.   Skin: Skin is warm and dry. No rash noted. He is not diaphoretic. No pallor.   Cracked, with areas of peeling skin; appears sun damaged   Psychiatric: Mood and affect normal.   Nursing note and vitals reviewed.      Lab Data Review:         10/8/2017  7:32 AM    Recent Labs      10/17/17   0304  10/18/17   0345  10/19/17   0238   SODIUM  129*  127*  131*   POTASSIUM  3.9  4.2  4.4   CHLORIDE  99  94*  97   CO2  23  25  25   BUN  7*  9  9   CREATININE  0.40*  0.46*  0.44*   CALCIUM  9.3  9.1  9.4       Recent Labs      10/17/17   0304  10/18/17   0345  10/19/17   0238   GLUCOSE  81  88  90       Recent Labs      10/17/17   0304  10/18/17   0344  10/19/17   0238   RBC  3.40*  3.53*  3.47*   HEMOGLOBIN  11.5*  12.1*  11.9*   HEMATOCRIT  32.5*  33.6*  33.6*   PLATELETCT  589*  611*  604*       Recent Labs      10/17/17   0304  10/18/17   0344  10/19/17   0238   WBC  6.1  8.3  8.1   NEUTSPOLYS   --    --   51.10   LYMPHOCYTES   --    --   25.90   MONOCYTES   --    --   14.80*   EOSINOPHILS   --    --   6.80   BASOPHILS   --    --   1.00     Assessment/Plan     Hyponatremia  - Na of 107 and AMS on admission   - likely 2/2 SIADH  - continue salt tabs 3g TID  - cont fluid restriction 1200cc/day  - AMS resolved, no focal neurological deficits, GCS stable, d/c neuro checks  - Na downtrending, possibly 2/2 fluid restriction order dropping off.  Will restart restriction and continue to monitor  - PT/OT: PT 1-2 more sessions prior to d/c; OT 3 times per week      Lung mass  - Hx of smoking >20 years, 10 pack-year.   - Cough, recent weight loss and low appetite.  - CT chest: 3.6x5.9 cm paratracheal mass compressing on SVC and 2x1.8 cm R upper lobe mass (9/30)  - s/p IR biopsy of RUL c/w with PNA  - BAL (10/11): few RBCs and rare Gram+/Gram- cocci  - s/p OR biopsy of paratracheal mass (10/11), pathology inconclusive  -  MRI Brain unremarkable  - Repeat Mediastinoscopy (10/17) & biopsy; repeat pathology consistent with SCLC  - CT Abdomen/Pelvis pending  - Heme/Onc following and coordinating radiation/chemotherapy  - Palliative Care consulted    Chronic alcohol use  - Consumes 1 beer daily   - BAL: 0.01 on admission   - PO thiamine, B12 and folic acid supplements   - No signs of EtOH withdrawal; CTM    Fall  - GLF with no reported syncope.  - CT head on admission from other facility negative for bleeding or intracranial pathology  - No evidence of neurological deficits   - CTM    Disorder of electrolytes  - monitor electrolytes  - replete as necessary    Bacteremia  - blood cultures on admission positive for Strep pneumo  - Repeat blood cultures negative 10/1, 10/2, 10/3  - afebrile  - s/p PO Augmentin BID (10/9-10/14)      Body mass index (BMI) 19.9 or less, adult  - Possibly 2/2 homelessness vs. Unknown malignancy  - Exhibits good appetite  - Nutrition following    Morning headache  - Mild AM headaches relieved with Tylenol  - 2/2 sleep apnea vs. Withdrawal headaches  - questionable history of sleep apnea  - Patient admits to drinking a lot of coffee at home  - nocturnal oximetry study  - encourage coffee to see if headaches resolve

## 2017-10-21 LAB
ANION GAP SERPL CALC-SCNC: 9 MMOL/L (ref 0–11.9)
BUN SERPL-MCNC: 8 MG/DL (ref 8–22)
CALCIUM SERPL-MCNC: 9.5 MG/DL (ref 8.5–10.5)
CHLORIDE SERPL-SCNC: 95 MMOL/L (ref 96–112)
CO2 SERPL-SCNC: 25 MMOL/L (ref 20–33)
CREAT SERPL-MCNC: 0.48 MG/DL (ref 0.5–1.4)
GFR SERPL CREATININE-BSD FRML MDRD: >60 ML/MIN/1.73 M 2
GLUCOSE SERPL-MCNC: 105 MG/DL (ref 65–99)
POTASSIUM SERPL-SCNC: 3.7 MMOL/L (ref 3.6–5.5)
SODIUM SERPL-SCNC: 129 MMOL/L (ref 135–145)

## 2017-10-21 PROCEDURE — A9270 NON-COVERED ITEM OR SERVICE: HCPCS | Performed by: INTERNAL MEDICINE

## 2017-10-21 PROCEDURE — 700111 HCHG RX REV CODE 636 W/ 250 OVERRIDE (IP): Performed by: INTERNAL MEDICINE

## 2017-10-21 PROCEDURE — 80048 BASIC METABOLIC PNL TOTAL CA: CPT

## 2017-10-21 PROCEDURE — 700111 HCHG RX REV CODE 636 W/ 250 OVERRIDE (IP): Performed by: STUDENT IN AN ORGANIZED HEALTH CARE EDUCATION/TRAINING PROGRAM

## 2017-10-21 PROCEDURE — 3E03305 INTRODUCTION OF OTHER ANTINEOPLASTIC INTO PERIPHERAL VEIN, PERCUTANEOUS APPROACH: ICD-10-PCS | Performed by: RADIOLOGY

## 2017-10-21 PROCEDURE — 700105 HCHG RX REV CODE 258: Performed by: INTERNAL MEDICINE

## 2017-10-21 PROCEDURE — 700102 HCHG RX REV CODE 250 W/ 637 OVERRIDE(OP): Performed by: STUDENT IN AN ORGANIZED HEALTH CARE EDUCATION/TRAINING PROGRAM

## 2017-10-21 PROCEDURE — 36415 COLL VENOUS BLD VENIPUNCTURE: CPT

## 2017-10-21 PROCEDURE — 700102 HCHG RX REV CODE 250 W/ 637 OVERRIDE(OP): Performed by: INTERNAL MEDICINE

## 2017-10-21 PROCEDURE — A9270 NON-COVERED ITEM OR SERVICE: HCPCS | Performed by: STUDENT IN AN ORGANIZED HEALTH CARE EDUCATION/TRAINING PROGRAM

## 2017-10-21 PROCEDURE — 770004 HCHG ROOM/CARE - ONCOLOGY PRIVATE *

## 2017-10-21 PROCEDURE — 99232 SBSQ HOSP IP/OBS MODERATE 35: CPT | Mod: GC | Performed by: HOSPITALIST

## 2017-10-21 RX ORDER — SODIUM CHLORIDE 9 MG/ML
500 INJECTION, SOLUTION INTRAVENOUS ONCE
Status: CANCELLED | OUTPATIENT
Start: 2017-10-21 | End: 2017-10-21

## 2017-10-21 RX ORDER — SODIUM CHLORIDE 9 MG/ML
500 INJECTION, SOLUTION INTRAVENOUS ONCE
Status: COMPLETED | OUTPATIENT
Start: 2017-10-21 | End: 2017-10-22

## 2017-10-21 RX ORDER — SODIUM CHLORIDE 9 MG/ML
500 INJECTION, SOLUTION INTRAVENOUS ONCE
Status: COMPLETED | OUTPATIENT
Start: 2017-10-21 | End: 2017-10-21

## 2017-10-21 RX ORDER — DEXAMETHASONE SODIUM PHOSPHATE 4 MG/ML
12 INJECTION, SOLUTION INTRA-ARTICULAR; INTRALESIONAL; INTRAMUSCULAR; INTRAVENOUS; SOFT TISSUE ONCE
Status: COMPLETED | OUTPATIENT
Start: 2017-10-21 | End: 2017-10-21

## 2017-10-21 RX ORDER — DEXAMETHASONE SODIUM PHOSPHATE 4 MG/ML
12 INJECTION, SOLUTION INTRA-ARTICULAR; INTRALESIONAL; INTRAMUSCULAR; INTRAVENOUS; SOFT TISSUE ONCE
Status: CANCELLED | OUTPATIENT
Start: 2017-10-21 | End: 2017-10-21

## 2017-10-21 RX ADMIN — STANDARDIZED SENNA CONCENTRATE AND DOCUSATE SODIUM 2 TABLET: 8.6; 5 TABLET, FILM COATED ORAL at 09:23

## 2017-10-21 RX ADMIN — DEXAMETHASONE SODIUM PHOSPHATE 12 MG: 4 INJECTION, SOLUTION INTRAMUSCULAR; INTRAVENOUS at 19:35

## 2017-10-21 RX ADMIN — SODIUM CHLORIDE TAB 1 GM 3 G: 1 TAB at 13:53

## 2017-10-21 RX ADMIN — HEPARIN SODIUM 5000 UNITS: 5000 INJECTION, SOLUTION INTRAVENOUS; SUBCUTANEOUS at 06:19

## 2017-10-21 RX ADMIN — SODIUM CHLORIDE 500 ML: 9 INJECTION, SOLUTION INTRAVENOUS at 23:53

## 2017-10-21 RX ADMIN — SODIUM CHLORIDE 150 MG: 9 INJECTION, SOLUTION INTRAVENOUS at 20:20

## 2017-10-21 RX ADMIN — HEPARIN SODIUM 5000 UNITS: 5000 INJECTION, SOLUTION INTRAVENOUS; SUBCUTANEOUS at 13:53

## 2017-10-21 RX ADMIN — FOLIC ACID 1 MG: 1 TABLET ORAL at 09:24

## 2017-10-21 RX ADMIN — THIAMINE HCL TAB 100 MG 100 MG: 100 TAB at 09:24

## 2017-10-21 RX ADMIN — VITAMIN D, TAB 1000IU (100/BT) 5000 UNITS: 25 TAB at 09:24

## 2017-10-21 RX ADMIN — HEPARIN SODIUM 5000 UNITS: 5000 INJECTION, SOLUTION INTRAVENOUS; SUBCUTANEOUS at 20:10

## 2017-10-21 RX ADMIN — MIDODRINE HYDROCHLORIDE 5 MG: 5 TABLET ORAL at 09:25

## 2017-10-21 RX ADMIN — STANDARDIZED SENNA CONCENTRATE AND DOCUSATE SODIUM 2 TABLET: 8.6; 5 TABLET, FILM COATED ORAL at 19:45

## 2017-10-21 RX ADMIN — SODIUM CHLORIDE TAB 1 GM 3 G: 1 TAB at 09:23

## 2017-10-21 RX ADMIN — CISPLATIN 133.6 MG: 1 INJECTION, SOLUTION INTRAVENOUS at 22:02

## 2017-10-21 RX ADMIN — SODIUM CHLORIDE TAB 1 GM 3 G: 1 TAB at 18:41

## 2017-10-21 RX ADMIN — ONDANSETRON HYDROCHLORIDE 16 MG: 2 SOLUTION INTRAMUSCULAR; INTRAVENOUS at 19:48

## 2017-10-21 RX ADMIN — THERA TABS 1 TABLET: TAB at 09:23

## 2017-10-21 RX ADMIN — MIDODRINE HYDROCHLORIDE 5 MG: 5 TABLET ORAL at 18:41

## 2017-10-21 RX ADMIN — MAGNESIUM HYDROXIDE 30 ML: 400 SUSPENSION ORAL at 13:54

## 2017-10-21 RX ADMIN — SODIUM CHLORIDE 500 ML: 9 INJECTION, SOLUTION INTRAVENOUS at 17:19

## 2017-10-21 RX ADMIN — SODIUM CHLORIDE, POTASSIUM CHLORIDE, SODIUM LACTATE AND CALCIUM CHLORIDE: 600; 310; 30; 20 INJECTION, SOLUTION INTRAVENOUS at 06:21

## 2017-10-21 RX ADMIN — NICOTINE 21 MG: 21 PATCH, EXTENDED RELEASE TRANSDERMAL at 06:20

## 2017-10-21 RX ADMIN — MIDODRINE HYDROCHLORIDE 5 MG: 5 TABLET ORAL at 13:54

## 2017-10-21 ASSESSMENT — ENCOUNTER SYMPTOMS
VOMITING: 0
TINGLING: 1
NAUSEA: 0
CHILLS: 0
WEAKNESS: 0
EYES NEGATIVE: 1
PALPITATIONS: 0
FEVER: 0
WEIGHT LOSS: 1
MYALGIAS: 0
HEADACHES: 0
ABDOMINAL PAIN: 0
DIARRHEA: 0
COUGH: 0
SHORTNESS OF BREATH: 0
DIZZINESS: 0

## 2017-10-21 ASSESSMENT — PAIN SCALES - GENERAL
PAINLEVEL_OUTOF10: 0

## 2017-10-21 NOTE — CARE PLAN
Problem: Communication  Goal: The ability to communicate needs accurately and effectively will improve    Intervention: [place] patient and significant other/support system to call light to alert staff of needs  The patient has been oriented to the Cancer Nursing Unit. All questions have been answered at this time.       Problem: Infection  Goal: Will remain free from infection    Intervention: Implement standard precautions and perform hand washing before and after patient contact  Standard infection prevention has been implemented. Hand washing performed before and after patient care interactions.

## 2017-10-21 NOTE — PROGRESS NOTES
Oncology/Hematology Progress Note               Author: LavonSONAL Vickers Date & Time created: 10/21/2017  4:16 PM     Interval History: Small Cell Lung Cancer Stage IIIA  10/19?17- Mediastinoscopy - precarinal node  Pos  10/20/17 - Limited disease small cell lung cancer. Dr. Young planning to start XRT on Monday 10/23/17  10/21/17 - D1 Cisplatin/etoposide    Review of Systems:  Review of Systems   All other systems reviewed and are negative.      Physical Exam:  Physical Exam   Constitutional: He is oriented to person, place, and time. He appears well-developed and well-nourished.   HENT:   Head: Normocephalic.   Mouth/Throat: Oropharynx is clear and moist.   Eyes: Conjunctivae and EOM are normal.   Cardiovascular: Normal rate and regular rhythm.    Pulmonary/Chest: Effort normal and breath sounds normal.   Abdominal: Soft.   Musculoskeletal: Normal range of motion.   Neurological: He is alert and oriented to person, place, and time.       Labs:        Invalid input(s): [id number]      Recent Labs      10/19/17   0238  10/20/17   0002  10/21/17   0749   SODIUM  131*  129*  129*   POTASSIUM  4.4  4.0  3.7   CHLORIDE  97  98  95*   CO2  25  24  25   BUN  9  10  8   CREATININE  0.44*  0.37*  0.48*   MAGNESIUM   --   1.7   --    PHOSPHORUS   --   4.1   --    CALCIUM  9.4  9.1  9.5     Recent Labs      10/19/17   0238  10/20/17   0002  10/21/17   0749   ALTSGPT   --   12   --    ASTSGOT   --   15   --    ALKPHOSPHAT   --   77   --    TBILIRUBIN   --   0.2   --    GLUCOSE  90  97  105*     Recent Labs      10/19/17   0238  10/20/17   0002   RBC  3.47*  3.37*   HEMOGLOBIN  11.9*  11.4*   HEMATOCRIT  33.6*  32.2*   PLATELETCT  604*  602*     Recent Labs      10/19/17   0238  10/20/17   0002   WBC  8.1  8.3   NEUTSPOLYS  51.10   --    LYMPHOCYTES  25.90   --    MONOCYTES  14.80*   --    EOSINOPHILS  6.80   --    BASOPHILS  1.00   --    ASTSGOT   --   15   ALTSGPT   --   12   ALKPHOSPHAT   --   77   TBILIRUBIN   --   0.2      Recent Labs      10/19/17   0238  10/20/17   0002  10/21/17   0749   SODIUM  131*  129*  129*   POTASSIUM  4.4  4.0  3.7   CHLORIDE  97  98  95*   CO2  25  24  25   GLUCOSE  90  97  105*   BUN  9  10  8   CREATININE  0.44*  0.37*  0.48*   CALCIUM  9.4  9.1  9.5     Hemodynamics:  Temp (24hrs), Av.2 °C (98.9 °F), Min:36.6 °C (97.8 °F), Max:37.4 °C (99.4 °F)  Temperature: 36.6 °C (97.8 °F)  Pulse  Av.6  Min: 64  Max: 99   Blood Pressure: 126/65     Respiratory:    Respiration: 18, Pulse Oximetry: 100 %        RUL Breath Sounds: Clear, RML Breath Sounds: Diminished, RLL Breath Sounds: Diminished, LEONARDO Breath Sounds: Fine Crackles, LLL Breath Sounds: Diminished  Fluids:    Intake/Output Summary (Last 24 hours) at 10/19/17 1524  Last data filed at 10/19/17 1300   Gross per 24 hour   Intake              761 ml   Output             1450 ml   Net             -689 ml     Weight: 56.3 kg (124 lb 1.9 oz)  GI/Nutrition:  Orders Placed This Encounter   Procedures   • DIET ORDER     Standing Status:   Standing     Number of Occurrences:   1     Order Specific Question:   Diet:     Answer:   Regular [1]     Order Specific Question:   Consistency/Fluid modifications:     Answer:   1200 ml Fluid Restriction [8]     Medical Decision Making, by Problem:  Active Hospital Problems    Diagnosis   • *Hyponatremia [E87.1]   • Lung mass [R91.8]   • Disorder of electrolytes [E87.8]   • Fall [W19.XXXA]   • Chronic alcohol use [F10.10]   • Body mass index (BMI) 19.9 or less, adult [Z68.1]   • Morning headache [R51]   • Bacteremia [R78.81]       Plan:  1. Stage IIIA Small cell lung cancer   just diagnosed on precarinal node biopsy  MRI brain negative  CT Abdomen/Pelvis will be ordered  Will call radiation therapy and transfer to oncology for chemotherapy. I discussed the diagnosis with the patient and treatment with combined modality chemoradiation. I assume his CT abdomen will be neg but he has SVC compression but not syndrome.    10/20/17- XRT planned for Monday 10/23. He needs to start chemotherapy ASAP and orders placed in [place]. He needs to be moved to Oncology today  10/21/17-starting chemotherapy D1, labs checked    2. SIADH - controlled  10/21/17 sodium 129    3. Long term tobacco abuse    Quality-Core Measures

## 2017-10-21 NOTE — CARE PLAN
Problem: Discharge Barriers/Planning  Goal: Patient's continuum of care needs will be met    Intervention: Assess potential discharge barriers on admission and throughout hospital stay  Patient is homeless, will need 15 rounds of radiation and chemo      Problem: Mobility  Goal: Risk for activity intolerance will decrease    Intervention: Assess and monitor signs of activity intolerance  Physical therapy onboard. Patient has lost a lot of muscle, motivated to get strong.  Patient is doing excercises

## 2017-10-21 NOTE — PROGRESS NOTES
Assumed care of the patient at 2030. Pt transported via wheelchair in company of transport personnel. All personal belongings are present at bedside. The patient has been oriented to the CNU and all questions have been answered. Pt denies pain at this time, and has no other complaint of discomfort. PT is up self, with steady gait, RA, PIV TKO. Pt updated on POC and all questions have been answered at this time. Fall precautions have been implemented, call light within reach, hourly rounding implemented.

## 2017-10-21 NOTE — PROGRESS NOTES
Assessment completed, Pt A&Ox4. Pt up self, denies pain, and nausea. Pt ambulated sosa this morning.  Discussed POC, no questions at this time. Call light within reach, hourly rounding in place.

## 2017-10-21 NOTE — PROGRESS NOTES
Chemotherapy Verification - PRIMARY RN      Height = 177.8 cm  Weight = 56.3 kg  BSA = 1.67 m2       Medication: etoposide   Dose: 100 mg/m2  Calculated Dose: 167 mg (ordered dose= 167 mg, <5% difference)                             (In mg/m2, AUC, mg/kg)     Medication: cisplatin  Dose: 80 mg/m2  Calculated Dose: 133.6 mg (ordered dose=133.6 mg, <5% difference)                             (In mg/m2, AUC, mg/kg)      I confirm this process was performed independently with the BSA and all final chemotherapy dosing calculations congruent.  Any discrepancies of 5% or greater have been addressed with the chemotherapy pharmacist. The resolution of the discrepancy has been documented in the EPIC progress notes.

## 2017-10-21 NOTE — PROGRESS NOTES
"Pharmacy Chemotherapy Note    Second Check    Patient Name: CASSIDY PAGE  MRN: [id number]    Oncologist: Shields    Protocol: IP SC LUNG CISPLATIN + ETOPOSIDE (80/100 +/-) XRT       CISplatin 80 mg/m2 IV over 60 minutes on day 1  Etoposide 100 mg/m2 IV over 60 minutes on days 1-3  28 day cycle for 4-6 cycles    *Dosing Reference*  NCCN Guidelines for Small Cell [place] Cancer.V.2.2017  Ruth Ann AT, et al. N Engl J Med. 199;340-(4):265-71  Moy S, et al. J Clin Oncol. 2002;20(24):5279-98        PREVIOUS CHEMO:  NONE    SIGNIFICANT EVENTS:  NONE    Dx: Small cell lung cancer         /65   Pulse 92   Temp 36.6 °C (97.8 °F)   Resp 18   Ht 1.778 m (5' 10\")   Wt 56.3 kg (124 lb 1.9 oz)   SpO2 100%   BMI 17.81 kg/m²  Body surface area is 1.67 meters squared.    LABS 10/20/2017  ANC: 4150 (10/19/2017)      WBC: 8.3 Plt: 602k   Hgb/Hct: 11.4/32.2     SCr: 0.37mg/dL CrCl: 94 mL/min    Ma.7   K: 3.7  Na: 129          Glu: 97  LFT's: 15/12/77 TBili = 0.2      Drug Order   (Drug name, dose, route, IV Fluid & volume, frequency, number of doses) Cycle: 1      Previous treatment: N/A     Medication = Cisplatin  Base Dose= 80 mg/m2  Calc Dose: Base Dose x 1.67 m2 = 133.6 mg  Final Dose = 133.6 mg  Route = IV   Fluid & Volume =  mL  Admin Duration = Over 60 min          <5% difference, OK to treat with final dose    Medication = Etoposide  Base Dose= 100 mg/m2  Calc Dose: Base Dose x 1.67 m2 = 167 mg  Final Dose = 167 mg  Route = IV  Fluid & Volume =  mL  Admin Duration = Over 120   Day 1-3      <5% difference, OK to treat with final dose      By my signature below, I confirm this process was performed independently with the BSA and all final chemotherapy dosing calculations congruent. I have reviewed the above chemotherapy order and that my calculation of the final dose and BSA (when applicable) corroborate those calculations of the  pharmacist. Discrepancies of 5% or greater in the " written dose have been addressed and documented within the EPIC Progress notes.    OMARI Boland, PharmD

## 2017-10-21 NOTE — PROGRESS NOTES
"/Pharmacy Chemotherapy Verification  Patient Name: Froylan Spain  Dx: SCLC - limited stage  Cycle: 1 (Previous treatment = NA)  Regimen and Dosing Reference  CISplatin/Etoposide + Radiation  CISplatin 80 mg/m2 IV over 60 minutes on day 1  Etoposide 100 mg/m2 IV over 60 minutes on days 1-3  28 day cycle for 4-6 cycles  NCCN Guidelines for Small Cell [place] Cancer.V.2.2017  Ruth Ann AT, et al. N Engl J Med. 199;340-(4):265-71  Moy S, et al. J Clin Oncol. 2002;20(24):3913-72  Belkys ARANDA et al. J Clin Oncol. 1994;12(10):2022-24  Michael H, et al. J Clin Oncol. 2006;24(33):1999-11    Allergies:Review of patient's allergies indicates no known allergies.  /65   Pulse 92   Temp 36.6 °C (97.8 °F)   Resp 18   Ht 1.778 m (5' 10\")   Wt 56.3 kg (124 lb 1.9 oz)   SpO2 100%   BMI 17.81 kg/m²   Body surface area is 1.67 meters squared.    Labs 10/21/17  Plt = 602 k     Hgb = 11.4 SCr =  0.48      CrCl ~93.4 ml/min  Labs 10/20/17  AST/ALT/AP = 15/12/77 Tbili = 0.2  Labs 10/19/17  ANC ~4150    CISplatin 80 mg/m2 x 1.67 m2 = 133.6 mg   <5% difference, OK to treat with final dose = 133.6 mg IV    Etoposide 100 mg/m2 x 1.67 m2 = 167 mg   <5% difference, OK to treat with final dose = 167 mg IV    Shira Corona, PharmD      "

## 2017-10-21 NOTE — PROGRESS NOTES
Internal Medicine Interval Note  Note Author: Christie Snell M.D.     Name Froylan Spain     1960   Age/Sex 57 y.o. male   MRN [id number]   Code Status FULL     After 5PM or if no immediate response to page, please call for cross-coverage  Attending/Team: Dr. Ang Tolentino/Nikolai See Patient List for primary contact information  Call [phone] to page    1st Call - Day Intern (R1):   Dr. Christie Snell 2nd Call - Day Sr. Resident (R2/R3):   Dr. Tayler Bianchi   56yo homeless male without significant PMH admitted on 17 for severe hyponatremia to 108 and AMS.  Started on salt tabs, fluid restriction and Na correction per guidelines.   Concern for SIADH.  Smoker with lung masses (5.9 cm rightparatracheal mass and 2 cm RUL) found on CT Chest.  IR biopsy (10/05/17) c/w organizing PNA.  BAL shows few RBCs and rare Gram+/Gram- cocci.  OR biopsy of paratracheal lesion (10/11) was inconclusive..  S/p repeat mediastinoscopy with biopsy (10/17).    Reason for interval visit  (Principal Problem)   Hyponatremia    Interval Problem Daily Status Update  (24 hours)   - No acute overnight events  - CT Abdomen/Pelvis negative for metastatis; shows constipation  - Chemo (Cisplatin and Etoposide)/Radiation to start on Monday x 15 days per Dr. Arango  - will need PICC placed  - Transfered to [facility] last night  - Seen by Palliative Care; disucssed code status - still wants to be Full Code, but wants to get in touch with brother, possibly through his daughters or former landlord, as the number he had for him is disconnected.  It seems he wants his brother to be involved in EOL decision-making   - Patient admittedly scared of current health status  - Na to 129, currently on 1200mL fluid restriction; AMS resolved  - No complaints of headache today        Review of Systems   Constitutional: Positive for weight loss. Negative for chills and fever.   Eyes: Negative.    Respiratory: Negative for cough and  shortness of breath.    Cardiovascular: Negative for chest pain and palpitations.   Gastrointestinal: Negative for abdominal pain, diarrhea, nausea and vomiting.   Genitourinary: Negative.    Musculoskeletal: Negative for myalgias.   Skin: Negative for itching and rash.   Neurological: Positive for tingling (chronic). Negative for dizziness, weakness and headaches.     Consultants/Specialty  Pulmonary   Nephrology  Thoracic Surgery  Hematology/Oncology     Disposition  Inpatient    Quality Measures    Reviewed items::  Labs reviewed and Medications reviewed  Garvin catheter::  No Garvin  DVT prophylaxis pharmacological::  Heparin  Ulcer Prophylaxis::  Not indicated        Physical Exam       Vitals:    10/20/17 1904 10/20/17 2200 10/21/17 0352 10/21/17 0800   BP: 123/66 121/78 112/66 126/65   Pulse: 85 90 82 92   Resp: 17 16 18 18   Temp: 37.4 °C (99.4 °F) 37.2 °C (98.9 °F) 37.4 °C (99.4 °F) 36.6 °C (97.8 °F)   SpO2: 100% 93% 97% 100%   Weight:    56.3 kg (124 lb 1.9 oz)   Height:         Body mass index is 18.13 kg/m².    Oxygen Therapy:  Pulse Oximetry: 97 %, O2 (LPM): 0, O2 Delivery: None (Room Air)    Physical Exam   Constitutional: He is oriented to person, place, and time. No distress.   Appears older than stated age; disheveled, thin   HENT:   Head: Normocephalic and atraumatic.   Mediastinoscopy scar across suprasternal notch, healing well   Eyes: Conjunctivae and EOM are normal. Right eye exhibits no discharge. Left eye exhibits no discharge. No scleral icterus.   Neck: Normal range of motion.   Cardiovascular: Normal rate, regular rhythm, normal heart sounds and intact distal pulses.  Exam reveals no gallop and no friction rub.    No murmur heard.  Pulmonary/Chest: Effort normal and breath sounds normal. No respiratory distress. He has no wheezes. He has no rales.   Coughing, chronic per patient   Abdominal: Soft. Bowel sounds are normal. He exhibits no distension and no mass. There is no tenderness. There  is no rebound and no guarding.   Musculoskeletal: Normal range of motion. He exhibits no edema or tenderness.   Neurological: He is alert and oriented to person, place, and time. No cranial nerve deficit. GCS score is 15.   Skin: Skin is warm and dry. No rash noted. He is not diaphoretic. No pallor.   Cracked, with areas of peeling skin; appears sun damaged   Psychiatric: Mood and affect normal.   Mood more somber.     Nursing note and vitals reviewed.      Lab Data Review:         10/8/2017  7:32 AM    Recent Labs      10/19/17   0238  10/20/17   0002  10/21/17   0749   SODIUM  131*  129*  129*   POTASSIUM  4.4  4.0  3.7   CHLORIDE  97  98  95*   CO2  25  24  25   BUN  9  10  8   CREATININE  0.44*  0.37*  0.48*   MAGNESIUM   --   1.7   --    PHOSPHORUS   --   4.1   --    CALCIUM  9.4  9.1  9.5       Recent Labs      10/19/17   0238  10/20/17   0002   ALTSGPT   --   12   ASTSGOT   --   15   ALKPHOSPHAT   --   77   TBILIRUBIN   --   0.2   GLUCOSE  90  97       Recent Labs      10/19/17   0238  10/20/17   0002   RBC  3.47*  3.37*   HEMOGLOBIN  11.9*  11.4*   HEMATOCRIT  33.6*  32.2*   PLATELETCT  604*  602*       Recent Labs      10/19/17   0238  10/20/17   0002   WBC  8.1  8.3   NEUTSPOLYS  51.10   --    LYMPHOCYTES  25.90   --    MONOCYTES  14.80*   --    EOSINOPHILS  6.80   --    BASOPHILS  1.00   --    ASTSGOT   --   15   ALTSGPT   --   12   ALKPHOSPHAT   --   77   TBILIRUBIN   --   0.2     Assessment/Plan     Hyponatremia  - Na of 107 and AMS on admission   - likely 2/2 SIADH  - continue salt tabs 3g TID  - Na ranging 128-132  - cont fluid restriction 1200cc/day  - AMS resolved, no focal neurological deficits, GCS stable, d/c neuro checks  - Na downtrending, possibly 2/2 fluid restriction order dropping off.  Will restart restriction and continue to monitor  - PT/OT: PT 1-2 more sessions prior to d/c; OT 3 times per week      Lung mass  - Hx of smoking >20 years, 10 pack-year.   - Cough, recent weight loss and  low appetite.  - CT chest: 3.6x5.9 cm paratracheal mass compressing on SVC and 2x1.8 cm R upper lobe mass (9/30)  - s/p IR biopsy of RUL c/w with PNA  - BAL (10/11): few RBCs and rare Gram+/Gram- cocci  - s/p OR biopsy of paratracheal mass (10/11), pathology inconclusive  - MRI Brain unremarkable  - Repeat Mediastinoscopy (10/17) & biopsy; repeat pathology consistent with SCLC  - CT Abdomen/Pelvis pending  - Heme/Onc following and coordinating radiation/chemotherapy  - Chemo (Cisplatin and Etoposide)/Radiation to start on Monday x 15 days per Dr. Arango  - Palliative Care following; still wants to be Full code    Chronic alcohol use  - Consumes 1 beer daily   - BAL: 0.01 on admission   - PO thiamine, B12 and folic acid supplements   - No signs of EtOH withdrawal; CTM    Fall  - GLF with no reported syncope.  - CT head on admission from other facility negative for bleeding or intracranial pathology  - No evidence of neurological deficits   - CTM    Disorder of electrolytes  - monitor electrolytes  - replete as necessary    Bacteremia  - blood cultures on admission positive for Strep pneumo  - Repeat blood cultures negative 10/1, 10/2, 10/3  - afebrile  - s/p PO Augmentin BID (10/9-10/14)      Body mass index (BMI) 19.9 or less, adult  - Possibly 2/2 homelessness vs. Unknown malignancy  - Exhibits good appetite  - Nutrition following    Morning headache  - Previously complained of mild AM headaches relieved with Tylenol  - 2/2 sleep apnea vs. Withdrawal headaches  - questionable history of sleep apnea  - Patient admits to drinking a lot of coffee at home  - nocturnal oximetry study shows sat >88% throughout night; consider outpatient sleep study on d/c  - drinking tea  - headaches have not recurred    Thrombocytosis (CMS-HCC)  - Platelet count of 559 on admission  - Uptrending  - Possibly acute phase reactant  - Will CTM  - Consider outpatient followup

## 2017-10-21 NOTE — PROGRESS NOTES
Report provided to Ashwini in CNU.  Awaiting arrival of transport to [facility].  Pt is aware of pending transfer.

## 2017-10-22 ENCOUNTER — APPOINTMENT (OUTPATIENT)
Dept: RADIOLOGY | Facility: MEDICAL CENTER | Age: 57
DRG: 166 | End: 2017-10-22
Attending: STUDENT IN AN ORGANIZED HEALTH CARE EDUCATION/TRAINING PROGRAM
Payer: COMMERCIAL

## 2017-10-22 PROBLEM — R78.81 BACTEREMIA: Status: RESOLVED | Noted: 2017-10-02 | Resolved: 2017-10-22
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
## 2017-10-22 PROBLEM — E87.8 DISORDER OF ELECTROLYTES: Status: RESOLVED | Noted: 2017-10-01 | Resolved: 2017-10-22

## 2017-10-22 LAB
ANION GAP SERPL CALC-SCNC: 5 MMOL/L (ref 0–11.9)
BUN SERPL-MCNC: 9 MG/DL (ref 8–22)
CALCIUM SERPL-MCNC: 9.1 MG/DL (ref 8.5–10.5)
CHLORIDE SERPL-SCNC: 101 MMOL/L (ref 96–112)
CO2 SERPL-SCNC: 22 MMOL/L (ref 20–33)
CREAT SERPL-MCNC: 0.42 MG/DL (ref 0.5–1.4)
GFR SERPL CREATININE-BSD FRML MDRD: >60 ML/MIN/1.73 M 2
GLUCOSE SERPL-MCNC: 126 MG/DL (ref 65–99)
MAGNESIUM SERPL-MCNC: 1.6 MG/DL (ref 1.5–2.5)
POTASSIUM SERPL-SCNC: 4.1 MMOL/L (ref 3.6–5.5)
SODIUM SERPL-SCNC: 128 MMOL/L (ref 135–145)

## 2017-10-22 PROCEDURE — 99232 SBSQ HOSP IP/OBS MODERATE 35: CPT | Mod: GC | Performed by: HOSPITALIST

## 2017-10-22 PROCEDURE — 83735 ASSAY OF MAGNESIUM: CPT

## 2017-10-22 PROCEDURE — A9270 NON-COVERED ITEM OR SERVICE: HCPCS | Performed by: STUDENT IN AN ORGANIZED HEALTH CARE EDUCATION/TRAINING PROGRAM

## 2017-10-22 PROCEDURE — 700102 HCHG RX REV CODE 250 W/ 637 OVERRIDE(OP): Performed by: INTERNAL MEDICINE

## 2017-10-22 PROCEDURE — A9270 NON-COVERED ITEM OR SERVICE: HCPCS | Performed by: INTERNAL MEDICINE

## 2017-10-22 PROCEDURE — 770004 HCHG ROOM/CARE - ONCOLOGY PRIVATE *

## 2017-10-22 PROCEDURE — 36415 COLL VENOUS BLD VENIPUNCTURE: CPT

## 2017-10-22 PROCEDURE — 80048 BASIC METABOLIC PNL TOTAL CA: CPT

## 2017-10-22 PROCEDURE — 700111 HCHG RX REV CODE 636 W/ 250 OVERRIDE (IP): Performed by: STUDENT IN AN ORGANIZED HEALTH CARE EDUCATION/TRAINING PROGRAM

## 2017-10-22 PROCEDURE — 700102 HCHG RX REV CODE 250 W/ 637 OVERRIDE(OP): Performed by: STUDENT IN AN ORGANIZED HEALTH CARE EDUCATION/TRAINING PROGRAM

## 2017-10-22 PROCEDURE — 36569 INSJ PICC 5 YR+ W/O IMAGING: CPT

## 2017-10-22 RX ORDER — ONDANSETRON 4 MG/1
4 TABLET, ORALLY DISINTEGRATING ORAL ONCE
Status: COMPLETED | OUTPATIENT
Start: 2017-10-23 | End: 2017-10-23

## 2017-10-22 RX ORDER — DEXAMETHASONE 4 MG/1
4 TABLET ORAL ONCE
Status: CANCELLED | OUTPATIENT
Start: 2017-10-23 | End: 2017-10-22

## 2017-10-22 RX ORDER — ONDANSETRON 4 MG/1
4 TABLET, ORALLY DISINTEGRATING ORAL ONCE
Status: CANCELLED | OUTPATIENT
Start: 2017-10-23 | End: 2017-10-22

## 2017-10-22 RX ORDER — ONDANSETRON 4 MG/1
4 TABLET, ORALLY DISINTEGRATING ORAL ONCE
Status: CANCELLED | OUTPATIENT
Start: 2017-10-24 | End: 2017-10-23

## 2017-10-22 RX ORDER — DEXAMETHASONE 4 MG/1
4 TABLET ORAL ONCE
Status: CANCELLED | OUTPATIENT
Start: 2017-10-24 | End: 2017-10-23

## 2017-10-22 RX ORDER — DEXAMETHASONE 4 MG/1
4 TABLET ORAL ONCE
Status: COMPLETED | OUTPATIENT
Start: 2017-10-23 | End: 2017-10-23

## 2017-10-22 RX ADMIN — NICOTINE 21 MG: 21 PATCH, EXTENDED RELEASE TRANSDERMAL at 06:27

## 2017-10-22 RX ADMIN — THIAMINE HCL TAB 100 MG 100 MG: 100 TAB at 09:37

## 2017-10-22 RX ADMIN — VITAMIN D, TAB 1000IU (100/BT) 5000 UNITS: 25 TAB at 10:01

## 2017-10-22 RX ADMIN — HEPARIN SODIUM 5000 UNITS: 5000 INJECTION, SOLUTION INTRAVENOUS; SUBCUTANEOUS at 21:46

## 2017-10-22 RX ADMIN — MIDODRINE HYDROCHLORIDE 5 MG: 5 TABLET ORAL at 17:35

## 2017-10-22 RX ADMIN — HEPARIN SODIUM 5000 UNITS: 5000 INJECTION, SOLUTION INTRAVENOUS; SUBCUTANEOUS at 06:28

## 2017-10-22 RX ADMIN — STANDARDIZED SENNA CONCENTRATE AND DOCUSATE SODIUM 2 TABLET: 8.6; 5 TABLET, FILM COATED ORAL at 09:39

## 2017-10-22 RX ADMIN — STANDARDIZED SENNA CONCENTRATE AND DOCUSATE SODIUM 2 TABLET: 8.6; 5 TABLET, FILM COATED ORAL at 21:46

## 2017-10-22 RX ADMIN — ETOPOSIDE 167 MG: 20 INJECTION, SOLUTION, CONCENTRATE INTRAVENOUS at 03:49

## 2017-10-22 RX ADMIN — THERA TABS 1 TABLET: TAB at 09:38

## 2017-10-22 RX ADMIN — MIDODRINE HYDROCHLORIDE 5 MG: 5 TABLET ORAL at 14:01

## 2017-10-22 RX ADMIN — FOLIC ACID 1 MG: 1 TABLET ORAL at 09:37

## 2017-10-22 RX ADMIN — MIDODRINE HYDROCHLORIDE 5 MG: 5 TABLET ORAL at 09:38

## 2017-10-22 ASSESSMENT — ENCOUNTER SYMPTOMS
MYALGIAS: 0
WEIGHT LOSS: 1
VOMITING: 0
WEAKNESS: 0
HEADACHES: 0
DIARRHEA: 0
DIZZINESS: 0
ABDOMINAL PAIN: 0
SHORTNESS OF BREATH: 0
CHILLS: 0
COUGH: 0
PALPITATIONS: 0
TINGLING: 1
NAUSEA: 0
EYES NEGATIVE: 1
FEVER: 0

## 2017-10-22 ASSESSMENT — PAIN SCALES - GENERAL
PAINLEVEL_OUTOF10: 0
PAINLEVEL_OUTOF10: 0

## 2017-10-22 NOTE — CARE PLAN
Problem: Infection  Goal: Will remain free from infection    Intervention: Implement standard precautions and perform hand washing before and after patient contact  Standard infection prevention and chemotherapy precautions have been implemented. Hand washing performed before and after patient care interactions.

## 2017-10-22 NOTE — PROGRESS NOTES
Rec'd report & assumed care of pt at 0700. Assessment completed. Pt is A&Ox4. Patient denies any pain at this time. Medications provided per MAR. Pt ambulates to the bathroom by self. Plan of care discussed & questions answered. Bed locked and in lowest position, bed alarm is not on. Call light within reach, non-skid socks in place, hourly rounding. Pt reports no further needs at this time.

## 2017-10-22 NOTE — PROGRESS NOTES
PICC Insertion Procedure Note    Consents confirmed, vessel patency confirmed with ultrasound. Risks and benefits of procedure explained to patient/family and education regarding central line associated bloodstream infections provided. Questions answered.     PICC placed in right upper arm per MD order with ultrasound guidance. 5 Fr, double lumen PICC placed in basilic vein after one attempt(s). 2 cc's of 1% lidocaine injected intradermally, 21 gauge microintroducer needle and modified Seldinger technique used. 40 cm total catheter length, 40 cm external measurement inserted with good blood return. Each lumen flushed without resistance with 10 mL 0.9% normal saline. PICC line secured with Biopatch and Tegaderm.    PICC tip location in the SVC confirmed by chest xray. Pt tolerated procedure well.  Patient condition relayed to unit RN or ordering physician via this post procedure note in the EMR.     [facility] Power PICC ref #  [id number], Lot #  [id number]

## 2017-10-22 NOTE — PROGRESS NOTES
Chemotherapy Verification - PRIMARY RN      Height = 1.7m  Weight = 56.3kg  BSA = 1.67m2       Medication: Etoposide  Dose: 100mg/m2  Calculated Dose: 167mg                             (In mg/m2, AUC, mg/kg)           I confirm this process was performed independently with the BSA and all final chemotherapy dosing calculations congruent.  Any discrepancies of 5% or greater have been addressed with the chemotherapy pharmacist. The resolution of the discrepancy has been documented in the EPIC progress notes.

## 2017-10-22 NOTE — PROGRESS NOTES
PICC line placed with some difficulty.  Awaiting PCXR to confirm placement.  Pt in good spirits.  POC reviewed.  Appetite is great.

## 2017-10-22 NOTE — PROGRESS NOTES
Internal Medicine Interval Note  Note Author: Tayler Bianchi M.D.     Name Froylan Spain     1960   Age/Sex 57 y.o. male   MRN [id number]   Code Status FULL     After 5PM or if no immediate response to page, please call for cross-coverage  Attending/Team: Dr. Ang Tolentino/Nikolai See Patient List for primary contact information  Call [phone] to page    1st Call - Day Intern (R1):   Dr. Christie Snell 2nd Call - Day Sr. Resident (R2/R3):   Dr. Tayler Bianchi   58yo homeless male without significant PMH admitted on 17 for severe hyponatremia to 108 and AMS.  Started on salt tabs, fluid restriction and Na correction per guidelines.   Concern for SIADH.  Smoker with lung masses (5.9 cm rightparatracheal mass and 2 cm RUL) found on CT Chest.  IR biopsy (10/05/17) c/w organizing PNA.  BAL shows few RBCs and rare Gram+/Gram- cocci.  OR biopsy of paratracheal lesion (10/11) was inconclusive..  S/p repeat mediastinoscopy with biopsy (10/17).    Reason for interval visit  (Principal Problem)   Hyponatremia    Interval Problem Daily Status Update  (24 hours)   - No acute overnight events - had chemo through the night without any complications.  - Chemo (Cisplatin and Etoposide)/Radiation x 15 days per Dr. Arango  - PICC to be placed today  - 10/22 Na 129, continue 1200mL fluid restriction; AMS resolved - DC'd salt tabs  - Seen by Palliative Care - still wants to be Full Code, but wants to get in touch with brother, possibly through his daughters or former landlord, as the number he had for him is disconnected.  It seems he wants his brother to be involved in EOL decision-making     Review of Systems   Constitutional: Positive for weight loss. Negative for chills and fever.   Eyes: Negative.    Respiratory: Negative for cough and shortness of breath.    Cardiovascular: Negative for chest pain and palpitations.   Gastrointestinal: Negative for abdominal pain, diarrhea, nausea and vomiting.    Genitourinary: Negative.    Musculoskeletal: Negative for myalgias.   Skin: Negative for itching and rash.   Neurological: Positive for tingling (chronic). Negative for dizziness, weakness and headaches.     Consultants/Specialty  Pulmonary   Nephrology  Thoracic Surgery  Hematology/Oncology     Disposition  Inpatient    Quality Measures    Reviewed items::  Labs reviewed and Medications reviewed  Garvin catheter::  No Garvin  DVT prophylaxis pharmacological::  Heparin  Ulcer Prophylaxis::  Not indicated        Physical Exam       Vitals:    10/21/17 1600 10/21/17 1942 10/22/17 0400 10/22/17 0756   BP: 116/65 130/69 116/71 117/67   Pulse: 89 82 78 78   Resp: 18 20 18 18   Temp: 37.2 °C (98.9 °F) 36.7 °C (98 °F) 36.4 °C (97.6 °F) 36.4 °C (97.6 °F)   SpO2: 97% 99% 96% 95%   Weight:       Height:         Body mass index is 17.81 kg/m².    Oxygen Therapy:  Pulse Oximetry: 95 %, O2 (LPM): 0, O2 Delivery: None (Room Air)    Physical Exam   Constitutional: He is oriented to person, place, and time. No distress.   Appears older than stated age; disheveled, thin   HENT:   Head: Normocephalic and atraumatic.   Mediastinoscopy scar across suprasternal notch, healing well   Eyes: Conjunctivae and EOM are normal. Right eye exhibits no discharge. Left eye exhibits no discharge. No scleral icterus.   Neck: Normal range of motion.   Cardiovascular: Normal rate, regular rhythm, normal heart sounds and intact distal pulses.  Exam reveals no gallop and no friction rub.    No murmur heard.  Pulmonary/Chest: Effort normal and breath sounds normal. No respiratory distress. He has no wheezes. He has no rales.   Coughing, chronic per patient   Abdominal: Soft. Bowel sounds are normal. He exhibits no distension and no mass. There is no tenderness. There is no rebound and no guarding.   Musculoskeletal: Normal range of motion. He exhibits no edema or tenderness.   Neurological: He is alert and oriented to person, place, and time. No cranial  nerve deficit. GCS score is 15.   Skin: Skin is warm and dry. No rash noted. He is not diaphoretic. No pallor.   Cracked, with areas of peeling skin; appears sun damaged   Psychiatric: Mood and affect normal.   Mood more somber.     Nursing note and vitals reviewed.      Lab Data Review:         10/8/2017  7:32 AM    Recent Labs      10/20/17   0002  10/21/17   0749  10/22/17   0724   SODIUM  129*  129*  128*   POTASSIUM  4.0  3.7  4.1   CHLORIDE  98  95*  101   CO2  24  25  22   BUN  10  8  9   CREATININE  0.37*  0.48*  0.42*   MAGNESIUM  1.7   --    --    PHOSPHORUS  4.1   --    --    CALCIUM  9.1  9.5  9.1       Recent Labs      10/20/17   0002  10/21/17   0749  10/22/17   0724   ALTSGPT  12   --    --    ASTSGOT  15   --    --    ALKPHOSPHAT  77   --    --    TBILIRUBIN  0.2   --    --    GLUCOSE  97  105*  126*       Recent Labs      10/20/17   0002   RBC  3.37*   HEMOGLOBIN  11.4*   HEMATOCRIT  32.2*   PLATELETCT  602*       Recent Labs      10/20/17   0002   WBC  8.3   ASTSGOT  15   ALTSGPT  12   ALKPHOSPHAT  77   TBILIRUBIN  0.2     Assessment/Plan     Hyponatremia  - Na of 107 and AMS on admission   - SIADH due to small cell CA  - DC'd salt tabs  - Na ranging 128-132  - cont fluid restriction 1200cc/day  - AMS resolved, no focal neurological deficits, GCS stable, d/c neuro checks        Small cell carcinoma of lung (CMS-HCC)  - Hx of smoking >20 years, 10 pack-year.   - Cough, recent weight loss and low appetite.  - CT chest: 3.6x5.9 cm paratracheal mass compressing on SVC and 2x1.8 cm R upper lobe mass (9/30)  - Procedures: IR biopsy of RUL c/w with PNA, BAL (10/11): few RBCs and rare Gram+/Gram- cocci and OR biopsy of paratracheal mass (10/11) showing small cell  - MRI Brain unremarkable  - Repeat Mediastinoscopy (10/17) & biopsy; repeat pathology consistent with SCLC  - CT Abdomen/Pelvis: constipation, no mets   - Chemo (Cisplatin and Etoposide)/Radiation x 15 days per Dr. Arango  - Will continue to  follow BUN/Cr closely given fluid restriction and scheduled chemo  - Palliative Care following; still wants to be Full code    Chronic alcohol use  - Consumes 1 beer daily   - BAL: 0.01 on admission   - PO thiamine, B12 and folic acid supplements   - No signs of EtOH withdrawal; CTM    Fall  - GLF with no reported syncope.  - CT head on admission from other facility negative for bleeding or intracranial pathology  - No evidence of neurological deficits   - CTM    Disorder of electrolytes  - monitor electrolytes  - replete as necessary    Bacteremia  - blood cultures on admission positive for Strep pneumo  - Repeat blood cultures negative 10/1, 10/2, 10/3  - afebrile  - s/p PO Augmentin BID (10/9-10/14)      Body mass index (BMI) 19.9 or less, adult  - Possibly 2/2 homelessness vs. Unknown malignancy  - Exhibits good appetite  - Nutrition following    Morning headache  - Previously complained of mild AM headaches relieved with Tylenol  - 2/2 sleep apnea vs. Withdrawal headaches  - questionable history of sleep apnea  - Patient admits to drinking a lot of coffee at home  - nocturnal oximetry study shows sat >88% throughout night; consider outpatient sleep study on d/c  - drinking tea  - headaches have not recurred    Thrombocytosis (CMS-HCC)  - Platelet count of 559 on admission  - Uptrending  - Possibly acute phase reactant  - Will CTM  - Consider outpatient followup

## 2017-10-22 NOTE — PROGRESS NOTES
Chemotherapy Verification - SECONDARY RN  Cycle 1 Day 2       Height = 177.8 cm  Weight = 56.3 kg  BSA = 1.67 m2       Medication: Etoposide  Dose: 100 mg/m2  Calculated Dose: 167 mg (167 mg ordered)                             (In mg/m2, AUC, mg/kg)       I confirm that this process was performed independently.

## 2017-10-22 NOTE — PROGRESS NOTES
Chemotherapy Verification - PRIMARY RN      Height = 177.8 cm  Weight = 56.3 kg  BSA = 1.67 m2      Medication: Cisplatin  Dose: 80 mg/m2  Calculated Dose: 133.6 mg Ordered Dose: 133.6 mg                            (In mg/m2, AUC, mg/kg)     Medication: Etoposide  Dose: 100 mg/m2  Calculated Dose: 167 mg Ordered Dose: 167 mg                            (In mg/m2, AUC, mg/kg)          I confirm this process was performed independently with the BSA and all final chemotherapy dosing calculations congruent.  Any discrepancies of 5% or greater have been addressed with the chemotherapy pharmacist. The resolution of the discrepancy has been documented in the EPIC progress notes.

## 2017-10-22 NOTE — CARE PLAN
Problem: Communication  Goal: The ability to communicate needs accurately and effectively will improve    Intervention: Educate patient and significant other/support system about the plan of care, procedures, treatments, medications and allow for questions  Pt updated on POC. All questions have been answered.

## 2017-10-22 NOTE — PROGRESS NOTES
"Pharmacy Chemotherapy Verification:     Patient Name: CASSIDY PAGE    Protocol: IP SC LUNG CISPLATIN + ETOPOSIDE (80/100) +/- XRT     CISplatin 80 mg/m2 IV over 60 minutes on day 1  Etoposide 100 mg/m2 IV over 60 minutes on days 1-3  28 day cycle for 4-6 cycles  *Dosing Reference*  NCCN Guidelines for Small Cell Lung Cancer.V.2017  Ruth Ann AT, et al. N Engl J Med. 199;340-(4):265-71  Moy S, et al. J Clin Oncol. 2002;20(24):7105-72    Dx: Small cell lung cancer, stage IIIA         /67   Pulse 78   Temp 36.4 °C (97.6 °F)   Resp 18   Ht 1.778 m (5' 10\")   Wt 56.3 kg (124 lb 1.9 oz)   SpO2 95%   BMI 17.81 kg/m²  Body surface area is 1.67 meters squared.    LABS 10/20/2017  [ANC: 4150 on 10/19/2017]      WBC: 8.3 Plt: 602k   Hgb: 11.4    10/22/17: SCr: 0.42mg/dL CrCl~ 93 mL/min (min Cr 0.7) Ma.6  10/20/17: LFT's: 15/12/77 TBili = 0.2      Drug Order   (Drug name, dose, route, IV Fluid & volume, frequency, number of doses) Cycle: 1, Day 2 of 3      Previous treatment: N/A     Medication = Etoposide  Base Dose= 100 mg/m2  Calc Dose: Base Dose x 1.67 m2 = 167 mg  Final Dose = 167 mg  Route = IV  Fluid & Volume =  mL  Admin Duration = Over  minutes   Days 1-3      <5% difference, OK to treat with final dose      By my signature below, I confirm this process was performed independently with the BSA and all final chemotherapy dosing calculations congruent. I have reviewed the above chemotherapy order and that my calculation of the final dose and BSA (when applicable) corroborate those calculations of the  pharmacist. Discrepancies of 5% or greater in the written dose have been addressed and documented within the [facility] Progress notes.    Mynor Donato PharmD    "

## 2017-10-22 NOTE — PROGRESS NOTES
Oncology/Hematology Progress Note               Author: Apollo Vickers Date & Time created: 10/22/2017  1:06 PM     Interval History: Small Cell Lung Cancer Stage IIIA  10/19?17- Mediastinoscopy - precarinal node  Pos  10/20/17 - Limited disease small cell lung cancer. Dr. Young planning to start XRT on Monday 10/23/17  10/21/17 - D1 Cisplatin/etoposide  10/22/17- D2 tolerated chemo well, etoposide today    Review of Systems:  Review of Systems   All other systems reviewed and are negative.      Physical Exam:  Physical Exam   Constitutional: He is oriented to person, place, and time. He appears well-developed and well-nourished.   HENT:   Head: Normocephalic.   Mouth/Throat: Oropharynx is clear and moist.   Eyes: Conjunctivae and EOM are normal.   Cardiovascular: Normal rate and regular rhythm.    Pulmonary/Chest: Effort normal and breath sounds normal.   Abdominal: Soft.   Musculoskeletal: Normal range of motion.   Neurological: He is alert and oriented to person, place, and time.       Labs:        Invalid input(s): [id number]      Recent Labs      10/20/17   0002  10/21/17   0749  10/22/17   0724   SODIUM  129*  129*  128*   POTASSIUM  4.0  3.7  4.1   CHLORIDE  98  95*  101   CO2  24  25  22   BUN  10  8  9   CREATININE  0.37*  0.48*  0.42*   MAGNESIUM  1.7   --   1.6   PHOSPHORUS  4.1   --    --    CALCIUM  9.1  9.5  9.1     Recent Labs      10/20/17   0002  10/21/17   0749  10/22/17   0724   ALTSGPT  12   --    --    ASTSGOT  15   --    --    ALKPHOSPHAT  77   --    --    TBILIRUBIN  0.2   --    --    GLUCOSE  97  105*  126*     Recent Labs      10/20/17   0002   RBC  3.37*   HEMOGLOBIN  11.4*   HEMATOCRIT  32.2*   PLATELETCT  602*     Recent Labs      10/20/17   0002   WBC  8.3   ASTSGOT  15   ALTSGPT  12   ALKPHOSPHAT  77   TBILIRUBIN  0.2     Recent Labs      10/20/17   0002  10/21/17   0749  10/22/17   0724   SODIUM  129*  129*  128*   POTASSIUM  4.0  3.7  4.1   CHLORIDE  98  95*  101   CO2  24  25   22   GLUCOSE  97  105*  126*   BUN  10  8  9   CREATININE  0.37*  0.48*  0.42*   CALCIUM  9.1  9.5  9.1     Hemodynamics:  Temp (24hrs), Av.7 °C (98 °F), Min:36.4 °C (97.6 °F), Max:37.2 °C (98.9 °F)  Temperature: 36.4 °C (97.6 °F)  Pulse  Av.6  Min: 64  Max: 99   Blood Pressure: 117/67     Respiratory:    Respiration: 18, Pulse Oximetry: 95 %        RUL Breath Sounds: Clear, RML Breath Sounds: Diminished, RLL Breath Sounds: Diminished, LEONARDO Breath Sounds: Clear, LLL Breath Sounds: Diminished  Fluids:    Intake/Output Summary (Last 24 hours) at 10/19/17 1524  Last data filed at 10/19/17 1300   Gross per 24 hour   Intake              761 ml   Output             1450 ml   Net             -689 ml        GI/Nutrition:  Orders Placed This Encounter   Procedures   • DIET ORDER     Standing Status:   Standing     Number of Occurrences:   1     Order Specific Question:   Diet:     Answer:   Regular [1]     Order Specific Question:   Consistency/Fluid modifications:     Answer:   1200 ml Fluid Restriction [8]     Medical Decision Making, by Problem:  Active Hospital Problems    Diagnosis   • *Hyponatremia [E87.1]   • Lung mass [R91.8]   • Disorder of electrolytes [E87.8]   • Fall [W19.XXXA]   • Chronic alcohol use [F10.10]   • Body mass index (BMI) 19.9 or less, adult [Z68.1]   • Morning headache [R51]   • Bacteremia [R78.81]       Plan:  1. Stage IIIA Small cell lung cancer   just diagnosed on precarinal node biopsy  MRI brain negative  CT Abdomen/Pelvis will be ordered  Will call radiation therapy and transfer to oncology for chemotherapy. I discussed the diagnosis with the patient and treatment with combined modality chemoradiation. I assume his CT abdomen will be neg but he has SVC compression but not syndrome.   10/20/17- XRT planned for Monday 10/23. He needs to start chemotherapy ASAP and orders placed in [place]. He needs to be moved to Oncology today  10/21/17-starting chemotherapy D1, labs checked  10/22/17-  D2 chemotherapy today and scheduled to start radiation tomorrow     2. SIADH - controlled  10/21/17 sodium 129  10/22/17- Na 128 - asx, observe    3. Long term tobacco abuse    Quality-Core Measures

## 2017-10-22 NOTE — PROGRESS NOTES
Assumed care of the patient at 1915. Received report from the day shift RN. Pt is AOx4 with no complaint of pain or discomfort at this time. Pt updated on POC for the shift: initiation of chemotherapy and consequent symptom management, monitoring I&O's, and continued 1200 ml fluid restriction. All questions have been answered at this time. PIV, TKO, w/ brisk blood return. Fall precautions implemented. Call light within reach, hourly rounding implemented.

## 2017-10-23 ENCOUNTER — HOSPITAL ENCOUNTER (OUTPATIENT)
Dept: RADIATION ONCOLOGY | Facility: MEDICAL CENTER | Age: 57
End: 2017-10-23

## 2017-10-23 ENCOUNTER — PATIENT OUTREACH (OUTPATIENT)
Dept: OTHER | Facility: MEDICAL CENTER | Age: 57
End: 2017-10-23

## 2017-10-23 LAB
ANION GAP SERPL CALC-SCNC: 7 MMOL/L (ref 0–11.9)
BASOPHILS # BLD AUTO: 0.3 % (ref 0–1.8)
BASOPHILS # BLD: 0.02 K/UL (ref 0–0.12)
BUN SERPL-MCNC: 10 MG/DL (ref 8–22)
CALCIUM SERPL-MCNC: 9.1 MG/DL (ref 8.5–10.5)
CHLORIDE SERPL-SCNC: 100 MMOL/L (ref 96–112)
CO2 SERPL-SCNC: 22 MMOL/L (ref 20–33)
CREAT SERPL-MCNC: 0.41 MG/DL (ref 0.5–1.4)
EOSINOPHIL # BLD AUTO: 0.01 K/UL (ref 0–0.51)
EOSINOPHIL NFR BLD: 0.1 % (ref 0–6.9)
ERYTHROCYTE [DISTWIDTH] IN BLOOD BY AUTOMATED COUNT: 43.5 FL (ref 35.9–50)
GFR SERPL CREATININE-BSD FRML MDRD: >60 ML/MIN/1.73 M 2
GLUCOSE SERPL-MCNC: 92 MG/DL (ref 65–99)
HCT VFR BLD AUTO: 32.8 % (ref 42–52)
HGB BLD-MCNC: 11.5 G/DL (ref 14–18)
IMM GRANULOCYTES # BLD AUTO: 0.03 K/UL (ref 0–0.11)
IMM GRANULOCYTES NFR BLD AUTO: 0.4 % (ref 0–0.9)
LYMPHOCYTES # BLD AUTO: 0.95 K/UL (ref 1–4.8)
LYMPHOCYTES NFR BLD: 12.9 % (ref 22–41)
MAGNESIUM SERPL-MCNC: 1.7 MG/DL (ref 1.5–2.5)
MCH RBC QN AUTO: 34.2 PG (ref 27–33)
MCHC RBC AUTO-ENTMCNC: 35.1 G/DL (ref 33.7–35.3)
MCV RBC AUTO: 97.6 FL (ref 81.4–97.8)
MONOCYTES # BLD AUTO: 0.76 K/UL (ref 0–0.85)
MONOCYTES NFR BLD AUTO: 10.3 % (ref 0–13.4)
NEUTROPHILS # BLD AUTO: 5.58 K/UL (ref 1.82–7.42)
NEUTROPHILS NFR BLD: 76 % (ref 44–72)
NRBC # BLD AUTO: 0 K/UL
NRBC BLD AUTO-RTO: 0 /100 WBC
PLATELET # BLD AUTO: 519 K/UL (ref 164–446)
PMV BLD AUTO: 9 FL (ref 9–12.9)
POTASSIUM SERPL-SCNC: 3.9 MMOL/L (ref 3.6–5.5)
RBC # BLD AUTO: 3.36 M/UL (ref 4.7–6.1)
SODIUM SERPL-SCNC: 129 MMOL/L (ref 135–145)
WBC # BLD AUTO: 7.4 K/UL (ref 4.8–10.8)

## 2017-10-23 PROCEDURE — A9270 NON-COVERED ITEM OR SERVICE: HCPCS | Performed by: STUDENT IN AN ORGANIZED HEALTH CARE EDUCATION/TRAINING PROGRAM

## 2017-10-23 PROCEDURE — 700111 HCHG RX REV CODE 636 W/ 250 OVERRIDE (IP): Performed by: STUDENT IN AN ORGANIZED HEALTH CARE EDUCATION/TRAINING PROGRAM

## 2017-10-23 PROCEDURE — 77386 HCHG IMRT DELIVERY COMPLEX: CPT | Mod: 59 | Performed by: RADIOLOGY

## 2017-10-23 PROCEDURE — A9270 NON-COVERED ITEM OR SERVICE: HCPCS | Performed by: INTERNAL MEDICINE

## 2017-10-23 PROCEDURE — 700102 HCHG RX REV CODE 250 W/ 637 OVERRIDE(OP): Performed by: STUDENT IN AN ORGANIZED HEALTH CARE EDUCATION/TRAINING PROGRAM

## 2017-10-23 PROCEDURE — 700102 HCHG RX REV CODE 250 W/ 637 OVERRIDE(OP): Performed by: INTERNAL MEDICINE

## 2017-10-23 PROCEDURE — DBY67ZZ CONTACT RADIATION OF MEDIASTINUM: ICD-10-PCS | Performed by: RADIOLOGY

## 2017-10-23 PROCEDURE — 77280 THER RAD SIMULAJ FIELD SMPL: CPT | Performed by: RADIOLOGY

## 2017-10-23 PROCEDURE — 770004 HCHG ROOM/CARE - ONCOLOGY PRIVATE *

## 2017-10-23 PROCEDURE — 85025 COMPLETE CBC W/AUTO DIFF WBC: CPT

## 2017-10-23 PROCEDURE — 700111 HCHG RX REV CODE 636 W/ 250 OVERRIDE (IP): Performed by: INTERNAL MEDICINE

## 2017-10-23 PROCEDURE — 80048 BASIC METABOLIC PNL TOTAL CA: CPT

## 2017-10-23 PROCEDURE — 77386 HCHG IMRT DELIVERY COMPLEX: CPT | Performed by: RADIOLOGY

## 2017-10-23 PROCEDURE — 83735 ASSAY OF MAGNESIUM: CPT

## 2017-10-23 PROCEDURE — 99232 SBSQ HOSP IP/OBS MODERATE 35: CPT | Mod: GC | Performed by: INTERNAL MEDICINE

## 2017-10-23 PROCEDURE — 700105 HCHG RX REV CODE 258: Performed by: INTERNAL MEDICINE

## 2017-10-23 PROCEDURE — 77280 THER RAD SIMULAJ FIELD SMPL: CPT | Mod: 26 | Performed by: RADIOLOGY

## 2017-10-23 RX ORDER — ONDANSETRON 4 MG/1
4 TABLET, ORALLY DISINTEGRATING ORAL ONCE
Status: COMPLETED | OUTPATIENT
Start: 2017-10-24 | End: 2017-10-24

## 2017-10-23 RX ORDER — DEXAMETHASONE 4 MG/1
4 TABLET ORAL ONCE
Status: COMPLETED | OUTPATIENT
Start: 2017-10-24 | End: 2017-10-24

## 2017-10-23 RX ORDER — NICOTINE 21 MG/24HR
14 PATCH, TRANSDERMAL 24 HOURS TRANSDERMAL
Status: DISCONTINUED | OUTPATIENT
Start: 2017-10-24 | End: 2017-10-30

## 2017-10-23 RX ADMIN — THERA TABS 1 TABLET: TAB at 08:00

## 2017-10-23 RX ADMIN — STANDARDIZED SENNA CONCENTRATE AND DOCUSATE SODIUM 2 TABLET: 8.6; 5 TABLET, FILM COATED ORAL at 21:21

## 2017-10-23 RX ADMIN — DEXAMETHASONE 4 MG: 4 TABLET ORAL at 03:05

## 2017-10-23 RX ADMIN — HEPARIN SODIUM 5000 UNITS: 5000 INJECTION, SOLUTION INTRAVENOUS; SUBCUTANEOUS at 13:12

## 2017-10-23 RX ADMIN — THIAMINE HCL TAB 100 MG 100 MG: 100 TAB at 08:01

## 2017-10-23 RX ADMIN — NICOTINE 21 MG: 21 PATCH, EXTENDED RELEASE TRANSDERMAL at 06:21

## 2017-10-23 RX ADMIN — VITAMIN D, TAB 1000IU (100/BT) 5000 UNITS: 25 TAB at 08:01

## 2017-10-23 RX ADMIN — ETOPOSIDE 167 MG: 20 INJECTION, SOLUTION, CONCENTRATE INTRAVENOUS at 03:24

## 2017-10-23 RX ADMIN — MIDODRINE HYDROCHLORIDE 5 MG: 5 TABLET ORAL at 13:12

## 2017-10-23 RX ADMIN — HEPARIN SODIUM 5000 UNITS: 5000 INJECTION, SOLUTION INTRAVENOUS; SUBCUTANEOUS at 21:21

## 2017-10-23 RX ADMIN — HEPARIN SODIUM 5000 UNITS: 5000 INJECTION, SOLUTION INTRAVENOUS; SUBCUTANEOUS at 06:19

## 2017-10-23 RX ADMIN — MIDODRINE HYDROCHLORIDE 5 MG: 5 TABLET ORAL at 18:12

## 2017-10-23 RX ADMIN — MIDODRINE HYDROCHLORIDE 5 MG: 5 TABLET ORAL at 08:00

## 2017-10-23 RX ADMIN — FOLIC ACID 1 MG: 1 TABLET ORAL at 07:59

## 2017-10-23 RX ADMIN — ONDANSETRON 4 MG: 4 TABLET, ORALLY DISINTEGRATING ORAL at 03:05

## 2017-10-23 ASSESSMENT — ENCOUNTER SYMPTOMS
COUGH: 0
DIARRHEA: 0
EYES NEGATIVE: 1
DIZZINESS: 0
HEADACHES: 0
PALPITATIONS: 0
ABDOMINAL PAIN: 0
CHILLS: 0
NAUSEA: 0
FEVER: 0
WEIGHT LOSS: 1
VOMITING: 0
MYALGIAS: 0
SHORTNESS OF BREATH: 0
WEAKNESS: 0
TINGLING: 1

## 2017-10-23 ASSESSMENT — PAIN SCALES - GENERAL
PAINLEVEL_OUTOF10: 0
PAINLEVEL_OUTOF10: 0

## 2017-10-23 NOTE — CARE PLAN
Problem: Communication  Goal: The ability to communicate needs accurately and effectively will improve    Intervention: Educate patient and significant other/support system about the plan of care, procedures, treatments, medications and allow for questions  Pt updated on POC. All questions answered at this time.      Problem: Safety  Goal: Will remain free from falls    Intervention: Implement fall precautions  Bed locked and lowered, treaded socks in place, IV pole on the same side as the restroom, bed rails lowered on the same side as the restroom, call light within reach, hourly rounding implemented.

## 2017-10-23 NOTE — PROGRESS NOTES
Assumed care of the patient at 1915. Received report from the day shift RN. Pt is AOx4, with no complaint of pain at this time. Patient is currently experiencing hiccups, but denies nausea/vomiting. Pt updated on POC for the shift: chemotherapy this am, labs, CHG bath performed and education provided. All questions have been answered at this time. PICC TKO w/ + blood return. 1200 ml fluid restriction w/ I&Os. Pt is up self/steady, RA. Fall precautions implemented. Call light within reach, hourly rounding implemented.

## 2017-10-23 NOTE — PROGRESS NOTES
Internal Medicine Interval Note  Note Author: Trav Fierro M.D.     Name Froylan Spain     1960   Age/Sex 57 y.o. male   MRN [id number]   Code Status FULL     After 5PM or if no immediate response to page, please call for cross-coverage  Attending/Team: Dr. Chandler /Nikolai See Patient List for primary contact information  Call [phone] to page    1st Call - Day Intern (R1):   Dr. Fierro 2nd Call - Day Sr. Resident (R2/R3):   Dr. Bianchi   58yo homeless male without significant PMH admitted on 17 for severe hyponatremia to 108 and AMS.  Started on salt tabs, fluid restriction and Na correction per guidelines.   Concern for SIADH.  Smoker with lung masses (5.9 cm rightparatracheal mass and 2 cm RUL) found on CT Chest.  IR biopsy (10/05/17) c/w organizing PNA.  BAL shows few RBCs and rare Gram+/Gram- cocci.  OR biopsy of paratracheal lesion (10/11) was inconclusive. S/p repeat mediastinoscopy with biopsy (10/17)- precarinal and level 7 lymph nodes demonstrate metastatic small cell carcinoma    Reason for interval visit  (Principal Problem)   Hyponatremia    Interval Problem Daily Status Update  (24 hours)   - No acute overnight events . PICC line flushing well.   - Patient denies nausea, vomiting.  - Day 3 of chemo (etoposide). BID radiation therapy started , to be continued for 15 days total  - AMS resolved, Na 129, patient on 1200 ml fluid restriction. Salt tablets discontinued yesterday  - Decreased dose of nicotine patch  - 10/21 Seen by Palliative Care - still wants to be Full Code, but wants to get in touch with brother, possibly through his daughters or former landlord, as the number he had for him is disconnected.  It seems he wants his brother to be involved in EOL decision-making       Review of Systems   Constitutional: Positive for weight loss. Negative for chills and fever.   Eyes: Negative.    Respiratory: Negative for cough and shortness of breath.    Cardiovascular: Negative  for chest pain and palpitations.   Gastrointestinal: Negative for abdominal pain, diarrhea, nausea and vomiting.   Genitourinary: Negative.    Musculoskeletal: Negative for myalgias.   Skin: Negative for itching and rash.   Neurological: Positive for tingling (chronic). Negative for dizziness, weakness and headaches.     Consultants/Specialty  Pulmonary   Nephrology  Thoracic Surgery  Hematology/Oncology     Disposition  Inpatient    Quality Measures    Reviewed items::  Labs reviewed and Medications reviewed  Garvin catheter::  No Garvin  DVT prophylaxis pharmacological::  Heparin  Ulcer Prophylaxis::  Not indicated        Physical Exam       Vitals:    10/22/17 1600 10/22/17 1955 10/23/17 0343 10/23/17 0800   BP: 124/70 144/75 129/78 129/83   Pulse: 80 86 70 82   Resp: 18 20 20 16   Temp: 36.4 °C (97.5 °F) 36.5 °C (97.7 °F) 36.7 °C (98.1 °F) 36.9 °C (98.4 °F)   SpO2: 94% 98% 97% 99%   Weight:       Height:         Body mass index is 17.81 kg/m².    Oxygen Therapy:  Pulse Oximetry: 99 %, O2 (LPM): 0, O2 Delivery: None (Room Air)    Physical Exam   Constitutional: He is oriented to person, place, and time. No distress.   Appears older than stated age; disheveled, thin   HENT:   Head: Normocephalic and atraumatic.   Mediastinoscopy scar across suprasternal notch, healing well   Eyes: Conjunctivae and EOM are normal. Right eye exhibits no discharge. Left eye exhibits no discharge. No scleral icterus.   Neck: Normal range of motion.   Cardiovascular: Normal rate, regular rhythm, normal heart sounds and intact distal pulses.  Exam reveals no gallop and no friction rub.    No murmur heard.  Pulmonary/Chest: Effort normal and breath sounds normal. No respiratory distress. He has no wheezes. He has no rales.   Abdominal: Soft. Bowel sounds are normal. He exhibits no distension and no mass. There is no tenderness. There is no rebound and no guarding.   Musculoskeletal: Normal range of motion. He exhibits no edema or  tenderness.   Neurological: He is alert and oriented to person, place, and time. No cranial nerve deficit. GCS score is 15.   Skin: Skin is warm and dry. No rash noted. He is not diaphoretic. No pallor.   Cracked, with areas of peeling skin; appears sun damaged   Psychiatric: Mood and affect normal.   Nursing note and vitals reviewed.      Lab Data Review:         10/8/2017  7:32 AM    Recent Labs      10/21/17   0749  10/22/17   0724  10/23/17   0629   SODIUM  129*  128*  129*   POTASSIUM  3.7  4.1  3.9   CHLORIDE  95*  101  100   CO2  25  22  22   BUN  8  9  10   CREATININE  0.48*  0.42*  0.41*   MAGNESIUM   --   1.6  1.7   CALCIUM  9.5  9.1  9.1       Recent Labs      10/21/17   0749  10/22/17   0724  10/23/17   0629   GLUCOSE  105*  126*  92       Recent Labs      10/23/17   0629   RBC  3.36*   HEMOGLOBIN  11.5*   HEMATOCRIT  32.8*   PLATELETCT  519*       Recent Labs      10/23/17   0629   WBC  7.4   NEUTSPOLYS  76.00*   LYMPHOCYTES  12.90*   MONOCYTES  10.30   EOSINOPHILS  0.10   BASOPHILS  0.30     Assessment/Plan       Small cell carcinoma of lung (CMS-HCC)  - Hx of smoking >20 years, 10 pack-year.   - Cough, recent weight loss and low appetite.  - (09/30) CT chest: 3.6x5.9 cm paratracheal mass compressing on SVC and 2x1.8 cm R upper lobe mass  - Procedures: (10/05) CT guided biopsy of RUL c/w with organizing PNA,  (10/11) Transbronchial biopsy was inconclusive BAL: few RBCs and rare Gram+/Gram- cocci and Mediastinoscopy with biopsy of paratracheal mass (10/17) showing metastatic small cell carcinoma  - Staging workup including MRI Brain and CT Abdomen/Pelvis:  Unremarkable  Plan:  - 10/21 Chemo (Cisplatin and Etoposide) started  - 10/23 Radiation BID started, to be continued for 15 days  - Will continue to follow BUN/Cr closely given fluid restriction and scheduled chemo  - Palliative Care following; still wants to be Full code    Hyponatremia  - Na of 107 and AMS on admission   - SIADH due to small cell  CA  - DC'd salt tabs and neuro checks  - Na ranging 128-132  - AMS resolved, no focal neurological deficits, GCS stable  - cont fluid restriction 1200cc/day    Thrombocytosis (CMS-HCC)  - Platelet count of 559 on admission  - Today -519  - Possibly acute phase reactant  - Will CTM  - Consider outpatient followup      Chronic alcohol use  - Consumes 1 beer daily   - BAL: 0.01 on admission   - PO thiamine, B12 and folic acid supplements   - No signs of EtOH withdrawal; CTM      Bacteremia  - blood cultures on admission positive for Strep pneumo  - Repeat blood cultures n egative 10/1, 10/2, 10/3  - afebrile  - s/p PO Augmentin BID (10/9-10/14)      Body mass index (BMI) 19.9 or less, adult  - Possibly 2/2 homelessness vs. malignancy  - Exhibits good appetite  - Nutrition following    Morning headache  - Previously complained of mild AM headaches relieved with Tylenol  - 2/2 sleep apnea vs. Withdrawal headaches  - questionable history of sleep apnea  - Patient admits to drinking a lot of coffee at home  - nocturnal oximetry study shows sat >88% throughout night; consider outpatient sleep study on d/c  - headaches have not recurred

## 2017-10-23 NOTE — PROGRESS NOTES
"Cancer Nurse Navigation:    Received referral and met with patient on inpatient unit.  Pt states he is homeless and \"lost everything\".  States he is breaking the law being homeless without ID.  Pt talked about [facility] nursing home has his ID.  Pt talked about not having friends and only family is his brother, Benjamin Spain who lives in [place].  He does not remember number to contact him.  Pt multiple times brought up heritage of being [place] and does not ask for help.  He went on to say he is half [place] and half [place] but has lived in  and English is his native language.  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  He was getting $197 a month on a food card and started process of social security disability but needs \"a doctor to sign off on\".  Discussed with patient reviewing need with  to assist in above needs.  Pt able to verbalize plan of care for his cancer treatment and repeat back understanding of his diagnosis.  Pt became tearful at one point saying he just recently starting have relationship with his brother again and that his brother would be making decisions for him if he can not.  Reviewed information on Advance Directives with patient.  He is interested in reviewing information and completing.  Pt states he is a 5/10 on distress scale r/t to above concerns.  Contact information provided to patient.  Spoke with patient's RN today who gave additional information and update that  is already involved and working on case.    "

## 2017-10-23 NOTE — PROGRESS NOTES
Patient was transported to our department for radiation therapy treatment number 2 of 30 to his lung. We plan on treating the patient again tomorrow BID.

## 2017-10-23 NOTE — PROGRESS NOTES
Chemotherapy Verification - PRIMARY RN      Height = 177.8 cm  Weight = 56.3 kg  BSA = 1.67 m2       Medication: Etoposide  Dose: 100 mg/m2  Calculated Dose: 167 mg Ordered Dose: 167 mg                             (In mg/m2, AUC, mg/kg)       I confirm this process was performed independently with the BSA and all final chemotherapy dosing calculations congruent.  Any discrepancies of 5% or greater have been addressed with the chemotherapy pharmacist. The resolution of the discrepancy has been documented in the EPIC progress notes.

## 2017-10-23 NOTE — PROGRESS NOTES
Chemotherapy Verification - SECONDARY RN  Cycle 1 Day 3       Height = 177.8 cm  Weight = 56.3 kg  BSA = 1.67 m2       Medication: Etoposide  Dose: 100 mg/m2  Calculated Dose: 167 mg (167 mg ordered)                             (In mg/m2, AUC, mg/kg)     I confirm that this process was performed independently.

## 2017-10-23 NOTE — PROGRESS NOTES
Chemotherapy Verification - PRIMARY RN      Height = 1.7m  Weight = 56.3kg  BSA = 1.67m2       Medication: Etoposide  Dose: 100mg/m2  Calculated Dose: 167mg = ordered dose                             (In mg/m2, AUC, mg/kg)           I confirm this process was performed independently with the BSA and all final chemotherapy dosing calculations congruent.  Any discrepancies of 5% or greater have been addressed with the chemotherapy pharmacist. The resolution of the discrepancy has been documented in the EPIC progress notes.

## 2017-10-23 NOTE — THERAPY
PT tx session attempted. Pt reports he has been up ad annamaria walking with IV pole (as it's consistently hooked up). Reports he has been doing his exercises daily. inquired about free weights for exercises which PT will obtain for pt to use while in house (4# hand weights delivered to pt's room by Therapy Tech). Pt reports no signficant acute PT needs at this time. Working with social work for post acute needs. PT to remain on service 1x/wk as needed to address pt continued improvement and assist with D/C planning as needed. Please continue to mobilize with nursing.

## 2017-10-23 NOTE — PROGRESS NOTES
Oncology/Hematology Progress Note               Author: Apollo Vickers Date & Time created: 10/23/2017  8:11 AM     Interval History: Small Cell Lung Cancer Stage IIIA  10/19?17- Mediastinoscopy - precarinal node  Pos  10/20/17 - Limited disease small cell lung cancer. Dr. Young planning to start XRT on Monday 10/23/17  10/21/17 - D1 Cisplatin/etoposide  10/22/17- D2 tolerated chemo well, etoposide today  10/23/17: D3 chemo today (Etoposide); started bid XRT this AM    Review of Systems:  Review of Systems   All other systems reviewed and are negative.      Physical Exam:  Physical Exam   Constitutional: He is oriented to person, place, and time. He appears well-developed and well-nourished.   HENT:   Head: Normocephalic.   Mouth/Throat: Oropharynx is clear and moist.   Eyes: Conjunctivae and EOM are normal.   Cardiovascular: Normal rate and regular rhythm.    Pulmonary/Chest: Effort normal and breath sounds normal.   Abdominal: Soft.   Musculoskeletal: Normal range of motion.   Neurological: He is alert and oriented to person, place, and time.       Labs:        Invalid input(s): [id number]      Recent Labs      10/21/17   0749  10/22/17   0724  10/23/17   0629   SODIUM  129*  128*  129*   POTASSIUM  3.7  4.1  3.9   CHLORIDE  95*  101  100   CO2  25  22  22   BUN  8  9  10   CREATININE  0.48*  0.42*  0.41*   MAGNESIUM   --   1.6  1.7   CALCIUM  9.5  9.1  9.1     Recent Labs      10/21/17   0749  10/22/17   0724  10/23/17   0629   GLUCOSE  105*  126*  92     Recent Labs      10/23/17   0629   RBC  3.36*   HEMOGLOBIN  11.5*   HEMATOCRIT  32.8*   PLATELETCT  519*     Recent Labs      10/23/17   0629   WBC  7.4   NEUTSPOLYS  76.00*   LYMPHOCYTES  12.90*   MONOCYTES  10.30   EOSINOPHILS  0.10   BASOPHILS  0.30     Recent Labs      10/21/17   0749  10/22/17   0724  10/23/17   0629   SODIUM  129*  128*  129*   POTASSIUM  3.7  4.1  3.9   CHLORIDE  95*  101  100   CO2  25  22  22   GLUCOSE  105*  126*  92   BUN  8  9   10   CREATININE  0.48*  0.42*  0.41*   CALCIUM  9.5  9.1  9.1     Hemodynamics:  Temp (24hrs), Av.6 °C (97.8 °F), Min:36.4 °C (97.5 °F), Max:36.7 °C (98.1 °F)  Temperature: 36.7 °C (98.1 °F)  Pulse  Av.5  Min: 64  Max: 99   Blood Pressure: 129/78     Respiratory:    Respiration: 20, Pulse Oximetry: 97 %        RUL Breath Sounds: Clear, RML Breath Sounds: Diminished, RLL Breath Sounds: Diminished, LEONARDO Breath Sounds: Clear, LLL Breath Sounds: Diminished  Fluids:    Intake/Output Summary (Last 24 hours) at 10/19/17 1524  Last data filed at 10/19/17 1300   Gross per 24 hour   Intake              761 ml   Output             1450 ml   Net             -689 ml        GI/Nutrition:  Orders Placed This Encounter   Procedures   • DIET ORDER     Standing Status:   Standing     Number of Occurrences:   1     Order Specific Question:   Diet:     Answer:   Regular [1]     Order Specific Question:   Consistency/Fluid modifications:     Answer:   1200 ml Fluid Restriction [8]     Medical Decision Making, by Problem:  Active Hospital Problems    Diagnosis   • *Hyponatremia [E87.1]   • Lung mass [R91.8]   • Disorder of electrolytes [E87.8]   • Fall [W19.XXXA]   • Chronic alcohol use [F10.10]   • Body mass index (BMI) 19.9 or less, adult [Z68.1]   • Morning headache [R51]   • Bacteremia [R78.81]       Plan:  1. Stage IIIA Small cell lung cancer   just diagnosed on precarinal node biopsy  MRI brain negative  CT Abdomen/Pelvis will be ordered  Will call radiation therapy and transfer to oncology for chemotherapy. I discussed the diagnosis with the patient and treatment with combined modality chemoradiation. I assume his CT abdomen will be neg but he has SVC compression but not syndrome.   10/20/17- XRT planned for Monday 10/23. He needs to start chemotherapy ASAP and orders placed in [place]. He needs to be moved to Oncology today  10/21/17-starting chemotherapy D1, labs checked  10/22/17- D2 chemotherapy today and scheduled to  start radiation tomorrow   10/23/17: Denies nausea, got XRT this AM    2. SIADH - controlled  10/21/17 sodium 129  10/22/17- Na 128 - asx, observe    3. Long term tobacco abuse    Quality-Core Measures

## 2017-10-23 NOTE — PALLIATIVE CARE
PALLIATIVE CARE FOLLOW-UP:    Dicussed with MARIAJOSE Cain -- thank you for searching for # for brother Benjamin Spain.    Plan:  F/u with pt to complete AD.    Thank you for allowing Palliative Care to support this pt and his family.  Contact [phone] for additional assistance, questions or concerns.

## 2017-10-23 NOTE — PROGRESS NOTES
"Pharmacy Chemotherapy Verification    Patient Name: CASSIDY PAGE  Dx: Small cell lung cancer, stage IIIA   Protocol: IP SC LUNG CISPLATIN + ETOPOSIDE (80/100) +/- XRT     CISplatin 80 mg/m2 IV over 60 minutes on day 1  Etoposide 100 mg/m2 IV over 60 minutes on days 1-3  28 day cycle for 4-6 cycles  *Dosing Reference*  NCCN Guidelines for Small Cell Lung Cancer.V.2.2017  Ruth Ann AT, et al. N Engl J Med. 199;340-(4):265-71  Moy MORILLO, et al. J Clin Oncol. 2002;20(24):6872-64    Allergies:Review of patient's allergies indicates no known allergies.  /83   Pulse 82   Temp 36.9 °C (98.4 °F)   Resp 16   Ht 1.778 m (5' 10\")   Wt 56.3 kg (124 lb 1.9 oz)   SpO2 99%   BMI 17.81 kg/m²  Body surface area is 1.67 meters squared.    Labs 10/23/17  ANC = 5580  Plt = 519K    Hgb = 11.5                 SCr =  0.41      CrCl ~93.4 ml/min  Labs 10/20/17  AST/ALT/AP = 15/12/77                   Tbili = 0.2     Drug Order   (Drug name, dose, route, IV Fluid & volume, frequency, number of doses) Cycle: 1, Day 3 of 3      Previous treatment: N/A     Medication = Etoposide  Base Dose= 100 mg/m2  Calc Dose: Base Dose x 1.67 m2 = 167 mg  Final Dose = 167 mg  Route = IV  Fluid & Volume =  mL  Admin Duration = Over  minutes   Days 1-3      <5% difference, OK to treat with final dose      By my signature below, I confirm this process was performed independently with the BSA and all final chemotherapy dosing calculations congruent. I have reviewed the above chemotherapy order and that my calculation of the final dose and BSA (when applicable) corroborate those calculations of the  pharmacist. Discrepancies of 5% or greater in the written dose have been addressed and documented within the [facility] Progress notes.    Shira Corona, PharmD    "

## 2017-10-23 NOTE — PROGRESS NOTES
Pt is A&O.  Up to bathroom to void.  Pt stated that he had a soft brown bm this am.  Tolerated his first radiation treatment.  Denies pain and nausea.  PICC line flushes well and has positive blood return.  Pt has a great appetite.

## 2017-10-24 PROBLEM — R51.9 MORNING HEADACHE: Status: RESOLVED | Noted: 2017-10-16 | Resolved: 2017-10-24

## 2017-10-24 LAB
ALBUMIN SERPL BCP-MCNC: 3.3 G/DL (ref 3.2–4.9)
ALBUMIN/GLOB SERPL: 1.2 G/DL
ALP SERPL-CCNC: 55 U/L (ref 30–99)
ALT SERPL-CCNC: 13 U/L (ref 2–50)
ANION GAP SERPL CALC-SCNC: 7 MMOL/L (ref 0–11.9)
AST SERPL-CCNC: 17 U/L (ref 12–45)
BASOPHILS # BLD AUTO: 1 % (ref 0–1.8)
BASOPHILS # BLD: 0.06 K/UL (ref 0–0.12)
BILIRUB SERPL-MCNC: 0.5 MG/DL (ref 0.1–1.5)
BUN SERPL-MCNC: 13 MG/DL (ref 8–22)
CALCIUM SERPL-MCNC: 9.2 MG/DL (ref 8.5–10.5)
CHLORIDE SERPL-SCNC: 99 MMOL/L (ref 96–112)
CO2 SERPL-SCNC: 24 MMOL/L (ref 20–33)
CREAT SERPL-MCNC: 0.45 MG/DL (ref 0.5–1.4)
EOSINOPHIL # BLD AUTO: 0.05 K/UL (ref 0–0.51)
EOSINOPHIL NFR BLD: 0.8 % (ref 0–6.9)
ERYTHROCYTE [DISTWIDTH] IN BLOOD BY AUTOMATED COUNT: 42.3 FL (ref 35.9–50)
GFR SERPL CREATININE-BSD FRML MDRD: >60 ML/MIN/1.73 M 2
GLOBULIN SER CALC-MCNC: 2.8 G/DL (ref 1.9–3.5)
GLUCOSE SERPL-MCNC: 84 MG/DL (ref 65–99)
HCT VFR BLD AUTO: 32.2 % (ref 42–52)
HGB BLD-MCNC: 11.2 G/DL (ref 14–18)
IMM GRANULOCYTES # BLD AUTO: 0.02 K/UL (ref 0–0.11)
IMM GRANULOCYTES NFR BLD AUTO: 0.3 % (ref 0–0.9)
LYMPHOCYTES # BLD AUTO: 2.17 K/UL (ref 1–4.8)
LYMPHOCYTES NFR BLD: 36.5 % (ref 22–41)
MAGNESIUM SERPL-MCNC: 1.8 MG/DL (ref 1.5–2.5)
MCH RBC QN AUTO: 33.4 PG (ref 27–33)
MCHC RBC AUTO-ENTMCNC: 34.8 G/DL (ref 33.7–35.3)
MCV RBC AUTO: 96.1 FL (ref 81.4–97.8)
MONOCYTES # BLD AUTO: 0.73 K/UL (ref 0–0.85)
MONOCYTES NFR BLD AUTO: 12.3 % (ref 0–13.4)
NEUTROPHILS # BLD AUTO: 2.91 K/UL (ref 1.82–7.42)
NEUTROPHILS NFR BLD: 49.1 % (ref 44–72)
NRBC # BLD AUTO: 0 K/UL
NRBC BLD AUTO-RTO: 0 /100 WBC
PLATELET # BLD AUTO: 483 K/UL (ref 164–446)
PMV BLD AUTO: 8.7 FL (ref 9–12.9)
POTASSIUM SERPL-SCNC: 4 MMOL/L (ref 3.6–5.5)
PROT SERPL-MCNC: 6.1 G/DL (ref 6–8.2)
RBC # BLD AUTO: 3.35 M/UL (ref 4.7–6.1)
SODIUM SERPL-SCNC: 130 MMOL/L (ref 135–145)
WBC # BLD AUTO: 5.9 K/UL (ref 4.8–10.8)

## 2017-10-24 PROCEDURE — A9270 NON-COVERED ITEM OR SERVICE: HCPCS | Performed by: INTERNAL MEDICINE

## 2017-10-24 PROCEDURE — 700105 HCHG RX REV CODE 258: Performed by: INTERNAL MEDICINE

## 2017-10-24 PROCEDURE — A9270 NON-COVERED ITEM OR SERVICE: HCPCS | Performed by: STUDENT IN AN ORGANIZED HEALTH CARE EDUCATION/TRAINING PROGRAM

## 2017-10-24 PROCEDURE — 99232 SBSQ HOSP IP/OBS MODERATE 35: CPT | Mod: GC | Performed by: INTERNAL MEDICINE

## 2017-10-24 PROCEDURE — 700102 HCHG RX REV CODE 250 W/ 637 OVERRIDE(OP): Performed by: STUDENT IN AN ORGANIZED HEALTH CARE EDUCATION/TRAINING PROGRAM

## 2017-10-24 PROCEDURE — 83735 ASSAY OF MAGNESIUM: CPT

## 2017-10-24 PROCEDURE — 80053 COMPREHEN METABOLIC PANEL: CPT

## 2017-10-24 PROCEDURE — 85025 COMPLETE CBC W/AUTO DIFF WBC: CPT

## 2017-10-24 PROCEDURE — 700102 HCHG RX REV CODE 250 W/ 637 OVERRIDE(OP): Performed by: INTERNAL MEDICINE

## 2017-10-24 PROCEDURE — 700111 HCHG RX REV CODE 636 W/ 250 OVERRIDE (IP): Performed by: STUDENT IN AN ORGANIZED HEALTH CARE EDUCATION/TRAINING PROGRAM

## 2017-10-24 PROCEDURE — 770004 HCHG ROOM/CARE - ONCOLOGY PRIVATE *

## 2017-10-24 PROCEDURE — 77014 PR CT GUIDANCE PLACEMENT RAD THERAPY FIELDS: CPT | Mod: 26,XE | Performed by: RADIOLOGY

## 2017-10-24 PROCEDURE — 700111 HCHG RX REV CODE 636 W/ 250 OVERRIDE (IP): Performed by: INTERNAL MEDICINE

## 2017-10-24 PROCEDURE — 77336 RADIATION PHYSICS CONSULT: CPT | Performed by: RADIOLOGY

## 2017-10-24 PROCEDURE — 77386 HCHG IMRT DELIVERY COMPLEX: CPT | Performed by: RADIOLOGY

## 2017-10-24 RX ADMIN — FOLIC ACID 1 MG: 1 TABLET ORAL at 08:40

## 2017-10-24 RX ADMIN — HEPARIN SODIUM 5000 UNITS: 5000 INJECTION, SOLUTION INTRAVENOUS; SUBCUTANEOUS at 15:53

## 2017-10-24 RX ADMIN — VITAMIN D, TAB 1000IU (100/BT) 5000 UNITS: 25 TAB at 08:40

## 2017-10-24 RX ADMIN — ETOPOSIDE 167 MG: 20 INJECTION, SOLUTION, CONCENTRATE INTRAVENOUS at 04:19

## 2017-10-24 RX ADMIN — HEPARIN SODIUM 5000 UNITS: 5000 INJECTION, SOLUTION INTRAVENOUS; SUBCUTANEOUS at 22:42

## 2017-10-24 RX ADMIN — ONDANSETRON 4 MG: 4 TABLET, ORALLY DISINTEGRATING ORAL at 03:16

## 2017-10-24 RX ADMIN — DEXAMETHASONE 4 MG: 4 TABLET ORAL at 03:16

## 2017-10-24 RX ADMIN — NICOTINE 14 MG: 14 PATCH, EXTENDED RELEASE TRANSDERMAL at 06:45

## 2017-10-24 RX ADMIN — MIDODRINE HYDROCHLORIDE 5 MG: 5 TABLET ORAL at 08:40

## 2017-10-24 RX ADMIN — HEPARIN SODIUM 5000 UNITS: 5000 INJECTION, SOLUTION INTRAVENOUS; SUBCUTANEOUS at 06:43

## 2017-10-24 RX ADMIN — MIDODRINE HYDROCHLORIDE 5 MG: 5 TABLET ORAL at 12:00

## 2017-10-24 RX ADMIN — STANDARDIZED SENNA CONCENTRATE AND DOCUSATE SODIUM 2 TABLET: 8.6; 5 TABLET, FILM COATED ORAL at 08:41

## 2017-10-24 RX ADMIN — MAGNESIUM HYDROXIDE 30 ML: 400 SUSPENSION ORAL at 12:01

## 2017-10-24 RX ADMIN — THIAMINE HCL TAB 100 MG 100 MG: 100 TAB at 08:40

## 2017-10-24 RX ADMIN — MIDODRINE HYDROCHLORIDE 5 MG: 5 TABLET ORAL at 17:46

## 2017-10-24 RX ADMIN — THERA TABS 1 TABLET: TAB at 08:40

## 2017-10-24 ASSESSMENT — ENCOUNTER SYMPTOMS
PALPITATIONS: 0
COUGH: 0
FEVER: 0
DIZZINESS: 0
NAUSEA: 0
SHORTNESS OF BREATH: 0
CHILLS: 0
MYALGIAS: 0
VOMITING: 0
DIARRHEA: 0
EYES NEGATIVE: 1
TINGLING: 1
HEADACHES: 0
WEIGHT LOSS: 1
WEAKNESS: 0
ABDOMINAL PAIN: 0

## 2017-10-24 ASSESSMENT — PAIN SCALES - GENERAL
PAINLEVEL_OUTOF10: 0

## 2017-10-24 NOTE — PROGRESS NOTES
Bedside report received from noc RN, assumed pt care, assessment completed, pt aox4, pt denies pain but does report numbness/tingling of BLE which is his baseline, pt ambulates steady gait up SBA, pt calls appropriately, pt medicated per MAR, incision noted to mid upper chest well approximated CDI EFRAIN, pt went down to radiation earlier today and is planned to go again at 1345 per report in am. Bed in low position, call light and personal belongings within reach, hourly rounding in place, pt needs met.

## 2017-10-24 NOTE — PROGRESS NOTES
Patient was transported to our department for radiation therapy treatment number 3 of 30 to his thorax. We plan on treating the patient again this afternoon (BID) and tomorrow (BID).

## 2017-10-24 NOTE — PROGRESS NOTES
Internal Medicine Interval Note  Note Author: Trav Fierro M.D.     Name Froylan Spain     1960   Age/Sex 57 y.o. male   MRN [id number]   Code Status FULL     After 5PM or if no immediate response to page, please call for cross-coverage  Attending/Team: Dr. Chandler /Nikolai See Patient List for primary contact information  Call [phone] to page    1st Call - Day Intern (R1):   Dr. Fierro 2nd Call - Day Sr. Resident (R2/R3):   Dr. Bianchi   58yo homeless male without significant PMH admitted on 17 for severe hyponatremia to 108 and AMS.  Started on salt tabs, fluid restriction and Na correction per guidelines.   Concern for SIADH.  Smoker with lung masses (5.9 cm rightparatracheal mass and 2 cm RUL) found on CT Chest.  IR biopsy (10/05/17) c/w organizing PNA.  BAL shows few RBCs and rare Gram+/Gram- cocci.  OR biopsy of paratracheal lesion (10/11) was inconclusive. S/p repeat mediastinoscopy with biopsy (10/17)- precarinal and level 7 lymph nodes demonstrate metastatic small cell carcinoma    Reason for interval visit  (Principal Problem)   Small cell carcinoma of lung (CMS-HCC)    Interval Problem Daily Status Update  (24 hours)   - No acute overnight events . Patient tolerated 3 days of chemo well.  - Patient denies nausea, vomiting.  - Day 2 of BID radiation therapy- to be continued for 15 days total  - AMS resolved, Na 130, patient continues to be on 1200 ml fluid restriction.     - 10/21 Seen by Palliative Care - still wants to be Full Code, but wants to get in touch with brother, possibly through his daughters or former landlord, as the number he had for him is disconnected.  It seems he wants his brother to be involved in EOL decision-making       Review of Systems   Constitutional: Positive for weight loss. Negative for chills and fever.   Eyes: Negative.    Respiratory: Negative for cough and shortness of breath.    Cardiovascular: Negative for chest pain and palpitations.    Gastrointestinal: Negative for abdominal pain, diarrhea, nausea and vomiting.   Genitourinary: Negative.    Musculoskeletal: Negative for myalgias.   Skin: Negative for itching and rash.   Neurological: Positive for tingling (chronic). Negative for dizziness, weakness and headaches.     Consultants/Specialty  Pulmonary   Nephrology  Thoracic Surgery  Hematology/Oncology     Disposition  Inpatient    Quality Measures    Reviewed items::  Labs reviewed and Medications reviewed  Garvin catheter::  No Garvin  DVT prophylaxis pharmacological::  Heparin  Ulcer Prophylaxis::  Not indicated        Physical Exam       Vitals:    10/23/17 2000 10/24/17 0000 10/24/17 0320 10/24/17 0741   BP: 112/60 142/81 127/85 126/83   Pulse: 90 73 78 71   Resp: 18 20 20 18   Temp: 36.9 °C (98.5 °F) 36.4 °C (97.6 °F) 37.2 °C (98.9 °F) 36.8 °C (98.3 °F)   SpO2: 95% 97% 98% 96%   Weight: 58.3 kg (128 lb 8.5 oz)      Height:         Body mass index is 18.44 kg/m². Weight: 58.3 kg (128 lb 8.5 oz)  Oxygen Therapy:  Pulse Oximetry: 96 %, O2 (LPM): 0, O2 Delivery: None (Room Air)    Physical Exam   Constitutional: He is oriented to person, place, and time. No distress.   Appears older than stated age; disheveled, thin   HENT:   Head: Normocephalic and atraumatic.   Mediastinoscopy scar across suprasternal notch, healing well   Eyes: Conjunctivae and EOM are normal. Right eye exhibits no discharge. Left eye exhibits no discharge. No scleral icterus.   Neck: Normal range of motion.   Cardiovascular: Normal rate, regular rhythm, normal heart sounds and intact distal pulses.  Exam reveals no gallop and no friction rub.    No murmur heard.  Pulmonary/Chest: Effort normal and breath sounds normal. No respiratory distress. He has no wheezes. He has no rales.   Abdominal: Soft. Bowel sounds are normal. He exhibits no distension and no mass. There is no tenderness. There is no rebound and no guarding.   Musculoskeletal: Normal range of motion. He exhibits  no edema or tenderness.   Neurological: He is alert and oriented to person, place, and time. No cranial nerve deficit. GCS score is 15.   Skin: Skin is warm and dry. No rash noted. He is not diaphoretic. No pallor.   Cracked, with areas of peeling skin; appears sun damaged   Psychiatric: Mood and affect normal.   Nursing note and vitals reviewed.      Lab Data Review:         10/8/2017  7:32 AM    Recent Labs      10/22/17   0724  10/23/17   0629  10/24/17   0421   SODIUM  128*  129*  130*   POTASSIUM  4.1  3.9  4.0   CHLORIDE  101  100  99   CO2  22 22 24   BUN  9  10  13   CREATININE  0.42*  0.41*  0.45*   MAGNESIUM  1.6  1.7  1.8   CALCIUM  9.1  9.1  9.2       Recent Labs      10/22/17   0724  10/23/17   0629  10/24/17   0421   ALTSGPT   --    --   13   ASTSGOT   --    --   17   ALKPHOSPHAT   --    --   55   TBILIRUBIN   --    --   0.5   GLUCOSE  126*  92  84       Recent Labs      10/23/17   0629  10/24/17   0421   RBC  3.36*  3.35*   HEMOGLOBIN  11.5*  11.2*   HEMATOCRIT  32.8*  32.2*   PLATELETCT  519*  483*       Recent Labs      10/23/17   0629  10/24/17   0421   WBC  7.4  5.9   NEUTSPOLYS  76.00*  49.10   LYMPHOCYTES  12.90*  36.50   MONOCYTES  10.30  12.30   EOSINOPHILS  0.10  0.80   BASOPHILS  0.30  1.00   ASTSGOT   --   17   ALTSGPT   --   13   ALKPHOSPHAT   --   55   TBILIRUBIN   --   0.5     Assessment/Plan       Small cell carcinoma of lung (CMS-HCC)  - Hx of smoking >20 years, 10 pack-year.   - Cough, recent weight loss and low appetite.  - (09/30) CT chest: 3.6x5.9 cm paratracheal mass compressing on SVC and 2x1.8 cm R upper lobe mass  - Procedures: (10/05) CT guided biopsy of RUL c/w with organizing PNA,  (10/11) Transbronchial biopsy was inconclusive BAL: few RBCs and rare Gram+/Gram- cocci and Mediastinoscopy with biopsy of paratracheal mass (10/17) showing metastatic small cell carcinoma  - Staging workup including MRI Brain and CT Abdomen/Pelvis:  Unremarkable  - 10/21-10/23 Patient received  chemotherapy. 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     Thrombocytosis (CMS-HCC)  - Platelet count of 559 on admission  - Trending down 602 --> 519-->483  - Possibly acute phase reactant  - Will CTM  - Consider outpatient followup      Chronic alcohol use  - Consumes 1 beer daily   - BAL: 0.01 on admission   - PO thiamine, B12 and folic acid supplements   - No signs of EtOH withdrawal; CTM      Bacteremia  - blood cultures on admission positive for Strep pneumo  - Repeat blood cultures n egative 10/1, 10/2, 10/3  - afebrile  - s/p PO Augmentin BID (10/9-10/14)      Body mass index (BMI) 19.9 or less, adult  - Possibly 2/2 homelessness vs. malignancy  - Exhibits good appetite  - Nutrition following

## 2017-10-24 NOTE — CARE PLAN
Problem: Communication  Goal: The ability to communicate needs accurately and effectively will improve  Outcome: PROGRESSING AS EXPECTED  Reviewed POC with patient and agreeable, answered questions and concerns to pt satisfaction.     Problem: Safety  Goal: Will remain free from falls  Outcome: PROGRESSING AS EXPECTED  Pt ambulates steady gait up SBA, pt calls appropriately for assistance, bed in low position, call light and personal belongings within reach, hourly rounding in place, pt needs met.

## 2017-10-24 NOTE — PALLIATIVE CARE
"PALLIATIVE CARE FOLLOW-UP:    Pt up exercising with hand weights.  Pt stated feeling \"pretty fine\" thus far in his chemoradiation treatments.  Pt has been attempting to locate his brother and thinks that he has found a path, as well as in locating his wallet/ID.  Encouraged pt's self-empowerment to handle his paperwork and decisions.    Pt would still like to wait to reach his brother before completing AD.    Plan:  PC to continue to follow and provide support, goal to complete AD.    Thank you for allowing Palliative Care to support this pt.  Contact [phone] for additional assistance, questions or concerns.  "

## 2017-10-24 NOTE — PROGRESS NOTES
Patient down at XRT  Getting bid radiation for two weeks  Tolerated chemo OK  Will start prophylactic Neupogen daily tomorrow

## 2017-10-24 NOTE — DISCHARGE PLANNING
"Medical SW    Referral: Sw met w/ pt at bedside.    Intervention: Shawn noted per Steven only family attached to pt's information is his mother who has passed away. Pt's brother's number on face sheet is no longer valid. Pt states his brother has several cell phones and he does not know the numbers. He will call several locations w/ the long distance privileges acquired for him via the hospital  from this Sw. Pt will call the [facility] long-term and ask about getting his wallet mailed to hospital at address provided by Sw including \"[facility] MRN [id number].\" Pt states he went to school w/ the  supervisor and will speak to him. Pt will also call his ex-landlord and acquire his brother's girlfriend's number to get a hold of his brother.    Pt states he has been homeless since June of this year. He had a trailer which had no water but had electricity and he was evicted by his landlord. He has since been living in sheds around [place], [place]. He plans to move to [place] and is concerned about going to the shelter.     He is aware he must cancel his [employer] insurance before he can apply for [place] Medicaid insurance. He reports he knows his [employer] CM contact information then states he thinks her name begins w/ a \"C.\" He was provided the information to apply for Social Security Disability in [place]. He knows he can acquire a phone appointment to do so. His current address would be this [facility] unless he d/sue to the shelter. He can get a mail box at the shelter. Also, when he acquires [place] Medicaid insurance his insurance CM may be able to place him.    Shawn updated palliative RN as above.     Shawn spoke to  Kari. She will see pt after lunch today.    Pt updated Sw that he has contacted several people and will continue to collect information as needed per above.    Pt aware aKri will help him cancel and complete application to switch insurance.    Shawn exchanged VMs w/ [facility]. Shawn updated as above " regarding pt's brother not being contacted yet. Pt has stated while on last hospital unit he wants his brother to be his POA for medical decisions.          Plan: Sw to assist w/ d/c planning as needed.

## 2017-10-24 NOTE — PROGRESS NOTES
Chemotherapy Verification - PRIMARY RN      Height = 1.778m  Weight = 58.3kg  BSA = 1.70       Medication: Etoposide  Dose: 100mg/m2  Calculated Dose: 170mg Ordered Dose: 167mg                             (In mg/m2, AUC, mg/kg)       I confirm this process was performed independently with the BSA and all final chemotherapy dosing calculations congruent.  Any discrepancies of 5% or greater have been addressed with the chemotherapy pharmacist. The resolution of the discrepancy has been documented in the EPIC progress notes.

## 2017-10-25 ENCOUNTER — DOCUMENTATION (OUTPATIENT)
Dept: HEMATOLOGY ONCOLOGY | Facility: MEDICAL CENTER | Age: 57
End: 2017-10-25

## 2017-10-25 LAB
ANION GAP SERPL CALC-SCNC: 6 MMOL/L (ref 0–11.9)
BASOPHILS # BLD AUTO: 1.1 % (ref 0–1.8)
BASOPHILS # BLD: 0.05 K/UL (ref 0–0.12)
BUN SERPL-MCNC: 12 MG/DL (ref 8–22)
CALCIUM SERPL-MCNC: 9.9 MG/DL (ref 8.5–10.5)
CHLORIDE SERPL-SCNC: 100 MMOL/L (ref 96–112)
CO2 SERPL-SCNC: 25 MMOL/L (ref 20–33)
CREAT SERPL-MCNC: 0.45 MG/DL (ref 0.5–1.4)
EOSINOPHIL # BLD AUTO: 0.08 K/UL (ref 0–0.51)
EOSINOPHIL NFR BLD: 1.8 % (ref 0–6.9)
ERYTHROCYTE [DISTWIDTH] IN BLOOD BY AUTOMATED COUNT: 42 FL (ref 35.9–50)
GFR SERPL CREATININE-BSD FRML MDRD: >60 ML/MIN/1.73 M 2
GLUCOSE SERPL-MCNC: 86 MG/DL (ref 65–99)
HCT VFR BLD AUTO: 32.3 % (ref 42–52)
HGB BLD-MCNC: 11.4 G/DL (ref 14–18)
IMM GRANULOCYTES # BLD AUTO: 0.01 K/UL (ref 0–0.11)
IMM GRANULOCYTES NFR BLD AUTO: 0.2 % (ref 0–0.9)
LYMPHOCYTES # BLD AUTO: 2.02 K/UL (ref 1–4.8)
LYMPHOCYTES NFR BLD: 45.7 % (ref 22–41)
MCH RBC QN AUTO: 33.8 PG (ref 27–33)
MCHC RBC AUTO-ENTMCNC: 35.3 G/DL (ref 33.7–35.3)
MCV RBC AUTO: 95.8 FL (ref 81.4–97.8)
MONOCYTES # BLD AUTO: 0.21 K/UL (ref 0–0.85)
MONOCYTES NFR BLD AUTO: 4.8 % (ref 0–13.4)
NEUTROPHILS # BLD AUTO: 2.05 K/UL (ref 1.82–7.42)
NEUTROPHILS NFR BLD: 46.4 % (ref 44–72)
NRBC # BLD AUTO: 0 K/UL
NRBC BLD AUTO-RTO: 0 /100 WBC
PLATELET # BLD AUTO: 440 K/UL (ref 164–446)
PMV BLD AUTO: 8.6 FL (ref 9–12.9)
POTASSIUM SERPL-SCNC: 4 MMOL/L (ref 3.6–5.5)
RBC # BLD AUTO: 3.37 M/UL (ref 4.7–6.1)
SODIUM SERPL-SCNC: 131 MMOL/L (ref 135–145)
WBC # BLD AUTO: 4.4 K/UL (ref 4.8–10.8)

## 2017-10-25 PROCEDURE — A9270 NON-COVERED ITEM OR SERVICE: HCPCS | Performed by: INTERNAL MEDICINE

## 2017-10-25 PROCEDURE — 770004 HCHG ROOM/CARE - ONCOLOGY PRIVATE *

## 2017-10-25 PROCEDURE — A9270 NON-COVERED ITEM OR SERVICE: HCPCS | Performed by: STUDENT IN AN ORGANIZED HEALTH CARE EDUCATION/TRAINING PROGRAM

## 2017-10-25 PROCEDURE — 700111 HCHG RX REV CODE 636 W/ 250 OVERRIDE (IP): Performed by: SPECIALIST

## 2017-10-25 PROCEDURE — 77386 HCHG IMRT DELIVERY COMPLEX: CPT | Performed by: RADIOLOGY

## 2017-10-25 PROCEDURE — 85025 COMPLETE CBC W/AUTO DIFF WBC: CPT

## 2017-10-25 PROCEDURE — 80048 BASIC METABOLIC PNL TOTAL CA: CPT

## 2017-10-25 PROCEDURE — 700102 HCHG RX REV CODE 250 W/ 637 OVERRIDE(OP): Performed by: STUDENT IN AN ORGANIZED HEALTH CARE EDUCATION/TRAINING PROGRAM

## 2017-10-25 PROCEDURE — 77427 RADIATION TX MANAGEMENT X5: CPT | Performed by: RADIOLOGY

## 2017-10-25 PROCEDURE — 700102 HCHG RX REV CODE 250 W/ 637 OVERRIDE(OP): Performed by: INTERNAL MEDICINE

## 2017-10-25 PROCEDURE — 77014 PR CT GUIDANCE PLACEMENT RAD THERAPY FIELDS: CPT | Mod: 26,XE | Performed by: RADIOLOGY

## 2017-10-25 PROCEDURE — 99232 SBSQ HOSP IP/OBS MODERATE 35: CPT | Mod: GC | Performed by: INTERNAL MEDICINE

## 2017-10-25 PROCEDURE — 700111 HCHG RX REV CODE 636 W/ 250 OVERRIDE (IP): Performed by: STUDENT IN AN ORGANIZED HEALTH CARE EDUCATION/TRAINING PROGRAM

## 2017-10-25 RX ADMIN — MIDODRINE HYDROCHLORIDE 5 MG: 5 TABLET ORAL at 11:52

## 2017-10-25 RX ADMIN — TBO-FILGRASTIM 300 MCG: 300 INJECTION, SOLUTION SUBCUTANEOUS at 14:48

## 2017-10-25 RX ADMIN — STANDARDIZED SENNA CONCENTRATE AND DOCUSATE SODIUM 2 TABLET: 8.6; 5 TABLET, FILM COATED ORAL at 20:15

## 2017-10-25 RX ADMIN — VITAMIN D, TAB 1000IU (100/BT) 5000 UNITS: 25 TAB at 08:12

## 2017-10-25 RX ADMIN — THERA TABS 1 TABLET: TAB at 08:12

## 2017-10-25 RX ADMIN — MAGNESIUM HYDROXIDE 30 ML: 400 SUSPENSION ORAL at 11:52

## 2017-10-25 RX ADMIN — THIAMINE HCL TAB 100 MG 100 MG: 100 TAB at 08:12

## 2017-10-25 RX ADMIN — HEPARIN SODIUM 5000 UNITS: 5000 INJECTION, SOLUTION INTRAVENOUS; SUBCUTANEOUS at 20:15

## 2017-10-25 RX ADMIN — HEPARIN SODIUM 5000 UNITS: 5000 INJECTION, SOLUTION INTRAVENOUS; SUBCUTANEOUS at 05:47

## 2017-10-25 RX ADMIN — HEPARIN SODIUM 5000 UNITS: 5000 INJECTION, SOLUTION INTRAVENOUS; SUBCUTANEOUS at 14:48

## 2017-10-25 RX ADMIN — STANDARDIZED SENNA CONCENTRATE AND DOCUSATE SODIUM 2 TABLET: 8.6; 5 TABLET, FILM COATED ORAL at 08:12

## 2017-10-25 RX ADMIN — FOLIC ACID 1 MG: 1 TABLET ORAL at 08:12

## 2017-10-25 RX ADMIN — MIDODRINE HYDROCHLORIDE 5 MG: 5 TABLET ORAL at 08:15

## 2017-10-25 RX ADMIN — NICOTINE 14 MG: 14 PATCH, EXTENDED RELEASE TRANSDERMAL at 05:48

## 2017-10-25 RX ADMIN — MIDODRINE HYDROCHLORIDE 5 MG: 5 TABLET ORAL at 16:58

## 2017-10-25 ASSESSMENT — ENCOUNTER SYMPTOMS
SHORTNESS OF BREATH: 0
HEADACHES: 0
DIARRHEA: 0
TINGLING: 1
NAUSEA: 0
ABDOMINAL PAIN: 0
COUGH: 0
MYALGIAS: 0
FEVER: 0
VOMITING: 0
CHILLS: 0
EYES NEGATIVE: 1
DIZZINESS: 0
WEIGHT LOSS: 1
WEAKNESS: 0
PALPITATIONS: 0

## 2017-10-25 ASSESSMENT — PAIN SCALES - GENERAL
PAINLEVEL_OUTOF10: 0

## 2017-10-25 NOTE — PROGRESS NOTES
Nutrition Services:    Pt is a new radiation start; however, is currently admitted at [facility]. Pt has been assessed and followed by inpatient RD. Therefore, once pt d/c, outpatient RD to meet w/ pt throughout the course of his cancer treatment.

## 2017-10-25 NOTE — PROGRESS NOTES
Internal Medicine Interval Note  Note Author: Trav Fierro M.D.     Name Froylan Spain     1960   Age/Sex 57 y.o. male   MRN [id number]   Code Status FULL     After 5PM or if no immediate response to page, please call for cross-coverage  Attending/Team: Dr. Chandler /Nikolai See Patient List for primary contact information  Call [phone] to page    1st Call - Day Intern (R1):   Dr. Fierro 2nd Call - Day Sr. Resident (R2/R3):   Dr. Bianchi   58yo homeless male without significant PMH admitted on 17 for severe hyponatremia to 108 and AMS.  Started on salt tabs, fluid restriction and Na correction per guidelines.   Concern for SIADH.  Smoker with lung masses (5.9 cm rightparatracheal mass and 2 cm RUL) found on CT Chest.  IR biopsy (10/05/17) c/w organizing PNA.  BAL shows few RBCs and rare Gram+/Gram- cocci.  OR biopsy of paratracheal lesion (10/11) was inconclusive. S/p repeat mediastinoscopy with biopsy (10/17)- precarinal and level 7 lymph nodes demonstrate metastatic small cell carcinoma    Reason for interval visit  (Principal Problem)   Small cell carcinoma of lung (CMS-HCC)    Interval Problem Daily Status Update  (24 hours)   - No acute events overnight  - Patient denies nausea, vomiting.  - Day 3 of BID radiation therapy- to be continued for 15 days total  - AMS resolved, Na 131, fluid restriction changed to 1500ml         Review of Systems   Constitutional: Positive for weight loss. Negative for chills and fever.   Eyes: Negative.    Respiratory: Negative for cough and shortness of breath.    Cardiovascular: Negative for chest pain and palpitations.   Gastrointestinal: Negative for abdominal pain, diarrhea, nausea and vomiting.   Genitourinary: Negative.    Musculoskeletal: Negative for myalgias.   Skin: Negative for itching and rash.   Neurological: Positive for tingling (chronic). Negative for dizziness, weakness and headaches.     Consultants/Specialty  Pulmonary    Nephrology  Thoracic Surgery  Hematology/Oncology     Disposition  Inpatient    Quality Measures    Reviewed items::  Labs reviewed and Medications reviewed  Garvin catheter::  No Garvin  DVT prophylaxis pharmacological::  Heparin  Ulcer Prophylaxis::  Not indicated        Physical Exam       Vitals:    10/24/17 1200 10/24/17 1600 10/24/17 2000 10/25/17 0339   BP: 140/83 134/82 139/82 116/77   Pulse: 90 81 84 77   Resp: 18 18 20 18   Temp: 36.6 °C (97.8 °F) 36.3 °C (97.4 °F) 37.1 °C (98.8 °F) 36.7 °C (98.1 °F)   SpO2: 99% 99% 98% 99%   Weight: 58 kg (127 lb 13.9 oz)      Height:         Body mass index is 18.35 kg/m². Weight: 58 kg (127 lb 13.9 oz)  Oxygen Therapy:  Pulse Oximetry: 99 %, O2 (LPM): 0, O2 Delivery: None (Room Air)    Physical Exam   Constitutional: He is oriented to person, place, and time. No distress.   Appears older than stated age; thin   HENT:   Head: Normocephalic and atraumatic.   Mediastinoscopy scar across suprasternal notch, healing well   Eyes: Conjunctivae and EOM are normal. Right eye exhibits no discharge. Left eye exhibits no discharge. No scleral icterus.   Neck: Normal range of motion.   Cardiovascular: Normal rate, regular rhythm, normal heart sounds and intact distal pulses.  Exam reveals no gallop and no friction rub.    No murmur heard.  Pulmonary/Chest: Effort normal and breath sounds normal. No respiratory distress. He has no wheezes. He has no rales.   Abdominal: Soft. Bowel sounds are normal. He exhibits no distension and no mass. There is no tenderness. There is no rebound and no guarding.   Musculoskeletal: Normal range of motion. He exhibits no edema or tenderness.   Neurological: He is alert and oriented to person, place, and time. No cranial nerve deficit. GCS score is 15.   Skin: Skin is warm and dry. No rash noted. He is not diaphoretic. No pallor.   Psychiatric: Mood and affect normal.   Nursing note and vitals reviewed.      Lab Data Review:         10/8/2017  7:32  AM    Recent Labs      10/23/17   0629  10/24/17   0421  10/25/17   0415   SODIUM  129*  130*  131*   POTASSIUM  3.9  4.0  4.0   CHLORIDE  100  99  100   CO2  22 24 25   BUN  10  13  12   CREATININE  0.41*  0.45*  0.45*   MAGNESIUM  1.7  1.8   --    CALCIUM  9.1  9.2  9.9       Recent Labs      10/23/17   0629  10/24/17   0421  10/25/17   0415   ALTSGPT   --   13   --    ASTSGOT   --   17   --    ALKPHOSPHAT   --   55   --    TBILIRUBIN   --   0.5   --    GLUCOSE  92  84  86       Recent Labs      10/23/17   0629  10/24/17   0421  10/25/17   0415   RBC  3.36*  3.35*  3.37*   HEMOGLOBIN  11.5*  11.2*  11.4*   HEMATOCRIT  32.8*  32.2*  32.3*   PLATELETCT  519*  483*  440       Recent Labs      10/23/17   0629  10/24/17   0421  10/25/17   0415   WBC  7.4  5.9  4.4*   NEUTSPOLYS  76.00*  49.10  46.40   LYMPHOCYTES  12.90*  36.50  45.70*   MONOCYTES  10.30  12.30  4.80   EOSINOPHILS  0.10  0.80  1.80   BASOPHILS  0.30  1.00  1.10   ASTSGOT   --   17   --    ALTSGPT   --   13   --    ALKPHOSPHAT   --   55   --    TBILIRUBIN   --   0.5   --      Assessment/Plan       Small cell carcinoma of lung (CMS-HCC)  - (10/17) Mediastinoscopy with biopsy of paratracheal mass showed metastatic small cell carcinoma  - Staging workup including MRI Brain and CT Abdomen/Pelvis:  Unremarkable  - 10/21-10/23 Patient received chemotherapy. 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 malignancy  - Exhibits good appetite  - Nutrition following

## 2017-10-25 NOTE — PROGRESS NOTES
Bedside report received from day shift RN. Assumed care. Pt is A&Ox4. Pt is in bed. Pt denies pain at this time. Pt was updated on the plan of care for the night. All questions answered. Pt has call light within reach, bed is in lowest position. All fall precautions in place. Pt has nonskid socks on feet, and appropriate signs are on the door, pt has no other needs at this time.

## 2017-10-25 NOTE — CARE PLAN
Problem: Communication  Goal: The ability to communicate needs accurately and effectively will improve  Outcome: MET Date Met: 10/25/17  Patient is communicating effectively as evidenced by appropriate questions and responses regarding treatment plan.    Problem: Safety  Goal: Will remain free from falls  Outcome: MET Date Met: 10/25/17  Patient has remained free from falls when ambulating to the restroom and around room. Non-skid socks on feet and fall risk precautions in place, call light within reach.

## 2017-10-25 NOTE — PROGRESS NOTES
Bedside report received from noc RN, assumed pt care, assessment completed, pt aox4, pt ambulates steady gait up independently, pt medicated per MAR, reviewed POC with patient and agreeable, PICC + blood return, bed in low position, call light and personal belongings within reach, hourly rounding in place, pt needs met.

## 2017-10-25 NOTE — PROGRESS NOTES
10/24/2017  7:56 AM         []Hide copied text  []Vincent for attribution information  Patient was transported to our department for radiation therapy treatment number 6 of 30 to his thorax. We plan on treating the patient again tomorrow (BID).

## 2017-10-26 LAB
ANION GAP SERPL CALC-SCNC: 9 MMOL/L (ref 0–11.9)
ANISOCYTOSIS BLD QL SMEAR: ABNORMAL
BASOPHILS # BLD AUTO: 0 % (ref 0–1.8)
BASOPHILS # BLD: 0 K/UL (ref 0–0.12)
BUN SERPL-MCNC: 12 MG/DL (ref 8–22)
CALCIUM SERPL-MCNC: 9.4 MG/DL (ref 8.5–10.5)
CHLORIDE SERPL-SCNC: 98 MMOL/L (ref 96–112)
CO2 SERPL-SCNC: 24 MMOL/L (ref 20–33)
CREAT SERPL-MCNC: 0.51 MG/DL (ref 0.5–1.4)
EOSINOPHIL # BLD AUTO: 0 K/UL (ref 0–0.51)
EOSINOPHIL NFR BLD: 0 % (ref 0–6.9)
ERYTHROCYTE [DISTWIDTH] IN BLOOD BY AUTOMATED COUNT: 42.6 FL (ref 35.9–50)
GFR SERPL CREATININE-BSD FRML MDRD: >60 ML/MIN/1.73 M 2
GLUCOSE SERPL-MCNC: 86 MG/DL (ref 65–99)
HCT VFR BLD AUTO: 33.5 % (ref 42–52)
HGB BLD-MCNC: 11.8 G/DL (ref 14–18)
HYPOCHROMIA BLD QL SMEAR: ABNORMAL
LYMPHOCYTES # BLD AUTO: 1.32 K/UL (ref 1–4.8)
LYMPHOCYTES NFR BLD: 3.4 % (ref 22–41)
MACROCYTES BLD QL SMEAR: ABNORMAL
MANUAL DIFF BLD: NORMAL
MCH RBC QN AUTO: 34.2 PG (ref 27–33)
MCHC RBC AUTO-ENTMCNC: 35.2 G/DL (ref 33.7–35.3)
MCV RBC AUTO: 97.1 FL (ref 81.4–97.8)
METAMYELOCYTES NFR BLD MANUAL: 2.5 %
MICROCYTES BLD QL SMEAR: ABNORMAL
MONOCYTES # BLD AUTO: 0.31 K/UL (ref 0–0.85)
MONOCYTES NFR BLD AUTO: 0.8 % (ref 0–13.4)
MORPHOLOGY BLD-IMP: NORMAL
NEUTROPHILS # BLD AUTO: 36.2 K/UL (ref 1.82–7.42)
NEUTROPHILS NFR BLD: 93.3 % (ref 44–72)
NRBC # BLD AUTO: 0 K/UL
NRBC BLD AUTO-RTO: 0 /100 WBC
PLATELET # BLD AUTO: 368 K/UL (ref 164–446)
PLATELET BLD QL SMEAR: NORMAL
PMV BLD AUTO: 9 FL (ref 9–12.9)
POIKILOCYTOSIS BLD QL SMEAR: NORMAL
POTASSIUM SERPL-SCNC: 4.2 MMOL/L (ref 3.6–5.5)
RBC # BLD AUTO: 3.45 M/UL (ref 4.7–6.1)
RBC BLD AUTO: PRESENT
SODIUM SERPL-SCNC: 131 MMOL/L (ref 135–145)
SPHEROCYTES BLD QL SMEAR: NORMAL
WBC # BLD AUTO: 38.8 K/UL (ref 4.8–10.8)

## 2017-10-26 PROCEDURE — 770004 HCHG ROOM/CARE - ONCOLOGY PRIVATE *

## 2017-10-26 PROCEDURE — 85007 BL SMEAR W/DIFF WBC COUNT: CPT

## 2017-10-26 PROCEDURE — A9270 NON-COVERED ITEM OR SERVICE: HCPCS | Performed by: STUDENT IN AN ORGANIZED HEALTH CARE EDUCATION/TRAINING PROGRAM

## 2017-10-26 PROCEDURE — 700111 HCHG RX REV CODE 636 W/ 250 OVERRIDE (IP): Performed by: STUDENT IN AN ORGANIZED HEALTH CARE EDUCATION/TRAINING PROGRAM

## 2017-10-26 PROCEDURE — 700102 HCHG RX REV CODE 250 W/ 637 OVERRIDE(OP): Performed by: STUDENT IN AN ORGANIZED HEALTH CARE EDUCATION/TRAINING PROGRAM

## 2017-10-26 PROCEDURE — 77336 RADIATION PHYSICS CONSULT: CPT | Performed by: RADIOLOGY

## 2017-10-26 PROCEDURE — 99231 SBSQ HOSP IP/OBS SF/LOW 25: CPT | Mod: GC | Performed by: INTERNAL MEDICINE

## 2017-10-26 PROCEDURE — A9270 NON-COVERED ITEM OR SERVICE: HCPCS | Performed by: INTERNAL MEDICINE

## 2017-10-26 PROCEDURE — 700102 HCHG RX REV CODE 250 W/ 637 OVERRIDE(OP): Performed by: INTERNAL MEDICINE

## 2017-10-26 PROCEDURE — 77014 PR CT GUIDANCE PLACEMENT RAD THERAPY FIELDS: CPT | Mod: 26,XE | Performed by: RADIOLOGY

## 2017-10-26 PROCEDURE — 700111 HCHG RX REV CODE 636 W/ 250 OVERRIDE (IP): Performed by: SPECIALIST

## 2017-10-26 PROCEDURE — 85027 COMPLETE CBC AUTOMATED: CPT

## 2017-10-26 PROCEDURE — 80048 BASIC METABOLIC PNL TOTAL CA: CPT

## 2017-10-26 PROCEDURE — 77386 HCHG IMRT DELIVERY COMPLEX: CPT | Performed by: RADIOLOGY

## 2017-10-26 RX ADMIN — HEPARIN SODIUM 5000 UNITS: 5000 INJECTION, SOLUTION INTRAVENOUS; SUBCUTANEOUS at 20:45

## 2017-10-26 RX ADMIN — FOLIC ACID 1 MG: 1 TABLET ORAL at 08:35

## 2017-10-26 RX ADMIN — VITAMIN D, TAB 1000IU (100/BT) 5000 UNITS: 25 TAB at 08:34

## 2017-10-26 RX ADMIN — NICOTINE 14 MG: 14 PATCH, EXTENDED RELEASE TRANSDERMAL at 05:58

## 2017-10-26 RX ADMIN — TBO-FILGRASTIM 300 MCG: 300 INJECTION, SOLUTION SUBCUTANEOUS at 14:00

## 2017-10-26 RX ADMIN — THIAMINE HCL TAB 100 MG 100 MG: 100 TAB at 08:34

## 2017-10-26 RX ADMIN — HEPARIN SODIUM 5000 UNITS: 5000 INJECTION, SOLUTION INTRAVENOUS; SUBCUTANEOUS at 05:58

## 2017-10-26 RX ADMIN — MIDODRINE HYDROCHLORIDE 5 MG: 5 TABLET ORAL at 18:03

## 2017-10-26 RX ADMIN — THERA TABS 1 TABLET: TAB at 08:34

## 2017-10-26 RX ADMIN — MIDODRINE HYDROCHLORIDE 5 MG: 5 TABLET ORAL at 08:35

## 2017-10-26 RX ADMIN — STANDARDIZED SENNA CONCENTRATE AND DOCUSATE SODIUM 2 TABLET: 8.6; 5 TABLET, FILM COATED ORAL at 20:45

## 2017-10-26 RX ADMIN — MIDODRINE HYDROCHLORIDE 5 MG: 5 TABLET ORAL at 12:30

## 2017-10-26 ASSESSMENT — ENCOUNTER SYMPTOMS
BLURRED VISION: 0
BRUISES/BLEEDS EASILY: 0
SHORTNESS OF BREATH: 0
WEAKNESS: 0
CONSTIPATION: 0
CHILLS: 0
SORE THROAT: 1
HEADACHES: 0
COUGH: 0
DIZZINESS: 0
FEVER: 0
NERVOUS/ANXIOUS: 0
MYALGIAS: 0
DIARRHEA: 0
ABDOMINAL PAIN: 0

## 2017-10-26 ASSESSMENT — PAIN SCALES - GENERAL
PAINLEVEL_OUTOF10: 0

## 2017-10-26 NOTE — PROGRESS NOTES
Patient received treatment in Radiation Therapy. Patient received treatment number 8 of 30 planned.

## 2017-10-26 NOTE — PROGRESS NOTES
Alia from Lab called with critical result of wbc at 38.8. Critical lab result read back to Alia.   This critical lab result is within parameters established by [facility] Nikolai for this patient. Doctor stated patient getting nuepagen.

## 2017-10-26 NOTE — PROGRESS NOTES
Internal Medicine Interval Note  Note Author: Tayler Bianchi M.D.     Name Froylan Spain     1960   Age/Sex 57 y.o. male   MRN [id number]   Code Status full     After 5PM or if no immediate response to page, please call for cross-coverage  Attending/Team: Pawan/Nikolai See Patient List for primary contact information  Call [phone] to page    1st Call - Day Intern (R1):   Vadim 2nd Call - Day Sr. Resident (R2/R3):   Arias         Reason for interval visit  (Principal Problem)   Small cell carcinoma of lung (CMS-HCC)    Interval Problem Daily Status Update  (24 hours)   -Na staying at 131 with 1200ml restriction - will continue  -No events overnight  -continuing radiation   -received filgastrim - WBC improved to 38.8    Review of Systems   Constitutional: Negative for fever and malaise/fatigue.   HENT: Positive for sore throat.         Lower throat burning - around area where radiation being given    Eyes: Negative for blurred vision.   Respiratory: Negative for cough.    Cardiovascular: Negative for chest pain.   Gastrointestinal: Negative for abdominal pain, constipation and diarrhea.   Genitourinary: Negative for dysuria.   Musculoskeletal: Negative for joint pain and myalgias.   Skin: Negative for rash.   Neurological: Negative for dizziness, weakness and headaches.   Psychiatric/Behavioral: The patient is not nervous/anxious.        Consultants/Specialty  Hem/Onc  Radio/Onc    Disposition  inpatient    Quality Measures    Reviewed items::  Labs reviewed and Medications reviewed  Garvin catheter::  No Garvin  DVT prophylaxis pharmacological::  Heparin          Physical Exam       Vitals:    10/25/17 1931 10/26/17 0431 10/26/17 0800 10/26/17 1100   BP: 117/71 106/66 (!) 95/68    Pulse: 93 87 94    Resp: 18 18 17    Temp: 36.9 °C (98.4 °F) 37.4 °C (99.3 °F) 36.9 °C (98.4 °F)    SpO2: 97% 95% 98%    Weight:    56.7 kg (125 lb)   Height:         Body mass index is 17.94 kg/m². Weight: 56.7  kg (125 lb)  Oxygen Therapy:  Pulse Oximetry: 98 %, O2 (LPM): 0, O2 Delivery: None (Room Air)    Physical Exam   Constitutional: He is oriented to person, place, and time. No distress.   cachectic   HENT:   Mouth/Throat: No oropharyngeal exudate.   Neck: Neck supple. No tracheal deviation present.   Scar healing well   Cardiovascular: Normal rate and regular rhythm.    No murmur heard.  Pulmonary/Chest: No respiratory distress. He has no wheezes. He has no rales. He exhibits no tenderness.   Abdominal: He exhibits no distension. There is no tenderness.   Musculoskeletal: He exhibits no edema.   Lymphadenopathy:     He has no cervical adenopathy.   Neurological: He is alert and oriented to person, place, and time. No cranial nerve deficit.   Paresthesia - chronic   Skin: He is not diaphoretic.   Psychiatric: Affect normal.     Lab Data Review:   10/26/2017  11:40 AM    Recent Labs      10/24/17   0421  10/25/17   0415  10/26/17   0413   SODIUM  130*  131*  131*   POTASSIUM  4.0  4.0  4.2   CHLORIDE  99  100  98   CO2  24 25 24   BUN  13  12  12   CREATININE  0.45*  0.45*  0.51   MAGNESIUM  1.8   --    --    CALCIUM  9.2  9.9  9.4       Recent Labs      10/24/17   0421  10/25/17   0415  10/26/17   0413   ALTSGPT  13   --    --    ASTSGOT  17   --    --    ALKPHOSPHAT  55   --    --    TBILIRUBIN  0.5   --    --    GLUCOSE  84  86  86       Recent Labs      10/24/17   0421  10/25/17   0415  10/26/17   0505   RBC  3.35*  3.37*  3.45*   HEMOGLOBIN  11.2*  11.4*  11.8*   HEMATOCRIT  32.2*  32.3*  33.5*   PLATELETCT  483*  440  368       Recent Labs      10/24/17   0421  10/25/17   0415  10/26/17   0505   WBC  5.9  4.4*  38.8*   NEUTSPOLYS  49.10  46.40  93.30*   LYMPHOCYTES  36.50  45.70*  3.40*   MONOCYTES  12.30  4.80  0.80   EOSINOPHILS  0.80  1.80  0.00   BASOPHILS  1.00  1.10  0.00   ASTSGOT  17   --    --    ALTSGPT  13   --    --    ALKPHOSPHAT  55   --    --    TBILIRUBIN  0.5   --    --             Assessment/Plan     * Small cell carcinoma of lung (CMS-HCC)- (present on admission)   Assessment & Plan    - Hx of smoking >20 years, 10 pack-year.   - Cough, recent weight loss and low appetite.  - (09/30) CT chest: 3.6x5.9 cm paratracheal mass compressing on SVC and 2x1.8 cm R upper lobe mass  - Procedures: (10/05) CT guided biopsy of RUL c/w with organizing PNA,  (10/11) Transbronchial biopsy was inconclusive BAL: few RBCs and rare Gram+/Gram- cocci and Mediastinoscopy with biopsy of paratracheal mass (10/17) showing metastatic small cell carcinoma  - Staging workup including MRI Brain and CT Abdomen/Pelvis:  Unremarkable  - 10/21-10/23 Patient received chemotherapy. 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 malignancy  - Exhibits good appetite  - Nutrition following        Chronic alcohol use- (present on admission)   Assessment & Plan    - Consumed 1 beer daily   - BAL: 0.01 on admission   - PO thiamine, B12 and folic acid supplements

## 2017-10-26 NOTE — PROGRESS NOTES
Assessment completed, Pt A&Ox4. Pt up self, denies pain, and nausea. Right PICC with + blood return. Discussed POC, no questions at this time. Call light within reach, hourly rounding in place.

## 2017-10-26 NOTE — CARE PLAN
Problem: Safety  Goal: Will remain free from injury  Outcome: MET Date Met: 10/26/17  Patient has remained free from injury this visit. Fall precautions are in place and patient has been educated on the risks.

## 2017-10-26 NOTE — PALLIATIVE CARE
"Palliative Care follow-up  Met with patient in his room R304/00. Patient up independent, engaging in ADLs. Introduced myself, role of Palliative Care and goal of advance directive and goals of care.     Patient stated understanding. He stated that his landlord is going to get his wallet from [facility] and bring it to him.   He stated that his brother will be coming into town this weekend and we can discuss his advance directive at that time. He stated that he doesn't \"want to put words in his (brother) mouth\" but \"he\"ll find out his (brother's) wishes\". I explained that an advance directive is about his (patient's) wishes not family. I remain concerned that the patient does not understand what an Advance Directive means and I was unsuccessful in redirecting the conversation.     I suggested that once his brother arrives we can have a family meeting to discuss his dx/px, goals of care, advance directive, treatment options and discharge plans. He stated understanding and agreed.      Updated: Dr. Shen, BS RN and Palliative Care team.      Plan: Palliative Care to follow for support, education and goals of care.     Thank you for allowing Palliative Care to participate in this patient's care. Please feel free to call [phone] with any questions or concerns.  "

## 2017-10-26 NOTE — PROGRESS NOTES
Oncology/Hematology Progress Note               Author: Apollo PRUITT Vickers Date & Time created: 10/26/2017  7:25 AM     Interval History: Small Cell Lung Cancer Stage IIIA  10/19?17- Mediastinoscopy - precarinal node  Pos  10/20/17 - Limited disease small cell lung cancer. Dr. Young planning to start XRT on Monday 10/23/17  10/21/17 - D1 Cisplatin/etoposide  10/22/17- D2 tolerated chemo well, etoposide today  10/23/17: D3 chemo today (Etoposide); started bid XRT this AM  10/26/17: Getting bid XRT, tolerating well; getting prophylactic Neupogen with elevated WBC    Review of Systems:  Review of Systems   Constitutional: Negative for chills and fever.   Respiratory: Negative for cough and shortness of breath.    Cardiovascular: Negative for chest pain.   Gastrointestinal: Negative for abdominal pain.   Musculoskeletal: Negative for myalgias.   Skin: Negative for rash.   Neurological: Negative for dizziness.   Endo/Heme/Allergies: Does not bruise/bleed easily.   All other systems reviewed and are negative.      Physical Exam:  Physical Exam   Constitutional: He is oriented to person, place, and time. He appears well-developed and well-nourished.   HENT:   Head: Normocephalic.   Mouth/Throat: Oropharynx is clear and moist.   Eyes: Conjunctivae and EOM are normal.   Cardiovascular: Normal rate and regular rhythm.    Pulmonary/Chest: Effort normal and breath sounds normal.   Abdominal: Soft.   Musculoskeletal: Normal range of motion.   Neurological: He is alert and oriented to person, place, and time.       Labs:        Invalid input(s): [id number]      Recent Labs      10/24/17   0421  10/25/17   0415  10/26/17   0413   SODIUM  130*  131*  131*   POTASSIUM  4.0  4.0  4.2   CHLORIDE  99  100  98   CO2  24  25  24   BUN  13  12  12   CREATININE  0.45*  0.45*  0.51   MAGNESIUM  1.8   --    --    CALCIUM  9.2  9.9  9.4     Recent Labs      10/24/17   0421  10/25/17   0415  10/26/17   0413   ALTSGPT  13   --    --     ASTSGOT  17   --    --    ALKPHOSPHAT  55   --    --    TBILIRUBIN  0.5   --    --    GLUCOSE  84  86  86     Recent Labs      10/24/17   0421  10/25/17   0415  10/26/17   0505   RBC  3.35*  3.37*  3.45*   HEMOGLOBIN  11.2*  11.4*  11.8*   HEMATOCRIT  32.2*  32.3*  33.5*   PLATELETCT  483*  440  368     Recent Labs      10/24/17   0421  10/25/17   0415  10/26/17   0505   WBC  5.9  4.4*  38.8*   NEUTSPOLYS  49.10  46.40  93.30*   LYMPHOCYTES  36.50  45.70*  3.40*   MONOCYTES  12.30  4.80  0.80   EOSINOPHILS  0.80  1.80  0.00   BASOPHILS  1.00  1.10  0.00   ASTSGOT  17   --    --    ALTSGPT  13   --    --    ALKPHOSPHAT  55   --    --    TBILIRUBIN  0.5   --    --      Recent Labs      10/24/17   0421  10/25/17   0415  10/26/17   0413   SODIUM  130*  131*  131*   POTASSIUM  4.0  4.0  4.2   CHLORIDE  99  100  98   CO2  24  25  24   GLUCOSE  84  86  86   BUN  13  12  12   CREATININE  0.45*  0.45*  0.51   CALCIUM  9.2  9.9  9.4     Hemodynamics:  Temp (24hrs), Av.9 °C (98.4 °F), Min:36.4 °C (97.6 °F), Max:37.4 °C (99.3 °F)  Temperature: 37.4 °C (99.3 °F)  Pulse  Av.5  Min: 64  Max: 99   Blood Pressure: 106/66     Respiratory:    Respiration: 18, Pulse Oximetry: 95 %        RUL Breath Sounds: Clear, RML Breath Sounds: Clear, RLL Breath Sounds: Diminished, LEONARDO Breath Sounds: Clear, LLL Breath Sounds: Diminished  Fluids:    Intake/Output Summary (Last 24 hours) at 10/19/17 1524  Last data filed at 10/19/17 1300   Gross per 24 hour   Intake              761 ml   Output             1450 ml   Net             -689 ml        GI/Nutrition:  Orders Placed This Encounter   Procedures   • DIET ORDER     Standing Status:   Standing     Number of Occurrences:   1     Order Specific Question:   Diet:     Answer:   Regular [1]     Order Specific Question:   Consistency/Fluid modifications:     Answer:   1500 ml Fluid Restriction [9]     Medical Decision Making, by Problem:  Active Hospital Problems    Diagnosis   •  *Hyponatremia [E87.1]   • Lung mass [R91.8]   • Disorder of electrolytes [E87.8]   • Fall [W19.XXXA]   • Chronic alcohol use [F10.10]   • Body mass index (BMI) 19.9 or less, adult [Z68.1]   • Morning headache [R51]   • Bacteremia [R78.81]       Plan:  1. Stage IIIA Small cell lung cancer   just diagnosed on precarinal node biopsy  MRI brain negative  CT Abdomen/Pelvis will be ordered  Will call radiation therapy and transfer to oncology for chemotherapy. I discussed the diagnosis with the patient and treatment with combined modality chemoradiation. I assume his CT abdomen will be neg but he has SVC compression but not syndrome.   10/20/17- XRT planned for Monday 10/23. He needs to start chemotherapy ASAP and orders placed in [place]. He needs to be moved to Oncology today  10/21/17-starting chemotherapy D1, labs checked  10/22/17- D2 chemotherapy today and scheduled to start radiation tomorrow   10/23/17: Denies nausea, got XRT this AM  10/26/17: Doing well; XRT apparently continues into NOvember; patient says he is homeless; ? Future placement    2. SIADH - controlled  10/21/17 sodium 129  10/22/17- Na 128 - asx, observe    3. Long term tobacco abuse      Reviewed items::  Labs reviewed and Medications reviewed

## 2017-10-26 NOTE — CARE PLAN
Problem: Discharge Barriers/Planning  Goal: Patient's continuum of care needs will be met    Intervention: Assess potential discharge barriers on admission and throughout hospital stay  Pt is homeless and wanting to relocate from [facility] to [place]. Many discharge barriers. Social work on the case      Problem: Mobility  Goal: Risk for activity intolerance will decrease    Intervention: Assess and monitor signs of activity intolerance  Patient very motivated to get strong, ambulates frequently and lifts small weights in room

## 2017-10-26 NOTE — PROGRESS NOTES
Bedside report received from day shift RN. Assumed care. Pt is A&Ox4. Pt is in bed. Pt denies pain at this time. Pt was updated on the plan of care for the night. All questions answered. Pt has call light within reach and bed is in lowest position.  All fall precautions in place. Pt has nonskid socks on feet, and appropriate signs are on the door, pt has no other needs at this time.

## 2017-10-26 NOTE — PROGRESS NOTES
Patient brought down to radiation oncology and received treatment 7 of 30. We plan on treating the patient again this afternoon (BID).

## 2017-10-27 LAB
ANION GAP SERPL CALC-SCNC: 8 MMOL/L (ref 0–11.9)
BASOPHILS # BLD AUTO: 0.9 % (ref 0–1.8)
BASOPHILS # BLD: 0.34 K/UL (ref 0–0.12)
BUN SERPL-MCNC: 11 MG/DL (ref 8–22)
CALCIUM SERPL-MCNC: 9.8 MG/DL (ref 8.5–10.5)
CHLORIDE SERPL-SCNC: 100 MMOL/L (ref 96–112)
CO2 SERPL-SCNC: 24 MMOL/L (ref 20–33)
CREAT SERPL-MCNC: 0.37 MG/DL (ref 0.5–1.4)
EOSINOPHIL # BLD AUTO: 0 K/UL (ref 0–0.51)
EOSINOPHIL NFR BLD: 0 % (ref 0–6.9)
ERYTHROCYTE [DISTWIDTH] IN BLOOD BY AUTOMATED COUNT: 42.7 FL (ref 35.9–50)
GFR SERPL CREATININE-BSD FRML MDRD: >60 ML/MIN/1.73 M 2
GLUCOSE SERPL-MCNC: 90 MG/DL (ref 65–99)
HCT VFR BLD AUTO: 32.6 % (ref 42–52)
HGB BLD-MCNC: 11.5 G/DL (ref 14–18)
LYMPHOCYTES # BLD AUTO: 1.33 K/UL (ref 1–4.8)
LYMPHOCYTES NFR BLD: 3.5 % (ref 22–41)
MANUAL DIFF BLD: NORMAL
MCH RBC QN AUTO: 34.1 PG (ref 27–33)
MCHC RBC AUTO-ENTMCNC: 35.3 G/DL (ref 33.7–35.3)
MCV RBC AUTO: 96.7 FL (ref 81.4–97.8)
MONOCYTES # BLD AUTO: 0.99 K/UL (ref 0–0.85)
MONOCYTES NFR BLD AUTO: 2.6 % (ref 0–13.4)
MORPHOLOGY BLD-IMP: NORMAL
NEUTROPHILS # BLD AUTO: 35.43 K/UL (ref 1.82–7.42)
NEUTROPHILS NFR BLD: 89.5 % (ref 44–72)
NEUTS BAND NFR BLD MANUAL: 3.5 % (ref 0–10)
NRBC # BLD AUTO: 0 K/UL
NRBC BLD AUTO-RTO: 0 /100 WBC
PLATELET # BLD AUTO: 336 K/UL (ref 164–446)
PLATELET BLD QL SMEAR: NORMAL
PMV BLD AUTO: 9 FL (ref 9–12.9)
POIKILOCYTOSIS BLD QL SMEAR: NORMAL
POTASSIUM SERPL-SCNC: 4.3 MMOL/L (ref 3.6–5.5)
RBC # BLD AUTO: 3.37 M/UL (ref 4.7–6.1)
RBC BLD AUTO: PRESENT
SODIUM SERPL-SCNC: 132 MMOL/L (ref 135–145)
WBC # BLD AUTO: 38.1 K/UL (ref 4.8–10.8)

## 2017-10-27 PROCEDURE — 85027 COMPLETE CBC AUTOMATED: CPT

## 2017-10-27 PROCEDURE — A9270 NON-COVERED ITEM OR SERVICE: HCPCS | Performed by: STUDENT IN AN ORGANIZED HEALTH CARE EDUCATION/TRAINING PROGRAM

## 2017-10-27 PROCEDURE — 700111 HCHG RX REV CODE 636 W/ 250 OVERRIDE (IP): Performed by: SPECIALIST

## 2017-10-27 PROCEDURE — 85007 BL SMEAR W/DIFF WBC COUNT: CPT

## 2017-10-27 PROCEDURE — 700102 HCHG RX REV CODE 250 W/ 637 OVERRIDE(OP): Performed by: INTERNAL MEDICINE

## 2017-10-27 PROCEDURE — 700102 HCHG RX REV CODE 250 W/ 637 OVERRIDE(OP): Performed by: STUDENT IN AN ORGANIZED HEALTH CARE EDUCATION/TRAINING PROGRAM

## 2017-10-27 PROCEDURE — 80048 BASIC METABOLIC PNL TOTAL CA: CPT

## 2017-10-27 PROCEDURE — 99232 SBSQ HOSP IP/OBS MODERATE 35: CPT | Mod: GC | Performed by: INTERNAL MEDICINE

## 2017-10-27 PROCEDURE — A9270 NON-COVERED ITEM OR SERVICE: HCPCS | Performed by: INTERNAL MEDICINE

## 2017-10-27 PROCEDURE — 770004 HCHG ROOM/CARE - ONCOLOGY PRIVATE *

## 2017-10-27 PROCEDURE — 700111 HCHG RX REV CODE 636 W/ 250 OVERRIDE (IP): Performed by: STUDENT IN AN ORGANIZED HEALTH CARE EDUCATION/TRAINING PROGRAM

## 2017-10-27 RX ADMIN — MIDODRINE HYDROCHLORIDE 5 MG: 5 TABLET ORAL at 09:38

## 2017-10-27 RX ADMIN — FOLIC ACID 1 MG: 1 TABLET ORAL at 09:38

## 2017-10-27 RX ADMIN — NICOTINE 14 MG: 14 PATCH, EXTENDED RELEASE TRANSDERMAL at 05:30

## 2017-10-27 RX ADMIN — THERA TABS 1 TABLET: TAB at 09:38

## 2017-10-27 RX ADMIN — HEPARIN SODIUM 5000 UNITS: 5000 INJECTION, SOLUTION INTRAVENOUS; SUBCUTANEOUS at 05:30

## 2017-10-27 RX ADMIN — HEPARIN SODIUM 5000 UNITS: 5000 INJECTION, SOLUTION INTRAVENOUS; SUBCUTANEOUS at 14:40

## 2017-10-27 RX ADMIN — STANDARDIZED SENNA CONCENTRATE AND DOCUSATE SODIUM 2 TABLET: 8.6; 5 TABLET, FILM COATED ORAL at 20:46

## 2017-10-27 RX ADMIN — STANDARDIZED SENNA CONCENTRATE AND DOCUSATE SODIUM 2 TABLET: 8.6; 5 TABLET, FILM COATED ORAL at 09:38

## 2017-10-27 RX ADMIN — TBO-FILGRASTIM 300 MCG: 300 INJECTION, SOLUTION SUBCUTANEOUS at 14:40

## 2017-10-27 RX ADMIN — VITAMIN D, TAB 1000IU (100/BT) 5000 UNITS: 25 TAB at 09:38

## 2017-10-27 RX ADMIN — MIDODRINE HYDROCHLORIDE 5 MG: 5 TABLET ORAL at 12:48

## 2017-10-27 RX ADMIN — MIDODRINE HYDROCHLORIDE 5 MG: 5 TABLET ORAL at 17:54

## 2017-10-27 RX ADMIN — THIAMINE HCL TAB 100 MG 100 MG: 100 TAB at 09:38

## 2017-10-27 RX ADMIN — MAGNESIUM HYDROXIDE 30 ML: 400 SUSPENSION ORAL at 12:48

## 2017-10-27 RX ADMIN — HEPARIN SODIUM 5000 UNITS: 5000 INJECTION, SOLUTION INTRAVENOUS; SUBCUTANEOUS at 20:46

## 2017-10-27 ASSESSMENT — ENCOUNTER SYMPTOMS
CHILLS: 0
NERVOUS/ANXIOUS: 0
DIARRHEA: 0
COUGH: 0
WEAKNESS: 0
HEADACHES: 0
SHORTNESS OF BREATH: 0
BLURRED VISION: 0
CONSTIPATION: 0
ABDOMINAL PAIN: 0
DIZZINESS: 0
BRUISES/BLEEDS EASILY: 0
MYALGIAS: 0
FEVER: 0
SORE THROAT: 0

## 2017-10-27 ASSESSMENT — PAIN SCALES - GENERAL
PAINLEVEL_OUTOF10: 0

## 2017-10-27 NOTE — CARE PLAN
Problem: Infection  Goal: Will remain free from infection  Outcome: PROGRESSING AS EXPECTED  Patient has remained free from infection this visit so far. Educations has been provided on hand washing, CHG baths, and making sure visitors are free from sickness.

## 2017-10-27 NOTE — PROGRESS NOTES
Internal Medicine Interval Note  Note Author: Trav Fierro M.D.     Name Froylan Spain     1960   Age/Sex 57 y.o. male   MRN [id number]   Code Status full     After 5PM or if no immediate response to page, please call for cross-coverage  Attending/Team: Pawan/Nikolai See Patient List for primary contact information  Call [phone] to page    1st Call - Day Intern (R1):   Vadim 2nd Call - Day Sr. Resident (R2/R3):   Arias         Reason for interval visit  (Principal Problem)   Small cell carcinoma of lung (CMS-HCC)    Interval Problem Daily Status Update  (24 hours)   - No events overnight. Patient reports itching in his throat, no complaints otherwise.  - He completed 4 out of 15 days of radiation.   - Receiving filgrastim, WBC improved to 38.1  - Na 132, will continue 1500 ml fluid restriction    Review of Systems   Constitutional: Negative for fever and malaise/fatigue.   HENT: Negative for sore throat.         Lower throat irritation and itching - around area where radiation being given    Eyes: Negative for blurred vision.   Respiratory: Negative for cough.    Cardiovascular: Negative for chest pain.   Gastrointestinal: Negative for abdominal pain, constipation and diarrhea.   Genitourinary: Negative for dysuria.   Musculoskeletal: Negative for joint pain and myalgias.   Skin: Negative for rash.   Neurological: Negative for dizziness, weakness and headaches.   Psychiatric/Behavioral: The patient is not nervous/anxious.        Consultants/Specialty  Hem/Onc  Radio/Onc    Disposition  inpatient    Quality Measures    Reviewed items::  Labs reviewed and Medications reviewed  Garvin catheter::  No Garvin  DVT prophylaxis pharmacological::  Heparin          Physical Exam       Vitals:    10/26/17 1555 10/26/17 1923 10/27/17 0421 10/27/17 0755   BP: 117/72 112/74 (!) 94/60 101/64   Pulse: (!) 102 88 100 94   Resp: 18 18 18 18   Temp: 37.2 °C (98.9 °F) 36.7 °C (98.1 °F) 36.9 °C (98.5 °F) 36.9 °C  (98.4 °F)   SpO2: 98% 96% 96% 94%   Weight:       Height:         Body mass index is 17.94 kg/m².    Oxygen Therapy:  Pulse Oximetry: 94 %, O2 (LPM): 0, O2 Delivery: None (Room Air)    Physical Exam   Constitutional: He is oriented to person, place, and time. No distress.   cachectic   HENT:   Mouth/Throat: No oropharyngeal exudate.   Neck: Neck supple. No tracheal deviation present.   Scar healing well   Cardiovascular: Normal rate and regular rhythm.    No murmur heard.  Pulmonary/Chest: No respiratory distress. He has no wheezes. He has no rales. He exhibits no tenderness.   Abdominal: He exhibits no distension. There is no tenderness.   Musculoskeletal: He exhibits no edema.   Lymphadenopathy:     He has no cervical adenopathy.   Neurological: He is alert and oriented to person, place, and time. No cranial nerve deficit.   Paresthesia - chronic   Skin: He is not diaphoretic.   Psychiatric: Affect normal.     Lab Data Review:   10/26/2017  11:40 AM    Recent Labs      10/25/17   0415  10/26/17   0413  10/27/17   0402   SODIUM  131*  131*  132*   POTASSIUM  4.0  4.2  4.3   CHLORIDE  100  98  100   CO2  25  24  24   BUN  12  12  11   CREATININE  0.45*  0.51  0.37*   CALCIUM  9.9  9.4  9.8       Recent Labs      10/25/17   0415  10/26/17   0413  10/27/17   0402   GLUCOSE  86  86  90       Recent Labs      10/25/17   0415  10/26/17   0505  10/27/17   0402   RBC  3.37*  3.45*  3.37*   HEMOGLOBIN  11.4*  11.8*  11.5*   HEMATOCRIT  32.3*  33.5*  32.6*   PLATELETCT  440  368  336       Recent Labs      10/25/17   0415  10/26/17   0505  10/27/17   0402   WBC  4.4*  38.8*  38.1*   NEUTSPOLYS  46.40  93.30*  89.50*   LYMPHOCYTES  45.70*  3.40*  3.50*   MONOCYTES  4.80  0.80  2.60   EOSINOPHILS  1.80  0.00  0.00   BASOPHILS  1.10  0.00  0.90           Assessment/Plan     * Small cell carcinoma of lung (CMS-HCC)- (present on admission)   Assessment & Plan    - Hx of smoking >20 years, 10 pack-year.   - Cough, recent weight  loss and low appetite.  - (09/30) CT chest: 3.6x5.9 cm paratracheal mass compressing on SVC and 2x1.8 cm R upper lobe mass  - Procedures: (10/05) CT guided biopsy of RUL c/w with organizing PNA,  (10/11) Transbronchial biopsy was inconclusive BAL: few RBCs and rare Gram+/Gram- cocci and Mediastinoscopy with biopsy of paratracheal mass (10/17) showing metastatic small cell carcinoma  - Staging workup including MRI Brain and CT Abdomen/Pelvis:  Unremarkable  - 10/21-10/23 Patient received chemotherapy. CISplatin on day 1 and etoposide on days 1-3 28 day cycle for 4-6 cycles  - 10/23 Radiation BID started, to be continued for 15 days. No radiation today  - Will continue to follow BUN/Cr closely given fluid restriction and scheduled chemo- BUN/ creat today within normal limits  - Patient receiving prophylactic filgastrim - WBC 4.4 >> now 38.8--> 38.1  - Palliative Care following; still wants to be Full code        Hyponatremia- (present on admission)   Assessment & Plan    - Na of 107 and AMS on admission   - SIADH due to small cell CA  - AMS resolved, no focal neurological deficits, GCS stable  - Na improving -132  - Fluid restriction will stay at 1500ml/day  - CTM              Thrombocytosis (CMS-HCC)   Assessment & Plan    - Resolving  - Platelet count today is 336  - Will CTM          Body mass index (BMI) 19.9 or less, adult   Assessment & Plan    - Possibly 2/2 homelessness vs. malignancy  - Exhibits good appetite  - Nutrition following        Chronic alcohol use- (present on admission)   Assessment & Plan    - Consumed 1 beer daily   - BAL: 0.01 on admission   - PO thiamine, B12 and folic acid supplements

## 2017-10-27 NOTE — THERAPY
Attempted PT tx session. Patient and nsg report patient has been up ad annamaria amb in hallway and performing daily exercises in room with weights. Patient stated he had already performed his exercises today, and doesn't currently have any questions regarding his ther ex. Patient declined OOB activity or ther ex instruction today. Will keep patient on service 1x/week to follow for d/c needs. Nsg and patient aware.

## 2017-10-27 NOTE — PROGRESS NOTES
Oncology/Hematology Progress Note               Author: Mark Armstrong    CC- Small Cell Lung Cancer Stage IIIA on chemo XRT Date & Time created: 10/27/2017  8:55 AM     Interval History: Small Cell Lung Cancer Stage IIIA  10/19?17- Mediastinoscopy - precarinal node  Pos  10/20/17 - Limited disease small cell lung cancer. Dr. Young planning to start XRT on Monday 10/23/17  10/21/17 - D1 Cisplatin/etoposide  10/22/17- D2 tolerated chemo well, etoposide today  10/23/17: D3 chemo today (Etoposide); started bid XRT this AM  10/26/17: Getting bid XRT, tolerating well; getting prophylactic Neupogen with elevated WBC  10/27- Feels at baseline    Review of Systems:  Review of Systems   Constitutional: Negative for chills and fever.   Respiratory: Negative for cough and shortness of breath.    Cardiovascular: Negative for chest pain.   Gastrointestinal: Negative for abdominal pain.   Musculoskeletal: Negative for myalgias.   Skin: Negative for rash.   Neurological: Negative for dizziness.   Endo/Heme/Allergies: Does not bruise/bleed easily.   All other systems reviewed and are negative.      Physical Exam:  Physical Exam   Constitutional: He is oriented to person, place, and time. He appears well-developed and well-nourished.   HENT:   Head: Normocephalic.   Mouth/Throat: Oropharynx is clear and moist.   Eyes: Conjunctivae and EOM are normal.   Cardiovascular: Normal rate and regular rhythm.    Pulmonary/Chest: Effort normal and breath sounds normal.   Abdominal: Soft.   Musculoskeletal: Normal range of motion.   Neurological: He is alert and oriented to person, place, and time.       Labs:        Invalid input(s): [id number]      Recent Labs      10/25/17   0415  10/26/17   0413  10/27/17   0402   SODIUM  131*  131*  132*   POTASSIUM  4.0  4.2  4.3   CHLORIDE  100  98  100   CO2  25  24  24   BUN  12  12  11   CREATININE  0.45*  0.51  0.37*   CALCIUM  9.9  9.4  9.8     Recent Labs      10/25/17   0415  10/26/17    0413  10/27/17   0402   GLUCOSE  86  86  90     Recent Labs      10/25/17   0415  10/26/17   0505  10/27/17   0402   RBC  3.37*  3.45*  3.37*   HEMOGLOBIN  11.4*  11.8*  11.5*   HEMATOCRIT  32.3*  33.5*  32.6*   PLATELETCT  440  368  336     Recent Labs      10/25/17   0415  10/26/17   0505  10/27/17   0402   WBC  4.4*  38.8*  38.1*   NEUTSPOLYS  46.40  93.30*  89.50*   LYMPHOCYTES  45.70*  3.40*  3.50*   MONOCYTES  4.80  0.80  2.60   EOSINOPHILS  1.80  0.00  0.00   BASOPHILS  1.10  0.00  0.90     Recent Labs      10/25/17   0415  10/26/17   0413  10/27/17   0402   SODIUM  131*  131*  132*   POTASSIUM  4.0  4.2  4.3   CHLORIDE  100  98  100   CO2  25  24  24   GLUCOSE  86  86  90   BUN  12  12  11   CREATININE  0.45*  0.51  0.37*   CALCIUM  9.9  9.4  9.8     Hemodynamics:  Temp (24hrs), Av.9 °C (98.5 °F), Min:36.7 °C (98.1 °F), Max:37.2 °C (98.9 °F)  Temperature: 36.9 °C (98.4 °F)  Pulse  Av.8  Min: 64  Max: 102   Blood Pressure: 101/64     Respiratory:    Respiration: 18, Pulse Oximetry: 94 %        RUL Breath Sounds: Clear, RML Breath Sounds: Clear, RLL Breath Sounds: Diminished, LEONARDO Breath Sounds: Clear, LLL Breath Sounds: Diminished  Fluids:    Intake/Output Summary (Last 24 hours) at 10/19/17 1524  Last data filed at 10/19/17 1300   Gross per 24 hour   Intake              761 ml   Output             1450 ml   Net             -689 ml     Weight: 56.7 kg (125 lb)  GI/Nutrition:  Orders Placed This Encounter   Procedures   • DIET ORDER     Standing Status:   Standing     Number of Occurrences:   1     Order Specific Question:   Diet:     Answer:   Regular [1]     Order Specific Question:   Consistency/Fluid modifications:     Answer:   1500 ml Fluid Restriction [9]     Medical Decision Making, by Problem:  Active Hospital Problems    Diagnosis   • *Hyponatremia [E87.1]   • Lung mass [R91.8]   • Disorder of electrolytes [E87.8]   • Fall [W19.XXXA]   • Chronic alcohol use [F10.10]   • Body mass index (BMI)  19.9 or less, adult [Z68.1]   • Morning headache [R51]   • Bacteremia [R78.81]       Plan:  1. Stage IIIA Small cell lung cancer     MRI brain negative  CT Abdomen/Pelvis negative    10/20/17- XRT planned for Monday 10/23.  10/21/17-starting chemotherapy D1, labs checked  10/22/17- D2 chemotherapy today and scheduled to start radiation tomorrow   10/23/17: Denies nausea, got XRT this AM     Doing well; XRT apparently continues into NOvember; patient says he is homeless; ? Future placement    2. SIADH - Improving    3. Long term tobacco abuse  4. Leukocytosis secondary to Neupogen. We will hold further Neupogen    Reviewed items::  Labs reviewed and Medications reviewed

## 2017-10-27 NOTE — PROGRESS NOTES
Prabha from Lab called with critical result of WBC at 38.1. Critical lab result read back to Prabha.   This critical lab result is within parameters established by  for this patient  Patient currently taking Neupegen.

## 2017-10-27 NOTE — CARE PLAN
Problem: Knowledge Deficit  Goal: Knowledge of disease process/condition, treatment plan, diagnostic tests, and medications will improve    Intervention: Assess knowledge level of disease process/condition, treatment plan, diagnostic tests, and medications  Discussed cumulative effects of radiation and feeling tired      Problem: Pain Management  Goal: Pain level will decrease to patient's comfort goal    Intervention: Follow pain managment plan developed in collaboration with patient and Interdisciplinary Team  No complaints of pain

## 2017-10-28 LAB
ALBUMIN SERPL BCP-MCNC: 3.7 G/DL (ref 3.2–4.9)
ALBUMIN/GLOB SERPL: 1.2 G/DL
ALP SERPL-CCNC: 94 U/L (ref 30–99)
ALT SERPL-CCNC: 13 U/L (ref 2–50)
ANION GAP SERPL CALC-SCNC: 8 MMOL/L (ref 0–11.9)
AST SERPL-CCNC: 17 U/L (ref 12–45)
BASOPHILS # BLD AUTO: 0 % (ref 0–1.8)
BASOPHILS # BLD: 0 K/UL (ref 0–0.12)
BILIRUB SERPL-MCNC: 0.4 MG/DL (ref 0.1–1.5)
BUN SERPL-MCNC: 10 MG/DL (ref 8–22)
CALCIUM SERPL-MCNC: 9.4 MG/DL (ref 8.5–10.5)
CHLORIDE SERPL-SCNC: 100 MMOL/L (ref 96–112)
CO2 SERPL-SCNC: 22 MMOL/L (ref 20–33)
CREAT SERPL-MCNC: 0.46 MG/DL (ref 0.5–1.4)
EOSINOPHIL # BLD AUTO: 0 K/UL (ref 0–0.51)
EOSINOPHIL NFR BLD: 0 % (ref 0–6.9)
ERYTHROCYTE [DISTWIDTH] IN BLOOD BY AUTOMATED COUNT: 42.8 FL (ref 35.9–50)
GFR SERPL CREATININE-BSD FRML MDRD: >60 ML/MIN/1.73 M 2
GLOBULIN SER CALC-MCNC: 3 G/DL (ref 1.9–3.5)
GLUCOSE SERPL-MCNC: 87 MG/DL (ref 65–99)
HCT VFR BLD AUTO: 33.6 % (ref 42–52)
HGB BLD-MCNC: 11.8 G/DL (ref 14–18)
LYMPHOCYTES # BLD AUTO: 0.6 K/UL (ref 1–4.8)
LYMPHOCYTES NFR BLD: 2.6 % (ref 22–41)
MANUAL DIFF BLD: NORMAL
MCH RBC QN AUTO: 33.7 PG (ref 27–33)
MCHC RBC AUTO-ENTMCNC: 35.1 G/DL (ref 33.7–35.3)
MCV RBC AUTO: 96 FL (ref 81.4–97.8)
MONOCYTES # BLD AUTO: 0 K/UL (ref 0–0.85)
MONOCYTES NFR BLD AUTO: 0 % (ref 0–13.4)
MORPHOLOGY BLD-IMP: NORMAL
NEUTROPHILS # BLD AUTO: 22.6 K/UL (ref 1.82–7.42)
NEUTROPHILS NFR BLD: 94.8 % (ref 44–72)
NEUTS BAND NFR BLD MANUAL: 2.6 % (ref 0–10)
NRBC # BLD AUTO: 0 K/UL
NRBC BLD AUTO-RTO: 0 /100 WBC
PLATELET # BLD AUTO: 305 K/UL (ref 164–446)
PLATELET BLD QL SMEAR: NORMAL
PMV BLD AUTO: 9.1 FL (ref 9–12.9)
POTASSIUM SERPL-SCNC: 4.2 MMOL/L (ref 3.6–5.5)
PROT SERPL-MCNC: 6.7 G/DL (ref 6–8.2)
RBC # BLD AUTO: 3.5 M/UL (ref 4.7–6.1)
RBC BLD AUTO: PRESENT
SODIUM SERPL-SCNC: 130 MMOL/L (ref 135–145)
TOXIC GRANULES BLD QL SMEAR: SLIGHT
WBC # BLD AUTO: 23.2 K/UL (ref 4.8–10.8)

## 2017-10-28 PROCEDURE — 700102 HCHG RX REV CODE 250 W/ 637 OVERRIDE(OP): Performed by: INTERNAL MEDICINE

## 2017-10-28 PROCEDURE — 99231 SBSQ HOSP IP/OBS SF/LOW 25: CPT | Mod: GC | Performed by: INTERNAL MEDICINE

## 2017-10-28 PROCEDURE — 700102 HCHG RX REV CODE 250 W/ 637 OVERRIDE(OP): Performed by: STUDENT IN AN ORGANIZED HEALTH CARE EDUCATION/TRAINING PROGRAM

## 2017-10-28 PROCEDURE — A9270 NON-COVERED ITEM OR SERVICE: HCPCS | Performed by: INTERNAL MEDICINE

## 2017-10-28 PROCEDURE — 770004 HCHG ROOM/CARE - ONCOLOGY PRIVATE *

## 2017-10-28 PROCEDURE — A9270 NON-COVERED ITEM OR SERVICE: HCPCS | Performed by: STUDENT IN AN ORGANIZED HEALTH CARE EDUCATION/TRAINING PROGRAM

## 2017-10-28 PROCEDURE — 80053 COMPREHEN METABOLIC PANEL: CPT

## 2017-10-28 PROCEDURE — 85007 BL SMEAR W/DIFF WBC COUNT: CPT

## 2017-10-28 PROCEDURE — 85027 COMPLETE CBC AUTOMATED: CPT

## 2017-10-28 PROCEDURE — 700111 HCHG RX REV CODE 636 W/ 250 OVERRIDE (IP): Performed by: STUDENT IN AN ORGANIZED HEALTH CARE EDUCATION/TRAINING PROGRAM

## 2017-10-28 RX ADMIN — MIDODRINE HYDROCHLORIDE 5 MG: 5 TABLET ORAL at 17:51

## 2017-10-28 RX ADMIN — HEPARIN SODIUM 5000 UNITS: 5000 INJECTION, SOLUTION INTRAVENOUS; SUBCUTANEOUS at 05:43

## 2017-10-28 RX ADMIN — STANDARDIZED SENNA CONCENTRATE AND DOCUSATE SODIUM 2 TABLET: 8.6; 5 TABLET, FILM COATED ORAL at 20:45

## 2017-10-28 RX ADMIN — THERA TABS 1 TABLET: TAB at 08:21

## 2017-10-28 RX ADMIN — NICOTINE 14 MG: 14 PATCH, EXTENDED RELEASE TRANSDERMAL at 05:43

## 2017-10-28 RX ADMIN — HEPARIN SODIUM 5000 UNITS: 5000 INJECTION, SOLUTION INTRAVENOUS; SUBCUTANEOUS at 20:44

## 2017-10-28 RX ADMIN — FOLIC ACID 1 MG: 1 TABLET ORAL at 08:21

## 2017-10-28 RX ADMIN — VITAMIN D, TAB 1000IU (100/BT) 5000 UNITS: 25 TAB at 08:21

## 2017-10-28 RX ADMIN — MIDODRINE HYDROCHLORIDE 5 MG: 5 TABLET ORAL at 12:25

## 2017-10-28 RX ADMIN — MIDODRINE HYDROCHLORIDE 5 MG: 5 TABLET ORAL at 08:21

## 2017-10-28 RX ADMIN — HEPARIN SODIUM 5000 UNITS: 5000 INJECTION, SOLUTION INTRAVENOUS; SUBCUTANEOUS at 16:05

## 2017-10-28 RX ADMIN — THIAMINE HCL TAB 100 MG 100 MG: 100 TAB at 08:21

## 2017-10-28 ASSESSMENT — ENCOUNTER SYMPTOMS
DIARRHEA: 0
CONSTIPATION: 0
FEVER: 0
COUGH: 0
HEADACHES: 0
WEAKNESS: 0
DIZZINESS: 0
SORE THROAT: 0
BLURRED VISION: 0
ABDOMINAL PAIN: 0
MYALGIAS: 0
NERVOUS/ANXIOUS: 0
NAUSEA: 0
VOMITING: 0

## 2017-10-28 ASSESSMENT — PAIN SCALES - GENERAL: PAINLEVEL_OUTOF10: 0

## 2017-10-28 NOTE — PROGRESS NOTES
Assumed care of pt at 1900. Bedside report received. AAOx4, VSS. Denies pain or nausea. PICC with +blood return. POC discussed, call light in reach, pt calls for assistance, all needs met at this time.

## 2017-10-28 NOTE — PROGRESS NOTES
Bedside report received from noc RN, assumed pt care, assessment completed, pt aox4, pt denies pain and nausea at present time, pt report numbness and tingling of BLE no change than previous times, pt ambulates steady gait up independently, PICC with TKO IVF, pt medicated per MAR, bed in low position, call light and personal belongings within reach, hourly rounding in place, pt needs met.

## 2017-10-28 NOTE — DISCHARGE PLANNING
Palliative Social Work    Met with pt at chair side to f/u with him about completing an advanced directive. Pt said that he has not had a chance to talk with his brother about being his DPOA yet.  Pt was expecting his brother to come to the hospital today.  This SW stopped by the pts room several times and brother was not present.  Pt does not want to complete the advanced directive until he speaks with his brother.

## 2017-10-28 NOTE — PROGRESS NOTES
Internal Medicine Interval Note  Note Author: Trav Fierro M.D.     Name Froylan Spain     1960   Age/Sex 57 y.o. male   MRN [id number]   Code Status full     After 5PM or if no immediate response to page, please call for cross-coverage  Attending/Team: Meredith See Patient List for primary contact information  Call [phone] to page    1st Call - Day Intern (R1):   Vadim 2nd Call - Day Sr. Resident (R2/R3):   Arias         Reason for interval visit  (Principal Problem)   Small cell carcinoma of lung (CMS-HCC)    Interval Problem Daily Status Update  (24 hours)   - No events overnight. No new complaints  - He has completed 4 out of 15 days of radiation. Next dose on ?Monday  - WBC 23.2, filgrastrim on hold as per Hem/onc  - Na 130, asymptomatic, will continue 1500 ml fluid restriction    Review of Systems   Constitutional: Negative for fever and malaise/fatigue.   HENT: Negative for sore throat.         Lower throat burning pain - around area where radiation being given    Eyes: Negative for blurred vision.   Respiratory: Negative for cough.    Cardiovascular: Negative for chest pain.   Gastrointestinal: Negative for abdominal pain, constipation, diarrhea, nausea and vomiting.   Genitourinary: Negative for dysuria.   Musculoskeletal: Negative for joint pain and myalgias.   Skin: Negative for rash.   Neurological: Negative for dizziness, weakness and headaches.   Psychiatric/Behavioral: The patient is not nervous/anxious.        Consultants/Specialty  Hem/Onc  Radio/Onc    Disposition  inpatient    Quality Measures    Reviewed items::  Labs reviewed and Medications reviewed  Garvin catheter::  No Garvin  DVT prophylaxis pharmacological::  Heparin  Ulcer Prophylaxis::  Not indicated          Physical Exam       Vitals:    10/27/17 0755 10/27/17 1600 10/27/17 2021 10/28/17 0435   BP: 101/64 110/58 104/62 (!) 94/60   Pulse: 94 90 82 95   Resp: 18 18 18 18   Temp: 36.9 °C (98.4 °F) 36.7 °C (98  °F) 36.3 °C (97.4 °F) 36.7 °C (98.1 °F)   SpO2: 94% 94% 96% 98%   Weight:       Height:         Body mass index is 17.94 kg/m².    Oxygen Therapy:  Pulse Oximetry: 98 %, O2 Delivery: None (Room Air)    Physical Exam   Constitutional: He is oriented to person, place, and time. No distress.   cachectic   HENT:   Mouth/Throat: No oropharyngeal exudate.   Neck: Neck supple. No tracheal deviation present.   Scar healing well   Cardiovascular: Normal rate and regular rhythm.    No murmur heard.  Pulmonary/Chest: No respiratory distress. He has no wheezes. He has no rales. He exhibits no tenderness.   Abdominal: He exhibits no distension. There is no tenderness.   Musculoskeletal: He exhibits no edema.   Lymphadenopathy:     He has no cervical adenopathy.   Neurological: He is alert and oriented to person, place, and time. No cranial nerve deficit.   Skin: He is not diaphoretic.   Psychiatric: Affect normal.     Lab Data Review:   10/26/2017  11:40 AM    Recent Labs      10/26/17   0413  10/27/17   0402  10/28/17   0437   SODIUM  131*  132*  130*   POTASSIUM  4.2  4.3  4.2   CHLORIDE  98  100  100   CO2  24  24  22   BUN  12  11  10   CREATININE  0.51  0.37*  0.46*   CALCIUM  9.4  9.8  9.4       Recent Labs      10/26/17   0413  10/27/17   0402  10/28/17   0437   ALTSGPT   --    --   13   ASTSGOT   --    --   17   ALKPHOSPHAT   --    --   94   TBILIRUBIN   --    --   0.4   GLUCOSE  86  90  87       Recent Labs      10/26/17   0505  10/27/17   0402  10/28/17   0437   RBC  3.45*  3.37*  3.50*   HEMOGLOBIN  11.8*  11.5*  11.8*   HEMATOCRIT  33.5*  32.6*  33.6*   PLATELETCT  368  336  305       Recent Labs      10/26/17   0505  10/27/17   0402  10/28/17   0437   WBC  38.8*  38.1*  23.2*   NEUTSPOLYS  93.30*  89.50*  94.80*   LYMPHOCYTES  3.40*  3.50*  2.60*   MONOCYTES  0.80  2.60  0.00   EOSINOPHILS  0.00  0.00  0.00   BASOPHILS  0.00  0.90  0.00   ASTSGOT   --    --   17   ALTSGPT   --    --   13   ALKPHOSPHAT   --    --    94   TBILIRUBIN   --    --   0.4           Assessment/Plan     * Small cell carcinoma of lung (CMS-HCC)- (present on admission)   Assessment & Plan    - Hx of smoking >20 years, 10 pack-year.   - Cough, recent weight loss and low appetite.  - (09/30) CT chest: 3.6x5.9 cm paratracheal mass compressing on SVC and 2x1.8 cm R upper lobe mass  - Procedures: (10/05) CT guided biopsy of RUL c/w with organizing PNA,  (10/11) Transbronchial biopsy was inconclusive BAL: few RBCs and rare Gram+/Gram- cocci and Mediastinoscopy with biopsy of paratracheal mass (10/17) showing metastatic small cell carcinoma  - Staging workup including MRI Brain and CT Abdomen/Pelvis:  Unremarkable  - 10/21-10/23 Patient received chemotherapy. CISplatin on day 1 and etoposide on days 1-3 28 day cycle for 4-6 cycles  - 10/23 Radiation BID started, to be continued for 15 days. No radiation today  - Will continue to follow BUN/Cr closely given fluid restriction and scheduled chemo- BUN/ creat today within normal limits  - Patient received filgrastim for 3 days. WBC improved to 23.2. Further doses on hold.  - Palliative Care following; still wants to be Full code        Hyponatremia- (present on admission)   Assessment & Plan    - Na of 107 and AMS on admission   - SIADH due to small cell CA  - AMS resolved, no focal neurological deficits, GCS stable  - Na improving -130-132  - Fluid restriction will stay at 1500ml/day  - CTM              Thrombocytosis (CMS-HCC)   Assessment & Plan    - Resolving  - Platelet count today is 305  - Will CTM          Body mass index (BMI) 19.9 or less, adult   Assessment & Plan    - Possibly 2/2 homelessness vs. malignancy  - Exhibits good appetite  - Nutrition following        Chronic alcohol use- (present on admission)   Assessment & Plan    - Consumed 1 beer daily   - BAL: 0.01 on admission   - PO thiamine, B12 and folic acid supplements

## 2017-10-28 NOTE — CARE PLAN
Problem: Mobility  Goal: Risk for activity intolerance will decrease  Outcome: PROGRESSING AS EXPECTED  Pt ambulates in sosa during the day, pt ambulates steady gait up independently, calls appropriately for assistance.     Problem: Pain Management  Goal: Pain level will decrease to patient's comfort goal  Outcome: PROGRESSING AS EXPECTED  Pt denies pain at present time.

## 2017-10-29 ENCOUNTER — APPOINTMENT (OUTPATIENT)
Dept: RADIOLOGY | Facility: MEDICAL CENTER | Age: 57
DRG: 166 | End: 2017-10-29
Attending: STUDENT IN AN ORGANIZED HEALTH CARE EDUCATION/TRAINING PROGRAM
Payer: COMMERCIAL

## 2017-10-29 LAB
ANION GAP SERPL CALC-SCNC: 9 MMOL/L (ref 0–11.9)
BASOPHILS # BLD AUTO: 0 % (ref 0–1.8)
BASOPHILS # BLD: 0 K/UL (ref 0–0.12)
BUN SERPL-MCNC: 11 MG/DL (ref 8–22)
CALCIUM SERPL-MCNC: 9.7 MG/DL (ref 8.5–10.5)
CHLORIDE SERPL-SCNC: 99 MMOL/L (ref 96–112)
CO2 SERPL-SCNC: 22 MMOL/L (ref 20–33)
CREAT SERPL-MCNC: 0.47 MG/DL (ref 0.5–1.4)
EOSINOPHIL # BLD AUTO: 0.17 K/UL (ref 0–0.51)
EOSINOPHIL NFR BLD: 2.6 % (ref 0–6.9)
ERYTHROCYTE [DISTWIDTH] IN BLOOD BY AUTOMATED COUNT: 42.6 FL (ref 35.9–50)
GFR SERPL CREATININE-BSD FRML MDRD: >60 ML/MIN/1.73 M 2
GLUCOSE SERPL-MCNC: 129 MG/DL (ref 65–99)
HCT VFR BLD AUTO: 32.7 % (ref 42–52)
HGB BLD-MCNC: 11.4 G/DL (ref 14–18)
LYMPHOCYTES # BLD AUTO: 1.59 K/UL (ref 1–4.8)
LYMPHOCYTES NFR BLD: 23.7 % (ref 22–41)
MANUAL DIFF BLD: NORMAL
MCH RBC QN AUTO: 34 PG (ref 27–33)
MCHC RBC AUTO-ENTMCNC: 34.9 G/DL (ref 33.7–35.3)
MCV RBC AUTO: 97.6 FL (ref 81.4–97.8)
MONOCYTES # BLD AUTO: 0.47 K/UL (ref 0–0.85)
MONOCYTES NFR BLD AUTO: 7 % (ref 0–13.4)
MORPHOLOGY BLD-IMP: NORMAL
NEUTROPHILS # BLD AUTO: 4.47 K/UL (ref 1.82–7.42)
NEUTROPHILS NFR BLD: 66.7 % (ref 44–72)
NRBC # BLD AUTO: 0 K/UL
NRBC BLD AUTO-RTO: 0 /100 WBC
PLATELET # BLD AUTO: 270 K/UL (ref 164–446)
PLATELET BLD QL SMEAR: NORMAL
PMV BLD AUTO: 9.2 FL (ref 9–12.9)
POTASSIUM SERPL-SCNC: 3.9 MMOL/L (ref 3.6–5.5)
RBC # BLD AUTO: 3.35 M/UL (ref 4.7–6.1)
RBC BLD AUTO: NORMAL
SODIUM SERPL-SCNC: 130 MMOL/L (ref 135–145)
WBC # BLD AUTO: 6.7 K/UL (ref 4.8–10.8)

## 2017-10-29 PROCEDURE — 99231 SBSQ HOSP IP/OBS SF/LOW 25: CPT | Mod: GC | Performed by: INTERNAL MEDICINE

## 2017-10-29 PROCEDURE — 700102 HCHG RX REV CODE 250 W/ 637 OVERRIDE(OP): Performed by: STUDENT IN AN ORGANIZED HEALTH CARE EDUCATION/TRAINING PROGRAM

## 2017-10-29 PROCEDURE — 71020 DX-CHEST-2 VIEWS: CPT

## 2017-10-29 PROCEDURE — 700102 HCHG RX REV CODE 250 W/ 637 OVERRIDE(OP): Performed by: INTERNAL MEDICINE

## 2017-10-29 PROCEDURE — A9270 NON-COVERED ITEM OR SERVICE: HCPCS | Performed by: STUDENT IN AN ORGANIZED HEALTH CARE EDUCATION/TRAINING PROGRAM

## 2017-10-29 PROCEDURE — 85007 BL SMEAR W/DIFF WBC COUNT: CPT

## 2017-10-29 PROCEDURE — 85027 COMPLETE CBC AUTOMATED: CPT

## 2017-10-29 PROCEDURE — 700111 HCHG RX REV CODE 636 W/ 250 OVERRIDE (IP): Performed by: STUDENT IN AN ORGANIZED HEALTH CARE EDUCATION/TRAINING PROGRAM

## 2017-10-29 PROCEDURE — A9270 NON-COVERED ITEM OR SERVICE: HCPCS | Performed by: INTERNAL MEDICINE

## 2017-10-29 PROCEDURE — 80048 BASIC METABOLIC PNL TOTAL CA: CPT

## 2017-10-29 PROCEDURE — 770004 HCHG ROOM/CARE - ONCOLOGY PRIVATE *

## 2017-10-29 RX ADMIN — HEPARIN SODIUM 5000 UNITS: 5000 INJECTION, SOLUTION INTRAVENOUS; SUBCUTANEOUS at 15:44

## 2017-10-29 RX ADMIN — MIDODRINE HYDROCHLORIDE 5 MG: 5 TABLET ORAL at 12:00

## 2017-10-29 RX ADMIN — VITAMIN D, TAB 1000IU (100/BT) 5000 UNITS: 25 TAB at 10:55

## 2017-10-29 RX ADMIN — HEPARIN SODIUM 5000 UNITS: 5000 INJECTION, SOLUTION INTRAVENOUS; SUBCUTANEOUS at 05:25

## 2017-10-29 RX ADMIN — MIDODRINE HYDROCHLORIDE 5 MG: 5 TABLET ORAL at 15:50

## 2017-10-29 RX ADMIN — FOLIC ACID 1 MG: 1 TABLET ORAL at 10:56

## 2017-10-29 RX ADMIN — THERA TABS 1 TABLET: TAB at 10:56

## 2017-10-29 RX ADMIN — NICOTINE 14 MG: 14 PATCH, EXTENDED RELEASE TRANSDERMAL at 05:25

## 2017-10-29 RX ADMIN — HEPARIN SODIUM 5000 UNITS: 5000 INJECTION, SOLUTION INTRAVENOUS; SUBCUTANEOUS at 20:17

## 2017-10-29 RX ADMIN — THIAMINE HCL TAB 100 MG 100 MG: 100 TAB at 10:56

## 2017-10-29 ASSESSMENT — ENCOUNTER SYMPTOMS
DIZZINESS: 0
NAUSEA: 0
MYALGIAS: 0
BLURRED VISION: 0
WEAKNESS: 0
COUGH: 0
HEADACHES: 0
DIARRHEA: 0
SORE THROAT: 0
VOMITING: 0
ABDOMINAL PAIN: 0
FEVER: 0
HALLUCINATIONS: 0
NERVOUS/ANXIOUS: 0
CONSTIPATION: 0

## 2017-10-29 ASSESSMENT — PAIN SCALES - GENERAL
PAINLEVEL_OUTOF10: 0
PAINLEVEL_OUTOF10: 0

## 2017-10-29 NOTE — PROGRESS NOTES
Assumed care of pt at 1900. Bedside report received. AAOx4, VSS. Denies pain or nausea, reports general weakness, numbness, and tinglin to BLE. PICC with +blood return to TKO. POC discussed, call light in reach, pt calls for assistance, all needs met at this time.

## 2017-10-29 NOTE — PROGRESS NOTES
Internal Medicine Interval Note  Note Author: Trav Fierro M.D.     Name Froylan Spain     1960   Age/Sex 57 y.o. male   MRN [id number]   Code Status Full code     After 5PM or if no immediate response to page, please call for cross-coverage  Attending/Team: Meredith See Patient List for primary contact information  Call [phone] to page    1st Call - Day Intern (R1):   Vadim 2nd Call - Day Sr. Resident (R2/R3):   Arias         Reason for interval visit  (Principal Problem)   Small cell carcinoma of lung (CMS-HCC)    Interval Problem Daily Status Update  (24 hours)   - No complaints. No events overnight  - Completed 4 days of radiation. Possibly resuming tomorrow  - Na staying at 130, will continue 1500 ml fluid restriction    Review of Systems   Constitutional: Negative for fever and malaise/fatigue.   HENT: Negative for sore throat.    Eyes: Negative for blurred vision.   Respiratory: Negative for cough.    Cardiovascular: Negative for chest pain.   Gastrointestinal: Negative for abdominal pain, constipation, diarrhea, nausea and vomiting.   Genitourinary: Negative for dysuria.   Musculoskeletal: Negative for joint pain and myalgias.   Skin: Negative for rash.   Neurological: Negative for dizziness, weakness and headaches.   Psychiatric/Behavioral: Negative for hallucinations. The patient is not nervous/anxious.        Consultants/Specialty  Hem/Onc  Radio/Onc    Disposition  inpatient    Quality Measures    Reviewed items::  Labs reviewed and Medications reviewed  Garvin catheter::  No Garvin  DVT prophylaxis pharmacological::  Heparin  Ulcer Prophylaxis::  Not indicated          Physical Exam       Vitals:    10/28/17 1600 10/28/17 2139 10/29/17 0506 10/29/17 0800   BP: 115/74 103/67 106/76 106/71   Pulse: 94 87 100 99   Resp: 17 18 18 18   Temp: 36.4 °C (97.6 °F) 36.4 °C (97.5 °F) 36.5 °C (97.7 °F) 36.3 °C (97.3 °F)   SpO2: 99% 97% 98% 99%   Weight:       Height:         Body mass  index is 18.66 kg/m².    Oxygen Therapy:  Pulse Oximetry: 99 %, O2 (LPM): 0, O2 Delivery: None (Room Air)    Physical Exam   Constitutional: He is oriented to person, place, and time. No distress.   cachectic   HENT:   Mouth/Throat: No oropharyngeal exudate.   Neck: Neck supple. No tracheal deviation present.   Scar healing well   Cardiovascular: Normal rate and regular rhythm.    No murmur heard.  Pulmonary/Chest: No respiratory distress. He has no wheezes. He has no rales. He exhibits no tenderness.   Abdominal: He exhibits no distension. There is no tenderness.   Musculoskeletal: He exhibits no edema.   Lymphadenopathy:     He has no cervical adenopathy.   Neurological: He is alert and oriented to person, place, and time. No cranial nerve deficit.   Skin: He is not diaphoretic.   Psychiatric: Affect normal.     Lab Data Review:   10/26/2017  11:40 AM    Recent Labs      10/27/17   0402  10/28/17   0437  10/29/17   0532   SODIUM  132*  130*  130*   POTASSIUM  4.3  4.2  3.9   CHLORIDE  100  100  99   CO2  24  22  22   BUN  11  10  11   CREATININE  0.37*  0.46*  0.47*   CALCIUM  9.8  9.4  9.7       Recent Labs      10/27/17   0402  10/28/17   0437  10/29/17   0532   ALTSGPT   --   13   --    ASTSGOT   --   17   --    ALKPHOSPHAT   --   94   --    TBILIRUBIN   --   0.4   --    GLUCOSE  90  87  129*       Recent Labs      10/27/17   0402  10/28/17   0437  10/29/17   0532   RBC  3.37*  3.50*  3.35*   HEMOGLOBIN  11.5*  11.8*  11.4*   HEMATOCRIT  32.6*  33.6*  32.7*   PLATELETCT  336  305  270       Recent Labs      10/27/17   0402  10/28/17   0437  10/29/17   0532   WBC  38.1*  23.2*  6.7   NEUTSPOLYS  89.50*  94.80*  66.70   LYMPHOCYTES  3.50*  2.60*  23.70   MONOCYTES  2.60  0.00  7.00   EOSINOPHILS  0.00  0.00  2.60   BASOPHILS  0.90  0.00  0.00   ASTSGOT   --   17   --    ALTSGPT   --   13   --    ALKPHOSPHAT   --   94   --    TBILIRUBIN   --   0.4   --            Assessment/Plan     * Small cell carcinoma of lung  (CMS-HCC)- (present on admission)   Assessment & Plan    - Hx of smoking >20 years, 10 pack-year.   - Cough, recent weight loss and low appetite.  - (09/30) CT chest: 3.6x5.9 cm paratracheal mass compressing on SVC and 2x1.8 cm R upper lobe mass  - Procedures: (10/05) CT guided biopsy of RUL c/w with organizing PNA,  (10/11) Transbronchial biopsy was inconclusive BAL: few RBCs and rare Gram+/Gram- cocci and Mediastinoscopy with biopsy of paratracheal mass (10/17) showing metastatic small cell carcinoma  - Staging workup including MRI Brain and CT Abdomen/Pelvis:  Unremarkable  - 10/21-10/23 Patient received chemotherapy. CISplatin on day 1 and etoposide on days 1-3 28 day cycle for 4-6 cycles  - 10/23 Radiation BID started, to be continued for 15 days. Received 4 days of radiation. Possibly resuming tomorrow.  - WBC today is normal.  - Palliative Care following; still wants to be Full code        Hyponatremia- (present on admission)   Assessment & Plan    - Na of 107 and AMS on admission   - SIADH due to small cell CA  - AMS resolved, no focal neurological deficits, GCS stable  - Na improving -130-132  - Fluid restriction will stay at 1500ml/day  - CTM              Thrombocytosis (CMS-HCC)   Assessment & Plan    - Resolved  - Platelet count today is 270  - Will CTM          Body mass index (BMI) 19.9 or less, adult   Assessment & Plan    - Possibly 2/2 homelessness vs. malignancy  - Exhibits good appetite  - Nutrition following        Chronic alcohol use- (present on admission)   Assessment & Plan    - Consumed 1 beer daily   - BAL: 0.01 on admission   - PO thiamine, B12 and folic acid supplements

## 2017-10-30 PROBLEM — D75.839 THROMBOCYTOSIS: Status: RESOLVED | Noted: 2017-10-20 | Resolved: 2017-10-30

## 2017-10-30 LAB
ANION GAP SERPL CALC-SCNC: 6 MMOL/L (ref 0–11.9)
BASOPHILS # BLD AUTO: 1.4 % (ref 0–1.8)
BASOPHILS # BLD: 0.05 K/UL (ref 0–0.12)
BUN SERPL-MCNC: 10 MG/DL (ref 8–22)
CALCIUM SERPL-MCNC: 9.5 MG/DL (ref 8.5–10.5)
CHLORIDE SERPL-SCNC: 100 MMOL/L (ref 96–112)
CO2 SERPL-SCNC: 23 MMOL/L (ref 20–33)
CREAT SERPL-MCNC: 0.48 MG/DL (ref 0.5–1.4)
EOSINOPHIL # BLD AUTO: 0.34 K/UL (ref 0–0.51)
EOSINOPHIL NFR BLD: 9.4 % (ref 0–6.9)
ERYTHROCYTE [DISTWIDTH] IN BLOOD BY AUTOMATED COUNT: 41.3 FL (ref 35.9–50)
GFR SERPL CREATININE-BSD FRML MDRD: >60 ML/MIN/1.73 M 2
GLUCOSE SERPL-MCNC: 93 MG/DL (ref 65–99)
HCT VFR BLD AUTO: 30.7 % (ref 42–52)
HGB BLD-MCNC: 10.7 G/DL (ref 14–18)
IMM GRANULOCYTES # BLD AUTO: 0.02 K/UL (ref 0–0.11)
IMM GRANULOCYTES NFR BLD AUTO: 0.6 % (ref 0–0.9)
LYMPHOCYTES # BLD AUTO: 1.41 K/UL (ref 1–4.8)
LYMPHOCYTES NFR BLD: 38.8 % (ref 22–41)
MCH RBC QN AUTO: 33.5 PG (ref 27–33)
MCHC RBC AUTO-ENTMCNC: 34.9 G/DL (ref 33.7–35.3)
MCV RBC AUTO: 96.2 FL (ref 81.4–97.8)
MONOCYTES # BLD AUTO: 0.55 K/UL (ref 0–0.85)
MONOCYTES NFR BLD AUTO: 15.2 % (ref 0–13.4)
NEUTROPHILS # BLD AUTO: 1.26 K/UL (ref 1.82–7.42)
NEUTROPHILS NFR BLD: 34.6 % (ref 44–72)
NRBC # BLD AUTO: 0 K/UL
NRBC BLD AUTO-RTO: 0 /100 WBC
PLATELET # BLD AUTO: 241 K/UL (ref 164–446)
PMV BLD AUTO: 9.1 FL (ref 9–12.9)
POTASSIUM SERPL-SCNC: 4.1 MMOL/L (ref 3.6–5.5)
RBC # BLD AUTO: 3.19 M/UL (ref 4.7–6.1)
SODIUM SERPL-SCNC: 129 MMOL/L (ref 135–145)
WBC # BLD AUTO: 3.6 K/UL (ref 4.8–10.8)

## 2017-10-30 PROCEDURE — 700102 HCHG RX REV CODE 250 W/ 637 OVERRIDE(OP): Performed by: STUDENT IN AN ORGANIZED HEALTH CARE EDUCATION/TRAINING PROGRAM

## 2017-10-30 PROCEDURE — A9270 NON-COVERED ITEM OR SERVICE: HCPCS | Performed by: STUDENT IN AN ORGANIZED HEALTH CARE EDUCATION/TRAINING PROGRAM

## 2017-10-30 PROCEDURE — 700111 HCHG RX REV CODE 636 W/ 250 OVERRIDE (IP): Performed by: STUDENT IN AN ORGANIZED HEALTH CARE EDUCATION/TRAINING PROGRAM

## 2017-10-30 PROCEDURE — 80048 BASIC METABOLIC PNL TOTAL CA: CPT

## 2017-10-30 PROCEDURE — 99231 SBSQ HOSP IP/OBS SF/LOW 25: CPT | Mod: GC | Performed by: INTERNAL MEDICINE

## 2017-10-30 PROCEDURE — 85025 COMPLETE CBC W/AUTO DIFF WBC: CPT

## 2017-10-30 PROCEDURE — 77014 PR CT GUIDANCE PLACEMENT RAD THERAPY FIELDS: CPT | Mod: 26 | Performed by: RADIOLOGY

## 2017-10-30 PROCEDURE — 77386 HCHG IMRT DELIVERY COMPLEX: CPT | Performed by: RADIOLOGY

## 2017-10-30 PROCEDURE — 770004 HCHG ROOM/CARE - ONCOLOGY PRIVATE *

## 2017-10-30 RX ORDER — NICOTINE 21 MG/24HR
14 PATCH, TRANSDERMAL 24 HOURS TRANSDERMAL
Status: DISCONTINUED | OUTPATIENT
Start: 2017-10-31 | End: 2017-11-17 | Stop reason: HOSPADM

## 2017-10-30 RX ADMIN — THIAMINE HCL TAB 100 MG 100 MG: 100 TAB at 09:26

## 2017-10-30 RX ADMIN — HEPARIN SODIUM 5000 UNITS: 5000 INJECTION, SOLUTION INTRAVENOUS; SUBCUTANEOUS at 21:20

## 2017-10-30 RX ADMIN — MIDODRINE HYDROCHLORIDE 5 MG: 5 TABLET ORAL at 09:24

## 2017-10-30 RX ADMIN — MIDODRINE HYDROCHLORIDE 5 MG: 5 TABLET ORAL at 17:57

## 2017-10-30 RX ADMIN — THERA TABS 1 TABLET: TAB at 09:25

## 2017-10-30 RX ADMIN — NICOTINE 14 MG: 14 PATCH, EXTENDED RELEASE TRANSDERMAL at 05:20

## 2017-10-30 RX ADMIN — MIDODRINE HYDROCHLORIDE 5 MG: 5 TABLET ORAL at 13:25

## 2017-10-30 RX ADMIN — FOLIC ACID 1 MG: 1 TABLET ORAL at 09:26

## 2017-10-30 RX ADMIN — VITAMIN D, TAB 1000IU (100/BT) 5000 UNITS: 25 TAB at 09:25

## 2017-10-30 RX ADMIN — HEPARIN SODIUM 5000 UNITS: 5000 INJECTION, SOLUTION INTRAVENOUS; SUBCUTANEOUS at 05:20

## 2017-10-30 RX ADMIN — STANDARDIZED SENNA CONCENTRATE AND DOCUSATE SODIUM 2 TABLET: 8.6; 5 TABLET, FILM COATED ORAL at 09:26

## 2017-10-30 RX ADMIN — MAGNESIUM HYDROXIDE 30 ML: 400 SUSPENSION ORAL at 13:25

## 2017-10-30 ASSESSMENT — PAIN SCALES - GENERAL
PAINLEVEL_OUTOF10: 0

## 2017-10-30 ASSESSMENT — ENCOUNTER SYMPTOMS
ABDOMINAL PAIN: 0
DIZZINESS: 0
CONSTIPATION: 0
NAUSEA: 0
HEADACHES: 0
WEAKNESS: 0
CHILLS: 0
VOMITING: 0
FEVER: 0
NERVOUS/ANXIOUS: 0
SORE THROAT: 0
HALLUCINATIONS: 0
DIARRHEA: 0
COUGH: 0
MYALGIAS: 0
BLURRED VISION: 0

## 2017-10-30 NOTE — PROGRESS NOTES
Patient received treatment in Radiation Therapy. Patient received treatment number 10 of 30 planned.

## 2017-10-30 NOTE — PROGRESS NOTES
Pharmacy Pharmacotherapy Consult for LOS >30 days    Admit Date: 9/30/2017      Medications were reviewed for appropriateness and ongoing need.     Current Facility-Administered Medications   Medication Dose Route Frequency Provider Last Rate Last Dose   • [START ON 10/31/2017] nicotine (NICODERM) 14 MG/24HR 14 mg  14 mg Transdermal Daily-0600 Trav Fierro M.D.        And   • nicotine polacrilex (NICORETTE) 2 MG piece 2 mg  2 mg Oral Q HOUR PRN Trav Fierro M.D.       • normal saline PF 10-20 mL  10-20 mL Intravenous PRN Tayler Bianchi M.D.       • magnesium hydroxide (MILK OF MAGNESIA) suspension 30 mL  30 mL Oral Daily Before Lunch Trav Fierro M.D.   30 mL at 10/27/17 1248    And   • senna-docusate (PERICOLACE or SENOKOT S) 8.6-50 MG per tablet 2 Tab  2 Tab Oral BID Trav Fierro M.D.   2 Tab at 10/28/17 2045    And   • polyethylene glycol/lytes (MIRALAX) PACKET 1 Packet  1 Packet Oral QDAY PRN Trav Fierro M.D.        And   • bisacodyl (DULCOLAX) suppository 10 mg  10 mg Rectal QDAY PRN Trav Fierro M.D.       • heparin injection 5,000 Units  5,000 Units Subcutaneous Q8HRS Trav Fierro M.D.   5,000 Units at 10/30/17 0520   • lactated ringers infusion   Intravenous Continuous Trav Fierro M.D. 10 mL/hr at 10/21/17 0621     • midodrine (PROAMATINE) tablet 5 mg  5 mg Oral TID WITH MEALS Trav Fierro M.D.   5 mg at 10/30/17 0924   • vitamin D (cholecalciferol) tablet 5,000 Units  5,000 Units Oral DAILY Trav Fierro M.D.   5,000 Units at 10/29/17 1055   • multivitamin (THERAGRAN) tablet 1 Tab  1 Tab Oral DAILY Trav Fierro M.D.   1 Tab at 10/29/17 1056   • thiamine (THIAMINE) tablet 100 mg  100 mg Oral DAILY Trav Fierro M.D.   100 mg at 10/29/17 1056   • folic acid (FOLVITE) tablet 1 mg  1 mg Oral DAILY Trav Fierro M.D.   1 mg at 10/29/17 1056   • Respiratory Care per Protocol   Nebulization Continuous RT Trav Fierro M.D.       • ondansetron (ZOFRAN) syringe/vial  injection 4 mg  4 mg Intravenous Q4HRS PRN Trav Fierro M.D.       • ondansetron (ZOFRAN ODT) dispertab 4 mg  4 mg Oral Q4HRS PRLESLEY Fierro M.D.       • promethazine (PHENERGAN) tablet 12.5-25 mg  12.5-25 mg Oral Q4HRS PERRY Fierro M.D.       • promethazine (PHENERGAN) suppository 12.5-25 mg  12.5-25 mg Rectal Q4HRS PRLESLEY Fierro M.D.       • prochlorperazine (COMPAZINE) injection 5-10 mg  5-10 mg Intravenous Q4HRS PRLESLEY Fierro M.D.       • acetaminophen (TYLENOL) tablet 650 mg  650 mg Oral Q6HRS PRLESLEY Fierro M.D.   650 mg at 10/18/17 0804       Recommendations:  Discontinue prn guaifenesin and labetalol. No use in 30 days since ordered on admit.     DINA Gagnon PharmSudhirD.

## 2017-10-30 NOTE — PROGRESS NOTES
Patient was transported to our department for radiation therapy treatment number 9 of 30 to his thorax. We plan on treating the patient again at 1:30pm (Tx 10 of 30, BID) and twice tomorrow (BID).

## 2017-10-30 NOTE — CARE PLAN
Problem: Venous Thromboembolism (VTW)/Deep Vein Thrombosis (DVT) Prevention:  Goal: Patient will participate in Venous Thrombosis (VTE)/Deep Vein Thrombosis (DVT)Prevention Measures  Outcome: PROGRESSING AS EXPECTED  He is receiving SQ lovenox  Intervention: Encourage ambulation/mobilization at level directed by Physical Therapy in collaboration with Interdisciplinary Team  Pt ambulates to the bathroom and around the halls.      Problem: Pain Management  Goal: Pain level will decrease to patient's comfort goal  Outcome: PROGRESSING AS EXPECTED  He denies c/o pain

## 2017-10-30 NOTE — CARE PLAN
Problem: Venous Thromboembolism (VTW)/Deep Vein Thrombosis (DVT) Prevention:  Goal: Patient will participate in Venous Thrombosis (VTE)/Deep Vein Thrombosis (DVT)Prevention Measures    Intervention: Encourage patient to perform ankle flex, foot rotation, and knee flex exercises in addition to other prophylatic measures every hour while awake  Patient ambulating and doing excercises      Problem: Pain Management  Goal: Pain level will decrease to patient's comfort goal    Intervention: Follow pain managment plan developed in collaboration with patient and Interdisciplinary Team  No complaints of pain

## 2017-10-30 NOTE — PROGRESS NOTES
Received report from day RN. POC discussed with patient, pt verbalizes understanding and agreement.  A&Ox4. Pt is up self. He denies c/o pain. PICC with positive blood return. Call light in reach, bed in low position, Q2 hr rounding.

## 2017-10-30 NOTE — PROGRESS NOTES
Internal Medicine Interval Note  Note Author: Trav Fierro M.D.     Name Froylan Spain     1960   Age/Sex 57 y.o. male   MRN [id number]   Code Status Full code     After 5PM or if no immediate response to page, please call for cross-coverage  Attending/Team: Meredith See Patient List for primary contact information  Call [phone] to page    1st Call - Day Intern (R1):   Vadim 2nd Call - Day Sr. Resident (R2/R3):   Arias         Reason for interval visit  (Principal Problem)   Small cell carcinoma of lung (CMS-HCC)    Interval Problem Daily Status Update  (24 hours)   - No events overnight, no new complaints  - Patient receiving radiation doses 9 and 10 of total 30 .  - Na at 129 today, patient asymptomatic, will continue 1500 ml fluid restriction.    Review of Systems   Constitutional: Negative for chills, fever and malaise/fatigue.   HENT: Negative for sore throat.    Eyes: Negative for blurred vision.   Respiratory: Negative for cough.    Cardiovascular: Negative for chest pain.   Gastrointestinal: Negative for abdominal pain, constipation, diarrhea, nausea and vomiting.   Genitourinary: Negative for dysuria.   Musculoskeletal: Negative for joint pain and myalgias.   Skin: Negative for rash.   Neurological: Negative for dizziness, weakness and headaches.   Psychiatric/Behavioral: Negative for hallucinations. The patient is not nervous/anxious.        Consultants/Specialty  Hem/Onc  Radio/Onc    Disposition  inpatient    Quality Measures    Reviewed items::  Labs reviewed and Medications reviewed  Garvin catheter::  No Garvin  DVT prophylaxis pharmacological::  Heparin  Ulcer Prophylaxis::  Not indicated          Physical Exam       Vitals:    10/29/17 1600 10/29/17 1928 10/30/17 0511 10/30/17 0740   BP: 104/76 108/69 106/72 100/70   Pulse: 96 92 86 84   Resp: 18 18 18 18   Temp: 36.4 °C (97.6 °F) 36.5 °C (97.7 °F) 36.3 °C (97.4 °F) 36.5 °C (97.7 °F)   SpO2: 98% 97% 98% 98%   Weight:   57.2 kg (126 lb 1.7 oz)     Height:         Body mass index is 18.09 kg/m². Weight: 57.2 kg (126 lb 1.7 oz)  Oxygen Therapy:  Pulse Oximetry: 98 %, O2 (LPM): 0, O2 Delivery: None (Room Air)    Physical Exam   Constitutional: He is oriented to person, place, and time. No distress.   cachectic   HENT:   Mouth/Throat: No oropharyngeal exudate.   Neck: Neck supple. No tracheal deviation present.   Scar healing well   Cardiovascular: Normal rate and regular rhythm.    No murmur heard.  Pulmonary/Chest: No respiratory distress. He has no wheezes. He has no rales. He exhibits no tenderness.   Abdominal: He exhibits no distension. There is no tenderness.   Musculoskeletal: He exhibits no edema.   Lymphadenopathy:     He has no cervical adenopathy.   Neurological: He is alert and oriented to person, place, and time. No cranial nerve deficit.   Skin: He is not diaphoretic.   Psychiatric: Affect normal.     Lab Data Review:   10/26/2017  11:40 AM    Recent Labs      10/28/17   0437  10/29/17   0532  10/30/17   0000   SODIUM  130*  130*  129*   POTASSIUM  4.2  3.9  4.1   CHLORIDE  100  99  100   CO2  22  22  23   BUN  10  11  10   CREATININE  0.46*  0.47*  0.48*   CALCIUM  9.4  9.7  9.5       Recent Labs      10/28/17   0437  10/29/17   0532  10/30/17   0000   ALTSGPT  13   --    --    ASTSGOT  17   --    --    ALKPHOSPHAT  94   --    --    TBILIRUBIN  0.4   --    --    GLUCOSE  87  129*  93       Recent Labs      10/28/17   0437  10/29/17   0532  10/30/17   0000   RBC  3.50*  3.35*  3.19*   HEMOGLOBIN  11.8*  11.4*  10.7*   HEMATOCRIT  33.6*  32.7*  30.7*   PLATELETCT  305  270  241       Recent Labs      10/28/17   0437  10/29/17   0532  10/30/17   0000   WBC  23.2*  6.7  3.6*   NEUTSPOLYS  94.80*  66.70  34.60*   LYMPHOCYTES  2.60*  23.70  38.80   MONOCYTES  0.00  7.00  15.20*   EOSINOPHILS  0.00  2.60  9.40*   BASOPHILS  0.00  0.00  1.40   ASTSGOT  17   --    --    ALTSGPT  13   --    --    ALKPHOSPHAT  94   --    --     TBILIRUBIN  0.4   --    --            Assessment/Plan     * Small cell carcinoma of lung (CMS-HCC)- (present on admission)   Assessment & Plan    - Hx of smoking >20 years, 10 pack-year.   - Cough, recent weight loss and low appetite.  - (09/30) CT chest: 3.6x5.9 cm paratracheal mass compressing on SVC and 2x1.8 cm R upper lobe mass  - Procedures: (10/05) CT guided biopsy of RUL c/w with organizing PNA,  (10/11) Transbronchial biopsy was inconclusive BAL: few RBCs and rare Gram+/Gram- cocci and Mediastinoscopy with biopsy of paratracheal mass (10/17) showing metastatic small cell carcinoma  - Staging workup including MRI Brain and CT Abdomen/Pelvis:  Unremarkable  - 10/21-10/23 Patient received chemotherapy. CISplatin on day 1 and etoposide on days 1-3 28 day cycle for 4-6 cycles  - 10/23 Radiation BID started, to be continued for 15 days.   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 malignancy  - Exhibits good appetite  - Nutrition following        Chronic alcohol use- (present on admission)   Assessment & Plan    - Consumed 1 beer daily   - BAL:0.01 on admission   - Continue PO thiamine, B12 and folic acid supplements

## 2017-10-31 LAB
ANION GAP SERPL CALC-SCNC: 7 MMOL/L (ref 0–11.9)
BASOPHILS # BLD AUTO: 1.9 % (ref 0–1.8)
BASOPHILS # BLD: 0.05 K/UL (ref 0–0.12)
BUN SERPL-MCNC: 10 MG/DL (ref 8–22)
CALCIUM SERPL-MCNC: 9.4 MG/DL (ref 8.5–10.5)
CHLORIDE SERPL-SCNC: 100 MMOL/L (ref 96–112)
CO2 SERPL-SCNC: 22 MMOL/L (ref 20–33)
CREAT SERPL-MCNC: 0.4 MG/DL (ref 0.5–1.4)
EOSINOPHIL # BLD AUTO: 0.27 K/UL (ref 0–0.51)
EOSINOPHIL NFR BLD: 10.4 % (ref 0–6.9)
ERYTHROCYTE [DISTWIDTH] IN BLOOD BY AUTOMATED COUNT: 41.1 FL (ref 35.9–50)
GFR SERPL CREATININE-BSD FRML MDRD: >60 ML/MIN/1.73 M 2
GLUCOSE SERPL-MCNC: 90 MG/DL (ref 65–99)
HCT VFR BLD AUTO: 29.9 % (ref 42–52)
HGB BLD-MCNC: 10.6 G/DL (ref 14–18)
IMM GRANULOCYTES # BLD AUTO: 0.01 K/UL (ref 0–0.11)
IMM GRANULOCYTES NFR BLD AUTO: 0.4 % (ref 0–0.9)
LYMPHOCYTES # BLD AUTO: 0.94 K/UL (ref 1–4.8)
LYMPHOCYTES NFR BLD: 36.3 % (ref 22–41)
MCH RBC QN AUTO: 34.1 PG (ref 27–33)
MCHC RBC AUTO-ENTMCNC: 35.5 G/DL (ref 33.7–35.3)
MCV RBC AUTO: 96.1 FL (ref 81.4–97.8)
MONOCYTES # BLD AUTO: 0.47 K/UL (ref 0–0.85)
MONOCYTES NFR BLD AUTO: 18.1 % (ref 0–13.4)
NEUTROPHILS # BLD AUTO: 0.85 K/UL (ref 1.82–7.42)
NEUTROPHILS NFR BLD: 32.9 % (ref 44–72)
NRBC # BLD AUTO: 0 K/UL
NRBC BLD AUTO-RTO: 0 /100 WBC
PLATELET # BLD AUTO: 222 K/UL (ref 164–446)
PMV BLD AUTO: 9.4 FL (ref 9–12.9)
POTASSIUM SERPL-SCNC: 4.2 MMOL/L (ref 3.6–5.5)
RBC # BLD AUTO: 3.11 M/UL (ref 4.7–6.1)
SODIUM SERPL-SCNC: 129 MMOL/L (ref 135–145)
WBC # BLD AUTO: 2.6 K/UL (ref 4.8–10.8)

## 2017-10-31 PROCEDURE — 700102 HCHG RX REV CODE 250 W/ 637 OVERRIDE(OP): Performed by: STUDENT IN AN ORGANIZED HEALTH CARE EDUCATION/TRAINING PROGRAM

## 2017-10-31 PROCEDURE — 99232 SBSQ HOSP IP/OBS MODERATE 35: CPT | Mod: GC | Performed by: INTERNAL MEDICINE

## 2017-10-31 PROCEDURE — 770004 HCHG ROOM/CARE - ONCOLOGY PRIVATE *

## 2017-10-31 PROCEDURE — 77014 PR CT GUIDANCE PLACEMENT RAD THERAPY FIELDS: CPT | Mod: 26,XE | Performed by: RADIOLOGY

## 2017-10-31 PROCEDURE — 700111 HCHG RX REV CODE 636 W/ 250 OVERRIDE (IP): Performed by: INTERNAL MEDICINE

## 2017-10-31 PROCEDURE — 700111 HCHG RX REV CODE 636 W/ 250 OVERRIDE (IP): Performed by: STUDENT IN AN ORGANIZED HEALTH CARE EDUCATION/TRAINING PROGRAM

## 2017-10-31 PROCEDURE — A9270 NON-COVERED ITEM OR SERVICE: HCPCS | Performed by: STUDENT IN AN ORGANIZED HEALTH CARE EDUCATION/TRAINING PROGRAM

## 2017-10-31 PROCEDURE — 80048 BASIC METABOLIC PNL TOTAL CA: CPT

## 2017-10-31 PROCEDURE — 85025 COMPLETE CBC W/AUTO DIFF WBC: CPT

## 2017-10-31 PROCEDURE — 77386 HCHG IMRT DELIVERY COMPLEX: CPT | Mod: 59 | Performed by: RADIOLOGY

## 2017-10-31 RX ADMIN — MIDODRINE HYDROCHLORIDE 5 MG: 5 TABLET ORAL at 12:08

## 2017-10-31 RX ADMIN — VITAMIN D, TAB 1000IU (100/BT) 5000 UNITS: 25 TAB at 08:00

## 2017-10-31 RX ADMIN — MIDODRINE HYDROCHLORIDE 5 MG: 5 TABLET ORAL at 17:14

## 2017-10-31 RX ADMIN — HEPARIN SODIUM 5000 UNITS: 5000 INJECTION, SOLUTION INTRAVENOUS; SUBCUTANEOUS at 20:05

## 2017-10-31 RX ADMIN — HEPARIN SODIUM 5000 UNITS: 5000 INJECTION, SOLUTION INTRAVENOUS; SUBCUTANEOUS at 05:15

## 2017-10-31 RX ADMIN — NICOTINE 14 MG: 14 PATCH, EXTENDED RELEASE TRANSDERMAL at 05:15

## 2017-10-31 RX ADMIN — TBO-FILGRASTIM 300 MCG: 300 INJECTION, SOLUTION SUBCUTANEOUS at 15:18

## 2017-10-31 RX ADMIN — MAGNESIUM HYDROXIDE 30 ML: 400 SUSPENSION ORAL at 12:09

## 2017-10-31 RX ADMIN — FOLIC ACID 1 MG: 1 TABLET ORAL at 08:00

## 2017-10-31 RX ADMIN — MIDODRINE HYDROCHLORIDE 5 MG: 5 TABLET ORAL at 08:00

## 2017-10-31 RX ADMIN — THIAMINE HCL TAB 100 MG 100 MG: 100 TAB at 08:00

## 2017-10-31 RX ADMIN — STANDARDIZED SENNA CONCENTRATE AND DOCUSATE SODIUM 2 TABLET: 8.6; 5 TABLET, FILM COATED ORAL at 08:00

## 2017-10-31 RX ADMIN — HEPARIN SODIUM 5000 UNITS: 5000 INJECTION, SOLUTION INTRAVENOUS; SUBCUTANEOUS at 15:19

## 2017-10-31 RX ADMIN — ACETAMINOPHEN 650 MG: 325 TABLET, FILM COATED ORAL at 09:57

## 2017-10-31 RX ADMIN — THERA TABS 1 TABLET: TAB at 08:00

## 2017-10-31 ASSESSMENT — ENCOUNTER SYMPTOMS
PALPITATIONS: 0
FEVER: 0
BLURRED VISION: 0
EYE DISCHARGE: 0
HALLUCINATIONS: 0
MYALGIAS: 0
VOMITING: 0
HEARTBURN: 0
HEADACHES: 0
WEAKNESS: 0
ORTHOPNEA: 0
SORE THROAT: 0
CONSTIPATION: 0
NAUSEA: 0
SPUTUM PRODUCTION: 0
ABDOMINAL PAIN: 0
CHILLS: 0
SORE THROAT: 1
DIZZINESS: 0
COUGH: 1
HEMOPTYSIS: 0
SHORTNESS OF BREATH: 0
DOUBLE VISION: 0
DIARRHEA: 0
NERVOUS/ANXIOUS: 0
EYE REDNESS: 0

## 2017-10-31 ASSESSMENT — PAIN SCALES - GENERAL
PAINLEVEL_OUTOF10: 3
PAINLEVEL_OUTOF10: 0

## 2017-10-31 NOTE — PROGRESS NOTES
Oncology/Hematology Progress Note               Author: Kevin Bardales    CC-  Stage IIIA small cell lung cancer Date & Time created: 10/31/2017  11:52 AM     Interval History:   He says he is doing pretty well today. He has may be starting to develop a slightly sore throat and an occasional very mild cough. He has no shortness of breath or sputum production. He has no fevers or chills. He is not having any pain. He has no nausea vomiting or diarrhea. He continues to tolerate his radiation which she is getting twice a day.      PMHx, PSurgHx, SocHx, FAMHx:  All reviewed and no changes    Review of Systems:  Review of Systems   Constitutional: Positive for malaise/fatigue. Negative for chills and fever.   HENT: Positive for sore throat. Negative for congestion and hearing loss.         Very mild tender throat   Eyes: Negative for blurred vision, double vision, discharge and redness.   Respiratory: Positive for cough. Negative for hemoptysis, sputum production and shortness of breath.         Very mild slight cough occasionally   Cardiovascular: Negative for chest pain, palpitations, orthopnea and leg swelling.   Gastrointestinal: Negative for abdominal pain, constipation, diarrhea, heartburn, nausea and vomiting.   Genitourinary: Negative for dysuria, frequency and urgency.   Musculoskeletal: Negative for myalgias.   Skin: Negative for itching and rash.   Neurological: Negative for headaches.   All other systems reviewed and are negative.      Physical Exam:  Physical Exam   Constitutional: He is oriented to person, place, and time. He appears well-developed and well-nourished.   HENT:   Head: Normocephalic and atraumatic.   Mouth/Throat: Oropharynx is clear and moist. No oropharyngeal exudate.   Eyes: Conjunctivae and EOM are normal. Right eye exhibits no discharge. Left eye exhibits no discharge. No scleral icterus.   Neck: Normal range of motion. Neck supple. No thyromegaly present.   Cardiovascular: Normal rate,  regular rhythm and normal heart sounds.  Exam reveals no gallop and no friction rub.    No murmur heard.  Pulmonary/Chest: Effort normal and breath sounds normal. No respiratory distress. He has no wheezes. He has no rales.   Abdominal: Soft. Bowel sounds are normal. He exhibits no distension. There is no tenderness. There is no rebound and no guarding.   Musculoskeletal: Normal range of motion. He exhibits no edema or tenderness.   Lymphadenopathy:     He has no cervical adenopathy.   Neurological: He is alert and oriented to person, place, and time.   Skin: Skin is warm and dry. No rash noted. No erythema.   Psychiatric: He has a normal mood and affect. His behavior is normal. Thought content normal.       Labs:        Invalid input(s): [id number]      Recent Labs      10/29/17   0532  10/30/17   0000  10/31/17   0200   SODIUM  130*  129*  129*   POTASSIUM  3.9  4.1  4.2   CHLORIDE  99  100  100   CO2  22  23  22   BUN  11  10  10   CREATININE  0.47*  0.48*  0.40*   CALCIUM  9.7  9.5  9.4     Recent Labs      10/29/17   0532  10/30/17   0000  10/31/17   0200   GLUCOSE  129*  93  90     Recent Labs      10/29/17   0532  10/30/17   0000  10/31/17   0200   RBC  3.35*  3.19*  3.11*   HEMOGLOBIN  11.4*  10.7*  10.6*   HEMATOCRIT  32.7*  30.7*  29.9*   PLATELETCT  270  241  222     Recent Labs      10/29/17   0532  10/30/17   0000  10/31/17   0200   WBC  6.7  3.6*  2.6*   NEUTSPOLYS  66.70  34.60*  32.90*   LYMPHOCYTES  23.70  38.80  36.30   MONOCYTES  7.00  15.20*  18.10*   EOSINOPHILS  2.60  9.40*  10.40*   BASOPHILS  0.00  1.40  1.90*     Recent Labs      10/29/17   0532  10/30/17   0000  10/31/17   0200   SODIUM  130*  129*  129*   POTASSIUM  3.9  4.1  4.2   CHLORIDE  99  100  100   CO2  22  23  22   GLUCOSE  129*  93  90   BUN  11  10  10   CREATININE  0.47*  0.48*  0.40*   CALCIUM  9.7  9.5  9.4     Hemodynamics:  Temp (24hrs), Av.6 °C (97.8 °F), Min:36.1 °C (97 °F), Max:37.4 °C (99.4 °F)  Temperature:  37.4 °C (99.4 °F)  Pulse  Av.6  Min: 64  Max: 102   Blood Pressure: 112/72     Respiratory:    Respiration: 16, Pulse Oximetry: 99 %        RLL Breath Sounds: Diminished, LLL Breath Sounds: Diminished  Fluids:    Intake/Output Summary (Last 24 hours) at 10/19/17 1524  Last data filed at 10/19/17 1300   Gross per 24 hour   Intake              761 ml   Output             1450 ml   Net             -689 ml     Weight: 57.4 kg (126 lb 8.7 oz)  GI/Nutrition:  Orders Placed This Encounter   Procedures   • DIET ORDER     Standing Status:   Standing     Number of Occurrences:   1     Order Specific Question:   Diet:     Answer:   Regular [1]     Order Specific Question:   Consistency/Fluid modifications:     Answer:   1500 ml Fluid Restriction [9]     Medical Decision Making, by Problem:  Active Hospital Problems    Diagnosis   • *Hyponatremia [E87.1]   • Lung mass [R91.8]   • Disorder of electrolytes [E87.8]   • Fall [W19.XXXA]   • Chronic alcohol use [F10.10]   • Body mass index (BMI) 19.9 or less, adult [Z68.1]   • Morning headache [R51]   • Bacteremia [R78.81]       Plan:  1. Stage IIIA Small cell lung cancer--  MRI brain negative.  CT Abdomen/Pelvis negative for metastasis.  Mediastinal LN involvement confirmed on mediastinoscopy.  Cure rate should be about 15% with current chemo and radiation.  Started cisplatin and etoposide chemo on 10/21/17.  Started BID radiation on 10/23/17.  Plan is for 15 days of treatment with the XRT.  --day 11 of chemo today  --next cycle due on 17 (Q21 day cycle)  --XRT day 5 of 15 today    2.  Neutropenia-- common side effect of chemo.  Was on prophy granix initially with rise in WBC.  This was stopped.Now neutropenic.  No s/s of infection.  --restart granix Qday to get through damien  --monitor for infection     3.. SIADH - Improving.  Medicine managing.  Likely related to lung cancer.  Hopefully as cancer responds, this will resolve.    3. Long term tobacco abuse        Reviewed  items::  Labs reviewed and Medications reviewed

## 2017-10-31 NOTE — CARE PLAN
Problem: Infection  Goal: Will remain free from infection  Outcome: PROGRESSING AS EXPECTED    Intervention: Assess signs and symptoms of infection  No s/s of infection  He has been afebrile tonight      Problem: Bowel/Gastric:  Goal: Normal bowel function is maintained or improved  Outcome: PROGRESSING AS EXPECTED    Intervention: Educate patient and significant other/support system about diet, fluid intake, medications and activity to promote bowel function  Last BM 10/30  He has great PO intake

## 2017-10-31 NOTE — PROGRESS NOTES
Bedside report received from noc RN, assumed pt care, assessment completed, pt aox4, pt reports a headache today, pt medicated per MAR, pt ambulates steady gait up independently, PICC +blood return, neutropenic precautions in place, pt questions and concerns answered to pt satisfaction, pt ambulates steady gait up independently, bed in low position, call light and personal belongings within reach, hourly rounding in place, pt needs met.

## 2017-10-31 NOTE — PROGRESS NOTES
Patient received treatment in Radiation Therapy. Patient received treatment number 11 of 30 planned.

## 2017-10-31 NOTE — PROGRESS NOTES
Internal Medicine Interval Note  Note Author: Trav Fierro M.D.     Name Froylan Spain     1960   Age/Sex 57 y.o. male   MRN [id number]   Code Status Full code     After 5PM or if no immediate response to page, please call for cross-coverage  Attending/Team: Meredith See Patient List for primary contact information  Call [phone] to page    1st Call - Day Intern (R1):   Vadim 2nd Call - Day Sr. Resident (R2/R3):   Arias         Reason for interval visit  (Principal Problem)   Small cell carcinoma of lung (CMS-HCC)    Interval Problem Daily Status Update  (24 hours)   - No events overnight, complains of a non productive cough  - Patient receiving radiation doses 11 and 12 of total 30 .  - Na staying at 129 , will continue 1500 ml fluid restriction  - Filgrastim restarted today    Review of Systems   Constitutional: Negative for chills, fever and malaise/fatigue.   HENT: Negative for sore throat.    Eyes: Negative for blurred vision.   Respiratory: Positive for cough. Negative for sputum production and shortness of breath.    Cardiovascular: Negative for chest pain.   Gastrointestinal: Negative for abdominal pain, constipation, diarrhea, nausea and vomiting.   Genitourinary: Negative for dysuria.   Musculoskeletal: Negative for joint pain and myalgias.   Skin: Negative for rash.   Neurological: Negative for dizziness, weakness and headaches.   Psychiatric/Behavioral: Negative for hallucinations. The patient is not nervous/anxious.        Consultants/Specialty  Hem/Onc  Radio/Onc    Disposition  inpatient    Quality Measures    Reviewed items::  Labs reviewed and Medications reviewed  Garvin catheter::  No Garvin  DVT prophylaxis pharmacological::  Heparin  Ulcer Prophylaxis::  Not indicated          Physical Exam       Vitals:    10/31/17 0514 10/31/17 0800 10/31/17 1215 10/31/17 1547   BP: 112/68 112/72 123/77 128/77   Pulse: 95 92 100 96   Resp: 18 16 18 18   Temp: 36.5 °C (97.7 °F) 37.4  °C (99.4 °F) 36.6 °C (97.8 °F) 37.3 °C (99.1 °F)   SpO2: 96% 99% 98% 96%   Weight:       Height:         Body mass index is 18.16 kg/m².    Oxygen Therapy:  Pulse Oximetry: 96 %, O2 (LPM): 0, O2 Delivery: None (Room Air)    Physical Exam   Constitutional: He is oriented to person, place, and time. No distress.   cachectic   HENT:   Mouth/Throat: No oropharyngeal exudate.   Neck: Neck supple. No tracheal deviation present.   Scar healing well   Cardiovascular: Normal rate and regular rhythm.    No murmur heard.  Pulmonary/Chest: No respiratory distress. He has no wheezes. He has no rales. He exhibits no tenderness.   Abdominal: He exhibits no distension. There is no tenderness.   Musculoskeletal: He exhibits no edema.   Lymphadenopathy:     He has no cervical adenopathy.   Neurological: He is alert and oriented to person, place, and time. No cranial nerve deficit.   Skin: He is not diaphoretic.   Psychiatric: Affect normal.     Lab Data Review:   10/26/2017  11:40 AM    Recent Labs      10/29/17   0532  10/30/17   0000  10/31/17   0200   SODIUM  130*  129*  129*   POTASSIUM  3.9  4.1  4.2   CHLORIDE  99  100  100   CO2  22  23  22   BUN  11  10  10   CREATININE  0.47*  0.48*  0.40*   CALCIUM  9.7  9.5  9.4       Recent Labs      10/29/17   0532  10/30/17   0000  10/31/17   0200   GLUCOSE  129*  93  90       Recent Labs      10/29/17   0532  10/30/17   0000  10/31/17   0200   RBC  3.35*  3.19*  3.11*   HEMOGLOBIN  11.4*  10.7*  10.6*   HEMATOCRIT  32.7*  30.7*  29.9*   PLATELETCT  270  241  222       Recent Labs      10/29/17   0532  10/30/17   0000  10/31/17   0200   WBC  6.7  3.6*  2.6*   NEUTSPOLYS  66.70  34.60*  32.90*   LYMPHOCYTES  23.70  38.80  36.30   MONOCYTES  7.00  15.20*  18.10*   EOSINOPHILS  2.60  9.40*  10.40*   BASOPHILS  0.00  1.40  1.90*           Assessment/Plan     * Small cell carcinoma of lung (CMS-HCC)- (present on admission)   Assessment & Plan    - Hx of smoking >20 years, 10 pack-year.   -  Cough, recent weight loss and low appetite.  - (09/30) CT chest: 3.6x5.9 cm paratracheal mass compressing on SVC and 2x1.8 cm R upper lobe mass  - Procedures: (10/05) CT guided biopsy of RUL c/w with organizing PNA,  (10/11) Transbronchial biopsy was inconclusive BAL: few RBCs and rare Gram+/Gram- cocci and Mediastinoscopy with biopsy of paratracheal mass (10/17) showing metastatic small cell carcinoma  - Staging workup including MRI Brain and CT Abdomen/Pelvis:  Unremarkable  - 10/21-10/23 Patient received chemotherapy. CISplatin on day 1 and etoposide on days 1-3 28 day cycle for 4-6 cycles. Next cycle due on 11/13  - 10/23 Radiation BID started, to be continued for 15 days.   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 malignancy  - Exhibits good appetite  - Nutrition following        Chronic alcohol use- (present on admission)   Assessment & Plan    - Consumed 1 beer daily   - BAL: 0.01 on admission   - Continue PO thiamine, B12 and folic acid supplements

## 2017-10-31 NOTE — PROGRESS NOTES
Received report from day RN. POC discussed with patient, pt verbalizes understanding and agreement.  A&Ox4. Pt is up self. He denies c/o pain. PICC with positive blood return. Call light in reach, bed in low position, Q2 hr rounding

## 2017-10-31 NOTE — CARE PLAN
Problem: Infection  Goal: Will remain free from infection  Outcome: PROGRESSING AS EXPECTED  Neutropenic precautions in place, pt educated about precaution and verbalizes agreement.  Pt remain afebrile, vitals completed q4h    Problem: Mobility  Goal: Risk for activity intolerance will decrease  Outcome: PROGRESSING AS EXPECTED  Pt ambulates steady gait up independently.
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
## 2017-11-01 ENCOUNTER — HOSPITAL ENCOUNTER (OUTPATIENT)
Dept: RADIATION ONCOLOGY | Facility: MEDICAL CENTER | Age: 57
End: 2017-11-30
Attending: RADIOLOGY
Payer: MEDICAID

## 2017-11-01 LAB
ALBUMIN SERPL BCP-MCNC: 3.6 G/DL (ref 3.2–4.9)
ALBUMIN/GLOB SERPL: 1.2 G/DL
ALP SERPL-CCNC: 72 U/L (ref 30–99)
ALT SERPL-CCNC: 8 U/L (ref 2–50)
ANION GAP SERPL CALC-SCNC: 5 MMOL/L (ref 0–11.9)
ANISOCYTOSIS BLD QL SMEAR: ABNORMAL
AST SERPL-CCNC: 13 U/L (ref 12–45)
BASOPHILS # BLD AUTO: 0 % (ref 0–1.8)
BASOPHILS # BLD: 0 K/UL (ref 0–0.12)
BILIRUB SERPL-MCNC: 0.3 MG/DL (ref 0.1–1.5)
BUN SERPL-MCNC: 11 MG/DL (ref 8–22)
BURR CELLS BLD QL SMEAR: NORMAL
CALCIUM SERPL-MCNC: 9.5 MG/DL (ref 8.5–10.5)
CHLORIDE SERPL-SCNC: 103 MMOL/L (ref 96–112)
CO2 SERPL-SCNC: 23 MMOL/L (ref 20–33)
CREAT SERPL-MCNC: 0.45 MG/DL (ref 0.5–1.4)
EOSINOPHIL # BLD AUTO: 0.27 K/UL (ref 0–0.51)
EOSINOPHIL NFR BLD: 2.7 % (ref 0–6.9)
ERYTHROCYTE [DISTWIDTH] IN BLOOD BY AUTOMATED COUNT: 41.5 FL (ref 35.9–50)
GFR SERPL CREATININE-BSD FRML MDRD: >60 ML/MIN/1.73 M 2
GLOBULIN SER CALC-MCNC: 2.9 G/DL (ref 1.9–3.5)
GLUCOSE SERPL-MCNC: 91 MG/DL (ref 65–99)
HCT VFR BLD AUTO: 29.3 % (ref 42–52)
HGB BLD-MCNC: 10.2 G/DL (ref 14–18)
LYMPHOCYTES # BLD AUTO: 1.5 K/UL (ref 1–4.8)
LYMPHOCYTES NFR BLD: 15 % (ref 22–41)
MANUAL DIFF BLD: NORMAL
MCH RBC QN AUTO: 33.3 PG (ref 27–33)
MCHC RBC AUTO-ENTMCNC: 34.8 G/DL (ref 33.7–35.3)
MCV RBC AUTO: 95.8 FL (ref 81.4–97.8)
MONOCYTES # BLD AUTO: 0.53 K/UL (ref 0–0.85)
MONOCYTES NFR BLD AUTO: 5.3 % (ref 0–13.4)
MORPHOLOGY BLD-IMP: NORMAL
NEUTROPHILS # BLD AUTO: 7.7 K/UL (ref 1.82–7.42)
NEUTROPHILS NFR BLD: 76.1 % (ref 44–72)
NEUTS BAND NFR BLD MANUAL: 0.9 % (ref 0–10)
NRBC # BLD AUTO: 0 K/UL
NRBC BLD AUTO-RTO: 0 /100 WBC
PLATELET # BLD AUTO: 199 K/UL (ref 164–446)
PLATELET BLD QL SMEAR: NORMAL
PMV BLD AUTO: 9.4 FL (ref 9–12.9)
POIKILOCYTOSIS BLD QL SMEAR: NORMAL
POTASSIUM SERPL-SCNC: 4.4 MMOL/L (ref 3.6–5.5)
PROT SERPL-MCNC: 6.5 G/DL (ref 6–8.2)
RBC # BLD AUTO: 3.06 M/UL (ref 4.7–6.1)
RBC BLD AUTO: PRESENT
SODIUM SERPL-SCNC: 131 MMOL/L (ref 135–145)
WBC # BLD AUTO: 10 K/UL (ref 4.8–10.8)

## 2017-11-01 PROCEDURE — A9270 NON-COVERED ITEM OR SERVICE: HCPCS | Performed by: STUDENT IN AN ORGANIZED HEALTH CARE EDUCATION/TRAINING PROGRAM

## 2017-11-01 PROCEDURE — 99231 SBSQ HOSP IP/OBS SF/LOW 25: CPT | Mod: GC | Performed by: INTERNAL MEDICINE

## 2017-11-01 PROCEDURE — 700102 HCHG RX REV CODE 250 W/ 637 OVERRIDE(OP): Performed by: STUDENT IN AN ORGANIZED HEALTH CARE EDUCATION/TRAINING PROGRAM

## 2017-11-01 PROCEDURE — 77336 RADIATION PHYSICS CONSULT: CPT | Performed by: RADIOLOGY

## 2017-11-01 PROCEDURE — 77014 PR CT GUIDANCE PLACEMENT RAD THERAPY FIELDS: CPT | Mod: 26,XE | Performed by: RADIOLOGY

## 2017-11-01 PROCEDURE — 85027 COMPLETE CBC AUTOMATED: CPT

## 2017-11-01 PROCEDURE — 700111 HCHG RX REV CODE 636 W/ 250 OVERRIDE (IP): Performed by: STUDENT IN AN ORGANIZED HEALTH CARE EDUCATION/TRAINING PROGRAM

## 2017-11-01 PROCEDURE — 77386 HCHG IMRT DELIVERY COMPLEX: CPT | Performed by: RADIOLOGY

## 2017-11-01 PROCEDURE — 85007 BL SMEAR W/DIFF WBC COUNT: CPT

## 2017-11-01 PROCEDURE — 770004 HCHG ROOM/CARE - ONCOLOGY PRIVATE *

## 2017-11-01 PROCEDURE — 80053 COMPREHEN METABOLIC PANEL: CPT

## 2017-11-01 RX ADMIN — MIDODRINE HYDROCHLORIDE 5 MG: 5 TABLET ORAL at 18:22

## 2017-11-01 RX ADMIN — VITAMIN D, TAB 1000IU (100/BT) 5000 UNITS: 25 TAB at 08:26

## 2017-11-01 RX ADMIN — MAGNESIUM HYDROXIDE 30 ML: 400 SUSPENSION ORAL at 14:48

## 2017-11-01 RX ADMIN — HEPARIN SODIUM 5000 UNITS: 5000 INJECTION, SOLUTION INTRAVENOUS; SUBCUTANEOUS at 21:35

## 2017-11-01 RX ADMIN — HEPARIN SODIUM 5000 UNITS: 5000 INJECTION, SOLUTION INTRAVENOUS; SUBCUTANEOUS at 14:48

## 2017-11-01 RX ADMIN — HEPARIN SODIUM 5000 UNITS: 5000 INJECTION, SOLUTION INTRAVENOUS; SUBCUTANEOUS at 05:17

## 2017-11-01 RX ADMIN — MIDODRINE HYDROCHLORIDE 5 MG: 5 TABLET ORAL at 08:26

## 2017-11-01 RX ADMIN — MIDODRINE HYDROCHLORIDE 5 MG: 5 TABLET ORAL at 14:48

## 2017-11-01 RX ADMIN — FOLIC ACID 1 MG: 1 TABLET ORAL at 08:26

## 2017-11-01 RX ADMIN — THERA TABS 1 TABLET: TAB at 08:26

## 2017-11-01 RX ADMIN — THIAMINE HCL TAB 100 MG 100 MG: 100 TAB at 08:26

## 2017-11-01 RX ADMIN — STANDARDIZED SENNA CONCENTRATE AND DOCUSATE SODIUM 2 TABLET: 8.6; 5 TABLET, FILM COATED ORAL at 08:26

## 2017-11-01 RX ADMIN — NICOTINE 14 MG: 14 PATCH, EXTENDED RELEASE TRANSDERMAL at 05:17

## 2017-11-01 ASSESSMENT — PAIN SCALES - GENERAL
PAINLEVEL_OUTOF10: 0

## 2017-11-01 ASSESSMENT — ENCOUNTER SYMPTOMS
NAUSEA: 0
DIARRHEA: 0
HALLUCINATIONS: 0
CHILLS: 0
WEAKNESS: 0
NERVOUS/ANXIOUS: 0
SPUTUM PRODUCTION: 0
SORE THROAT: 0
DIZZINESS: 0
SHORTNESS OF BREATH: 0
ABDOMINAL PAIN: 0
CONSTIPATION: 0
MYALGIAS: 0
COUGH: 1
BLURRED VISION: 0
HEADACHES: 0
VOMITING: 0
FEVER: 0

## 2017-11-01 NOTE — CARE PLAN
Problem: Infection  Goal: Will remain free from infection  Outcome: NOT MET    Intervention: Assess signs and symptoms of infection  Pt is neutropenic  Free from s/s of infection  Q4VS    Intervention: Give CDC handouts for infection prevention (infection prevention/hand washing, disease specific, and device specific) and document in Education  Pt educated about good hand hygiene and wearing a mask when outside of his room      Problem: Mobility  Goal: Risk for activity intolerance will decrease  Outcome: PROGRESSING AS EXPECTED    Intervention: Encourage patient to increase activity level in collaboration with Interdisciplinary Team  Pt ambulates around the unit and his room

## 2017-11-01 NOTE — PROGRESS NOTES
Assessment completed, Pt A&Ox4. Pt up self, denies pain, and nausea. Right PICC with + blood return. went down for first radiation of the day. Discussed POC, no questions at this time. Call light within reach, hourly rounding in place.

## 2017-11-01 NOTE — PROGRESS NOTES
Patient was transported to our department for radiation therapy treatment number 13 of 30 to his thorax. We plan on treating the patient again at 1:30pm (Tx 14 of 30, BID) and twice tomorrow (BID).

## 2017-11-01 NOTE — DISCHARGE PLANNING
Medical SW    Referral: Sw advised Dr Ziegler as indicated below.    Intervention: Sw updated by  Krai who states pt's Medicaid has been pushed through and he has his letter of eligibility which he can use during out pt procedure appointments for coverage.       Plan: Sw to assist w/ d/c planning as needed.

## 2017-11-01 NOTE — PROGRESS NOTES
Received report from day RN. POC discussed with patient, pt verbalizes understanding and agreement.  A&Ox4. Pt is up self. Denies c/o pain. He has a great appetite. Call light in reach, bed in low position, Q2 hr rounding.

## 2017-11-01 NOTE — CARE PLAN
Problem: Discharge Barriers/Planning  Goal: Patient's continuum of care needs will be met    Intervention: Assess potential discharge barriers on admission and throughout hospital stay  Patient is homeless, many potential discharge barriers, SW and Nurse Navigator on case      Problem: Medication  Goal: Compliance with prescribed medication will improve    Intervention: Educate patient and significant other/support system to self monitor medication side effects and adverse effects and report to Provider  Needs clarification on need for Granix daily

## 2017-11-02 LAB
ANION GAP SERPL CALC-SCNC: 8 MMOL/L (ref 0–11.9)
BASOPHILS # BLD AUTO: 0.9 % (ref 0–1.8)
BASOPHILS # BLD: 0.03 K/UL (ref 0–0.12)
BUN SERPL-MCNC: 10 MG/DL (ref 8–22)
CALCIUM SERPL-MCNC: 9.6 MG/DL (ref 8.5–10.5)
CHLORIDE SERPL-SCNC: 99 MMOL/L (ref 96–112)
CO2 SERPL-SCNC: 23 MMOL/L (ref 20–33)
CREAT SERPL-MCNC: 0.43 MG/DL (ref 0.5–1.4)
EOSINOPHIL # BLD AUTO: 0.21 K/UL (ref 0–0.51)
EOSINOPHIL NFR BLD: 6.3 % (ref 0–6.9)
ERYTHROCYTE [DISTWIDTH] IN BLOOD BY AUTOMATED COUNT: 41.6 FL (ref 35.9–50)
GFR SERPL CREATININE-BSD FRML MDRD: >60 ML/MIN/1.73 M 2
GLUCOSE SERPL-MCNC: 90 MG/DL (ref 65–99)
HCT VFR BLD AUTO: 31.1 % (ref 42–52)
HGB BLD-MCNC: 10.9 G/DL (ref 14–18)
IMM GRANULOCYTES # BLD AUTO: 0.01 K/UL (ref 0–0.11)
IMM GRANULOCYTES NFR BLD AUTO: 0.3 % (ref 0–0.9)
LYMPHOCYTES # BLD AUTO: 0.79 K/UL (ref 1–4.8)
LYMPHOCYTES NFR BLD: 23.7 % (ref 22–41)
MCH RBC QN AUTO: 34 PG (ref 27–33)
MCHC RBC AUTO-ENTMCNC: 35 G/DL (ref 33.7–35.3)
MCV RBC AUTO: 96.9 FL (ref 81.4–97.8)
MONOCYTES # BLD AUTO: 0.51 K/UL (ref 0–0.85)
MONOCYTES NFR BLD AUTO: 15.3 % (ref 0–13.4)
NEUTROPHILS # BLD AUTO: 1.78 K/UL (ref 1.82–7.42)
NEUTROPHILS NFR BLD: 53.5 % (ref 44–72)
NRBC # BLD AUTO: 0 K/UL
NRBC BLD AUTO-RTO: 0 /100 WBC
PLATELET # BLD AUTO: 200 K/UL (ref 164–446)
PMV BLD AUTO: 9.2 FL (ref 9–12.9)
POTASSIUM SERPL-SCNC: 4.3 MMOL/L (ref 3.6–5.5)
RBC # BLD AUTO: 3.21 M/UL (ref 4.7–6.1)
SODIUM SERPL-SCNC: 130 MMOL/L (ref 135–145)
WBC # BLD AUTO: 3.3 K/UL (ref 4.8–10.8)

## 2017-11-02 PROCEDURE — 700102 HCHG RX REV CODE 250 W/ 637 OVERRIDE(OP): Performed by: STUDENT IN AN ORGANIZED HEALTH CARE EDUCATION/TRAINING PROGRAM

## 2017-11-02 PROCEDURE — 99231 SBSQ HOSP IP/OBS SF/LOW 25: CPT | Mod: GC | Performed by: INTERNAL MEDICINE

## 2017-11-02 PROCEDURE — A9270 NON-COVERED ITEM OR SERVICE: HCPCS | Performed by: STUDENT IN AN ORGANIZED HEALTH CARE EDUCATION/TRAINING PROGRAM

## 2017-11-02 PROCEDURE — 77014 PR CT GUIDANCE PLACEMENT RAD THERAPY FIELDS: CPT | Mod: 26,XE | Performed by: RADIOLOGY

## 2017-11-02 PROCEDURE — 700111 HCHG RX REV CODE 636 W/ 250 OVERRIDE (IP): Performed by: STUDENT IN AN ORGANIZED HEALTH CARE EDUCATION/TRAINING PROGRAM

## 2017-11-02 PROCEDURE — 85025 COMPLETE CBC W/AUTO DIFF WBC: CPT

## 2017-11-02 PROCEDURE — 770004 HCHG ROOM/CARE - ONCOLOGY PRIVATE *

## 2017-11-02 PROCEDURE — 77386 HCHG IMRT DELIVERY COMPLEX: CPT | Performed by: RADIOLOGY

## 2017-11-02 PROCEDURE — 80048 BASIC METABOLIC PNL TOTAL CA: CPT

## 2017-11-02 PROCEDURE — 77427 RADIATION TX MANAGEMENT X5: CPT | Performed by: RADIOLOGY

## 2017-11-02 RX ADMIN — THERA TABS 1 TABLET: TAB at 08:43

## 2017-11-02 RX ADMIN — STANDARDIZED SENNA CONCENTRATE AND DOCUSATE SODIUM 2 TABLET: 8.6; 5 TABLET, FILM COATED ORAL at 08:44

## 2017-11-02 RX ADMIN — MIDODRINE HYDROCHLORIDE 5 MG: 5 TABLET ORAL at 17:05

## 2017-11-02 RX ADMIN — VITAMIN D, TAB 1000IU (100/BT) 5000 UNITS: 25 TAB at 08:43

## 2017-11-02 RX ADMIN — HEPARIN SODIUM 5000 UNITS: 5000 INJECTION, SOLUTION INTRAVENOUS; SUBCUTANEOUS at 05:14

## 2017-11-02 RX ADMIN — MIDODRINE HYDROCHLORIDE 5 MG: 5 TABLET ORAL at 12:57

## 2017-11-02 RX ADMIN — THIAMINE HCL TAB 100 MG 100 MG: 100 TAB at 08:44

## 2017-11-02 RX ADMIN — FOLIC ACID 1 MG: 1 TABLET ORAL at 08:44

## 2017-11-02 RX ADMIN — ACETAMINOPHEN 650 MG: 325 TABLET, FILM COATED ORAL at 10:31

## 2017-11-02 RX ADMIN — MIDODRINE HYDROCHLORIDE 5 MG: 5 TABLET ORAL at 08:44

## 2017-11-02 RX ADMIN — HEPARIN SODIUM 5000 UNITS: 5000 INJECTION, SOLUTION INTRAVENOUS; SUBCUTANEOUS at 14:34

## 2017-11-02 RX ADMIN — NICOTINE 14 MG: 14 PATCH, EXTENDED RELEASE TRANSDERMAL at 05:14

## 2017-11-02 RX ADMIN — MAGNESIUM HYDROXIDE 30 ML: 400 SUSPENSION ORAL at 10:31

## 2017-11-02 RX ADMIN — HEPARIN SODIUM 5000 UNITS: 5000 INJECTION, SOLUTION INTRAVENOUS; SUBCUTANEOUS at 21:30

## 2017-11-02 ASSESSMENT — ENCOUNTER SYMPTOMS
DIARRHEA: 0
DIZZINESS: 0
COUGH: 1
HALLUCINATIONS: 0
VOMITING: 0
NERVOUS/ANXIOUS: 0
SPUTUM PRODUCTION: 0
WEAKNESS: 0
CONSTIPATION: 0
CHILLS: 0
SORE THROAT: 0
SHORTNESS OF BREATH: 0
NAUSEA: 0
HEADACHES: 0
ABDOMINAL PAIN: 0
MYALGIAS: 0
FEVER: 0
BLURRED VISION: 0

## 2017-11-02 ASSESSMENT — PAIN SCALES - GENERAL
PAINLEVEL_OUTOF10: 0
PAINLEVEL_OUTOF10: 5
PAINLEVEL_OUTOF10: 0
PAINLEVEL_OUTOF10: 0
PAINLEVEL_OUTOF10: 2
PAINLEVEL_OUTOF10: 0

## 2017-11-02 NOTE — CARE PLAN
Problem: Mobility  Goal: Risk for activity intolerance will decrease  Outcome: PROGRESSING AS EXPECTED  Up self with steady gait. Up to bathroom. Experiencing fatigued with ambulation.     Problem: Pain Management  Goal: Pain level will decrease to patient's comfort goal  Outcome: PROGRESSING AS EXPECTED  Appropriately verbalizes 0 to 10 pain scale. Calls for medication as needed.

## 2017-11-02 NOTE — PROGRESS NOTES
Assumed care of patient. Patient A&O X4. Calls for assistance appropriately. Up self with steady gait. Denying pain at this time. PICC flushing with positive blood return. POC discussed.

## 2017-11-02 NOTE — DISCHARGE PLANNING
Received referral to see patient re: Advance Directives along with Certificate of Competency from [employer] . Met with patient - answered questions, completed document and notarized. Will scan into [employer]. DOMINIC Alexander Judit - [phone]

## 2017-11-02 NOTE — PROGRESS NOTES
Internal Medicine Interval Note  Note Author: Trav Fierro M.D.     Name Froylan Spain     1960   Age/Sex 57 y.o. male   MRN [id number]   Code Status Full code     After 5PM or if no immediate response to page, please call for cross-coverage  Attending/Team: Meredith See Patient List for primary contact information  Call [phone] to page    1st Call - Day Intern (R1):   Vadim 2nd Call - Day Sr. Resident (R2/R3):   Criss Lynch         Reason for interval visit  (Principal Problem)   Small cell carcinoma of lung (CMS-HCC)    Interval Problem Daily Status Update  (24 hours)   - Continues to have cough, no worsening of symptoms  - Received a dose of filgrastim yesterday, WBC improved to 10  - Patient receiving radiation doses 13 and 14 of total 30 .  - Na at 131, will continue 1500 ml fluid restriction      Review of Systems   Constitutional: Negative for chills, fever and malaise/fatigue.   HENT: Negative for sore throat.    Eyes: Negative for blurred vision.   Respiratory: Positive for cough. Negative for sputum production and shortness of breath.    Cardiovascular: Negative for chest pain.   Gastrointestinal: Negative for abdominal pain, constipation, diarrhea, nausea and vomiting.   Genitourinary: Negative for dysuria.   Musculoskeletal: Negative for joint pain and myalgias.   Skin: Negative for rash.   Neurological: Negative for dizziness, weakness and headaches.   Psychiatric/Behavioral: Negative for hallucinations. The patient is not nervous/anxious.        Consultants/Specialty  Hem/Onc  Radio/Onc    Disposition  inpatient    Quality Measures    Reviewed items::  Labs reviewed and Medications reviewed  Garvin catheter::  No Garvin  DVT prophylaxis pharmacological::  Heparin  Ulcer Prophylaxis::  Not indicated          Physical Exam       Vitals:    17 0000 17 0400 17 0800 17 1600   BP: 111/77 117/70 107/70 121/75   Pulse: 95 (!) 104 98 90   Resp: 18 18 18 16    Temp: 36.4 °C (97.5 °F) 36.4 °C (97.6 °F) 36.7 °C (98 °F) 36.9 °C (98.5 °F)   SpO2: 96% 94% 98% 100%   Weight:       Height:         Body mass index is 18.16 kg/m².    Oxygen Therapy:  Pulse Oximetry: 100 %, O2 (LPM): 0, O2 Delivery: None (Room Air)    Physical Exam   Constitutional: He is oriented to person, place, and time. No distress.   cachectic   HENT:   Mouth/Throat: No oropharyngeal exudate.   Neck: Neck supple. No tracheal deviation present.   Scar healing well   Cardiovascular: Normal rate and regular rhythm.    No murmur heard.  Pulmonary/Chest: No respiratory distress. He has no wheezes. He has no rales. He exhibits no tenderness.   Abdominal: He exhibits no distension. There is no tenderness.   Musculoskeletal: He exhibits no edema.   Lymphadenopathy:     He has no cervical adenopathy.   Neurological: He is alert and oriented to person, place, and time. No cranial nerve deficit.   Skin: He is not diaphoretic.   Psychiatric: Affect normal.     Lab Data Review:   10/26/2017  11:40 AM    Recent Labs      10/30/17   0000  10/31/17   0200  11/01/17   0005   SODIUM  129*  129*  131*   POTASSIUM  4.1  4.2  4.4   CHLORIDE  100  100  103   CO2  23  22  23   BUN  10  10  11   CREATININE  0.48*  0.40*  0.45*   CALCIUM  9.5  9.4  9.5       Recent Labs      10/30/17   0000  10/31/17   0200  11/01/17   0005   ALTSGPT   --    --   8   ASTSGOT   --    --   13   ALKPHOSPHAT   --    --   72   TBILIRUBIN   --    --   0.3   GLUCOSE  93  90  91       Recent Labs      10/30/17   0000  10/31/17   0200  11/01/17   0005   RBC  3.19*  3.11*  3.06*   HEMOGLOBIN  10.7*  10.6*  10.2*   HEMATOCRIT  30.7*  29.9*  29.3*   PLATELETCT  241  222  199       Recent Labs      10/30/17   0000  10/31/17   0200  11/01/17   0005   WBC  3.6*  2.6*  10.0   NEUTSPOLYS  34.60*  32.90*  76.10*   LYMPHOCYTES  38.80  36.30  15.00*   MONOCYTES  15.20*  18.10*  5.30   EOSINOPHILS  9.40*  10.40*  2.70   BASOPHILS  1.40  1.90*  0.00   ASTSGOT   --    --    13   ALTSGPT   --    --   8   ALKPHOSPHAT   --    --   72   TBILIRUBIN   --    --   0.3           Assessment/Plan     * Small cell carcinoma of lung (CMS-HCC)- (present on admission)   Assessment & Plan    - Hx of smoking >20 years, 10 pack-year.   - Cough, recent weight loss and low appetite.  - (09/30) CT chest: 3.6x5.9 cm paratracheal mass compressing on SVC and 2x1.8 cm R upper lobe mass  - Procedures: (10/05) CT guided biopsy of RUL c/w with organizing PNA,  (10/11) Transbronchial biopsy was inconclusive BAL: few RBCs and rare Gram+/Gram- cocci and Mediastinoscopy with biopsy of paratracheal mass (10/17) showing metastatic small cell carcinoma  - Staging workup including MRI Brain and CT Abdomen/Pelvis:  Unremarkable  - 10/21-10/23 Patient received chemotherapy. CISplatin on day 1 and etoposide on days 1-3 28 day cycle for 4-6 cycles. Next cycle due on 11/13  - 10/23 Radiation BID started, to be continued for 15 days.   - 10/31 He is receiving doses 13 and 14 of radiation today  - Patient received filgrastim yesterday. 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 malignancy  - Exhibits good appetite  - Nutrition following        Chronic alcohol use- (present on admission)   Assessment & Plan    - Consumed 1 beer daily   - BAL: 0.01 on admission  - Continue PO thiamine, B12 and folic acid supplements

## 2017-11-02 NOTE — PROGRESS NOTES
Pt arrived back from radiation. Tolerated well. Denies pain or nausea. Alert and oriented x 4. Up self with a steady gait. Ambulating frequently. PICC line to RUE flushing well with positive blood return. Fluids infusing at TKO. Fluid restriction in place. VSS. Resting comfortably at this time. Call light within reach. Hourly rounding in place.

## 2017-11-03 LAB
ANION GAP SERPL CALC-SCNC: 9 MMOL/L (ref 0–11.9)
BASOPHILS # BLD AUTO: 1.2 % (ref 0–1.8)
BASOPHILS # BLD: 0.02 K/UL (ref 0–0.12)
BUN SERPL-MCNC: 10 MG/DL (ref 8–22)
CALCIUM SERPL-MCNC: 9.8 MG/DL (ref 8.5–10.5)
CHLORIDE SERPL-SCNC: 101 MMOL/L (ref 96–112)
CO2 SERPL-SCNC: 24 MMOL/L (ref 20–33)
CREAT SERPL-MCNC: 0.39 MG/DL (ref 0.5–1.4)
EOSINOPHIL # BLD AUTO: 0.21 K/UL (ref 0–0.51)
EOSINOPHIL NFR BLD: 12.9 % (ref 0–6.9)
ERYTHROCYTE [DISTWIDTH] IN BLOOD BY AUTOMATED COUNT: 42 FL (ref 35.9–50)
GFR SERPL CREATININE-BSD FRML MDRD: >60 ML/MIN/1.73 M 2
GLUCOSE SERPL-MCNC: 95 MG/DL (ref 65–99)
HCT VFR BLD AUTO: 31.1 % (ref 42–52)
HGB BLD-MCNC: 10.9 G/DL (ref 14–18)
IMM GRANULOCYTES # BLD AUTO: 0.01 K/UL (ref 0–0.11)
IMM GRANULOCYTES NFR BLD AUTO: 0.6 % (ref 0–0.9)
LYMPHOCYTES # BLD AUTO: 0.6 K/UL (ref 1–4.8)
LYMPHOCYTES NFR BLD: 36.8 % (ref 22–41)
MCH RBC QN AUTO: 33.6 PG (ref 27–33)
MCHC RBC AUTO-ENTMCNC: 35 G/DL (ref 33.7–35.3)
MCV RBC AUTO: 96 FL (ref 81.4–97.8)
MONOCYTES # BLD AUTO: 0.29 K/UL (ref 0–0.85)
MONOCYTES NFR BLD AUTO: 17.8 % (ref 0–13.4)
NEUTROPHILS # BLD AUTO: 0.5 K/UL (ref 1.82–7.42)
NEUTROPHILS NFR BLD: 30.7 % (ref 44–72)
NRBC # BLD AUTO: 0 K/UL
NRBC BLD AUTO-RTO: 0 /100 WBC
PLATELET # BLD AUTO: 200 K/UL (ref 164–446)
PMV BLD AUTO: 9.2 FL (ref 9–12.9)
POTASSIUM SERPL-SCNC: 4.3 MMOL/L (ref 3.6–5.5)
RBC # BLD AUTO: 3.24 M/UL (ref 4.7–6.1)
SODIUM SERPL-SCNC: 134 MMOL/L (ref 135–145)
WBC # BLD AUTO: 1.7 K/UL (ref 4.8–10.8)

## 2017-11-03 PROCEDURE — 85025 COMPLETE CBC W/AUTO DIFF WBC: CPT

## 2017-11-03 PROCEDURE — 77386 HCHG IMRT DELIVERY COMPLEX: CPT | Performed by: RADIOLOGY

## 2017-11-03 PROCEDURE — 700102 HCHG RX REV CODE 250 W/ 637 OVERRIDE(OP): Performed by: STUDENT IN AN ORGANIZED HEALTH CARE EDUCATION/TRAINING PROGRAM

## 2017-11-03 PROCEDURE — 770004 HCHG ROOM/CARE - ONCOLOGY PRIVATE *

## 2017-11-03 PROCEDURE — A9270 NON-COVERED ITEM OR SERVICE: HCPCS | Performed by: STUDENT IN AN ORGANIZED HEALTH CARE EDUCATION/TRAINING PROGRAM

## 2017-11-03 PROCEDURE — 700111 HCHG RX REV CODE 636 W/ 250 OVERRIDE (IP): Performed by: STUDENT IN AN ORGANIZED HEALTH CARE EDUCATION/TRAINING PROGRAM

## 2017-11-03 PROCEDURE — 80048 BASIC METABOLIC PNL TOTAL CA: CPT

## 2017-11-03 PROCEDURE — 77386 HCHG IMRT DELIVERY COMPLEX: CPT | Mod: 59 | Performed by: RADIOLOGY

## 2017-11-03 PROCEDURE — 77336 RADIATION PHYSICS CONSULT: CPT | Performed by: RADIOLOGY

## 2017-11-03 PROCEDURE — 99231 SBSQ HOSP IP/OBS SF/LOW 25: CPT | Mod: GC | Performed by: INTERNAL MEDICINE

## 2017-11-03 PROCEDURE — 77014 PR CT GUIDANCE PLACEMENT RAD THERAPY FIELDS: CPT | Mod: 26,XE | Performed by: RADIOLOGY

## 2017-11-03 PROCEDURE — 700111 HCHG RX REV CODE 636 W/ 250 OVERRIDE (IP): Performed by: INTERNAL MEDICINE

## 2017-11-03 RX ADMIN — MIDODRINE HYDROCHLORIDE 5 MG: 5 TABLET ORAL at 09:26

## 2017-11-03 RX ADMIN — HEPARIN SODIUM 5000 UNITS: 5000 INJECTION, SOLUTION INTRAVENOUS; SUBCUTANEOUS at 21:50

## 2017-11-03 RX ADMIN — HEPARIN SODIUM 5000 UNITS: 5000 INJECTION, SOLUTION INTRAVENOUS; SUBCUTANEOUS at 05:27

## 2017-11-03 RX ADMIN — THIAMINE HCL TAB 100 MG 100 MG: 100 TAB at 09:26

## 2017-11-03 RX ADMIN — VITAMIN D, TAB 1000IU (100/BT) 5000 UNITS: 25 TAB at 09:24

## 2017-11-03 RX ADMIN — TBO-FILGRASTIM 300 MCG: 300 INJECTION, SOLUTION SUBCUTANEOUS at 14:11

## 2017-11-03 RX ADMIN — MIDODRINE HYDROCHLORIDE 5 MG: 5 TABLET ORAL at 18:07

## 2017-11-03 RX ADMIN — HEPARIN SODIUM 5000 UNITS: 5000 INJECTION, SOLUTION INTRAVENOUS; SUBCUTANEOUS at 14:08

## 2017-11-03 RX ADMIN — MIDODRINE HYDROCHLORIDE 5 MG: 5 TABLET ORAL at 12:35

## 2017-11-03 RX ADMIN — THERA TABS 1 TABLET: TAB at 09:26

## 2017-11-03 RX ADMIN — NICOTINE 14 MG: 14 PATCH, EXTENDED RELEASE TRANSDERMAL at 05:26

## 2017-11-03 RX ADMIN — FOLIC ACID 1 MG: 1 TABLET ORAL at 09:24

## 2017-11-03 ASSESSMENT — ENCOUNTER SYMPTOMS
SORE THROAT: 0
NERVOUS/ANXIOUS: 0
ABDOMINAL PAIN: 0
WEAKNESS: 0
CONSTIPATION: 0
NAUSEA: 0
DIZZINESS: 0
COUGH: 1
HALLUCINATIONS: 0
SPUTUM PRODUCTION: 0
MYALGIAS: 0
CHILLS: 0
HEADACHES: 0
DIARRHEA: 0
VOMITING: 0
BLURRED VISION: 0
FEVER: 0
SHORTNESS OF BREATH: 0

## 2017-11-03 ASSESSMENT — PAIN SCALES - GENERAL
PAINLEVEL_OUTOF10: 0

## 2017-11-03 NOTE — PALLIATIVE CARE
Spiritual Care Note           Patient's Name: Froylan Spain   MRN: [id number]    Age and Gender: 57 y.o. male   YOB: 1960   Place of Residence: [place], [place]   Family/Friends/Others Present:    Clinical Team Present:    Unit:    Room (and Bed): [place] [address]   Ethnicity or Nationality:    Primary Language:    Medical Diagnosis(-es)/Procedure(s):    [employer] Affiliation: None   Code Status: Full Code    Date of Admission: 9/30/2017    Length of Stay: 34 days   Date of Visit: 11/03/17       Situation/Reason for Visit:  Patient followed by palliative care.    Background:      Receptivity to Visit:      Observations:      Summary of Interaction/Conversation with Patient and/or Family/Friends/Others:      Assessment of Cultural/Social/Emotional/Spiritual Issues:      Interventions:  Compassionate presence, reflective listening, emotional support.    Outcomes:      Consultations/Referrals:      Requests/Recommendations:      Continuing Care:        Contact Information:  Chaplain ANDREAS Trujillo  [phone]   [email or web address]

## 2017-11-03 NOTE — PROGRESS NOTES
Assumed care of patient. Patient A&O. Up self with steady gait. Calls for medication and assistance as needed. PICC flushing with positive blood return.

## 2017-11-03 NOTE — PROGRESS NOTES
Internal Medicine Interval Note  Note Author: Trav Fierro M.D.     Name Froylan Spain     1960   Age/Sex 57 y.o. male   MRN [id number]   Code Status Full code     After 5PM or if no immediate response to page, please call for cross-coverage  Attending/Team: Meredith See Patient List for primary contact information  Call [phone] to page    1st Call - Day Intern (R1):   Vadim 2nd Call - Day Sr. Resident (R2/R3):   Criss Lynch         Reason for interval visit  (Principal Problem)   Small cell carcinoma of lung (CMS-HCC)    Interval Problem Daily Status Update  (24 hours)   - No new complaints. Patient tolerating radiation well.  - Patient receiving radiation doses 15 and 16 of total 30 .  - Na at 130, will continue 1500 ml fluid restriction      Review of Systems   Constitutional: Negative for chills, fever and malaise/fatigue.   HENT: Negative for sore throat.    Eyes: Negative for blurred vision.   Respiratory: Positive for cough. Negative for sputum production and shortness of breath.    Cardiovascular: Negative for chest pain.   Gastrointestinal: Negative for abdominal pain, constipation, diarrhea, nausea and vomiting.   Genitourinary: Negative for dysuria.   Musculoskeletal: Negative for joint pain and myalgias.   Skin: Negative for rash.   Neurological: Negative for dizziness, weakness and headaches.   Psychiatric/Behavioral: Negative for hallucinations. The patient is not nervous/anxious.        Consultants/Specialty  Hem/Onc  Radio/Onc    Disposition  inpatient    Quality Measures    Reviewed items::  Labs reviewed and Medications reviewed  Garvin catheter::  No Garvin  DVT prophylaxis pharmacological::  Heparin  Ulcer Prophylaxis::  Not indicated          Physical Exam       Vitals:    17 0350 17 0800 17 0900 17 1600   BP: 107/71 110/67  110/64   Pulse: 91 98  98   Resp: 18 18  18   Temp: 36.9 °C (98.5 °F) 36.3 °C (97.4 °F)  37.1 °C (98.8 °F)   SpO2: 97% 99%   98%   Weight:   57.7 kg (127 lb 3.3 oz)    Height:         Body mass index is 18.25 kg/m². Weight: 57.7 kg (127 lb 3.3 oz)  Oxygen Therapy:  Pulse Oximetry: 98 %, O2 (LPM): 0, O2 Delivery: None (Room Air)    Physical Exam   Constitutional: He is oriented to person, place, and time. No distress.   cachectic   HENT:   Mouth/Throat: No oropharyngeal exudate.   Neck: Neck supple. No tracheal deviation present.   Scar healing well   Cardiovascular: Normal rate and regular rhythm.    No murmur heard.  Pulmonary/Chest: No respiratory distress. He has no wheezes. He has no rales. He exhibits no tenderness.   Abdominal: He exhibits no distension. There is no tenderness.   Musculoskeletal: He exhibits no edema.   Lymphadenopathy:     He has no cervical adenopathy.   Neurological: He is alert and oriented to person, place, and time. No cranial nerve deficit.   Skin: He is not diaphoretic.   Psychiatric: Affect normal.     Lab Data Review:   10/26/2017  11:40 AM    Recent Labs      10/31/17   0200  11/01/17   0005  11/02/17   0356   SODIUM  129*  131*  130*   POTASSIUM  4.2  4.4  4.3   CHLORIDE  100  103  99   CO2  22  23  23   BUN  10  11  10   CREATININE  0.40*  0.45*  0.43*   CALCIUM  9.4  9.5  9.6       Recent Labs      10/31/17   0200  11/01/17   0005  11/02/17   0356   ALTSGPT   --   8   --    ASTSGOT   --   13   --    ALKPHOSPHAT   --   72   --    TBILIRUBIN   --   0.3   --    GLUCOSE  90  91  90       Recent Labs      10/31/17   0200  11/01/17   0005  11/02/17   0356   RBC  3.11*  3.06*  3.21*   HEMOGLOBIN  10.6*  10.2*  10.9*   HEMATOCRIT  29.9*  29.3*  31.1*   PLATELETCT  222  199  200       Recent Labs      10/31/17   0200  11/01/17   0005  11/02/17   0356   WBC  2.6*  10.0  3.3*   NEUTSPOLYS  32.90*  76.10*  53.50   LYMPHOCYTES  36.30  15.00*  23.70   MONOCYTES  18.10*  5.30  15.30*   EOSINOPHILS  10.40*  2.70  6.30   BASOPHILS  1.90*  0.00  0.90   ASTSGOT   --   13   --    ALTSGPT   --   8   --    ALKPHOSPHAT   --    72   --    TBILIRUBIN   --   0.3   --            Assessment/Plan     * Small cell carcinoma of lung (CMS-HCC)- (present on admission)   Assessment & Plan    - Hx of smoking >20 years, 10 pack-year.   - Cough, recent weight loss and low appetite.  - (09/30) CT chest: 3.6x5.9 cm paratracheal mass compressing on SVC and 2x1.8 cm R upper lobe mass  - Procedures: (10/05) CT guided biopsy of RUL c/w with organizing PNA,  (10/11) Transbronchial biopsy was inconclusive BAL: few RBCs and rare Gram+/Gram- cocci and Mediastinoscopy with biopsy of paratracheal mass (10/17) showing metastatic small cell carcinoma  - Staging workup including MRI Brain and CT Abdomen/Pelvis:  Unremarkable  - 10/21-10/23 Patient received chemotherapy. CISplatin on day 1 and etoposide on days 1-3 28 day cycle for 4-6 cycles. Next cycle due on 11/13  - 10/23 Radiation BID started, to be continued for 15 days.   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 malignancy  - Exhibits good appetite  - Nutrition following        Chronic alcohol use- (present on admission)   Assessment & Plan    - Consumed 1 beer daily   - BAL: 0.01 on admission  - Continue PO thiamine, B12 and folic acid supplements

## 2017-11-03 NOTE — PROGRESS NOTES
Patient was transported to our department for radiation therapy treatment number 17 of 30 to his thorax. We plan on treating the patient again at 2:30pm (Tx 18 of 30, BID) and twice Monday, 11/6/17, (BID).

## 2017-11-03 NOTE — CARE PLAN
Problem: Mobility  Goal: Risk for activity intolerance will decrease  Outcome: PROGRESSING AS EXPECTED  Mobilizing to the bathroom with steady gait.     Problem: Pain Management  Goal: Pain level will decrease to patient's comfort goal  Outcome: PROGRESSING AS EXPECTED  Appropriately verbalizes 0 to 10 pain scale. Calls for assistance as needed.

## 2017-11-03 NOTE — PROGRESS NOTES
Report received from NOC RN and assumed care. Patient is A&O x4, resting in bed. 1500 mL fluid restriction.     Patient reports denies pain.    PICC line flushes with + blood return; TKO    POC discussed. All needs met at this time. Call light within reach. Bed locked, in lowest position. Hourly rounding in place.

## 2017-11-03 NOTE — PROGRESS NOTES
Tech from Lab called with critical result of WBC 1.7 at 0455. Critical lab result read back to Tech.   This critical lab result is within parameters established by  for this patient

## 2017-11-03 NOTE — CARE PLAN
Problem: Infection  Goal: Will remain free from infection  Neutropenic precautions in place. Patient educated to wear mask when ambulating in halls. q4 vitals.     Problem: Mobility  Goal: Risk for activity intolerance will decrease  Patient ambulates with steady gait.

## 2017-11-04 LAB
ANION GAP SERPL CALC-SCNC: 7 MMOL/L (ref 0–11.9)
ANISOCYTOSIS BLD QL SMEAR: ABNORMAL
BASOPHILS # BLD AUTO: 0 % (ref 0–1.8)
BASOPHILS # BLD: 0 K/UL (ref 0–0.12)
BUN SERPL-MCNC: 10 MG/DL (ref 8–22)
BURR CELLS BLD QL SMEAR: NORMAL
CALCIUM SERPL-MCNC: 9.4 MG/DL (ref 8.5–10.5)
CHLORIDE SERPL-SCNC: 101 MMOL/L (ref 96–112)
CO2 SERPL-SCNC: 23 MMOL/L (ref 20–33)
CREAT SERPL-MCNC: 0.42 MG/DL (ref 0.5–1.4)
EOSINOPHIL # BLD AUTO: 0.09 K/UL (ref 0–0.51)
EOSINOPHIL NFR BLD: 1.8 % (ref 0–6.9)
ERYTHROCYTE [DISTWIDTH] IN BLOOD BY AUTOMATED COUNT: 43.6 FL (ref 35.9–50)
GFR SERPL CREATININE-BSD FRML MDRD: >60 ML/MIN/1.73 M 2
GLUCOSE SERPL-MCNC: 86 MG/DL (ref 65–99)
HCT VFR BLD AUTO: 31.2 % (ref 42–52)
HGB BLD-MCNC: 11 G/DL (ref 14–18)
LYMPHOCYTES # BLD AUTO: 0.53 K/UL (ref 1–4.8)
LYMPHOCYTES NFR BLD: 10.6 % (ref 22–41)
MACROCYTES BLD QL SMEAR: ABNORMAL
MANUAL DIFF BLD: NORMAL
MCH RBC QN AUTO: 33.7 PG (ref 27–33)
MCHC RBC AUTO-ENTMCNC: 35.3 G/DL (ref 33.7–35.3)
MCV RBC AUTO: 95.7 FL (ref 81.4–97.8)
MONOCYTES # BLD AUTO: 0.4 K/UL (ref 0–0.85)
MONOCYTES NFR BLD AUTO: 8 % (ref 0–13.4)
MORPHOLOGY BLD-IMP: NORMAL
NEUTROPHILS # BLD AUTO: 3.98 K/UL (ref 1.82–7.42)
NEUTROPHILS NFR BLD: 75.2 % (ref 44–72)
NEUTS BAND NFR BLD MANUAL: 4.4 % (ref 0–10)
NRBC # BLD AUTO: 0 K/UL
NRBC BLD AUTO-RTO: 0 /100 WBC
OVALOCYTES BLD QL SMEAR: NORMAL
PLATELET # BLD AUTO: 186 K/UL (ref 164–446)
PMV BLD AUTO: 9.1 FL (ref 9–12.9)
POIKILOCYTOSIS BLD QL SMEAR: NORMAL
POLYCHROMASIA BLD QL SMEAR: NORMAL
POTASSIUM SERPL-SCNC: 4.1 MMOL/L (ref 3.6–5.5)
RBC # BLD AUTO: 3.26 M/UL (ref 4.7–6.1)
RBC BLD AUTO: PRESENT
SODIUM SERPL-SCNC: 131 MMOL/L (ref 135–145)
TOXIC GRANULES BLD QL SMEAR: SLIGHT
WBC # BLD AUTO: 5 K/UL (ref 4.8–10.8)

## 2017-11-04 PROCEDURE — 85007 BL SMEAR W/DIFF WBC COUNT: CPT

## 2017-11-04 PROCEDURE — A9270 NON-COVERED ITEM OR SERVICE: HCPCS | Performed by: STUDENT IN AN ORGANIZED HEALTH CARE EDUCATION/TRAINING PROGRAM

## 2017-11-04 PROCEDURE — 770004 HCHG ROOM/CARE - ONCOLOGY PRIVATE *

## 2017-11-04 PROCEDURE — 700102 HCHG RX REV CODE 250 W/ 637 OVERRIDE(OP): Performed by: STUDENT IN AN ORGANIZED HEALTH CARE EDUCATION/TRAINING PROGRAM

## 2017-11-04 PROCEDURE — 85027 COMPLETE CBC AUTOMATED: CPT

## 2017-11-04 PROCEDURE — 99232 SBSQ HOSP IP/OBS MODERATE 35: CPT | Mod: GC | Performed by: INTERNAL MEDICINE

## 2017-11-04 PROCEDURE — 700111 HCHG RX REV CODE 636 W/ 250 OVERRIDE (IP): Performed by: STUDENT IN AN ORGANIZED HEALTH CARE EDUCATION/TRAINING PROGRAM

## 2017-11-04 PROCEDURE — 80048 BASIC METABOLIC PNL TOTAL CA: CPT

## 2017-11-04 RX ADMIN — THIAMINE HCL TAB 100 MG 100 MG: 100 TAB at 08:11

## 2017-11-04 RX ADMIN — STANDARDIZED SENNA CONCENTRATE AND DOCUSATE SODIUM 2 TABLET: 8.6; 5 TABLET, FILM COATED ORAL at 20:48

## 2017-11-04 RX ADMIN — MAGNESIUM HYDROXIDE 30 ML: 400 SUSPENSION ORAL at 12:04

## 2017-11-04 RX ADMIN — HEPARIN SODIUM 5000 UNITS: 5000 INJECTION, SOLUTION INTRAVENOUS; SUBCUTANEOUS at 14:41

## 2017-11-04 RX ADMIN — HEPARIN SODIUM 5000 UNITS: 5000 INJECTION, SOLUTION INTRAVENOUS; SUBCUTANEOUS at 20:48

## 2017-11-04 RX ADMIN — MIDODRINE HYDROCHLORIDE 5 MG: 5 TABLET ORAL at 08:10

## 2017-11-04 RX ADMIN — NICOTINE 14 MG: 14 PATCH, EXTENDED RELEASE TRANSDERMAL at 05:31

## 2017-11-04 RX ADMIN — THERA TABS 1 TABLET: TAB at 08:10

## 2017-11-04 RX ADMIN — HEPARIN SODIUM 5000 UNITS: 5000 INJECTION, SOLUTION INTRAVENOUS; SUBCUTANEOUS at 05:31

## 2017-11-04 RX ADMIN — MIDODRINE HYDROCHLORIDE 5 MG: 5 TABLET ORAL at 12:02

## 2017-11-04 RX ADMIN — MIDODRINE HYDROCHLORIDE 5 MG: 5 TABLET ORAL at 17:33

## 2017-11-04 RX ADMIN — FOLIC ACID 1 MG: 1 TABLET ORAL at 08:10

## 2017-11-04 RX ADMIN — VITAMIN D, TAB 1000IU (100/BT) 5000 UNITS: 25 TAB at 08:11

## 2017-11-04 ASSESSMENT — ENCOUNTER SYMPTOMS
DIZZINESS: 0
BLURRED VISION: 0
MYALGIAS: 0
NAUSEA: 0
DIARRHEA: 0
HALLUCINATIONS: 0
SORE THROAT: 0
ABDOMINAL PAIN: 0
COUGH: 1
CHILLS: 0
HEADACHES: 0
WEAKNESS: 0
NERVOUS/ANXIOUS: 0
SPUTUM PRODUCTION: 0
CONSTIPATION: 0
SHORTNESS OF BREATH: 0
FEVER: 0
VOMITING: 0

## 2017-11-04 ASSESSMENT — LIFESTYLE VARIABLES
HOW MANY TIMES IN THE PAST YEAR HAVE YOU HAD 5 OR MORE DRINKS IN A DAY: 0
AVERAGE NUMBER OF DAYS PER WEEK YOU HAVE A DRINK CONTAINING ALCOHOL: 2
TOTAL SCORE: 1
HAVE PEOPLE ANNOYED YOU BY CRITICIZING YOUR DRINKING: NO
EVER HAD A DRINK FIRST THING IN THE MORNING TO STEADY YOUR NERVES TO GET RID OF A HANGOVER: NO
HAVE YOU EVER FELT YOU SHOULD CUT DOWN ON YOUR DRINKING: YES
TOTAL SCORE: 1
ON A TYPICAL DAY WHEN YOU DRINK ALCOHOL HOW MANY DRINKS DO YOU HAVE: 2
EVER FELT BAD OR GUILTY ABOUT YOUR DRINKING: NO
DO YOU DRINK ALCOHOL: YES
CONSUMPTION TOTAL: NEGATIVE
TOTAL SCORE: 1

## 2017-11-04 ASSESSMENT — COPD QUESTIONNAIRES
DO YOU EVER COUGH UP ANY MUCUS OR PHLEGM?: YES, A FEW DAYS A WEEK OR MONTH
COPD SCREENING SCORE: 4
DURING THE PAST 4 WEEKS HOW MUCH DID YOU FEEL SHORT OF BREATH: NONE/LITTLE OF THE TIME
HAVE YOU SMOKED AT LEAST 100 CIGARETTES IN YOUR ENTIRE LIFE: YES

## 2017-11-04 ASSESSMENT — PAIN SCALES - GENERAL
PAINLEVEL_OUTOF10: 0

## 2017-11-04 ASSESSMENT — PATIENT HEALTH QUESTIONNAIRE - PHQ9
SUM OF ALL RESPONSES TO PHQ QUESTIONS 1-9: 0
SUM OF ALL RESPONSES TO PHQ9 QUESTIONS 1 AND 2: 0
2. FEELING DOWN, DEPRESSED, IRRITABLE, OR HOPELESS: NOT AT ALL
1. LITTLE INTEREST OR PLEASURE IN DOING THINGS: NOT AT ALL

## 2017-11-04 NOTE — CARE PLAN
Problem: Bowel/Gastric:  Goal: Normal bowel function is maintained or improved  Patient reports having normal bowel pattern. LBM: 11/4    Problem: Mobility  Goal: Risk for activity intolerance will decrease  Patient mobilizes to bathroom; steady gait.

## 2017-11-04 NOTE — PROGRESS NOTES
Report received from NOC RN and assumed care @0700. Patient is A&O x4, resting in bed.     1500mL fluid restriction; starting new at 0600.     Patient reports denies pain    PICC line flushes with + blood return.     POC discussed. All needs met at this time. Call light within reach. Bed locked, in lowest position. Hourly rounding in place.

## 2017-11-04 NOTE — PROGRESS NOTES
Assumed care of pt at 1900. Bedside report received. AAOx4, VSS. Denies pain or nausea at this time. PICC to TKO with +blood return. POC discussed, call light in reach, pt calls for assistance, all needs met at this time.

## 2017-11-04 NOTE — PROGRESS NOTES
Internal Medicine Interval Note  Note Author: Jeremy Choudhary M.D.     Name Froylan Spain     1960   Age/Sex 57 y.o. male   MRN [id number]   Code Status Full code     After 5PM or if no immediate response to page, please call for cross-coverage  Attending/Team: Meredith See Patient List for primary contact information  Call [phone] to page    1st Call - Day Intern (R1):   Vadim 2nd Call - Day Sr. Resident (R2/R3):   Criss Lynch         Reason for interval visit  (Principal Problem)   Small cell carcinoma of lung (CMS-HCC)    Interval Problem Daily Status Update  (24 hours)   - No acute events overnight  - Patient receiving radiation doses 17 and 18 of total 30 (last treatment 11/3) .  - Na at 131, will continue 1500 ml fluid restriction  -Received a dose of Granix per hem/onc. WBCs is 5000 today.      Review of Systems   Constitutional: Negative for chills, fever and malaise/fatigue.   HENT: Negative for sore throat.    Eyes: Negative for blurred vision.   Respiratory: Positive for cough. Negative for sputum production and shortness of breath.    Cardiovascular: Negative for chest pain.   Gastrointestinal: Negative for abdominal pain, constipation, diarrhea, nausea and vomiting.   Genitourinary: Negative for dysuria.   Musculoskeletal: Negative for joint pain and myalgias.   Skin: Negative for rash.   Neurological: Negative for dizziness, weakness and headaches.   Psychiatric/Behavioral: Negative for hallucinations. The patient is not nervous/anxious.        Consultants/Specialty  Hem/Onc  Radio/Onc    Disposition  Inpatient for radiation treatments    Quality Measures    Reviewed items::  Labs reviewed and Medications reviewed  Garvin catheter::  No Garvin  DVT prophylaxis pharmacological::  Heparin  Ulcer Prophylaxis::  Not indicated          Physical Exam       Vitals:    17 2028 17 0000 17 0341 17 0754   BP: 107/64 101/59 110/74 (!) 99/62   Pulse: (!) 103 96 (!)  102 (!) 103   Resp: 18 18 18 18   Temp: 36.6 °C (97.9 °F) 36.8 °C (98.2 °F) 36.4 °C (97.5 °F) 36.9 °C (98.4 °F)   SpO2: 96% 95% 95% 98%   Weight:       Height:         Body mass index is 18.09 kg/m². Weight: 57.2 kg (126 lb 1.7 oz)  Oxygen Therapy:  Pulse Oximetry: 98 %, O2 (LPM): 0, O2 Delivery: None (Room Air)    Physical Exam   Constitutional: He is oriented to person, place, and time. No distress.   cachectic   HENT:   Mouth/Throat: No oropharyngeal exudate.   Neck: Neck supple. No tracheal deviation present.   Scar healing well   Cardiovascular: Normal rate and regular rhythm.    No murmur heard.  Pulmonary/Chest: No respiratory distress. He has no wheezes. He has no rales. He exhibits no tenderness.   Abdominal: He exhibits no distension. There is no tenderness.   Musculoskeletal: He exhibits no edema.   Lymphadenopathy:     He has no cervical adenopathy.   Neurological: He is alert and oriented to person, place, and time. No cranial nerve deficit.   Skin: He is not diaphoretic.   Psychiatric: Affect normal.     Lab Data Review:   10/26/2017  11:40 AM    Recent Labs      11/02/17 0356 11/03/17 0346 11/04/17   0535   SODIUM  130*  134*  131*   POTASSIUM  4.3  4.3  4.1   CHLORIDE  99  101  101   CO2  23  24  23   BUN  10  10  10   CREATININE  0.43*  0.39*  0.42*   CALCIUM  9.6  9.8  9.4       Recent Labs      11/02/17   0356  11/03/17   0346  11/04/17   0535   GLUCOSE  90  95  86       Recent Labs      11/02/17   0356  11/03/17   0346  11/04/17   0535   RBC  3.21*  3.24*  3.26*   HEMOGLOBIN  10.9*  10.9*  11.0*   HEMATOCRIT  31.1*  31.1*  31.2*   PLATELETCT  200  200  186       Recent Labs      11/02/17 0356 11/03/17 0346 11/04/17   0535   WBC  3.3*  1.7*  5.0   NEUTSPOLYS  53.50  30.70*  75.20*   LYMPHOCYTES  23.70  36.80  10.60*   MONOCYTES  15.30*  17.80*  8.00   EOSINOPHILS  6.30  12.90*  1.80   BASOPHILS  0.90  1.20  0.00           Assessment/Plan     * Small cell carcinoma of lung (CMS-HCC)-  (present on admission)   Assessment & Plan    - Hx of smoking >20 years, 10 pack-year.   - Cough, recent weight loss and low appetite.  - (09/30) CT chest: 3.6x5.9 cm paratracheal mass compressing on SVC and 2x1.8 cm R upper lobe mass  - Procedures: (10/05) CT guided biopsy of RUL c/w with organizing PNA,  (10/11) Transbronchial biopsy was inconclusive BAL: few RBCs and rare Gram+/Gram- cocci and Mediastinoscopy with biopsy of paratracheal mass (10/17) showing metastatic small cell carcinoma  - Staging workup including MRI Brain and CT Abdomen/Pelvis:  Unremarkable  - 10/21-10/23 Patient received chemotherapy. CISplatin on day 1 and etoposide on days 1-3 28 day cycle for 4-6 cycles. Next cycle due on 11/13  - 10/23 Radiation BID started, to be continued for 15 days.   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 malignancy  - Exhibits good appetite  - Nutrition following        Chronic alcohol use- (present on admission)   Assessment & Plan    - Consumed 1 beer daily   - BAL: 0.01 on admission  - Continue PO thiamine, B12 and folic acid supplements

## 2017-11-04 NOTE — PROGRESS NOTES
Internal Medicine Interval Note  Note Author: Trav Fierro M.D.     Name Froylan Spain     1960   Age/Sex 57 y.o. male   MRN [id number]   Code Status Full code     After 5PM or if no immediate response to page, please call for cross-coverage  Attending/Team: Meredith See Patient List for primary contact information  Call [phone] to page    1st Call - Day Intern (R1):   Vadim 2nd Call - Day Sr. Resident (R2/R3):   Criss Lynch         Reason for interval visit  (Principal Problem)   Small cell carcinoma of lung (CMS-HCC)    Interval Problem Daily Status Update  (24 hours)   - Patient tolerating radiation well.  - Patient receiving radiation doses 17 and 18 of total 30 .  - Na at 134, will continue 1500 ml fluid restriction      Review of Systems   Constitutional: Negative for chills, fever and malaise/fatigue.   HENT: Negative for sore throat.    Eyes: Negative for blurred vision.   Respiratory: Positive for cough. Negative for sputum production and shortness of breath.    Cardiovascular: Negative for chest pain.   Gastrointestinal: Negative for abdominal pain, constipation, diarrhea, nausea and vomiting.   Genitourinary: Negative for dysuria.   Musculoskeletal: Negative for joint pain and myalgias.   Skin: Negative for rash.   Neurological: Negative for dizziness, weakness and headaches.   Psychiatric/Behavioral: Negative for hallucinations. The patient is not nervous/anxious.        Consultants/Specialty  Hem/Onc  Radio/Onc    Disposition  inpatient    Quality Measures    Reviewed items::  Labs reviewed and Medications reviewed  Garvin catheter::  No Garvin  DVT prophylaxis pharmacological::  Heparin  Ulcer Prophylaxis::  Not indicated          Physical Exam       Vitals:    17 0900 17 1200 17 1600 17 1727   BP: 100/65 104/73 103/66    Pulse: (!) 104 (!) 104 100    Resp: 17 15 16    Temp: 36.8 °C (98.2 °F) 36.8 °C (98.3 °F) 36.8 °C (98.3 °F)    SpO2: 93% 98% 97%     Weight:    57.2 kg (126 lb 1.7 oz)   Height:         Body mass index is 18.09 kg/m². Weight: 57.2 kg (126 lb 1.7 oz)  Oxygen Therapy:  Pulse Oximetry: 97 %, O2 (LPM): 0, O2 Delivery: None (Room Air)    Physical Exam   Constitutional: He is oriented to person, place, and time. No distress.   cachectic   HENT:   Mouth/Throat: No oropharyngeal exudate.   Neck: Neck supple. No tracheal deviation present.   Scar healing well   Cardiovascular: Normal rate and regular rhythm.    No murmur heard.  Pulmonary/Chest: No respiratory distress. He has no wheezes. He has no rales. He exhibits no tenderness.   Abdominal: He exhibits no distension. There is no tenderness.   Musculoskeletal: He exhibits no edema.   Lymphadenopathy:     He has no cervical adenopathy.   Neurological: He is alert and oriented to person, place, and time. No cranial nerve deficit.   Skin: He is not diaphoretic.   Psychiatric: Affect normal.     Lab Data Review:   10/26/2017  11:40 AM    Recent Labs      11/01/17 0005 11/02/17 0356 11/03/17 0346   SODIUM  131*  130*  134*   POTASSIUM  4.4  4.3  4.3   CHLORIDE  103  99  101   CO2  23  23  24   BUN  11  10  10   CREATININE  0.45*  0.43*  0.39*   CALCIUM  9.5  9.6  9.8       Recent Labs      11/01/17 0005 11/02/17 0356 11/03/17 0346   ALTSGPT  8   --    --    ASTSGOT  13   --    --    ALKPHOSPHAT  72   --    --    TBILIRUBIN  0.3   --    --    GLUCOSE  91  90  95       Recent Labs      11/01/17   0005  11/02/17 0356  11/03/17 0346   RBC  3.06*  3.21*  3.24*   HEMOGLOBIN  10.2*  10.9*  10.9*   HEMATOCRIT  29.3*  31.1*  31.1*   PLATELETCT  199  200  200       Recent Labs      11/01/17   0005  11/02/17 0356  11/03/17 0346   WBC  10.0  3.3*  1.7*   NEUTSPOLYS  76.10*  53.50  30.70*   LYMPHOCYTES  15.00*  23.70  36.80   MONOCYTES  5.30  15.30*  17.80*   EOSINOPHILS  2.70  6.30  12.90*   BASOPHILS  0.00  0.90  1.20   ASTSGOT  13   --    --    ALTSGPT  8   --    --    ALKPHOSPHAT  72   --     --    TBILIRUBIN  0.3   --    --            Assessment/Plan     * Small cell carcinoma of lung (CMS-HCC)- (present on admission)   Assessment & Plan    - Hx of smoking >20 years, 10 pack-year.   - Cough, recent weight loss and low appetite.  - (09/30) CT chest: 3.6x5.9 cm paratracheal mass compressing on SVC and 2x1.8 cm R upper lobe mass  - Procedures: (10/05) CT guided biopsy of RUL c/w with organizing PNA,  (10/11) Transbronchial biopsy was inconclusive BAL: few RBCs and rare Gram+/Gram- cocci and Mediastinoscopy with biopsy of paratracheal mass (10/17) showing metastatic small cell carcinoma  - Staging workup including MRI Brain and CT Abdomen/Pelvis:  Unremarkable  - 10/21-10/23 Patient received chemotherapy. CISplatin on day 1 and etoposide on days 1-3 28 day cycle for 4-6 cycles. Next cycle due on 11/13  - 10/23 Radiation BID started, to be continued for 15 days.   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 malignancy  - Exhibits good appetite  - Nutrition following        Chronic alcohol use- (present on admission)   Assessment & Plan    - Consumed 1 beer daily   - BAL: 0.01 on admission  - Continue PO thiamine, B12 and folic acid supplements

## 2017-11-05 LAB
ANION GAP SERPL CALC-SCNC: 11 MMOL/L (ref 0–11.9)
ANISOCYTOSIS BLD QL SMEAR: ABNORMAL
BASOPHILS # BLD AUTO: 0.9 % (ref 0–1.8)
BASOPHILS # BLD: 0.02 K/UL (ref 0–0.12)
BUN SERPL-MCNC: 8 MG/DL (ref 8–22)
CALCIUM SERPL-MCNC: 9.5 MG/DL (ref 8.5–10.5)
CHLORIDE SERPL-SCNC: 100 MMOL/L (ref 96–112)
CO2 SERPL-SCNC: 24 MMOL/L (ref 20–33)
CREAT SERPL-MCNC: 0.47 MG/DL (ref 0.5–1.4)
EOSINOPHIL # BLD AUTO: 0.34 K/UL (ref 0–0.51)
EOSINOPHIL NFR BLD: 13 % (ref 0–6.9)
ERYTHROCYTE [DISTWIDTH] IN BLOOD BY AUTOMATED COUNT: 43.8 FL (ref 35.9–50)
GFR SERPL CREATININE-BSD FRML MDRD: >60 ML/MIN/1.73 M 2
GLUCOSE SERPL-MCNC: 91 MG/DL (ref 65–99)
HCT VFR BLD AUTO: 31.3 % (ref 42–52)
HGB BLD-MCNC: 11 G/DL (ref 14–18)
LYMPHOCYTES # BLD AUTO: 0.86 K/UL (ref 1–4.8)
LYMPHOCYTES NFR BLD: 33 % (ref 22–41)
MACROCYTES BLD QL SMEAR: ABNORMAL
MANUAL DIFF BLD: NORMAL
MCH RBC QN AUTO: 33.8 PG (ref 27–33)
MCHC RBC AUTO-ENTMCNC: 35.1 G/DL (ref 33.7–35.3)
MCV RBC AUTO: 96.3 FL (ref 81.4–97.8)
METAMYELOCYTES NFR BLD MANUAL: 0.9 %
MONOCYTES # BLD AUTO: 0.29 K/UL (ref 0–0.85)
MONOCYTES NFR BLD AUTO: 11.3 % (ref 0–13.4)
MORPHOLOGY BLD-IMP: NORMAL
NEUTROPHILS # BLD AUTO: 1.06 K/UL (ref 1.82–7.42)
NEUTROPHILS NFR BLD: 37.4 % (ref 44–72)
NEUTS BAND NFR BLD MANUAL: 3.5 % (ref 0–10)
NRBC # BLD AUTO: 0 K/UL
NRBC BLD AUTO-RTO: 0 /100 WBC
PLATELET # BLD AUTO: 184 K/UL (ref 164–446)
PLATELET BLD QL SMEAR: NORMAL
PMV BLD AUTO: 9.3 FL (ref 9–12.9)
POTASSIUM SERPL-SCNC: 4.2 MMOL/L (ref 3.6–5.5)
RBC # BLD AUTO: 3.25 M/UL (ref 4.7–6.1)
RBC BLD AUTO: PRESENT
SODIUM SERPL-SCNC: 135 MMOL/L (ref 135–145)
WBC # BLD AUTO: 2.6 K/UL (ref 4.8–10.8)

## 2017-11-05 PROCEDURE — 85007 BL SMEAR W/DIFF WBC COUNT: CPT

## 2017-11-05 PROCEDURE — 700102 HCHG RX REV CODE 250 W/ 637 OVERRIDE(OP): Performed by: STUDENT IN AN ORGANIZED HEALTH CARE EDUCATION/TRAINING PROGRAM

## 2017-11-05 PROCEDURE — A9270 NON-COVERED ITEM OR SERVICE: HCPCS | Performed by: STUDENT IN AN ORGANIZED HEALTH CARE EDUCATION/TRAINING PROGRAM

## 2017-11-05 PROCEDURE — 700111 HCHG RX REV CODE 636 W/ 250 OVERRIDE (IP): Performed by: INTERNAL MEDICINE

## 2017-11-05 PROCEDURE — 99232 SBSQ HOSP IP/OBS MODERATE 35: CPT | Mod: GC | Performed by: INTERNAL MEDICINE

## 2017-11-05 PROCEDURE — 770004 HCHG ROOM/CARE - ONCOLOGY PRIVATE *

## 2017-11-05 PROCEDURE — 700111 HCHG RX REV CODE 636 W/ 250 OVERRIDE (IP): Performed by: STUDENT IN AN ORGANIZED HEALTH CARE EDUCATION/TRAINING PROGRAM

## 2017-11-05 PROCEDURE — 80048 BASIC METABOLIC PNL TOTAL CA: CPT

## 2017-11-05 PROCEDURE — 85027 COMPLETE CBC AUTOMATED: CPT

## 2017-11-05 RX ORDER — SODIUM CHLORIDE 9 MG/ML
INJECTION, SOLUTION INTRAVENOUS CONTINUOUS
Status: DISCONTINUED | OUTPATIENT
Start: 2017-11-05 | End: 2017-11-17 | Stop reason: HOSPADM

## 2017-11-05 RX ADMIN — BENZOCAINE AND MENTHOL 1 LOZENGE: 15; 3.6 LOZENGE ORAL at 14:56

## 2017-11-05 RX ADMIN — MIDODRINE HYDROCHLORIDE 5 MG: 5 TABLET ORAL at 18:10

## 2017-11-05 RX ADMIN — MAGNESIUM HYDROXIDE 30 ML: 400 SUSPENSION ORAL at 11:24

## 2017-11-05 RX ADMIN — NYSTATIN 500000 UNITS: 100000 SUSPENSION ORAL at 18:10

## 2017-11-05 RX ADMIN — ACETAMINOPHEN 650 MG: 325 TABLET, FILM COATED ORAL at 18:10

## 2017-11-05 RX ADMIN — ACETAMINOPHEN 650 MG: 325 TABLET, FILM COATED ORAL at 10:03

## 2017-11-05 RX ADMIN — HEPARIN SODIUM 5000 UNITS: 5000 INJECTION, SOLUTION INTRAVENOUS; SUBCUTANEOUS at 13:45

## 2017-11-05 RX ADMIN — NICOTINE 14 MG: 14 PATCH, EXTENDED RELEASE TRANSDERMAL at 05:00

## 2017-11-05 RX ADMIN — HEPARIN SODIUM 5000 UNITS: 5000 INJECTION, SOLUTION INTRAVENOUS; SUBCUTANEOUS at 20:23

## 2017-11-05 RX ADMIN — MIDODRINE HYDROCHLORIDE 5 MG: 5 TABLET ORAL at 08:40

## 2017-11-05 RX ADMIN — TBO-FILGRASTIM 300 MCG: 300 INJECTION, SOLUTION SUBCUTANEOUS at 14:56

## 2017-11-05 RX ADMIN — NYSTATIN 500000 UNITS: 100000 SUSPENSION ORAL at 20:23

## 2017-11-05 RX ADMIN — NYSTATIN 500000 UNITS: 100000 SUSPENSION ORAL at 13:45

## 2017-11-05 RX ADMIN — STANDARDIZED SENNA CONCENTRATE AND DOCUSATE SODIUM 2 TABLET: 8.6; 5 TABLET, FILM COATED ORAL at 08:41

## 2017-11-05 RX ADMIN — BENZOCAINE AND MENTHOL 1 LOZENGE: 15; 3.6 LOZENGE ORAL at 11:23

## 2017-11-05 RX ADMIN — THERA TABS 1 TABLET: TAB at 08:41

## 2017-11-05 RX ADMIN — THIAMINE HCL TAB 100 MG 100 MG: 100 TAB at 08:40

## 2017-11-05 RX ADMIN — FOLIC ACID 1 MG: 1 TABLET ORAL at 08:41

## 2017-11-05 RX ADMIN — STANDARDIZED SENNA CONCENTRATE AND DOCUSATE SODIUM 2 TABLET: 8.6; 5 TABLET, FILM COATED ORAL at 20:23

## 2017-11-05 RX ADMIN — MIDODRINE HYDROCHLORIDE 5 MG: 5 TABLET ORAL at 13:46

## 2017-11-05 RX ADMIN — HEPARIN SODIUM 5000 UNITS: 5000 INJECTION, SOLUTION INTRAVENOUS; SUBCUTANEOUS at 05:00

## 2017-11-05 RX ADMIN — VITAMIN D, TAB 1000IU (100/BT) 5000 UNITS: 25 TAB at 08:40

## 2017-11-05 ASSESSMENT — ENCOUNTER SYMPTOMS
HEADACHES: 0
SHORTNESS OF BREATH: 0
COUGH: 1
WEAKNESS: 0
HALLUCINATIONS: 0
FEVER: 0
DIZZINESS: 0
CONSTIPATION: 0
BLURRED VISION: 0
SORE THROAT: 1
CHILLS: 0
NERVOUS/ANXIOUS: 0
DIARRHEA: 0
MYALGIAS: 0
ABDOMINAL PAIN: 0
SPUTUM PRODUCTION: 0
NAUSEA: 0
VOMITING: 0

## 2017-11-05 ASSESSMENT — PAIN SCALES - GENERAL
PAINLEVEL_OUTOF10: 0
PAINLEVEL_OUTOF10: 4
PAINLEVEL_OUTOF10: 5
PAINLEVEL_OUTOF10: 5
PAINLEVEL_OUTOF10: 2

## 2017-11-05 NOTE — PROGRESS NOTES
Internal Medicine Interval Note  Note Author: Trav Fierro M.D.     Name Froylan Spain     1960   Age/Sex 57 y.o. male   MRN [id number]   Code Status Full code     After 5PM or if no immediate response to page, please call for cross-coverage  Attending/Team: Meredith See Patient List for primary contact information  Call [phone] to page    1st Call - Day Intern (R1):   Vadim 2nd Call - Day Sr. Resident (R2/R3):   Criss Lynch         Reason for interval visit  (Principal Problem)   Small cell carcinoma of lung (CMS-HCC)    Interval Problem Daily Status Update  (24 hours)   - No acute events overnight  - Patient has received 18 of 30 doses of radiation. Possibly resuming treatment on Monday.  - Na at 135, will discontinue fluid restriction  - WBC today 2.6, he will receive filgrastim today      Review of Systems   Constitutional: Negative for chills, fever and malaise/fatigue.   HENT: Positive for sore throat.    Eyes: Negative for blurred vision.   Respiratory: Positive for cough. Negative for sputum production and shortness of breath.    Cardiovascular: Negative for chest pain.   Gastrointestinal: Negative for abdominal pain, constipation, diarrhea, nausea and vomiting.   Genitourinary: Negative for dysuria.   Musculoskeletal: Negative for joint pain and myalgias.   Skin: Negative for rash.   Neurological: Negative for dizziness, weakness and headaches.   Psychiatric/Behavioral: Negative for hallucinations. The patient is not nervous/anxious.        Consultants/Specialty  Hem/Onc  Radio/Onc    Disposition  Inpatient for radiation treatments    Quality Measures    Reviewed items::  Labs reviewed and Medications reviewed  Garvin catheter::  No Garvin  DVT prophylaxis pharmacological::  Heparin  Ulcer Prophylaxis::  Not indicated          Physical Exam       Vitals:    17 1543 17 2105 17 0411 17 0807   BP: 101/81 108/67 110/72 104/73   Pulse: 95 84 99 90   Resp: 18 18  18 17   Temp: 36.7 °C (98 °F) 36.6 °C (97.9 °F) 36.8 °C (98.2 °F) 36.4 °C (97.5 °F)   SpO2: 99% 97% 98% 97%   Weight:       Height:         Body mass index is 18.28 kg/m². Weight: 57.8 kg (127 lb 6.8 oz)  Oxygen Therapy:  Pulse Oximetry: 97 %, O2 (LPM): 0, O2 Delivery: None (Room Air)    Physical Exam   Constitutional: He is oriented to person, place, and time. No distress.   cachectic   HENT:   Mouth/Throat: No oropharyngeal exudate.   Neck: Neck supple. No tracheal deviation present.   Scar healing well   Cardiovascular: Normal rate and regular rhythm.    No murmur heard.  Pulmonary/Chest: No respiratory distress. He has no wheezes. He has no rales. He exhibits no tenderness.   Abdominal: He exhibits no distension. There is no tenderness.   Musculoskeletal: He exhibits no edema.   Lymphadenopathy:     He has no cervical adenopathy.   Neurological: He is alert and oriented to person, place, and time. No cranial nerve deficit.   Skin: He is not diaphoretic.   Psychiatric: Affect normal.     Lab Data Review:   10/26/2017  11:40 AM    Recent Labs      11/03/17 0346 11/04/17 0535 11/05/17   0145   SODIUM  134*  131*  135   POTASSIUM  4.3  4.1  4.2   CHLORIDE  101  101  100   CO2  24  23  24   BUN  10  10  8   CREATININE  0.39*  0.42*  0.47*   CALCIUM  9.8  9.4  9.5       Recent Labs      11/03/17 0346 11/04/17 0535  11/05/17   0145   GLUCOSE  95  86  91       Recent Labs      11/03/17 0346 11/04/17 0535  11/05/17   0145   RBC  3.24*  3.26*  3.25*   HEMOGLOBIN  10.9*  11.0*  11.0*   HEMATOCRIT  31.1*  31.2*  31.3*   PLATELETCT  200  186  184       Recent Labs      11/03/17 0346 11/04/17 0535  11/05/17 0145   WBC  1.7*  5.0  2.6*   NEUTSPOLYS  30.70*  75.20*  37.40*   LYMPHOCYTES  36.80  10.60*  33.00   MONOCYTES  17.80*  8.00  11.30   EOSINOPHILS  12.90*  1.80  13.00*   BASOPHILS  1.20  0.00  0.90           Assessment/Plan     * Small cell carcinoma of lung (CMS-HCC)- (present on admission)    Assessment & Plan    - Hx of smoking >20 years, 10 pack-year.   - Cough, recent weight loss and low appetite.  - (09/30) CT chest: 3.6x5.9 cm paratracheal mass compressing on SVC and 2x1.8 cm R upper lobe mass  - Procedures: (10/05) CT guided biopsy of RUL c/w with organizing PNA,  (10/11) Transbronchial biopsy was inconclusive BAL: few RBCs and rare Gram+/Gram- cocci and Mediastinoscopy with biopsy of paratracheal mass (10/17) showing metastatic small cell carcinoma  - Staging workup including MRI Brain and CT Abdomen/Pelvis:  Unremarkable  - 10/21-10/23 Patient received chemotherapy. CISplatin on day 1 and etoposide on days 1-3 28 day cycle for 4-6 cycles. Next cycle due on 11/13  - 10/23 Radiation BID started, to be continued for 15 days.   - He received 18 doses . Radiation to be resumed on Monday  - WBC today 2.6. He will receive a dose of filgrastim today. Will continue to monitor.          Hyponatremia- (present on admission)   Assessment & Plan    - Na of 107 and AMS on admission   - SIADH due to small cell CA  - Sodium normal today- 135. Will discontinue fluid restriction.               Body mass index (BMI) 19.9 or less, adult   Assessment & Plan    - Possibly 2/2 homelessness vs. malignancy  - Exhibits good appetite  - Nutrition following        Chronic alcohol use- (present on admission)   Assessment & Plan    - Consumed 1 beer daily   - BAL: 0.01 on admission  - Continue PO thiamine, B12 and folic acid supplements

## 2017-11-05 NOTE — CARE PLAN
Problem: Venous Thromboembolism (VTW)/Deep Vein Thrombosis (DVT) Prevention:  Goal: Patient will participate in Venous Thrombosis (VTE)/Deep Vein Thrombosis (DVT)Prevention Measures    Intervention: Encourage patient to perform ankle flex, foot rotation, and knee flex exercises in addition to other prophylatic measures every hour while awake  Pt ambulating in room and hallway with steady gait      Problem: Pain Management  Goal: Pain level will decrease to patient's comfort goal  Tylenol administered for sore throat; MD updated and additional orders pending post MD assessment. Mild relief achieved

## 2017-11-05 NOTE — CARE PLAN
Problem: Bowel/Gastric:  Goal: Normal bowel function is maintained or improved    Intervention: Educate patient and significant other/support system about diet, fluid intake, medications and activity to promote bowel function  Pt educated on fluid restriction and how to maintain a regular bowel regimen with ordered stool softner. Pt verbalized understanding.       Problem: Medication  Goal: Compliance with prescribed medication will improve    Intervention: Educate patient and significant other/support system medication rationale and regimen  Educated pt on significance of taking ordered meds routinely to prevent deterioration in condition. Pt verbalized understanding.

## 2017-11-05 NOTE — PROGRESS NOTES
Assumed care from dayshift Rn. Pt is resting comfortably in bed watching tv. Call light is within reach and pt has no questions or comments for staff at this time.

## 2017-11-05 NOTE — PROGRESS NOTES
8:55 Call back received from attending MD. Per MD, she will assess pt and orders to follow. Pt updated.

## 2017-11-05 NOTE — PROGRESS NOTES
Assumed pt care at change of shift. Labs and oders noted. Pt aa&o x4, up selft with steady gait. C/O sore throat, mostly with swallowing. Upon assessment, white patches noted on back of tongue and towards throat. [facility] attending paged to notify. Plan of care updated with pt and questions addressed. No additional needs at this time.

## 2017-11-06 LAB
ALBUMIN SERPL BCP-MCNC: 3.6 G/DL (ref 3.2–4.9)
ALBUMIN/GLOB SERPL: 1.2 G/DL
ALP SERPL-CCNC: 70 U/L (ref 30–99)
ALT SERPL-CCNC: 8 U/L (ref 2–50)
ANION GAP SERPL CALC-SCNC: 7 MMOL/L (ref 0–11.9)
AST SERPL-CCNC: 11 U/L (ref 12–45)
BASOPHILS # BLD AUTO: 4.4 % (ref 0–1.8)
BASOPHILS # BLD: 0.33 K/UL (ref 0–0.12)
BILIRUB SERPL-MCNC: 0.4 MG/DL (ref 0.1–1.5)
BUN SERPL-MCNC: 9 MG/DL (ref 8–22)
CALCIUM SERPL-MCNC: 9.8 MG/DL (ref 8.5–10.5)
CHLORIDE SERPL-SCNC: 100 MMOL/L (ref 96–112)
CO2 SERPL-SCNC: 24 MMOL/L (ref 20–33)
CREAT SERPL-MCNC: 0.53 MG/DL (ref 0.5–1.4)
EOSINOPHIL # BLD AUTO: 0.4 K/UL (ref 0–0.51)
EOSINOPHIL NFR BLD: 5.3 % (ref 0–6.9)
ERYTHROCYTE [DISTWIDTH] IN BLOOD BY AUTOMATED COUNT: 44.9 FL (ref 35.9–50)
GFR SERPL CREATININE-BSD FRML MDRD: >60 ML/MIN/1.73 M 2
GLOBULIN SER CALC-MCNC: 2.9 G/DL (ref 1.9–3.5)
GLUCOSE SERPL-MCNC: 88 MG/DL (ref 65–99)
HCT VFR BLD AUTO: 30.7 % (ref 42–52)
HGB BLD-MCNC: 10.8 G/DL (ref 14–18)
LYMPHOCYTES # BLD AUTO: 0.81 K/UL (ref 1–4.8)
LYMPHOCYTES NFR BLD: 10.6 % (ref 22–41)
MANUAL DIFF BLD: NORMAL
MCH RBC QN AUTO: 34.1 PG (ref 27–33)
MCHC RBC AUTO-ENTMCNC: 35.2 G/DL (ref 33.7–35.3)
MCV RBC AUTO: 96.8 FL (ref 81.4–97.8)
MONOCYTES # BLD AUTO: 0.54 K/UL (ref 0–0.85)
MONOCYTES NFR BLD AUTO: 7.1 % (ref 0–13.4)
MORPHOLOGY BLD-IMP: NORMAL
MYELOCYTES NFR BLD MANUAL: 0.9 %
NEUTROPHILS # BLD AUTO: 5.45 K/UL (ref 1.82–7.42)
NEUTROPHILS NFR BLD: 64.6 % (ref 44–72)
NEUTS BAND NFR BLD MANUAL: 7.1 % (ref 0–10)
NRBC # BLD AUTO: 0 K/UL
NRBC BLD AUTO-RTO: 0 /100 WBC
PLATELET # BLD AUTO: 190 K/UL (ref 164–446)
PLATELET BLD QL SMEAR: NORMAL
PMV BLD AUTO: 9.1 FL (ref 9–12.9)
POTASSIUM SERPL-SCNC: 4.2 MMOL/L (ref 3.6–5.5)
PROT SERPL-MCNC: 6.5 G/DL (ref 6–8.2)
RBC # BLD AUTO: 3.17 M/UL (ref 4.7–6.1)
RBC BLD AUTO: NORMAL
SODIUM SERPL-SCNC: 131 MMOL/L (ref 135–145)
WBC # BLD AUTO: 7.6 K/UL (ref 4.8–10.8)

## 2017-11-06 PROCEDURE — 770004 HCHG ROOM/CARE - ONCOLOGY PRIVATE *

## 2017-11-06 PROCEDURE — A9270 NON-COVERED ITEM OR SERVICE: HCPCS | Performed by: STUDENT IN AN ORGANIZED HEALTH CARE EDUCATION/TRAINING PROGRAM

## 2017-11-06 PROCEDURE — 700102 HCHG RX REV CODE 250 W/ 637 OVERRIDE(OP): Performed by: STUDENT IN AN ORGANIZED HEALTH CARE EDUCATION/TRAINING PROGRAM

## 2017-11-06 PROCEDURE — 77386 HCHG IMRT DELIVERY COMPLEX: CPT | Performed by: RADIOLOGY

## 2017-11-06 PROCEDURE — 77014 PR CT GUIDANCE PLACEMENT RAD THERAPY FIELDS: CPT | Mod: 26,XE | Performed by: RADIOLOGY

## 2017-11-06 PROCEDURE — 700111 HCHG RX REV CODE 636 W/ 250 OVERRIDE (IP): Performed by: STUDENT IN AN ORGANIZED HEALTH CARE EDUCATION/TRAINING PROGRAM

## 2017-11-06 PROCEDURE — 99231 SBSQ HOSP IP/OBS SF/LOW 25: CPT | Performed by: INTERNAL MEDICINE

## 2017-11-06 PROCEDURE — 85027 COMPLETE CBC AUTOMATED: CPT

## 2017-11-06 PROCEDURE — 80053 COMPREHEN METABOLIC PANEL: CPT

## 2017-11-06 PROCEDURE — 85007 BL SMEAR W/DIFF WBC COUNT: CPT

## 2017-11-06 RX ADMIN — THIAMINE HCL TAB 100 MG 100 MG: 100 TAB at 09:26

## 2017-11-06 RX ADMIN — ACETAMINOPHEN 650 MG: 325 TABLET, FILM COATED ORAL at 20:50

## 2017-11-06 RX ADMIN — MAGNESIUM HYDROXIDE 30 ML: 400 SUSPENSION ORAL at 12:43

## 2017-11-06 RX ADMIN — HEPARIN SODIUM 5000 UNITS: 5000 INJECTION, SOLUTION INTRAVENOUS; SUBCUTANEOUS at 20:46

## 2017-11-06 RX ADMIN — BENZOCAINE AND MENTHOL 1 LOZENGE: 15; 3.6 LOZENGE ORAL at 05:12

## 2017-11-06 RX ADMIN — MIDODRINE HYDROCHLORIDE 5 MG: 5 TABLET ORAL at 09:26

## 2017-11-06 RX ADMIN — NYSTATIN 500000 UNITS: 100000 SUSPENSION ORAL at 17:47

## 2017-11-06 RX ADMIN — FOLIC ACID 1 MG: 1 TABLET ORAL at 09:26

## 2017-11-06 RX ADMIN — HEPARIN SODIUM 5000 UNITS: 5000 INJECTION, SOLUTION INTRAVENOUS; SUBCUTANEOUS at 14:43

## 2017-11-06 RX ADMIN — VITAMIN D, TAB 1000IU (100/BT) 5000 UNITS: 25 TAB at 09:26

## 2017-11-06 RX ADMIN — NYSTATIN 500000 UNITS: 100000 SUSPENSION ORAL at 09:26

## 2017-11-06 RX ADMIN — ACETAMINOPHEN 650 MG: 325 TABLET, FILM COATED ORAL at 09:25

## 2017-11-06 RX ADMIN — NYSTATIN 500000 UNITS: 100000 SUSPENSION ORAL at 14:43

## 2017-11-06 RX ADMIN — BENZOCAINE AND MENTHOL 1 LOZENGE: 15; 3.6 LOZENGE ORAL at 09:26

## 2017-11-06 RX ADMIN — BENZOCAINE AND MENTHOL 1 LOZENGE: 15; 3.6 LOZENGE ORAL at 20:50

## 2017-11-06 RX ADMIN — NYSTATIN 500000 UNITS: 100000 SUSPENSION ORAL at 20:46

## 2017-11-06 RX ADMIN — BENZOCAINE AND MENTHOL 1 LOZENGE: 15; 3.6 LOZENGE ORAL at 12:43

## 2017-11-06 RX ADMIN — HEPARIN SODIUM 5000 UNITS: 5000 INJECTION, SOLUTION INTRAVENOUS; SUBCUTANEOUS at 05:06

## 2017-11-06 RX ADMIN — NICOTINE 14 MG: 14 PATCH, EXTENDED RELEASE TRANSDERMAL at 05:06

## 2017-11-06 RX ADMIN — MIDODRINE HYDROCHLORIDE 5 MG: 5 TABLET ORAL at 12:43

## 2017-11-06 RX ADMIN — STANDARDIZED SENNA CONCENTRATE AND DOCUSATE SODIUM 2 TABLET: 8.6; 5 TABLET, FILM COATED ORAL at 09:25

## 2017-11-06 RX ADMIN — MIDODRINE HYDROCHLORIDE 5 MG: 5 TABLET ORAL at 17:47

## 2017-11-06 RX ADMIN — THERA TABS 1 TABLET: TAB at 09:25

## 2017-11-06 ASSESSMENT — ENCOUNTER SYMPTOMS
ABDOMINAL PAIN: 0
SHORTNESS OF BREATH: 0
CONSTIPATION: 0
SORE THROAT: 1
NERVOUS/ANXIOUS: 0
WEAKNESS: 0
VOMITING: 0
MYALGIAS: 0
NAUSEA: 0
FEVER: 0
HALLUCINATIONS: 0
CHILLS: 0
DIARRHEA: 0
DIZZINESS: 0
BLURRED VISION: 0
COUGH: 1
HEADACHES: 0
SPUTUM PRODUCTION: 0

## 2017-11-06 ASSESSMENT — PAIN SCALES - GENERAL
PAINLEVEL_OUTOF10: 2
PAINLEVEL_OUTOF10: 2
PAINLEVEL_OUTOF10: 4
PAINLEVEL_OUTOF10: 0
PAINLEVEL_OUTOF10: 0
PAINLEVEL_OUTOF10: 5

## 2017-11-06 NOTE — PROGRESS NOTES
Assumed care of pt at 1900. Bedside report received. AAOx4, VSS. Up self with steady gait. PICC line wth +blood return to [facility]. Pt states that throat is still sore with swallowing, but is starting to clear up. POC discussed, call light in reach, pt calls for assistance, all needs met at this time.

## 2017-11-06 NOTE — PROGRESS NOTES
Patient was transported to our department for radiation therapy treatment number 20 of 30 to their Lung. We plan on treating the patient again tomorrow morning, 11/7.

## 2017-11-06 NOTE — CARE PLAN
Problem: Infection  Goal: Will remain free from infection    Intervention: Assess signs and symptoms of infection  Continued pain to throat, some redness      Problem: Discharge Barriers/Planning  Goal: Patient's continuum of care needs will be met    Intervention: Assess potential discharge barriers on admission and throughout hospital stay  Patient should be receiving with wallet in the mail today or tomorrow, He has been waiting on his ID card

## 2017-11-07 PROBLEM — J02.9 SORETHROAT: Status: ACTIVE | Noted: 2017-11-07

## 2017-11-07 LAB
ANION GAP SERPL CALC-SCNC: 7 MMOL/L (ref 0–11.9)
ANISOCYTOSIS BLD QL SMEAR: ABNORMAL
BASOPHILS # BLD AUTO: 1.8 % (ref 0–1.8)
BASOPHILS # BLD: 0.07 K/UL (ref 0–0.12)
BUN SERPL-MCNC: 8 MG/DL (ref 8–22)
CALCIUM SERPL-MCNC: 9.8 MG/DL (ref 8.5–10.5)
CHLORIDE SERPL-SCNC: 102 MMOL/L (ref 96–112)
CO2 SERPL-SCNC: 24 MMOL/L (ref 20–33)
CREAT SERPL-MCNC: 0.49 MG/DL (ref 0.5–1.4)
EOSINOPHIL # BLD AUTO: 0.17 K/UL (ref 0–0.51)
EOSINOPHIL NFR BLD: 4.5 % (ref 0–6.9)
ERYTHROCYTE [DISTWIDTH] IN BLOOD BY AUTOMATED COUNT: 46 FL (ref 35.9–50)
GFR SERPL CREATININE-BSD FRML MDRD: >60 ML/MIN/1.73 M 2
GLUCOSE SERPL-MCNC: 87 MG/DL (ref 65–99)
HCT VFR BLD AUTO: 32.6 % (ref 42–52)
HGB BLD-MCNC: 11.5 G/DL (ref 14–18)
LG PLATELETS BLD QL SMEAR: NORMAL
LYMPHOCYTES # BLD AUTO: 1.35 K/UL (ref 1–4.8)
LYMPHOCYTES NFR BLD: 36.6 % (ref 22–41)
MACROCYTES BLD QL SMEAR: ABNORMAL
MANUAL DIFF BLD: NORMAL
MCH RBC QN AUTO: 34.4 PG (ref 27–33)
MCHC RBC AUTO-ENTMCNC: 35.3 G/DL (ref 33.7–35.3)
MCV RBC AUTO: 97.6 FL (ref 81.4–97.8)
METAMYELOCYTES NFR BLD MANUAL: 1.8 %
MONOCYTES # BLD AUTO: 0.46 K/UL (ref 0–0.85)
MONOCYTES NFR BLD AUTO: 12.5 % (ref 0–13.4)
MORPHOLOGY BLD-IMP: NORMAL
NEUTROPHILS # BLD AUTO: 1.58 K/UL (ref 1.82–7.42)
NEUTROPHILS NFR BLD: 42.8 % (ref 44–72)
NRBC # BLD AUTO: 0.02 K/UL
NRBC BLD AUTO-RTO: 0.5 /100 WBC
PLATELET # BLD AUTO: 223 K/UL (ref 164–446)
PLATELET BLD QL SMEAR: NORMAL
PMV BLD AUTO: 9.2 FL (ref 9–12.9)
POIKILOCYTOSIS BLD QL SMEAR: NORMAL
POTASSIUM SERPL-SCNC: 4.3 MMOL/L (ref 3.6–5.5)
RBC # BLD AUTO: 3.34 M/UL (ref 4.7–6.1)
RBC BLD AUTO: PRESENT
SODIUM SERPL-SCNC: 133 MMOL/L (ref 135–145)
WBC # BLD AUTO: 3.7 K/UL (ref 4.8–10.8)

## 2017-11-07 PROCEDURE — 700102 HCHG RX REV CODE 250 W/ 637 OVERRIDE(OP): Performed by: STUDENT IN AN ORGANIZED HEALTH CARE EDUCATION/TRAINING PROGRAM

## 2017-11-07 PROCEDURE — 700102 HCHG RX REV CODE 250 W/ 637 OVERRIDE(OP): Performed by: INTERNAL MEDICINE

## 2017-11-07 PROCEDURE — A9270 NON-COVERED ITEM OR SERVICE: HCPCS | Performed by: STUDENT IN AN ORGANIZED HEALTH CARE EDUCATION/TRAINING PROGRAM

## 2017-11-07 PROCEDURE — 77386 HCHG IMRT DELIVERY COMPLEX: CPT | Performed by: RADIOLOGY

## 2017-11-07 PROCEDURE — 99231 SBSQ HOSP IP/OBS SF/LOW 25: CPT | Performed by: INTERNAL MEDICINE

## 2017-11-07 PROCEDURE — 700111 HCHG RX REV CODE 636 W/ 250 OVERRIDE (IP): Performed by: STUDENT IN AN ORGANIZED HEALTH CARE EDUCATION/TRAINING PROGRAM

## 2017-11-07 PROCEDURE — 80048 BASIC METABOLIC PNL TOTAL CA: CPT

## 2017-11-07 PROCEDURE — 770004 HCHG ROOM/CARE - ONCOLOGY PRIVATE *

## 2017-11-07 PROCEDURE — 77014 PR CT GUIDANCE PLACEMENT RAD THERAPY FIELDS: CPT | Mod: 26,XE | Performed by: RADIOLOGY

## 2017-11-07 PROCEDURE — 77386 HCHG IMRT DELIVERY COMPLEX: CPT | Mod: 59 | Performed by: RADIOLOGY

## 2017-11-07 PROCEDURE — A9270 NON-COVERED ITEM OR SERVICE: HCPCS | Performed by: INTERNAL MEDICINE

## 2017-11-07 PROCEDURE — 85027 COMPLETE CBC AUTOMATED: CPT

## 2017-11-07 PROCEDURE — 85007 BL SMEAR W/DIFF WBC COUNT: CPT

## 2017-11-07 RX ORDER — TRAMADOL HYDROCHLORIDE 50 MG/1
50 TABLET ORAL EVERY 6 HOURS PRN
Status: DISCONTINUED | OUTPATIENT
Start: 2017-11-07 | End: 2017-11-17 | Stop reason: HOSPADM

## 2017-11-07 RX ADMIN — MIDODRINE HYDROCHLORIDE 5 MG: 5 TABLET ORAL at 18:00

## 2017-11-07 RX ADMIN — MIDODRINE HYDROCHLORIDE 5 MG: 5 TABLET ORAL at 08:56

## 2017-11-07 RX ADMIN — HEPARIN SODIUM 5000 UNITS: 5000 INJECTION, SOLUTION INTRAVENOUS; SUBCUTANEOUS at 20:52

## 2017-11-07 RX ADMIN — NICOTINE 14 MG: 14 PATCH, EXTENDED RELEASE TRANSDERMAL at 05:58

## 2017-11-07 RX ADMIN — TRAMADOL HYDROCHLORIDE 50 MG: 50 TABLET, COATED ORAL at 20:52

## 2017-11-07 RX ADMIN — FOLIC ACID 1 MG: 1 TABLET ORAL at 08:56

## 2017-11-07 RX ADMIN — MIDODRINE HYDROCHLORIDE 5 MG: 5 TABLET ORAL at 13:07

## 2017-11-07 RX ADMIN — MAGNESIUM HYDROXIDE 30 ML: 400 SUSPENSION ORAL at 13:07

## 2017-11-07 RX ADMIN — BENZOCAINE AND MENTHOL 1 LOZENGE: 15; 3.6 LOZENGE ORAL at 05:58

## 2017-11-07 RX ADMIN — ACETAMINOPHEN 650 MG: 325 TABLET, FILM COATED ORAL at 05:58

## 2017-11-07 RX ADMIN — HEPARIN SODIUM 5000 UNITS: 5000 INJECTION, SOLUTION INTRAVENOUS; SUBCUTANEOUS at 05:58

## 2017-11-07 RX ADMIN — THIAMINE HCL TAB 100 MG 100 MG: 100 TAB at 08:56

## 2017-11-07 RX ADMIN — BENZOCAINE AND MENTHOL 1 LOZENGE: 15; 3.6 LOZENGE ORAL at 20:52

## 2017-11-07 RX ADMIN — STANDARDIZED SENNA CONCENTRATE AND DOCUSATE SODIUM 2 TABLET: 8.6; 5 TABLET, FILM COATED ORAL at 08:56

## 2017-11-07 RX ADMIN — TRAMADOL HYDROCHLORIDE 50 MG: 50 TABLET, COATED ORAL at 13:07

## 2017-11-07 RX ADMIN — HEPARIN SODIUM 5000 UNITS: 5000 INJECTION, SOLUTION INTRAVENOUS; SUBCUTANEOUS at 14:46

## 2017-11-07 RX ADMIN — PHENOL 1 SPRAY: 1.5 LIQUID ORAL at 14:47

## 2017-11-07 RX ADMIN — THERA TABS 1 TABLET: TAB at 08:56

## 2017-11-07 RX ADMIN — ACETAMINOPHEN 325 MG: 325 TABLET, FILM COATED ORAL at 17:59

## 2017-11-07 RX ADMIN — VITAMIN D, TAB 1000IU (100/BT) 5000 UNITS: 25 TAB at 08:56

## 2017-11-07 ASSESSMENT — PAIN SCALES - GENERAL
PAINLEVEL_OUTOF10: 2
PAINLEVEL_OUTOF10: 5
PAINLEVEL_OUTOF10: 2
PAINLEVEL_OUTOF10: 2
PAINLEVEL_OUTOF10: 5
PAINLEVEL_OUTOF10: 0

## 2017-11-07 ASSESSMENT — ENCOUNTER SYMPTOMS
WHEEZING: 0
COUGH: 1
SHORTNESS OF BREATH: 0
NERVOUS/ANXIOUS: 0
DIARRHEA: 0
FEVER: 0
WEAKNESS: 0
HEADACHES: 0
MYALGIAS: 0
SPUTUM PRODUCTION: 0
SORE THROAT: 1
BLURRED VISION: 0
DOUBLE VISION: 0
EYE DISCHARGE: 0
VOMITING: 0
CHILLS: 0
HALLUCINATIONS: 0
NAUSEA: 0
TINGLING: 0
DIZZINESS: 0
PALPITATIONS: 0
FOCAL WEAKNESS: 0
CONSTIPATION: 0
ABDOMINAL PAIN: 0

## 2017-11-07 NOTE — CARE PLAN
Problem: Skin Integrity  Goal: Risk for impaired skin integrity will decrease  Outcome: PROGRESSING AS EXPECTED  Educated to turn self side to side. ambulatory    Problem: Pain Management  Goal: Pain level will decrease to patient's comfort goal  Outcome: PROGRESSING AS EXPECTED  Tylenol and throat lozanges

## 2017-11-07 NOTE — PROGRESS NOTES
Assessment completed, Pt A&Ox4. Pt up self, having pain in throat when swallowing, discussed further with [facility] residents for additional treatment options.. Right PICC with + blood return. Discussed POC, no questions at this time. Call light within reach, hourly rounding in place.

## 2017-11-07 NOTE — PROGRESS NOTES
Internal Medicine Interval Note  Note Author: Jeremy Choudhary M.D.     Name Froylan Spain     1960   Age/Sex 57 y.o. male   MRN [id number]   Code Status Full code     After 5PM or if no immediate response to page, please call for cross-coverage  Attending/Team: Meredith See Patient List for primary contact information  Call [phone] to page    1st Call - Day Intern (R1):   Vadim 2nd Call - Day Sr. Resident (R2/R3):   Criss Lynch         Reason for interval visit  (Principal Problem)   Small cell carcinoma of lung (CMS-HCC)    Interval Problem Daily Status Update  (24 hours)   - Still reporting sore throat and pain with swallowing  - Received doses 20 and 21 of radiation today  - Sodium 133, patient asymptomatic.   - WBC is 3.7.      Review of Systems   Constitutional: Negative for chills, fever and malaise/fatigue.   HENT: Positive for sore throat. Negative for congestion, hearing loss and nosebleeds.    Eyes: Negative for blurred vision, double vision and discharge.   Respiratory: Positive for cough. Negative for sputum production, shortness of breath and wheezing.    Cardiovascular: Negative for chest pain and palpitations.   Gastrointestinal: Negative for abdominal pain, constipation, diarrhea, nausea and vomiting.   Genitourinary: Negative for dysuria and urgency.   Musculoskeletal: Negative for joint pain and myalgias.   Skin: Negative for rash.   Neurological: Negative for dizziness, tingling, focal weakness, weakness and headaches.   Psychiatric/Behavioral: Negative for hallucinations. The patient is not nervous/anxious.        Consultants/Specialty  Hem/Onc  Radio/Onc    Disposition  Inpatient for radiation treatments    Quality Measures    Reviewed items::  Labs reviewed and Medications reviewed  Garvin catheter::  No Garvin  DVT prophylaxis pharmacological::  Heparin  Ulcer Prophylaxis::  Not indicated          Physical Exam       Vitals:    17 2033 17 0403 17  0752 11/07/17 1200   BP: 104/61 105/67 107/72    Pulse: 91 91 99    Resp: 18 16 16    Temp: 36.8 °C (98.3 °F) 36.8 °C (98.2 °F) 37.1 °C (98.7 °F)    SpO2: 97% 96% 94%    Weight:    58.1 kg (128 lb 1.4 oz)   Height:         Body mass index is 18.38 kg/m². Weight: 58.1 kg (128 lb 1.4 oz)  Oxygen Therapy:  Pulse Oximetry: 94 %, O2 Delivery: None (Room Air)    Physical Exam   Constitutional: He is oriented to person, place, and time. No distress.   cachectic   HENT:   Mouth/Throat: No oropharyngeal exudate.   Neck: Neck supple. No tracheal deviation present.   Scar healing well   Cardiovascular: Normal rate and regular rhythm.    No murmur heard.  Pulmonary/Chest: No respiratory distress. He has no wheezes. He has no rales. He exhibits no tenderness.   Abdominal: He exhibits no distension. There is no tenderness.   Musculoskeletal: He exhibits no edema.   Lymphadenopathy:     He has no cervical adenopathy.   Neurological: He is alert and oriented to person, place, and time. No cranial nerve deficit.   Skin: He is not diaphoretic.   Psychiatric: Affect normal.     Lab Data Review:   10/26/2017  11:40 AM    Recent Labs      11/05/17 0145 11/06/17 0001 11/07/17   0405   SODIUM  135  131*  133*   POTASSIUM  4.2  4.2  4.3   CHLORIDE  100  100  102   CO2  24  24  24   BUN  8  9  8   CREATININE  0.47*  0.53  0.49*   CALCIUM  9.5  9.8  9.8       Recent Labs      11/05/17 0145 11/06/17   0001  11/07/17   0405   ALTSGPT   --   8   --    ASTSGOT   --   11*   --    ALKPHOSPHAT   --   70   --    TBILIRUBIN   --   0.4   --    GLUCOSE  91  88  87       Recent Labs      11/05/17 0145 11/06/17   0001  11/07/17   0405   RBC  3.25*  3.17*  3.34*   HEMOGLOBIN  11.0*  10.8*  11.5*   HEMATOCRIT  31.3*  30.7*  32.6*   PLATELETCT  184  190  223       Recent Labs      11/05/17 0145 11/06/17   0001  11/07/17   0405   WBC  2.6*  7.6  3.7*   NEUTSPOLYS  37.40*  64.60  42.80*   LYMPHOCYTES  33.00  10.60*  36.60   MONOCYTES  11.30   7.10  12.50   EOSINOPHILS  13.00*  5.30  4.50   BASOPHILS  0.90  4.40*  1.80   ASTSGOT   --   11*   --    ALTSGPT   --   8   --    ALKPHOSPHAT   --   70   --    TBILIRUBIN   --   0.4   --            Assessment/Plan     * Small cell carcinoma of lung (CMS-HCC)- (present on admission)   Assessment & Plan    -Diagnosed by mediastinoscopy with biopsy of paratracheal mass (10/17) showing metastatic small cell carcinoma  - Staging workup including MRI Brain and CT Abdomen/Pelvis:  Unremarkable  - 10/21-10/23 Patient received chemotherapy. CISplatin on day 1 and etoposide on days 1-3 28 day cycle for 4-6 cycles. Next cycle due on 11/13  - 10/23 Radiation BID started, to be continued for 15 days.   - He is receiving doses 20 and 21 today  - WBC today 3.7. Will continue to monitor        Hyponatremia- (present on admission)   Assessment & Plan    - Na of 107 and AMS on admission   - SIADH due to small cell CA  - Sodium today 133. He is off fluid restriction  - Will continue to monitor              Sorethroat   Assessment & Plan    -Likely related to radiation treatments.  -No oral thrush.  -We'll start on Chloraseptic.  -We'll also add tramadol for pain as needed.        Body mass index (BMI) 19.9 or less, adult   Assessment & Plan    - Possibly 2/2 homelessness vs. malignancy  - Exhibits good appetite  - Nutrition following        Chronic alcohol use- (present on admission)   Assessment & Plan    - Consumed 1 beer daily   - BAL: 0.01 on admission  - Continue PO thiamine, B12 and folic acid supplements

## 2017-11-07 NOTE — PROGRESS NOTES
Received shift report from day shift RN. Pt is AO4 and declines any pain. Discussed POC. Assessed and administered evening medications. Bed in lowest position, call light and personal belongings within reach, educated to call if in need of assistance, non skid socks, room near nurses station,

## 2017-11-07 NOTE — CARE PLAN
Problem: Knowledge Deficit  Goal: Knowledge of disease process/condition, treatment plan, diagnostic tests, and medications will improve    Intervention: Assess knowledge level of disease process/condition, treatment plan, diagnostic tests, and medications  Discussed side effects of radiation as pertains to his throat pain      Problem: Pain Management  Goal: Pain level will decrease to patient's comfort goal    Intervention: Follow pain managment plan developed in collaboration with patient and Interdisciplinary Team  Spoke with resident about getting other options for pain management, tramadol ordered

## 2017-11-07 NOTE — PROGRESS NOTES
Internal Medicine Interval Note  Note Author: Trav Fierro M.D.     Name Froylan Spain     1960   Age/Sex 57 y.o. male   MRN [id number]   Code Status Full code     After 5PM or if no immediate response to page, please call for cross-coverage  Attending/Team: Meredith See Patient List for primary contact information  Call [phone] to page    1st Call - Day Intern (R1):   Vadim 2nd Call - Day Sr. Resident (R2/R3):   Criss Lynch         Reason for interval visit  (Principal Problem)   Small cell carcinoma of lung (CMS-HCC)    Interval Problem Daily Status Update  (24 hours)   - Reports sore throat. Nystatin started yesterday for thrush  - Receiving doses 19 and 20 of radiation today  - Sodium 131, patient asymptomatic. Will continue to monitor      Review of Systems   Constitutional: Negative for chills, fever and malaise/fatigue.   HENT: Positive for sore throat.    Eyes: Negative for blurred vision.   Respiratory: Positive for cough. Negative for sputum production and shortness of breath.    Cardiovascular: Negative for chest pain.   Gastrointestinal: Negative for abdominal pain, constipation, diarrhea, nausea and vomiting.   Genitourinary: Negative for dysuria.   Musculoskeletal: Negative for joint pain and myalgias.   Skin: Negative for rash.   Neurological: Negative for dizziness, weakness and headaches.   Psychiatric/Behavioral: Negative for hallucinations. The patient is not nervous/anxious.        Consultants/Specialty  Hem/Onc  Radio/Onc    Disposition  Inpatient for radiation treatments    Quality Measures    Reviewed items::  Labs reviewed and Medications reviewed  Garvin catheter::  No Garivn  DVT prophylaxis pharmacological::  Heparin  Ulcer Prophylaxis::  Not indicated          Physical Exam       Vitals:    17 0015 17 0510 17 0754 17 1549   BP: 100/65 110/82 110/70 103/76   Pulse: 91 99 100 (!) 101   Resp: 18 18 18 12   Temp: 36.5 °C (97.7 °F) 36.3 °C  (97.4 °F) 36.3 °C (97.3 °F) 36.7 °C (98.1 °F)   SpO2: 97% 96% 100% 99%   Weight:       Height:         Body mass index is 18.54 kg/m². Weight: 58.6 kg (129 lb 3 oz)  Oxygen Therapy:  Pulse Oximetry: 99 %, O2 Delivery: None (Room Air)    Physical Exam   Constitutional: He is oriented to person, place, and time. No distress.   cachectic   HENT:   Mouth/Throat: No oropharyngeal exudate.   Neck: Neck supple. No tracheal deviation present.   Scar healing well   Cardiovascular: Normal rate and regular rhythm.    No murmur heard.  Pulmonary/Chest: No respiratory distress. He has no wheezes. He has no rales. He exhibits no tenderness.   Abdominal: He exhibits no distension. There is no tenderness.   Musculoskeletal: He exhibits no edema.   Lymphadenopathy:     He has no cervical adenopathy.   Neurological: He is alert and oriented to person, place, and time. No cranial nerve deficit.   Skin: He is not diaphoretic.   Psychiatric: Affect normal.     Lab Data Review:   10/26/2017  11:40 AM    Recent Labs      11/04/17 0535 11/05/17 0145 11/06/17   0001   SODIUM  131*  135  131*   POTASSIUM  4.1  4.2  4.2   CHLORIDE  101  100  100   CO2  23  24  24   BUN  10  8  9   CREATININE  0.42*  0.47*  0.53   CALCIUM  9.4  9.5  9.8       Recent Labs      11/04/17 0535 11/05/17 0145  11/06/17   0001   ALTSGPT   --    --   8   ASTSGOT   --    --   11*   ALKPHOSPHAT   --    --   70   TBILIRUBIN   --    --   0.4   GLUCOSE  86  91  88       Recent Labs      11/04/17   0535  11/05/17 0145  11/06/17   0001   RBC  3.26*  3.25*  3.17*   HEMOGLOBIN  11.0*  11.0*  10.8*   HEMATOCRIT  31.2*  31.3*  30.7*   PLATELETCT  186  184  190       Recent Labs      11/04/17 0535 11/05/17 0145  11/06/17   0001   WBC  5.0  2.6*  7.6   NEUTSPOLYS  75.20*  37.40*  64.60   LYMPHOCYTES  10.60*  33.00  10.60*   MONOCYTES  8.00  11.30  7.10   EOSINOPHILS  1.80  13.00*  5.30   BASOPHILS  0.00  0.90  4.40*   ASTSGOT   --    --   11*   ALTSGPT   --    --    8   ALKPHOSPHAT   --    --   70   TBILIRUBIN   --    --   0.4           Assessment/Plan     * Small cell carcinoma of lung (CMS-HCC)- (present on admission)   Assessment & Plan    -Diagnosed by mediastinoscopy with biopsy of paratracheal mass (10/17) showing metastatic small cell carcinoma  - Staging workup including MRI Brain and CT Abdomen/Pelvis:  Unremarkable  - 10/21-10/23 Patient received chemotherapy. CISplatin on day 1 and etoposide on days 1-3 28 day cycle for 4-6 cycles. Next cycle due on 11/13  - 10/23 Radiation BID started, to be continued for 15 days.   - He is receiving doses 19 and 20 today  - WBC today improved to 7.6 after receiving filgrastim yesterday. 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 He is off fluid restriction  - Will continue to monitor              Body mass index (BMI) 19.9 or less, adult   Assessment & Plan    - Possibly 2/2 homelessness vs. malignancy  - Exhibits good appetite  - Nutrition following        Chronic alcohol use- (present on admission)   Assessment & Plan    - Consumed 1 beer daily   - BAL: 0.01 on admission  - Continue PO thiamine, B12 and folic acid supplements

## 2017-11-07 NOTE — ASSESSMENT & PLAN NOTE
-Likely related to radiation treatments  -Continue magic mouthwash and chloraseptic  -Continue tramadol for pain as needed

## 2017-11-07 NOTE — PROGRESS NOTES
Patient was transported to our department for radiation therapy treatment number 22 of 30 to his Lung. We plan on treating the patient again tomorrow morning and afternoon (BID, 7:30am and 1:30pm).

## 2017-11-08 LAB
ANION GAP SERPL CALC-SCNC: 7 MMOL/L (ref 0–11.9)
ANISOCYTOSIS BLD QL SMEAR: ABNORMAL
BASOPHILS # BLD AUTO: 0.9 % (ref 0–1.8)
BASOPHILS # BLD: 0.03 K/UL (ref 0–0.12)
BUN SERPL-MCNC: 10 MG/DL (ref 8–22)
BURR CELLS BLD QL SMEAR: NORMAL
CALCIUM SERPL-MCNC: 9.7 MG/DL (ref 8.5–10.5)
CHLORIDE SERPL-SCNC: 101 MMOL/L (ref 96–112)
CO2 SERPL-SCNC: 25 MMOL/L (ref 20–33)
CREAT SERPL-MCNC: 0.52 MG/DL (ref 0.5–1.4)
EOSINOPHIL # BLD AUTO: 0.18 K/UL (ref 0–0.51)
EOSINOPHIL NFR BLD: 5.2 % (ref 0–6.9)
ERYTHROCYTE [DISTWIDTH] IN BLOOD BY AUTOMATED COUNT: 46.6 FL (ref 35.9–50)
FUNGUS SPEC CULT: ABNORMAL
FUNGUS SPEC CULT: ABNORMAL
GFR SERPL CREATININE-BSD FRML MDRD: >60 ML/MIN/1.73 M 2
GLUCOSE SERPL-MCNC: 87 MG/DL (ref 65–99)
HCT VFR BLD AUTO: 32.8 % (ref 42–52)
HGB BLD-MCNC: 11.6 G/DL (ref 14–18)
LYMPHOCYTES # BLD AUTO: 1.09 K/UL (ref 1–4.8)
LYMPHOCYTES NFR BLD: 32.2 % (ref 22–41)
MACROCYTES BLD QL SMEAR: ABNORMAL
MANUAL DIFF BLD: NORMAL
MCH RBC QN AUTO: 34.3 PG (ref 27–33)
MCHC RBC AUTO-ENTMCNC: 35.4 G/DL (ref 33.7–35.3)
MCV RBC AUTO: 97 FL (ref 81.4–97.8)
METAMYELOCYTES NFR BLD MANUAL: 0.9 %
MONOCYTES # BLD AUTO: 0.44 K/UL (ref 0–0.85)
MONOCYTES NFR BLD AUTO: 13 % (ref 0–13.4)
MORPHOLOGY BLD-IMP: NORMAL
MYELOCYTES NFR BLD MANUAL: 0.9 %
NEUTROPHILS # BLD AUTO: 1.54 K/UL (ref 1.82–7.42)
NEUTROPHILS NFR BLD: 45.2 % (ref 44–72)
NRBC # BLD AUTO: 0 K/UL
NRBC BLD AUTO-RTO: 0 /100 WBC
OVALOCYTES BLD QL SMEAR: NORMAL
PLATELET # BLD AUTO: 204 K/UL (ref 164–446)
PLATELET BLD QL SMEAR: NORMAL
PMV BLD AUTO: 9 FL (ref 9–12.9)
POIKILOCYTOSIS BLD QL SMEAR: NORMAL
POTASSIUM SERPL-SCNC: 4.4 MMOL/L (ref 3.6–5.5)
PROMYELOCYTES NFR BLD MANUAL: 1.7 %
RBC # BLD AUTO: 3.38 M/UL (ref 4.7–6.1)
RBC BLD AUTO: PRESENT
SIGNIFICANT IND 70042: ABNORMAL
SITE SITE: ABNORMAL
SODIUM SERPL-SCNC: 133 MMOL/L (ref 135–145)
SOURCE SOURCE: ABNORMAL
VARIANT LYMPHS BLD QL SMEAR: NORMAL
WBC # BLD AUTO: 3.4 K/UL (ref 4.8–10.8)

## 2017-11-08 PROCEDURE — 77014 PR CT GUIDANCE PLACEMENT RAD THERAPY FIELDS: CPT | Mod: 26,XE | Performed by: RADIOLOGY

## 2017-11-08 PROCEDURE — A9270 NON-COVERED ITEM OR SERVICE: HCPCS | Performed by: STUDENT IN AN ORGANIZED HEALTH CARE EDUCATION/TRAINING PROGRAM

## 2017-11-08 PROCEDURE — 700102 HCHG RX REV CODE 250 W/ 637 OVERRIDE(OP): Performed by: INTERNAL MEDICINE

## 2017-11-08 PROCEDURE — 700102 HCHG RX REV CODE 250 W/ 637 OVERRIDE(OP): Performed by: STUDENT IN AN ORGANIZED HEALTH CARE EDUCATION/TRAINING PROGRAM

## 2017-11-08 PROCEDURE — 77386 HCHG IMRT DELIVERY COMPLEX: CPT | Performed by: RADIOLOGY

## 2017-11-08 PROCEDURE — 85007 BL SMEAR W/DIFF WBC COUNT: CPT

## 2017-11-08 PROCEDURE — 85027 COMPLETE CBC AUTOMATED: CPT

## 2017-11-08 PROCEDURE — 99231 SBSQ HOSP IP/OBS SF/LOW 25: CPT | Performed by: INTERNAL MEDICINE

## 2017-11-08 PROCEDURE — 77336 RADIATION PHYSICS CONSULT: CPT | Performed by: RADIOLOGY

## 2017-11-08 PROCEDURE — 700111 HCHG RX REV CODE 636 W/ 250 OVERRIDE (IP): Performed by: STUDENT IN AN ORGANIZED HEALTH CARE EDUCATION/TRAINING PROGRAM

## 2017-11-08 PROCEDURE — 80048 BASIC METABOLIC PNL TOTAL CA: CPT

## 2017-11-08 PROCEDURE — A9270 NON-COVERED ITEM OR SERVICE: HCPCS | Performed by: INTERNAL MEDICINE

## 2017-11-08 PROCEDURE — 770004 HCHG ROOM/CARE - ONCOLOGY PRIVATE *

## 2017-11-08 RX ADMIN — PHENOL 1 SPRAY: 1.5 LIQUID ORAL at 17:56

## 2017-11-08 RX ADMIN — HEPARIN SODIUM 5000 UNITS: 5000 INJECTION, SOLUTION INTRAVENOUS; SUBCUTANEOUS at 16:22

## 2017-11-08 RX ADMIN — STANDARDIZED SENNA CONCENTRATE AND DOCUSATE SODIUM 2 TABLET: 8.6; 5 TABLET, FILM COATED ORAL at 08:25

## 2017-11-08 RX ADMIN — VITAMIN D, TAB 1000IU (100/BT) 5000 UNITS: 25 TAB at 08:25

## 2017-11-08 RX ADMIN — HEPARIN SODIUM 5000 UNITS: 5000 INJECTION, SOLUTION INTRAVENOUS; SUBCUTANEOUS at 21:58

## 2017-11-08 RX ADMIN — THERA TABS 1 TABLET: TAB at 08:25

## 2017-11-08 RX ADMIN — HEPARIN SODIUM 5000 UNITS: 5000 INJECTION, SOLUTION INTRAVENOUS; SUBCUTANEOUS at 05:12

## 2017-11-08 RX ADMIN — MIDODRINE HYDROCHLORIDE 5 MG: 5 TABLET ORAL at 17:51

## 2017-11-08 RX ADMIN — TRAMADOL HYDROCHLORIDE 50 MG: 50 TABLET, COATED ORAL at 08:30

## 2017-11-08 RX ADMIN — PHENOL 1 SPRAY: 1.5 LIQUID ORAL at 08:30

## 2017-11-08 RX ADMIN — BENZOCAINE AND MENTHOL 1 LOZENGE: 15; 3.6 LOZENGE ORAL at 08:30

## 2017-11-08 RX ADMIN — MIDODRINE HYDROCHLORIDE 5 MG: 5 TABLET ORAL at 11:56

## 2017-11-08 RX ADMIN — NICOTINE 14 MG: 14 PATCH, EXTENDED RELEASE TRANSDERMAL at 05:11

## 2017-11-08 RX ADMIN — MAGNESIUM HYDROXIDE 30 ML: 400 SUSPENSION ORAL at 11:56

## 2017-11-08 RX ADMIN — BENZOCAINE AND MENTHOL 1 LOZENGE: 15; 3.6 LOZENGE ORAL at 12:00

## 2017-11-08 RX ADMIN — BENZOCAINE AND MENTHOL 1 LOZENGE: 15; 3.6 LOZENGE ORAL at 17:55

## 2017-11-08 RX ADMIN — THIAMINE HCL TAB 100 MG 100 MG: 100 TAB at 08:25

## 2017-11-08 RX ADMIN — TRAMADOL HYDROCHLORIDE 50 MG: 50 TABLET, COATED ORAL at 17:55

## 2017-11-08 RX ADMIN — FOLIC ACID 1 MG: 1 TABLET ORAL at 08:25

## 2017-11-08 RX ADMIN — MIDODRINE HYDROCHLORIDE 5 MG: 5 TABLET ORAL at 08:25

## 2017-11-08 ASSESSMENT — PAIN SCALES - GENERAL
PAINLEVEL_OUTOF10: 0
PAINLEVEL_OUTOF10: 2
PAINLEVEL_OUTOF10: 5
PAINLEVEL_OUTOF10: 2
PAINLEVEL_OUTOF10: 5
PAINLEVEL_OUTOF10: 0

## 2017-11-08 ASSESSMENT — ENCOUNTER SYMPTOMS
BLURRED VISION: 0
NERVOUS/ANXIOUS: 0
HALLUCINATIONS: 0
NAUSEA: 0
MYALGIAS: 0
FEVER: 0
SPUTUM PRODUCTION: 0
FOCAL WEAKNESS: 0
DIARRHEA: 0
TINGLING: 0
VOMITING: 0
CONSTIPATION: 0
DIZZINESS: 0
SORE THROAT: 1
WEAKNESS: 0
EYE DISCHARGE: 0
DOUBLE VISION: 0
CHILLS: 0
WHEEZING: 0
COUGH: 1
PALPITATIONS: 0
ABDOMINAL PAIN: 0
SHORTNESS OF BREATH: 0
HEADACHES: 0

## 2017-11-08 NOTE — CARE PLAN
Problem: Skin Integrity  Goal: Risk for impaired skin integrity will decrease  Outcome: PROGRESSING AS EXPECTED  Educated to turn self side to side. Pt ambulatory    Problem: Pain Management  Goal: Pain level will decrease to patient's comfort goal  Outcome: PROGRESSING AS EXPECTED  Rest, repositioned, and medicated per MAR

## 2017-11-08 NOTE — PROGRESS NOTES
Bedside report received from noc RN, assumed pt care, pt medicated per MAR, PICC +blood return, pt c/o sore throat medicated appropriately, pt ambulates steady gait up independently, reviewed POC with patient and agreeable, pt down to radiation this morning and is expected to go down again this afternoon, bed in low position, call light and personal belongings within reach, hourly rounding in place, pt needs met.

## 2017-11-08 NOTE — PROGRESS NOTES
Patient was transported to our department for radiation therapy treatment number 23 of 30 to his thorax. We plan on treating the patient again at 1:30pm (Tx 24 of 30, BID) and twice Thursday, 11/9/17, (BID).

## 2017-11-08 NOTE — PROGRESS NOTES
Received shift report from day shift RN. Pt is AO4 and reports pain in his throat, rest, repositioned, and medicated per MAR. Discussed POC. Assessed and administered evening medications. Pt upself with steady gait, bed in lowest position, call light and personal belongings within reach, educated to call if in need of assistance, non skid socks, all needs met at this time.

## 2017-11-09 LAB
ANION GAP SERPL CALC-SCNC: 7 MMOL/L (ref 0–11.9)
ANISOCYTOSIS BLD QL SMEAR: ABNORMAL
BASOPHILS # BLD AUTO: 0.9 % (ref 0–1.8)
BASOPHILS # BLD: 0.02 K/UL (ref 0–0.12)
BUN SERPL-MCNC: 9 MG/DL (ref 8–22)
CALCIUM SERPL-MCNC: 9.4 MG/DL (ref 8.5–10.5)
CHLORIDE SERPL-SCNC: 100 MMOL/L (ref 96–112)
CO2 SERPL-SCNC: 25 MMOL/L (ref 20–33)
CREAT SERPL-MCNC: 0.51 MG/DL (ref 0.5–1.4)
EOSINOPHIL # BLD AUTO: 0.16 K/UL (ref 0–0.51)
EOSINOPHIL NFR BLD: 7 % (ref 0–6.9)
ERYTHROCYTE [DISTWIDTH] IN BLOOD BY AUTOMATED COUNT: 46.7 FL (ref 35.9–50)
GFR SERPL CREATININE-BSD FRML MDRD: >60 ML/MIN/1.73 M 2
GLUCOSE SERPL-MCNC: 91 MG/DL (ref 65–99)
HCT VFR BLD AUTO: 32.4 % (ref 42–52)
HGB BLD-MCNC: 11.3 G/DL (ref 14–18)
LYMPHOCYTES # BLD AUTO: 1.01 K/UL (ref 1–4.8)
LYMPHOCYTES NFR BLD: 43.9 % (ref 22–41)
MACROCYTES BLD QL SMEAR: ABNORMAL
MANUAL DIFF BLD: NORMAL
MCH RBC QN AUTO: 33.7 PG (ref 27–33)
MCHC RBC AUTO-ENTMCNC: 34.9 G/DL (ref 33.7–35.3)
MCV RBC AUTO: 96.7 FL (ref 81.4–97.8)
METAMYELOCYTES NFR BLD MANUAL: 0.9 %
MONOCYTES # BLD AUTO: 0.52 K/UL (ref 0–0.85)
MONOCYTES NFR BLD AUTO: 22.8 % (ref 0–13.4)
MORPHOLOGY BLD-IMP: NORMAL
MYELOCYTES NFR BLD MANUAL: 1.7 %
NEUTROPHILS # BLD AUTO: 0.52 K/UL (ref 1.82–7.42)
NEUTROPHILS NFR BLD: 20.2 % (ref 44–72)
NEUTS BAND NFR BLD MANUAL: 2.6 % (ref 0–10)
NRBC # BLD AUTO: 0 K/UL
NRBC BLD AUTO-RTO: 0 /100 WBC
PLATELET # BLD AUTO: 236 K/UL (ref 164–446)
PLATELET BLD QL SMEAR: NORMAL
PMV BLD AUTO: 9 FL (ref 9–12.9)
POIKILOCYTOSIS BLD QL SMEAR: NORMAL
POTASSIUM SERPL-SCNC: 4.2 MMOL/L (ref 3.6–5.5)
RBC # BLD AUTO: 3.35 M/UL (ref 4.7–6.1)
RBC BLD AUTO: PRESENT
SODIUM SERPL-SCNC: 132 MMOL/L (ref 135–145)
WBC # BLD AUTO: 2.3 K/UL (ref 4.8–10.8)

## 2017-11-09 PROCEDURE — 700102 HCHG RX REV CODE 250 W/ 637 OVERRIDE(OP): Performed by: STUDENT IN AN ORGANIZED HEALTH CARE EDUCATION/TRAINING PROGRAM

## 2017-11-09 PROCEDURE — 770004 HCHG ROOM/CARE - ONCOLOGY PRIVATE *

## 2017-11-09 PROCEDURE — A9270 NON-COVERED ITEM OR SERVICE: HCPCS | Performed by: STUDENT IN AN ORGANIZED HEALTH CARE EDUCATION/TRAINING PROGRAM

## 2017-11-09 PROCEDURE — 77427 RADIATION TX MANAGEMENT X5: CPT | Performed by: RADIOLOGY

## 2017-11-09 PROCEDURE — 700111 HCHG RX REV CODE 636 W/ 250 OVERRIDE (IP): Performed by: STUDENT IN AN ORGANIZED HEALTH CARE EDUCATION/TRAINING PROGRAM

## 2017-11-09 PROCEDURE — 80048 BASIC METABOLIC PNL TOTAL CA: CPT

## 2017-11-09 PROCEDURE — 77014 PR CT GUIDANCE PLACEMENT RAD THERAPY FIELDS: CPT | Mod: 26,XE | Performed by: RADIOLOGY

## 2017-11-09 PROCEDURE — A9270 NON-COVERED ITEM OR SERVICE: HCPCS | Performed by: INTERNAL MEDICINE

## 2017-11-09 PROCEDURE — 77386 HCHG IMRT DELIVERY COMPLEX: CPT | Performed by: RADIOLOGY

## 2017-11-09 PROCEDURE — 700102 HCHG RX REV CODE 250 W/ 637 OVERRIDE(OP): Performed by: INTERNAL MEDICINE

## 2017-11-09 PROCEDURE — 700105 HCHG RX REV CODE 258: Performed by: STUDENT IN AN ORGANIZED HEALTH CARE EDUCATION/TRAINING PROGRAM

## 2017-11-09 PROCEDURE — 99231 SBSQ HOSP IP/OBS SF/LOW 25: CPT | Performed by: INTERNAL MEDICINE

## 2017-11-09 PROCEDURE — 85027 COMPLETE CBC AUTOMATED: CPT

## 2017-11-09 PROCEDURE — 85007 BL SMEAR W/DIFF WBC COUNT: CPT

## 2017-11-09 RX ADMIN — VITAMIN D, TAB 1000IU (100/BT) 5000 UNITS: 25 TAB at 08:28

## 2017-11-09 RX ADMIN — SODIUM CHLORIDE: 9 INJECTION, SOLUTION INTRAVENOUS at 02:44

## 2017-11-09 RX ADMIN — TRAMADOL HYDROCHLORIDE 50 MG: 50 TABLET, COATED ORAL at 07:23

## 2017-11-09 RX ADMIN — FOLIC ACID 1 MG: 1 TABLET ORAL at 08:28

## 2017-11-09 RX ADMIN — HEPARIN SODIUM 5000 UNITS: 5000 INJECTION, SOLUTION INTRAVENOUS; SUBCUTANEOUS at 16:08

## 2017-11-09 RX ADMIN — BENZOCAINE AND MENTHOL 1 LOZENGE: 15; 3.6 LOZENGE ORAL at 21:51

## 2017-11-09 RX ADMIN — PHENOL: 1.5 LIQUID ORAL at 21:51

## 2017-11-09 RX ADMIN — PHENOL 1 SPRAY: 1.5 LIQUID ORAL at 17:36

## 2017-11-09 RX ADMIN — HEPARIN SODIUM 5000 UNITS: 5000 INJECTION, SOLUTION INTRAVENOUS; SUBCUTANEOUS at 21:51

## 2017-11-09 RX ADMIN — TRAMADOL HYDROCHLORIDE 50 MG: 50 TABLET, COATED ORAL at 17:36

## 2017-11-09 RX ADMIN — THERA TABS 1 TABLET: TAB at 08:28

## 2017-11-09 RX ADMIN — NICOTINE 14 MG: 14 PATCH, EXTENDED RELEASE TRANSDERMAL at 06:16

## 2017-11-09 RX ADMIN — MIDODRINE HYDROCHLORIDE 5 MG: 5 TABLET ORAL at 08:28

## 2017-11-09 RX ADMIN — HEPARIN SODIUM 5000 UNITS: 5000 INJECTION, SOLUTION INTRAVENOUS; SUBCUTANEOUS at 06:16

## 2017-11-09 RX ADMIN — BENZOCAINE AND MENTHOL 1 LOZENGE: 15; 3.6 LOZENGE ORAL at 08:32

## 2017-11-09 RX ADMIN — MIDODRINE HYDROCHLORIDE 5 MG: 5 TABLET ORAL at 17:36

## 2017-11-09 RX ADMIN — BENZOCAINE AND MENTHOL 1 LOZENGE: 15; 3.6 LOZENGE ORAL at 07:24

## 2017-11-09 RX ADMIN — THIAMINE HCL TAB 100 MG 100 MG: 100 TAB at 08:28

## 2017-11-09 RX ADMIN — MAGNESIUM HYDROXIDE 30 ML: 400 SUSPENSION ORAL at 12:07

## 2017-11-09 RX ADMIN — PHENOL 1 SPRAY: 1.5 LIQUID ORAL at 07:23

## 2017-11-09 RX ADMIN — TRAMADOL HYDROCHLORIDE 50 MG: 50 TABLET, COATED ORAL at 12:07

## 2017-11-09 RX ADMIN — MIDODRINE HYDROCHLORIDE 5 MG: 5 TABLET ORAL at 12:07

## 2017-11-09 RX ADMIN — PHENOL 1 SPRAY: 1.5 LIQUID ORAL at 08:32

## 2017-11-09 RX ADMIN — BENZOCAINE AND MENTHOL 1 LOZENGE: 15; 3.6 LOZENGE ORAL at 17:36

## 2017-11-09 ASSESSMENT — ENCOUNTER SYMPTOMS
WHEEZING: 0
PALPITATIONS: 0
DOUBLE VISION: 0
FOCAL WEAKNESS: 0
HEADACHES: 0
SORE THROAT: 1
VOMITING: 0
DIZZINESS: 0
NERVOUS/ANXIOUS: 0
CHILLS: 0
HALLUCINATIONS: 0
CONSTIPATION: 0
SPUTUM PRODUCTION: 0
BLURRED VISION: 0
TINGLING: 0
NAUSEA: 0
SHORTNESS OF BREATH: 0
DIARRHEA: 0
COUGH: 1
FEVER: 0
EYE DISCHARGE: 0
ABDOMINAL PAIN: 0
MYALGIAS: 0
WEAKNESS: 0

## 2017-11-09 ASSESSMENT — PAIN SCALES - GENERAL
PAINLEVEL_OUTOF10: 5
PAINLEVEL_OUTOF10: 0
PAINLEVEL_OUTOF10: 5
PAINLEVEL_OUTOF10: 4

## 2017-11-09 NOTE — PROGRESS NOTES
Janet from Lab called with critical result of WBC 2.3 at 0400. Critical lab result read back to Nettie.   This critical lab result is within parameters established by Dr.[facility] for this patient

## 2017-11-09 NOTE — DISCHARGE PLANNING
Received voicemail from Chelsea (cell [phone]) with [facility] wanting to know what the skilled need is. Notified MARIAJOSE Cain via voicemail. Referral sent to [facility] at [phone].

## 2017-11-09 NOTE — DISCHARGE PLANNING
Received choice form from MARIAJOSE Cain for SNF with ARLIN form [facility]. Referral sent to #1 choice [facility] at 1201.

## 2017-11-09 NOTE — DISCHARGE PLANNING
"Medical SW    Referral: Sw received call from [facility] supervisor Wednesday 11/9/17. She indicates pt will be placed in SNF w/ ARLIN from [facility].    Intervention: Sw met w/ bedside RN who indicated she would ask [facility] Docs to please complete order for SNF.     Sw met w/ pt at bedside. Sw answered many questions. Sw acquired completed choice form for blanket SNF referrals to all contracted SNFs in [facility]. Pt referred to MIREYA Bermudez for support w/ phone, SSD ect.     Shawn faxed to [facility]. Sw received fax confirmation. Shawn spoke to Gen explaining above.     Shawn received VM from Gen. Chelsea w/ [facility] is asking how pt qualifies for skilled need and d/c to SNF. Shawn business skyped Gen that this Sw included a starred note at the top of the faxed choice form that indicates \"pt's [facility] INS will be paying for stay w/ an ARLIN.\"       Plan: Sw to assist w/ d/c planning as needed.    "

## 2017-11-09 NOTE — PROGRESS NOTES
Assumed care of pt @ 0700.  POC discussed w/ night nurse and pt, daily XRT bid, waiting for next chemo cycle to begin, d/c to SNF after next chemo cycle, pain control, call for assistance; pt in agreement w/ goals.  Pt AAOx4, VSS, c/o 4/10 pain from throat, pt given tramadol throat spray and lozenges for pain.  Pt's skin assessed left shin and sacral abrasions, incisions to upper back and neck, all healing well, otherwise CDI.  Pt educated re: increased fall risk, use of call light, hourly rounding, and pain scale; pt verbalizes his understanding.  Personal possessions and call light w/n reach, bed in lowest position.  Bed alarm off, pt up self, calls appropriately.

## 2017-11-09 NOTE — PROGRESS NOTES
Internal Medicine Interval Note  Note Author: Trav Fierro M.D.     Name Froylan Spain     1960   Age/Sex 57 y.o. male   MRN [id number]   Code Status Full code     After 5PM or if no immediate response to page, please call for cross-coverage  Attending/Team: Meredith See Patient List for primary contact information  Call [phone] to page    1st Call - Day Intern (R1):   Vadim 2nd Call - Day Sr. Resident (R2/R3):   Criss Lynch         Reason for interval visit  (Principal Problem)   Small cell carcinoma of lung (CMS-HCC)    Interval Problem Daily Status Update  (24 hours)   - Still reporting sore throat and pain with swallowing  - Receiving doses 23 and 24 of radiation today (total 30)  - Sodium 133, patient asymptomatic.   - WBC is 3.4      Review of Systems   Constitutional: Negative for chills, fever and malaise/fatigue.   HENT: Positive for sore throat. Negative for congestion, hearing loss and nosebleeds.    Eyes: Negative for blurred vision, double vision and discharge.   Respiratory: Positive for cough. Negative for sputum production, shortness of breath and wheezing.    Cardiovascular: Negative for chest pain and palpitations.   Gastrointestinal: Negative for abdominal pain, constipation, diarrhea, nausea and vomiting.   Genitourinary: Negative for dysuria and urgency.   Musculoskeletal: Negative for joint pain and myalgias.   Skin: Negative for rash.   Neurological: Negative for dizziness, tingling, focal weakness, weakness and headaches.   Psychiatric/Behavioral: Negative for hallucinations. The patient is not nervous/anxious.        Consultants/Specialty  Hem/Onc  Radio/Onc    Disposition  Inpatient for radiation treatments    Quality Measures    Reviewed items::  Labs reviewed and Medications reviewed  Garvin catheter::  No Garvin  DVT prophylaxis pharmacological::  Heparin  Ulcer Prophylaxis::  Not indicated          Physical Exam       Vitals:    17 2329 17 0517  11/08/17 0800 11/08/17 1535   BP: 112/70 (!) 99/70 105/72 101/60   Pulse: 97 98 (!) 106 98   Resp: 18 18 18 16   Temp: 36.3 °C (97.3 °F) 36.2 °C (97.2 °F) 36.7 °C (98.1 °F) 37.2 °C (98.9 °F)   SpO2: 98% 97% 94% 100%   Weight:       Height:         Body mass index is 18.38 kg/m².    Oxygen Therapy:  Pulse Oximetry: 100 %, O2 Delivery: None (Room Air)    Physical Exam   Constitutional: He is oriented to person, place, and time. No distress.   cachectic   HENT:   Mouth/Throat: No oropharyngeal exudate.   Neck: Neck supple. No tracheal deviation present.   Scar healing well   Cardiovascular: Normal rate and regular rhythm.    No murmur heard.  Pulmonary/Chest: No respiratory distress. He has no wheezes. He has no rales. He exhibits no tenderness.   Abdominal: He exhibits no distension. There is no tenderness.   Musculoskeletal: He exhibits no edema.   Lymphadenopathy:     He has no cervical adenopathy.   Neurological: He is alert and oriented to person, place, and time. No cranial nerve deficit.   Skin: He is not diaphoretic.   Psychiatric: Affect normal.     Lab Data Review:   10/26/2017  11:40 AM    Recent Labs      11/06/17   0001  11/07/17 0405 11/08/17 0515   SODIUM  131*  133*  133*   POTASSIUM  4.2  4.3  4.4   CHLORIDE  100  102  101   CO2  24  24  25   BUN  9  8  10   CREATININE  0.53  0.49*  0.52   CALCIUM  9.8  9.8  9.7       Recent Labs      11/06/17   0001  11/07/17 0405  11/08/17   0515   ALTSGPT  8   --    --    ASTSGOT  11*   --    --    ALKPHOSPHAT  70   --    --    TBILIRUBIN  0.4   --    --    GLUCOSE  88  87  87       Recent Labs      11/06/17   0001  11/07/17 0405 11/08/17   0515   RBC  3.17*  3.34*  3.38*   HEMOGLOBIN  10.8*  11.5*  11.6*   HEMATOCRIT  30.7*  32.6*  32.8*   PLATELETCT  190  223  204       Recent Labs      11/06/17   0001  11/07/17   0405  11/08/17   0515   WBC  7.6  3.7*  3.4*   NEUTSPOLYS  64.60  42.80*  45.20   LYMPHOCYTES  10.60*  36.60  32.20   MONOCYTES  7.10  12.50   13.00   EOSINOPHILS  5.30  4.50  5.20   BASOPHILS  4.40*  1.80  0.90   ASTSGOT  11*   --    --    ALTSGPT  8   --    --    ALKPHOSPHAT  70   --    --    TBILIRUBIN  0.4   --    --            Assessment/Plan     * Small cell carcinoma of lung (CMS-HCC)- (present on admission)   Assessment & Plan    -Diagnosed by mediastinoscopy with biopsy of paratracheal mass (10/17) showing metastatic small cell carcinoma  - Staging workup including MRI Brain and CT Abdomen/Pelvis:  Unremarkable  - 10/21-10/23 Patient received chemotherapy. CISplatin on day 1 and etoposide on days 1-3 28 day cycle for 4-6 cycles. Next cycle due on 11/13  - 10/23 Radiation BID started, to be continued for 15 days.   - He is receiving doses 23 and 24 today  - WBC today 3.4. 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 He is off fluid restriction  - Will continue to monitor              Sorethroat   Assessment & Plan    -Likely related to radiation treatments  -No oral thrush.  -We'll start on Chloraseptic.  -We'll also add tramadol for pain as needed.        Body mass index (BMI) 19.9 or less, adult   Assessment & Plan    - Possibly 2/2 homelessness vs. malignancy  - Exhibits good appetite  - Nutrition following        Chronic alcohol use- (present on admission)   Assessment & Plan    - Consumed 1 beer daily   - BAL: 0.01 on admission  - Continue PO thiamine, B12 and folic acid supplements

## 2017-11-09 NOTE — CARE PLAN
Problem: Infection  Goal: Will remain free from infection    Intervention: Implement standard precautions and perform hand washing before and after patient contact  Standard precautions in place. Hand hygiene performed before and after pt care.       Problem: Mobility  Goal: Risk for activity intolerance will decrease    Intervention: Assess and monitor signs of activity intolerance  Pt is up self. Pt encouraged to use call light if assistance is needed with ambulation.

## 2017-11-09 NOTE — DISCHARGE PLANNING
Medical SW    Referral: Sw [facility]. She will support pt to d/c. She will acquire SSD income, MTM transport, temporary GH, and he already has SNAP via [employer].    Intervention: Sw advised later by CM MARCIA w/ [facility], Indira Murillo pt will actually need SNF due to higher medical needs and the length of his medical care. [facility] will complete an ARLIN w/ any of their contracted SNFs including: [facility], and [facility]. She or CM MARCIA Patino would like to call the SNF once the referral is made so they will understand they will pay for him to stay there w/o skilled need of rehab. She would support SNF transfer following end of radiation and chemo. This appears to possibly be after 11/16 per EPIC.    Sw spoke to bedside MARCIA William. She will advise [facility] doctors if pt is medically cleared once radiation and chemo are completed by 11/16, pt can d/c to SNF w/ ARLIN from [facility] in place. RN will advise [facility] if they agree, Sw will need SNF order.      Plan: Sw to assist w/ d/c planning as needed.

## 2017-11-09 NOTE — PROGRESS NOTES
Received report and assumed patient care at change of shift. Patient is resting in bed, A&Ox4. Patient reports some throat discomfort at this time but declines medication. Pt has a PICC R, double lumen.   Plan of care discussed, questions answered. Bed is in the lowest position and locked, call light within reach, non-skid socks in place, hourly rounding. Patient reports no further needs and this time.

## 2017-11-09 NOTE — PROGRESS NOTES
Patient was transported to our department for radiation therapy treatment number 25 of 30 to his thorax. We plan on treating the patient again at 1:30pm (Tx 26 of 30, BID) and twice Thursday, 11/10/17, (BID).

## 2017-11-10 LAB
ANION GAP SERPL CALC-SCNC: 7 MMOL/L (ref 0–11.9)
BASOPHILS # BLD AUTO: 0.8 % (ref 0–1.8)
BASOPHILS # BLD: 0.02 K/UL (ref 0–0.12)
BUN SERPL-MCNC: 9 MG/DL (ref 8–22)
CALCIUM SERPL-MCNC: 9.2 MG/DL (ref 8.5–10.5)
CHLORIDE SERPL-SCNC: 99 MMOL/L (ref 96–112)
CO2 SERPL-SCNC: 25 MMOL/L (ref 20–33)
CREAT SERPL-MCNC: 0.52 MG/DL (ref 0.5–1.4)
EOSINOPHIL # BLD AUTO: 0.19 K/UL (ref 0–0.51)
EOSINOPHIL NFR BLD: 7.7 % (ref 0–6.9)
ERYTHROCYTE [DISTWIDTH] IN BLOOD BY AUTOMATED COUNT: 46.5 FL (ref 35.9–50)
GFR SERPL CREATININE-BSD FRML MDRD: >60 ML/MIN/1.73 M 2
GLUCOSE SERPL-MCNC: 93 MG/DL (ref 65–99)
HCT VFR BLD AUTO: 31.3 % (ref 42–52)
HGB BLD-MCNC: 10.9 G/DL (ref 14–18)
IMM GRANULOCYTES # BLD AUTO: 0.1 K/UL (ref 0–0.11)
IMM GRANULOCYTES NFR BLD AUTO: 4 % (ref 0–0.9)
LYMPHOCYTES # BLD AUTO: 0.79 K/UL (ref 1–4.8)
LYMPHOCYTES NFR BLD: 31.9 % (ref 22–41)
MCH RBC QN AUTO: 33.5 PG (ref 27–33)
MCHC RBC AUTO-ENTMCNC: 34.8 G/DL (ref 33.7–35.3)
MCV RBC AUTO: 96.3 FL (ref 81.4–97.8)
MONOCYTES # BLD AUTO: 0.76 K/UL (ref 0–0.85)
MONOCYTES NFR BLD AUTO: 30.6 % (ref 0–13.4)
NEUTROPHILS # BLD AUTO: 0.62 K/UL (ref 1.82–7.42)
NEUTROPHILS NFR BLD: 25 % (ref 44–72)
NRBC # BLD AUTO: 0 K/UL
NRBC BLD AUTO-RTO: 0 /100 WBC
PLATELET # BLD AUTO: 206 K/UL (ref 164–446)
PMV BLD AUTO: 9.2 FL (ref 9–12.9)
POTASSIUM SERPL-SCNC: 4 MMOL/L (ref 3.6–5.5)
RBC # BLD AUTO: 3.25 M/UL (ref 4.7–6.1)
SODIUM SERPL-SCNC: 131 MMOL/L (ref 135–145)
WBC # BLD AUTO: 2.5 K/UL (ref 4.8–10.8)

## 2017-11-10 PROCEDURE — 77014 PR CT GUIDANCE PLACEMENT RAD THERAPY FIELDS: CPT | Mod: 26,XE | Performed by: RADIOLOGY

## 2017-11-10 PROCEDURE — A9270 NON-COVERED ITEM OR SERVICE: HCPCS | Performed by: STUDENT IN AN ORGANIZED HEALTH CARE EDUCATION/TRAINING PROGRAM

## 2017-11-10 PROCEDURE — 700102 HCHG RX REV CODE 250 W/ 637 OVERRIDE(OP): Performed by: STUDENT IN AN ORGANIZED HEALTH CARE EDUCATION/TRAINING PROGRAM

## 2017-11-10 PROCEDURE — 80048 BASIC METABOLIC PNL TOTAL CA: CPT

## 2017-11-10 PROCEDURE — 700102 HCHG RX REV CODE 250 W/ 637 OVERRIDE(OP): Performed by: INTERNAL MEDICINE

## 2017-11-10 PROCEDURE — 700111 HCHG RX REV CODE 636 W/ 250 OVERRIDE (IP): Performed by: STUDENT IN AN ORGANIZED HEALTH CARE EDUCATION/TRAINING PROGRAM

## 2017-11-10 PROCEDURE — 700105 HCHG RX REV CODE 258: Performed by: STUDENT IN AN ORGANIZED HEALTH CARE EDUCATION/TRAINING PROGRAM

## 2017-11-10 PROCEDURE — A9270 NON-COVERED ITEM OR SERVICE: HCPCS | Performed by: INTERNAL MEDICINE

## 2017-11-10 PROCEDURE — 77386 HCHG IMRT DELIVERY COMPLEX: CPT | Performed by: RADIOLOGY

## 2017-11-10 PROCEDURE — 99231 SBSQ HOSP IP/OBS SF/LOW 25: CPT | Performed by: INTERNAL MEDICINE

## 2017-11-10 PROCEDURE — 770004 HCHG ROOM/CARE - ONCOLOGY PRIVATE *

## 2017-11-10 PROCEDURE — 85025 COMPLETE CBC W/AUTO DIFF WBC: CPT

## 2017-11-10 RX ADMIN — BENZOCAINE AND MENTHOL 1 LOZENGE: 15; 3.6 LOZENGE ORAL at 16:03

## 2017-11-10 RX ADMIN — BENZOCAINE AND MENTHOL 1 LOZENGE: 15; 3.6 LOZENGE ORAL at 22:03

## 2017-11-10 RX ADMIN — VITAMIN D, TAB 1000IU (100/BT) 5000 UNITS: 25 TAB at 08:01

## 2017-11-10 RX ADMIN — SODIUM CHLORIDE: 9 INJECTION, SOLUTION INTRAVENOUS at 21:52

## 2017-11-10 RX ADMIN — MAGNESIUM HYDROXIDE 30 ML: 400 SUSPENSION ORAL at 12:45

## 2017-11-10 RX ADMIN — BENZOCAINE AND MENTHOL 1 LOZENGE: 15; 3.6 LOZENGE ORAL at 05:46

## 2017-11-10 RX ADMIN — FOLIC ACID 1 MG: 1 TABLET ORAL at 08:01

## 2017-11-10 RX ADMIN — PHENOL 1 SPRAY: 1.5 LIQUID ORAL at 22:02

## 2017-11-10 RX ADMIN — BENZOCAINE AND MENTHOL 1 LOZENGE: 15; 3.6 LOZENGE ORAL at 12:45

## 2017-11-10 RX ADMIN — THIAMINE HCL TAB 100 MG 100 MG: 100 TAB at 08:01

## 2017-11-10 RX ADMIN — PHENOL 1 SPRAY: 1.5 LIQUID ORAL at 12:45

## 2017-11-10 RX ADMIN — STANDARDIZED SENNA CONCENTRATE AND DOCUSATE SODIUM 2 TABLET: 8.6; 5 TABLET, FILM COATED ORAL at 21:44

## 2017-11-10 RX ADMIN — HEPARIN SODIUM 5000 UNITS: 5000 INJECTION, SOLUTION INTRAVENOUS; SUBCUTANEOUS at 05:46

## 2017-11-10 RX ADMIN — MIDODRINE HYDROCHLORIDE 5 MG: 5 TABLET ORAL at 12:45

## 2017-11-10 RX ADMIN — MIDODRINE HYDROCHLORIDE 5 MG: 5 TABLET ORAL at 17:22

## 2017-11-10 RX ADMIN — THERA TABS 1 TABLET: TAB at 08:01

## 2017-11-10 RX ADMIN — NICOTINE 14 MG: 14 PATCH, EXTENDED RELEASE TRANSDERMAL at 05:46

## 2017-11-10 RX ADMIN — PHENOL: 1.5 LIQUID ORAL at 05:47

## 2017-11-10 RX ADMIN — STANDARDIZED SENNA CONCENTRATE AND DOCUSATE SODIUM 2 TABLET: 8.6; 5 TABLET, FILM COATED ORAL at 08:01

## 2017-11-10 RX ADMIN — ACETAMINOPHEN 650 MG: 325 TABLET, FILM COATED ORAL at 12:48

## 2017-11-10 RX ADMIN — TRAMADOL HYDROCHLORIDE 50 MG: 50 TABLET, COATED ORAL at 05:46

## 2017-11-10 RX ADMIN — HEPARIN SODIUM 5000 UNITS: 5000 INJECTION, SOLUTION INTRAVENOUS; SUBCUTANEOUS at 21:44

## 2017-11-10 RX ADMIN — HEPARIN SODIUM 5000 UNITS: 5000 INJECTION, SOLUTION INTRAVENOUS; SUBCUTANEOUS at 16:00

## 2017-11-10 RX ADMIN — MIDODRINE HYDROCHLORIDE 5 MG: 5 TABLET ORAL at 08:02

## 2017-11-10 ASSESSMENT — ENCOUNTER SYMPTOMS
FOCAL WEAKNESS: 0
PALPITATIONS: 0
ABDOMINAL PAIN: 0
EYE DISCHARGE: 0
TINGLING: 0
WHEEZING: 0
HEADACHES: 0
HALLUCINATIONS: 0
CHILLS: 0
CONSTIPATION: 0
NERVOUS/ANXIOUS: 0
SPUTUM PRODUCTION: 0
SHORTNESS OF BREATH: 0
MYALGIAS: 0
VOMITING: 0
WEAKNESS: 0
SORE THROAT: 1
BLURRED VISION: 0
COUGH: 1
DIARRHEA: 0
DIZZINESS: 0
FEVER: 0
NAUSEA: 0
DOUBLE VISION: 0

## 2017-11-10 ASSESSMENT — PAIN SCALES - GENERAL
PAINLEVEL_OUTOF10: 3
PAINLEVEL_OUTOF10: 4
PAINLEVEL_OUTOF10: 4

## 2017-11-10 NOTE — PROGRESS NOTES
Bedside report received from noc RN, assumed pt care, assessment completed, pt aox4 but fatigued this morning, pt report no changes in throat pain and discomfort, pt medicated per MAR, pt down to radiation this morning and expected to go down again this afternoon, PICC + blood return, reviewed POC with patient and agreeable, pt ambulates steady gait up independently, bed in low position, call light and personal belongings within reach, hourly rounding in place, pt needs met.

## 2017-11-10 NOTE — DISCHARGE PLANNING
"Per Chelsea  @ [facility] email \"Care exceeds capacity at this time. We understand that the patient will be completing chemo/radiation prior to being discharged, however with follow ups to the oncologist, the question that remains unknown will be if he will need further treatment down the road and for that we are unable to accommodate\".  "

## 2017-11-10 NOTE — PROGRESS NOTES
Internal Medicine Interval Note  Note Author: Trav Fierro M.D.     Name Froylan Spain     1960   Age/Sex 57 y.o. male   MRN [id number]   Code Status Full code     After 5PM or if no immediate response to page, please call for cross-coverage  Attending/Team: Meredith See Patient List for primary contact information  Call [phone] to page    1st Call - Day Intern (R1):   Vadim 2nd Call - Day Sr. Resident (R2/R3):   Criss Lynch         Reason for interval visit  (Principal Problem)   Small cell carcinoma of lung (CMS-HCC)    Interval Problem Daily Status Update  (24 hours)   - No events overnight  - Receiving doses 25 and 26 of radiation today (total 30)  - SW working on SNF placement following completion of radiation      Review of Systems   Constitutional: Negative for chills, fever and malaise/fatigue.   HENT: Positive for sore throat. Negative for congestion, hearing loss and nosebleeds.    Eyes: Negative for blurred vision, double vision and discharge.   Respiratory: Positive for cough. Negative for sputum production, shortness of breath and wheezing.    Cardiovascular: Negative for chest pain and palpitations.   Gastrointestinal: Negative for abdominal pain, constipation, diarrhea, nausea and vomiting.   Genitourinary: Negative for dysuria and urgency.   Musculoskeletal: Negative for joint pain and myalgias.   Skin: Negative for rash.   Neurological: Negative for dizziness, tingling, focal weakness, weakness and headaches.   Psychiatric/Behavioral: Negative for hallucinations. The patient is not nervous/anxious.        Consultants/Specialty  Hem/Onc  Radio/Onc    Disposition  Inpatient for radiation treatments    Quality Measures    Reviewed items::  Labs reviewed and Medications reviewed  Garvin catheter::  No Garvin  DVT prophylaxis pharmacological::  Heparin  Ulcer Prophylaxis::  Not indicated          Physical Exam       Vitals:    17 0340 17 0754 17 1200 17  1600   BP: 101/69 110/82 100/75 131/82   Pulse: 99 100 91 100   Resp: 18 18 18 18   Temp: 37 °C (98.6 °F) 36.8 °C (98.2 °F) 36.8 °C (98.3 °F) 36.7 °C (98 °F)   SpO2: 100% 96% 97% 95%   Weight:       Height:         Body mass index is 18.38 kg/m².    Oxygen Therapy:  Pulse Oximetry: 95 %, O2 (LPM): 0, O2 Delivery: None (Room Air)    Physical Exam   Constitutional: He is oriented to person, place, and time. No distress.   cachectic   HENT:   Mouth/Throat: No oropharyngeal exudate.   Neck: Neck supple. No tracheal deviation present.   Scar healing well   Cardiovascular: Normal rate and regular rhythm.    No murmur heard.  Pulmonary/Chest: No respiratory distress. He has no wheezes. He has no rales. He exhibits no tenderness.   Abdominal: He exhibits no distension. There is no tenderness.   Musculoskeletal: He exhibits no edema.   Lymphadenopathy:     He has no cervical adenopathy.   Neurological: He is alert and oriented to person, place, and time. No cranial nerve deficit.   Skin: He is not diaphoretic.   Psychiatric: Affect normal.     Lab Data Review:   10/26/2017  11:40 AM    Recent Labs      11/07/17 0405 11/08/17 0515 11/09/17   0255   SODIUM  133*  133*  132*   POTASSIUM  4.3  4.4  4.2   CHLORIDE  102  101  100   CO2  24  25  25   BUN  8  10  9   CREATININE  0.49*  0.52  0.51   CALCIUM  9.8  9.7  9.4       Recent Labs      11/07/17   0405  11/08/17   0515  11/09/17   0255   GLUCOSE  87  87  91       Recent Labs      11/07/17   0405  11/08/17   0515  11/09/17   0255   RBC  3.34*  3.38*  3.35*   HEMOGLOBIN  11.5*  11.6*  11.3*   HEMATOCRIT  32.6*  32.8*  32.4*   PLATELETCT  223  204  236       Recent Labs      11/07/17 0405  11/08/17 0515  11/09/17   0255   WBC  3.7*  3.4*  2.3*   NEUTSPOLYS  42.80*  45.20  20.20*   LYMPHOCYTES  36.60  32.20  43.90*   MONOCYTES  12.50  13.00  22.80*   EOSINOPHILS  4.50  5.20  7.00*   BASOPHILS  1.80  0.90  0.90           Assessment/Plan     * Small cell carcinoma of lung  (CMS-HCC)- (present on admission)   Assessment & Plan    -Diagnosed by mediastinoscopy with biopsy of paratracheal mass (10/17) showing metastatic small cell carcinoma  - Staging workup including MRI Brain and CT Abdomen/Pelvis:  Unremarkable  - 10/21-10/23 Patient received chemotherapy. CISplatin on day 1 and etoposide on days 1-3 28 day cycle for 4-6 cycles. Next cycle due on 11/13  - 10/23 Radiation BID started, to be continued for 15 days.   - He is receiving doses 25 and 26 today  - WBC today 2.3. 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  -We'll also add tramadol for pain as needed.        Body mass index (BMI) 19.9 or less, adult   Assessment & Plan    - Possibly 2/2 homelessness vs. malignancy  - Exhibits good appetite  - Nutrition following        Chronic alcohol use- (present on admission)   Assessment & Plan    - Consumed 1 beer daily   - BAL: 0.01 on admission  - Continue PO thiamine, B12 and folic acid supplements

## 2017-11-10 NOTE — DISCHARGE PLANNING
Per Chelsea at [facility], they have declined patient as Care Exceeds Compactly. Discussed patient would complete his treatments on Monday.  Per Chelsea, they have concerns over required follow-up appointments and they would not be able to meet those,  Per Chelsea, she has advised supervisor Kayce.

## 2017-11-10 NOTE — DISCHARGE PLANNING
SNF choice form with ARLIN notification sent to [facility], [facility] and [facility] at [phone].

## 2017-11-10 NOTE — PROGRESS NOTES
11/8/2017  7:41 AM         []Hide copied text  Patient was transported to our department for radiation therapy treatment number 27 of 30 to his thorax. We plan on treating the patient again at 1:30pm (Tx 28 of 30, BID) and twice Monday, 11/13/17, (BID).

## 2017-11-10 NOTE — DISCHARGE PLANNING
Medical Social Work    Pt currently pending SNF acceptance with [facility] via insurance.    PASRR completed ([id number]).    LOC completed (Under manual review).

## 2017-11-10 NOTE — DISCHARGE PLANNING
Per [facility] request, referral has been sent to [facility] and [facility] at 1318 on 11-10-17.

## 2017-11-10 NOTE — CARE PLAN
Problem: Infection  Goal: Will remain free from infection    Intervention: Implement standard precautions and perform hand washing before and after patient contact  Pt is neutropenic. Protective isolation precautions in place.       Problem: Pain Management  Goal: Pain level will decrease to patient's comfort goal    Intervention: Follow pain managment plan developed in collaboration with patient and Interdisciplinary Team  Pt has complaints of pain. Pt medicated per MAR.

## 2017-11-10 NOTE — DISCHARGE PLANNING
Patient has Medicaid FFS no [facility] needed.  Reached to Chelsea @ [facility] for decline reason

## 2017-11-10 NOTE — PROGRESS NOTES
Received report and assumed patient care at change of shift. Patient is resting in bed, A&O x4. Patient reports 5/10 pain at this time, medications provided per MAR.     PICC- Right.      Plan of care discussed, questions answered. Bed is in the lowest position and locked, call light within reach, non-skid socks in place, hourly rounding. Patient reports no further needs and this time.

## 2017-11-10 NOTE — DISCHARGE PLANNING
Received call from Bret at [facility], states patient has no skilled needs. Insurance is only willing to cover the cost of retirement care that is about $100.00 per day.  Per Bret this is not enough to cover the cost of room and board.  Per Bret cost would be $210.00 per day with out meds or therapy. Supervisor Kayce advised.

## 2017-11-10 NOTE — PROGRESS NOTES
Cora from Lab called with critical result of WBC of 2.5 at 0117. Critical lab result read back to Cora.   This critical lab result is within parameters established by [facility] for this patient

## 2017-11-11 LAB
ANION GAP SERPL CALC-SCNC: 8 MMOL/L (ref 0–11.9)
BASOPHILS # BLD AUTO: 1.1 % (ref 0–1.8)
BASOPHILS # BLD: 0.09 K/UL (ref 0–0.12)
BUN SERPL-MCNC: 9 MG/DL (ref 8–22)
CALCIUM SERPL-MCNC: 9.7 MG/DL (ref 8.5–10.5)
CHLORIDE SERPL-SCNC: 100 MMOL/L (ref 96–112)
CO2 SERPL-SCNC: 24 MMOL/L (ref 20–33)
CREAT SERPL-MCNC: 0.45 MG/DL (ref 0.5–1.4)
EOSINOPHIL # BLD AUTO: 0.14 K/UL (ref 0–0.51)
EOSINOPHIL NFR BLD: 1.7 % (ref 0–6.9)
ERYTHROCYTE [DISTWIDTH] IN BLOOD BY AUTOMATED COUNT: 47.9 FL (ref 35.9–50)
GFR SERPL CREATININE-BSD FRML MDRD: >60 ML/MIN/1.73 M 2
GLUCOSE SERPL-MCNC: 95 MG/DL (ref 65–99)
HCT VFR BLD AUTO: 31.6 % (ref 42–52)
HGB BLD-MCNC: 11.1 G/DL (ref 14–18)
IMM GRANULOCYTES # BLD AUTO: 0.07 K/UL (ref 0–0.11)
IMM GRANULOCYTES NFR BLD AUTO: 0.9 % (ref 0–0.9)
LYMPHOCYTES # BLD AUTO: 0.61 K/UL (ref 1–4.8)
LYMPHOCYTES NFR BLD: 7.5 % (ref 22–41)
MCH RBC QN AUTO: 33.9 PG (ref 27–33)
MCHC RBC AUTO-ENTMCNC: 35.1 G/DL (ref 33.7–35.3)
MCV RBC AUTO: 96.6 FL (ref 81.4–97.8)
MONOCYTES # BLD AUTO: 0.94 K/UL (ref 0–0.85)
MONOCYTES NFR BLD AUTO: 11.5 % (ref 0–13.4)
NEUTROPHILS # BLD AUTO: 6.3 K/UL (ref 1.82–7.42)
NEUTROPHILS NFR BLD: 77.3 % (ref 44–72)
NRBC # BLD AUTO: 0 K/UL
NRBC BLD AUTO-RTO: 0 /100 WBC
PLATELET # BLD AUTO: 227 K/UL (ref 164–446)
PMV BLD AUTO: 9 FL (ref 9–12.9)
POTASSIUM SERPL-SCNC: 4.2 MMOL/L (ref 3.6–5.5)
RBC # BLD AUTO: 3.27 M/UL (ref 4.7–6.1)
SODIUM SERPL-SCNC: 132 MMOL/L (ref 135–145)
WBC # BLD AUTO: 8.2 K/UL (ref 4.8–10.8)

## 2017-11-11 PROCEDURE — 700102 HCHG RX REV CODE 250 W/ 637 OVERRIDE(OP): Performed by: STUDENT IN AN ORGANIZED HEALTH CARE EDUCATION/TRAINING PROGRAM

## 2017-11-11 PROCEDURE — A9270 NON-COVERED ITEM OR SERVICE: HCPCS | Performed by: STUDENT IN AN ORGANIZED HEALTH CARE EDUCATION/TRAINING PROGRAM

## 2017-11-11 PROCEDURE — 85025 COMPLETE CBC W/AUTO DIFF WBC: CPT

## 2017-11-11 PROCEDURE — 99231 SBSQ HOSP IP/OBS SF/LOW 25: CPT | Performed by: INTERNAL MEDICINE

## 2017-11-11 PROCEDURE — 700102 HCHG RX REV CODE 250 W/ 637 OVERRIDE(OP): Performed by: INTERNAL MEDICINE

## 2017-11-11 PROCEDURE — A9270 NON-COVERED ITEM OR SERVICE: HCPCS | Performed by: INTERNAL MEDICINE

## 2017-11-11 PROCEDURE — 80048 BASIC METABOLIC PNL TOTAL CA: CPT

## 2017-11-11 PROCEDURE — 770004 HCHG ROOM/CARE - ONCOLOGY PRIVATE *

## 2017-11-11 PROCEDURE — 700111 HCHG RX REV CODE 636 W/ 250 OVERRIDE (IP): Performed by: STUDENT IN AN ORGANIZED HEALTH CARE EDUCATION/TRAINING PROGRAM

## 2017-11-11 RX ADMIN — FOLIC ACID 1 MG: 1 TABLET ORAL at 08:55

## 2017-11-11 RX ADMIN — ACETAMINOPHEN 650 MG: 325 TABLET, FILM COATED ORAL at 20:35

## 2017-11-11 RX ADMIN — BENZOCAINE AND MENTHOL 1 LOZENGE: 15; 3.6 LOZENGE ORAL at 18:35

## 2017-11-11 RX ADMIN — TRAMADOL HYDROCHLORIDE 50 MG: 50 TABLET, COATED ORAL at 13:06

## 2017-11-11 RX ADMIN — VITAMIN D, TAB 1000IU (100/BT) 5000 UNITS: 25 TAB at 08:55

## 2017-11-11 RX ADMIN — NICOTINE 14 MG: 14 PATCH, EXTENDED RELEASE TRANSDERMAL at 06:00

## 2017-11-11 RX ADMIN — PHENOL 1 SPRAY: 1.5 LIQUID ORAL at 20:36

## 2017-11-11 RX ADMIN — THERA TABS 1 TABLET: TAB at 08:55

## 2017-11-11 RX ADMIN — STANDARDIZED SENNA CONCENTRATE AND DOCUSATE SODIUM 2 TABLET: 8.6; 5 TABLET, FILM COATED ORAL at 08:55

## 2017-11-11 RX ADMIN — PHENOL 1 SPRAY: 1.5 LIQUID ORAL at 13:06

## 2017-11-11 RX ADMIN — MIDODRINE HYDROCHLORIDE 5 MG: 5 TABLET ORAL at 18:35

## 2017-11-11 RX ADMIN — PHENOL 1 SPRAY: 1.5 LIQUID ORAL at 08:55

## 2017-11-11 RX ADMIN — HEPARIN SODIUM 5000 UNITS: 5000 INJECTION, SOLUTION INTRAVENOUS; SUBCUTANEOUS at 05:07

## 2017-11-11 RX ADMIN — THIAMINE HCL TAB 100 MG 100 MG: 100 TAB at 08:55

## 2017-11-11 RX ADMIN — MIDODRINE HYDROCHLORIDE 5 MG: 5 TABLET ORAL at 13:06

## 2017-11-11 RX ADMIN — ACETAMINOPHEN 650 MG: 325 TABLET, FILM COATED ORAL at 07:32

## 2017-11-11 RX ADMIN — MIDODRINE HYDROCHLORIDE 5 MG: 5 TABLET ORAL at 08:55

## 2017-11-11 RX ADMIN — HEPARIN SODIUM 5000 UNITS: 5000 INJECTION, SOLUTION INTRAVENOUS; SUBCUTANEOUS at 20:32

## 2017-11-11 RX ADMIN — HEPARIN SODIUM 5000 UNITS: 5000 INJECTION, SOLUTION INTRAVENOUS; SUBCUTANEOUS at 13:06

## 2017-11-11 ASSESSMENT — PAIN SCALES - GENERAL
PAINLEVEL_OUTOF10: 3
PAINLEVEL_OUTOF10: 6
PAINLEVEL_OUTOF10: 5
PAINLEVEL_OUTOF10: 6
PAINLEVEL_OUTOF10: 6

## 2017-11-11 ASSESSMENT — ENCOUNTER SYMPTOMS
SORE THROAT: 1
PALPITATIONS: 0
DIZZINESS: 0
SPUTUM PRODUCTION: 0
SHORTNESS OF BREATH: 0
CONSTIPATION: 0
HEADACHES: 0
MYALGIAS: 0
WHEEZING: 0
DOUBLE VISION: 0
BLURRED VISION: 0
TINGLING: 0
WEAKNESS: 0
COUGH: 1
FEVER: 0
ABDOMINAL PAIN: 0
NERVOUS/ANXIOUS: 0
NAUSEA: 0
VOMITING: 0
HALLUCINATIONS: 0
CHILLS: 0
DIARRHEA: 0
FOCAL WEAKNESS: 0
EYE DISCHARGE: 0

## 2017-11-11 NOTE — PROGRESS NOTES
Received report and assumed patient care at change of shift. Patient is resting in bed, A&Ox4. Patient reports 3/10 pain, medications provided per MAR.     PICC dual lumen.     Plan of care discussed, questions answered. Bed is in the lowest position and locked, call light within reach, non-skid socks in place, hourly rounding. Patient reports no further needs and this time.

## 2017-11-11 NOTE — PROGRESS NOTES
Internal Medicine Interval Note  Note Author: Trav Fierro M.D.     Name Froylan Spain     1960   Age/Sex 57 y.o. male   MRN [id number]   Code Status Full code     After 5PM or if no immediate response to page, please call for cross-coverage  Attending/Team: Meredith See Patient List for primary contact information  Call [phone] to page    1st Call - Day Intern (R1):   Vadim 2nd Call - Day Sr. Resident (R2/R3):   Criss Lynch         Reason for interval visit  (Principal Problem)   Small cell carcinoma of lung (CMS-HCC)    Interval Problem Daily Status Update  (24 hours)   - No events overnight  - Patient receiving doses 27 and 28 of radiation today. He will complete the treatment on Monday.  - SW working on SNF placement following completion of radiation      Review of Systems   Constitutional: Negative for chills, fever and malaise/fatigue.   HENT: Positive for sore throat. Negative for congestion, hearing loss and nosebleeds.    Eyes: Negative for blurred vision, double vision and discharge.   Respiratory: Positive for cough. Negative for sputum production, shortness of breath and wheezing.    Cardiovascular: Negative for chest pain and palpitations.   Gastrointestinal: Negative for abdominal pain, constipation, diarrhea, nausea and vomiting.   Genitourinary: Negative for dysuria and urgency.   Musculoskeletal: Negative for joint pain and myalgias.   Skin: Negative for rash.   Neurological: Negative for dizziness, tingling, focal weakness, weakness and headaches.   Psychiatric/Behavioral: Negative for hallucinations. The patient is not nervous/anxious.        Consultants/Specialty  Hem/Onc  Radio/Onc    Disposition  Inpatient for radiation treatments    Quality Measures    Reviewed items::  Labs reviewed and Medications reviewed  Garvin catheter::  No Garvin  DVT prophylaxis pharmacological::  Heparin  Ulcer Prophylaxis::  Not indicated          Physical Exam       Vitals:    11/10/17  0444 11/10/17 0754 11/10/17 1200 11/10/17 1600   BP: 104/75 102/68 119/70 110/69   Pulse:  100 (!) 102 95   Resp:  18 18 18   Temp:  37.1 °C (98.8 °F) 36.4 °C (97.5 °F) 37.3 °C (99.1 °F)   SpO2:  96% 98% 99%   Weight:       Height:         Body mass index is 18.38 kg/m².    Oxygen Therapy:  Pulse Oximetry: 99 %, O2 (LPM): 0, O2 Delivery: None (Room Air)    Physical Exam   Constitutional: He is oriented to person, place, and time. No distress.   cachectic   HENT:   Mouth/Throat: No oropharyngeal exudate.   Neck: Neck supple. No tracheal deviation present.   Scar healing well   Cardiovascular: Normal rate and regular rhythm.    No murmur heard.  Pulmonary/Chest: No respiratory distress. He has no wheezes. He has no rales. He exhibits no tenderness.   Abdominal: He exhibits no distension. There is no tenderness.   Musculoskeletal: He exhibits no edema.   Lymphadenopathy:     He has no cervical adenopathy.   Neurological: He is alert and oriented to person, place, and time. No cranial nerve deficit.   Skin: He is not diaphoretic.   Psychiatric: Affect normal.     Lab Data Review:   10/26/2017  11:40 AM    Recent Labs      11/08/17   0515  11/09/17   0255  11/10/17   0020   SODIUM  133*  132*  131*   POTASSIUM  4.4  4.2  4.0   CHLORIDE  101  100  99   CO2  25  25  25   BUN  10  9  9   CREATININE  0.52  0.51  0.52   CALCIUM  9.7  9.4  9.2       Recent Labs      11/08/17 0515  11/09/17   0255  11/10/17   0020   GLUCOSE  87  91  93       Recent Labs      11/08/17 0515  11/09/17   0255  11/10/17   0020   RBC  3.38*  3.35*  3.25*   HEMOGLOBIN  11.6*  11.3*  10.9*   HEMATOCRIT  32.8*  32.4*  31.3*   PLATELETCT  204  236  206       Recent Labs      11/08/17 0515  11/09/17   0255  11/10/17   0020   WBC  3.4*  2.3*  2.5*   NEUTSPOLYS  45.20  20.20*  25.00*   LYMPHOCYTES  32.20  43.90*  31.90   MONOCYTES  13.00  22.80*  30.60*   EOSINOPHILS  5.20  7.00*  7.70*   BASOPHILS  0.90  0.90  0.80           Assessment/Plan     *  Small cell carcinoma of lung (CMS-HCC)- (present on admission)   Assessment & Plan    -Diagnosed by mediastinoscopy with biopsy of paratracheal mass (10/17) showing metastatic small cell carcinoma  - Staging workup including MRI Brain and CT Abdomen/Pelvis:  Unremarkable  - 10/21-10/23 Patient received chemotherapy. CISplatin on day 1 and etoposide on days 1-3 28 day cycle for 4-6 cycles. Next cycle due on 11/13  - 10/23 Radiation BID started, to be continued for 15 days.   - He is receiving doses 27 and 28 today  - WBC today 2.5. 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  -Continue tramadol for pain as needed.        Body mass index (BMI) 19.9 or less, adult   Assessment & Plan    - Possibly 2/2 homelessness vs. malignancy  - Exhibits good appetite  - Nutrition following        Chronic alcohol use- (present on admission)   Assessment & Plan    - Consumed 1 beer daily   - BAL: 0.01 on admission  - Continue PO thiamine, B12 and folic acid supplements

## 2017-11-11 NOTE — PROGRESS NOTES
Internal Medicine Interval Note  Note Author: Trav Fierro M.D.     Name Froylan Spain     1960   Age/Sex 57 y.o. male   MRN [id number]   Code Status Full code     After 5PM or if no immediate response to page, please call for cross-coverage  Attending/Team: Meredith See Patient List for primary contact information  Call [phone] to page    1st Call - Day Intern (R1):   Vadim 2nd Call - Day Sr. Resident (R2/R3):   Criss Lynch         Reason for interval visit  (Principal Problem)   Small cell carcinoma of lung (CMS-HCC)    Interval Problem Daily Status Update  (24 hours)   - No events overnight  - Patient has received 28 of the total 30 doses of radiation  - next chemotherapy cycle on ?      Review of Systems   Constitutional: Positive for malaise/fatigue. Negative for chills and fever.   HENT: Positive for sore throat. Negative for congestion, hearing loss and nosebleeds.    Eyes: Negative for blurred vision, double vision and discharge.   Respiratory: Positive for cough. Negative for sputum production, shortness of breath and wheezing.    Cardiovascular: Negative for chest pain and palpitations.   Gastrointestinal: Negative for abdominal pain, constipation, diarrhea, nausea and vomiting.   Genitourinary: Negative for dysuria and urgency.   Musculoskeletal: Negative for joint pain and myalgias.   Skin: Negative for rash.   Neurological: Negative for dizziness, tingling, focal weakness, weakness and headaches.   Psychiatric/Behavioral: Negative for hallucinations. The patient is not nervous/anxious.        Consultants/Specialty  Hem/Onc  Radio/Onc    Disposition  Inpatient for radiation treatments    Quality Measures    Reviewed items::  Labs reviewed and Medications reviewed  Garvin catheter::  No Garvin  DVT prophylaxis pharmacological::  Heparin  Ulcer Prophylaxis::  Not indicated          Physical Exam       Vitals:    11/10/17 1918 17 0030 17 0521 17 0800   BP:  104/63 105/73 102/69 115/79   Pulse: 95 (!) 101 (!) 106 (!) 108   Resp: 18 18 18 18   Temp: 36.8 °C (98.2 °F) 36.5 °C (97.7 °F) 36.6 °C (97.8 °F) 36.8 °C (98.2 °F)   SpO2: 97% 98% 96% 93%   Weight:       Height:         Body mass index is 18.38 kg/m².    Oxygen Therapy:  Pulse Oximetry: 93 %, O2 (LPM): 0, O2 Delivery: None (Room Air)    Physical Exam   Constitutional: He is oriented to person, place, and time. No distress.   cachectic   HENT:   Mouth/Throat: No oropharyngeal exudate.   Neck: Neck supple. No tracheal deviation present.   Scar healing well   Cardiovascular: Normal rate and regular rhythm.    No murmur heard.  Pulmonary/Chest: No respiratory distress. He has no wheezes. He has no rales. He exhibits no tenderness.   Abdominal: He exhibits no distension. There is no tenderness.   Musculoskeletal: He exhibits no edema.   Lymphadenopathy:     He has no cervical adenopathy.   Neurological: He is alert and oriented to person, place, and time. No cranial nerve deficit.   Skin: He is not diaphoretic.   Psychiatric: Affect normal.     Lab Data Review:   10/26/2017  11:40 AM    Recent Labs      11/09/17   0255  11/10/17   0020  11/11/17   0500   SODIUM  132*  131*  132*   POTASSIUM  4.2  4.0  4.2   CHLORIDE  100  99  100   CO2  25  25  24   BUN  9  9  9   CREATININE  0.51  0.52  0.45*   CALCIUM  9.4  9.2  9.7       Recent Labs      11/09/17   0255  11/10/17   0020  11/11/17   0500   GLUCOSE  91  93  95       Recent Labs      11/09/17   0255  11/10/17   0020  11/11/17   0500   RBC  3.35*  3.25*  3.27*   HEMOGLOBIN  11.3*  10.9*  11.1*   HEMATOCRIT  32.4*  31.3*  31.6*   PLATELETCT  236  206  227       Recent Labs      11/09/17   0255  11/10/17   0020  11/11/17   0500   WBC  2.3*  2.5*  8.2   NEUTSPOLYS  20.20*  25.00*  77.30*   LYMPHOCYTES  43.90*  31.90  7.50*   MONOCYTES  22.80*  30.60*  11.50   EOSINOPHILS  7.00*  7.70*  1.70   BASOPHILS  0.90  0.80  1.10           Assessment/Plan     * Small cell carcinoma of  lung (CMS-HCC)- (present on admission)   Assessment & Plan    -Diagnosed by mediastinoscopy with biopsy of paratracheal mass (10/17) showing metastatic small cell carcinoma  - Staging workup including MRI Brain and CT Abdomen/Pelvis:  Unremarkable  - 10/21-10/23 Patient received chemotherapy. CISplatin on day 1 and etoposide on days 1-3 28 day cycle for 4-6 cycles. Next cycle due on 11/13  - 10/23 Radiation BID started, to be continued for 15 days.   - He received 28 doses of radiation. 2 doses pending  - WBC improved today 8.2. 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  -Continue tramadol for pain as needed        Body mass index (BMI) 19.9 or less, adult   Assessment & Plan    - Possibly 2/2 homelessness vs. malignancy  - Exhibits good appetite  - Nutrition following        Chronic alcohol use- (present on admission)   Assessment & Plan    - Consumed 1 beer daily   - BAL: 0.01 on admission  - Continue PO thiamine, B12 and folic acid supplements

## 2017-11-11 NOTE — CARE PLAN
Problem: Infection  Goal: Will remain free from infection    Intervention: Implement standard precautions and perform hand washing before and after patient contact  Pt is neutropenic. Protective precautions in place.       Problem: Pain Management  Goal: Pain level will decrease to patient's comfort goal    Intervention: Follow pain managment plan developed in collaboration with patient and Interdisciplinary Team  Pt complains of pain. Pt medicated per MAR.

## 2017-11-12 LAB
ANION GAP SERPL CALC-SCNC: 10 MMOL/L (ref 0–11.9)
BASOPHILS # BLD AUTO: 0.9 % (ref 0–1.8)
BASOPHILS # BLD: 0.05 K/UL (ref 0–0.12)
BUN SERPL-MCNC: 8 MG/DL (ref 8–22)
CALCIUM SERPL-MCNC: 9.4 MG/DL (ref 8.5–10.5)
CHLORIDE SERPL-SCNC: 103 MMOL/L (ref 96–112)
CO2 SERPL-SCNC: 23 MMOL/L (ref 20–33)
CREAT SERPL-MCNC: 0.43 MG/DL (ref 0.5–1.4)
EOSINOPHIL # BLD AUTO: 0.16 K/UL (ref 0–0.51)
EOSINOPHIL NFR BLD: 2.8 % (ref 0–6.9)
ERYTHROCYTE [DISTWIDTH] IN BLOOD BY AUTOMATED COUNT: 47.3 FL (ref 35.9–50)
GFR SERPL CREATININE-BSD FRML MDRD: >60 ML/MIN/1.73 M 2
GLUCOSE SERPL-MCNC: 93 MG/DL (ref 65–99)
HCT VFR BLD AUTO: 32 % (ref 42–52)
HGB BLD-MCNC: 11.3 G/DL (ref 14–18)
IMM GRANULOCYTES # BLD AUTO: 0.07 K/UL (ref 0–0.11)
IMM GRANULOCYTES NFR BLD AUTO: 1.2 % (ref 0–0.9)
LYMPHOCYTES # BLD AUTO: 0.55 K/UL (ref 1–4.8)
LYMPHOCYTES NFR BLD: 9.8 % (ref 22–41)
MCH RBC QN AUTO: 33.8 PG (ref 27–33)
MCHC RBC AUTO-ENTMCNC: 35.3 G/DL (ref 33.7–35.3)
MCV RBC AUTO: 95.8 FL (ref 81.4–97.8)
MONOCYTES # BLD AUTO: 0.83 K/UL (ref 0–0.85)
MONOCYTES NFR BLD AUTO: 14.8 % (ref 0–13.4)
NEUTROPHILS # BLD AUTO: 3.96 K/UL (ref 1.82–7.42)
NEUTROPHILS NFR BLD: 70.5 % (ref 44–72)
NRBC # BLD AUTO: 0 K/UL
NRBC BLD AUTO-RTO: 0 /100 WBC
PLATELET # BLD AUTO: 219 K/UL (ref 164–446)
PMV BLD AUTO: 9 FL (ref 9–12.9)
POTASSIUM SERPL-SCNC: 4.2 MMOL/L (ref 3.6–5.5)
RBC # BLD AUTO: 3.34 M/UL (ref 4.7–6.1)
SODIUM SERPL-SCNC: 136 MMOL/L (ref 135–145)
WBC # BLD AUTO: 5.6 K/UL (ref 4.8–10.8)

## 2017-11-12 PROCEDURE — 700102 HCHG RX REV CODE 250 W/ 637 OVERRIDE(OP): Performed by: STUDENT IN AN ORGANIZED HEALTH CARE EDUCATION/TRAINING PROGRAM

## 2017-11-12 PROCEDURE — A9270 NON-COVERED ITEM OR SERVICE: HCPCS | Performed by: STUDENT IN AN ORGANIZED HEALTH CARE EDUCATION/TRAINING PROGRAM

## 2017-11-12 PROCEDURE — A9270 NON-COVERED ITEM OR SERVICE: HCPCS | Performed by: INTERNAL MEDICINE

## 2017-11-12 PROCEDURE — 700102 HCHG RX REV CODE 250 W/ 637 OVERRIDE(OP): Performed by: INTERNAL MEDICINE

## 2017-11-12 PROCEDURE — 85025 COMPLETE CBC W/AUTO DIFF WBC: CPT

## 2017-11-12 PROCEDURE — 99232 SBSQ HOSP IP/OBS MODERATE 35: CPT | Performed by: INTERNAL MEDICINE

## 2017-11-12 PROCEDURE — 700111 HCHG RX REV CODE 636 W/ 250 OVERRIDE (IP): Performed by: STUDENT IN AN ORGANIZED HEALTH CARE EDUCATION/TRAINING PROGRAM

## 2017-11-12 PROCEDURE — 770004 HCHG ROOM/CARE - ONCOLOGY PRIVATE *

## 2017-11-12 PROCEDURE — 80048 BASIC METABOLIC PNL TOTAL CA: CPT

## 2017-11-12 RX ADMIN — NICOTINE 14 MG: 14 PATCH, EXTENDED RELEASE TRANSDERMAL at 06:00

## 2017-11-12 RX ADMIN — LIDOCAINE HYDROCHLORIDE 5 ML: 20 SOLUTION OROPHARYNGEAL at 11:55

## 2017-11-12 RX ADMIN — MIDODRINE HYDROCHLORIDE 5 MG: 5 TABLET ORAL at 14:06

## 2017-11-12 RX ADMIN — HEPARIN SODIUM 5000 UNITS: 5000 INJECTION, SOLUTION INTRAVENOUS; SUBCUTANEOUS at 06:00

## 2017-11-12 RX ADMIN — HEPARIN SODIUM 5000 UNITS: 5000 INJECTION, SOLUTION INTRAVENOUS; SUBCUTANEOUS at 20:24

## 2017-11-12 RX ADMIN — FOLIC ACID 1 MG: 1 TABLET ORAL at 09:08

## 2017-11-12 RX ADMIN — THERA TABS 1 TABLET: TAB at 09:09

## 2017-11-12 RX ADMIN — LIDOCAINE HYDROCHLORIDE 5 ML: 20 SOLUTION OROPHARYNGEAL at 18:09

## 2017-11-12 RX ADMIN — MAGNESIUM HYDROXIDE 30 ML: 400 SUSPENSION ORAL at 11:56

## 2017-11-12 RX ADMIN — ACETAMINOPHEN 650 MG: 325 TABLET, FILM COATED ORAL at 18:09

## 2017-11-12 RX ADMIN — MIDODRINE HYDROCHLORIDE 5 MG: 5 TABLET ORAL at 09:09

## 2017-11-12 RX ADMIN — THIAMINE HCL TAB 100 MG 100 MG: 100 TAB at 09:08

## 2017-11-12 RX ADMIN — MIDODRINE HYDROCHLORIDE 5 MG: 5 TABLET ORAL at 18:07

## 2017-11-12 RX ADMIN — HEPARIN SODIUM 5000 UNITS: 5000 INJECTION, SOLUTION INTRAVENOUS; SUBCUTANEOUS at 14:06

## 2017-11-12 RX ADMIN — VITAMIN D, TAB 1000IU (100/BT) 5000 UNITS: 25 TAB at 09:08

## 2017-11-12 RX ADMIN — ACETAMINOPHEN 650 MG: 325 TABLET, FILM COATED ORAL at 06:00

## 2017-11-12 ASSESSMENT — ENCOUNTER SYMPTOMS
SPUTUM PRODUCTION: 0
MYALGIAS: 0
SHORTNESS OF BREATH: 0
DIZZINESS: 0
SORE THROAT: 1
ABDOMINAL PAIN: 0
WHEEZING: 0
VOMITING: 0
FEVER: 0
DOUBLE VISION: 0
CONSTIPATION: 0
NAUSEA: 0
HALLUCINATIONS: 0
FOCAL WEAKNESS: 0
NERVOUS/ANXIOUS: 0
TINGLING: 0
HEADACHES: 0
DIARRHEA: 0
BLURRED VISION: 0
PALPITATIONS: 0
WEAKNESS: 0
CHILLS: 0
COUGH: 1
EYE DISCHARGE: 0

## 2017-11-12 ASSESSMENT — PAIN SCALES - GENERAL
PAINLEVEL_OUTOF10: 5
PAINLEVEL_OUTOF10: 4
PAINLEVEL_OUTOF10: 2
PAINLEVEL_OUTOF10: 5

## 2017-11-12 NOTE — PROGRESS NOTES
HEMATOLOGY-ONCOLOGY PROGRESS NOTE  - LSCLC on Cisplatin and Etoposide along with RT started on 10/21/17 ( Primary oncologist Dr. Armstrong)   -  C # 2 on 11/13/17     Events:  NO overnight events   His only complaint was mild odynophagia   No fevers or chills.     Subjective:    Objective:  Medications reviewed and notable for:  Current Facility-Administered Medications   Medication Dose   • tramadol (ULTRAM) 50 MG tablet 50 mg  50 mg   • sore throat spray (CHLORASEPTIC) 1 Spray  1 Spray   • benzocaine-menthol (CEPACOL) lozenge 1 Lozenge  1 Lozenge   • NS infusion     • nicotine (NICODERM) 14 MG/24HR 14 mg  14 mg    And   • nicotine polacrilex (NICORETTE) 2 MG piece 2 mg  2 mg   • normal saline PF 10-20 mL  10-20 mL   • magnesium hydroxide (MILK OF MAGNESIA) suspension 30 mL  30 mL    And   • senna-docusate (PERICOLACE or SENOKOT S) 8.6-50 MG per tablet 2 Tab  2 Tab    And   • polyethylene glycol/lytes (MIRALAX) PACKET 1 Packet  1 Packet    And   • bisacodyl (DULCOLAX) suppository 10 mg  10 mg   • heparin injection 5,000 Units  5,000 Units   • midodrine (PROAMATINE) tablet 5 mg  5 mg   • vitamin D (cholecalciferol) tablet 5,000 Units  5,000 Units   • multivitamin (THERAGRAN) tablet 1 Tab  1 Tab   • thiamine (THIAMINE) tablet 100 mg  100 mg   • folic acid (FOLVITE) tablet 1 mg  1 mg   • Respiratory Care per Protocol     • ondansetron (ZOFRAN) syringe/vial injection 4 mg  4 mg   • ondansetron (ZOFRAN ODT) dispertab 4 mg  4 mg   • promethazine (PHENERGAN) tablet 12.5-25 mg  12.5-25 mg   • promethazine (PHENERGAN) suppository 12.5-25 mg  12.5-25 mg   • prochlorperazine (COMPAZINE) injection 5-10 mg  5-10 mg   • acetaminophen (TYLENOL) tablet 650 mg  650 mg       ROS:   Constitutional: +  fatigue, no fevers or chills, no night sweats, + mild pain swallowing   Resp: No cough or SOB  Cardio:No chest pain or palpitations  Pschy: No depression or anxiety   Neuro: No headaches, no seizure, no vision changes  GI: no abdominal  "pain, nausea or vomiting. No diarrhea or constipation   All other ROS negative    Blood pressure 102/73, pulse (!) 111, temperature 36.3 °C (97.3 °F), resp. rate 18, height 1.778 m (5' 10\"), weight 58.1 kg (128 lb 1.4 oz), SpO2 94 %.    General:  comfortable, NAD  HEENT:  sclera anicteric, pupils equal, round, reactive to light, oral cavity and oropharynx clear, mucous membranes moist  Neck:   supple, no lymphadenopathy  Cor:   regular rate and rhythm, no murmurs, rubs, or gallops  Pulm:   clear to auscultation bilaterally  Abd:   bowel sounds present, soft, nontender, nondistended, no palpable masses or organomegaly  Extremities:  warm, no lower extremity edema  Neurologic:  A&O x 3  Pyschiatric:  Appropriate mood and affect    Labs reviewed and notable for:  Recent Labs      11/10/17   0020  11/11/17   0500  11/12/17   0600   WBC  2.5*  8.2  5.6   RBC  3.25*  3.27*  3.34*   HEMOGLOBIN  10.9*  11.1*  11.3*   HEMATOCRIT  31.3*  31.6*  32.0*   MCV  96.3  96.6  95.8   MCH  33.5*  33.9*  33.8*   MCHC  34.8  35.1  35.3   RDW  46.5  47.9  47.3   PLATELETCT  206  227  219   MPV  9.2  9.0  9.0         .@CMP  Recent Results (from the past 24 hour(s))   CBC WITH DIFFERENTIAL    Collection Time: 11/12/17  6:00 AM   Result Value Ref Range    WBC 5.6 4.8 - 10.8 K/uL    RBC 3.34 (L) 4.70 - 6.10 M/uL    Hemoglobin 11.3 (L) 14.0 - 18.0 g/dL    Hematocrit 32.0 (L) 42.0 - 52.0 %    MCV 95.8 81.4 - 97.8 fL    MCH 33.8 (H) 27.0 - 33.0 pg    MCHC 35.3 33.7 - 35.3 g/dL    RDW 47.3 35.9 - 50.0 fL    Platelet Count 219 164 - 446 K/uL    MPV 9.0 9.0 - 12.9 fL    Neutrophils-Polys 70.50 44.00 - 72.00 %    Lymphocytes 9.80 (L) 22.00 - 41.00 %    Monocytes 14.80 (H) 0.00 - 13.40 %    Eosinophils 2.80 0.00 - 6.90 %    Basophils 0.90 0.00 - 1.80 %    Immature Granulocytes 1.20 (H) 0.00 - 0.90 %    Nucleated RBC 0.00 /100 WBC    Neutrophils (Absolute) 3.96 1.82 - 7.42 K/uL    Lymphs (Absolute) 0.55 (L) 1.00 - 4.80 K/uL    Monos (Absolute) 0.83 " 0.00 - 0.85 K/uL    Eos (Absolute) 0.16 0.00 - 0.51 K/uL    Baso (Absolute) 0.05 0.00 - 0.12 K/uL    Immature Granulocytes (abs) 0.07 0.00 - 0.11 K/uL    NRBC (Absolute) 0.00 K/uL       Diagnostic imaging:      Assessment and Recommendations:  1- Limited stage SCLC on  Cisplatin and Etoposide along with RT started  on 10/21/17 . He is scheduled to start C # 2 on 11/13/17   2. SIADH due to # 1 improved and now on Na 132 range  3. Esophagitis due to RT     Plan:   - Clinically and hemodynamically stable.   - His last Cr was done on 11/11/17 - N . I did request CMP and Mg level tomorrow morning prior to starting   - [facility] and my partner Dr. Costello will start making rounds and will follow next week   - Continue RT as planned.   - Start Magic mouth wash help with esophagitis      High complexicity/Drug monitoring     We will continue to follow with you; please call with any questions, [phone].      Vinita Pastor MD  [facility]   [phone]

## 2017-11-12 NOTE — PROGRESS NOTES
Internal Medicine Interval Note  Note Author: Trav Fierro M.D.     Name Froylan Spain     1960   Age/Sex 57 y.o. male   MRN [id number]   Code Status Full code     After 5PM or if no immediate response to page, please call for cross-coverage  Attending/Team: Meredith See Patient List for primary contact information  Call [phone] to page    1st Call - Day Intern (R1):   Vadim 2nd Call - Day Sr. Resident (R2/R3):   Criss Lynch         Reason for interval visit  (Principal Problem)   Small cell carcinoma of lung (CMS-HCC)    Interval Problem Daily Status Update  (24 hours)   - No events overnight  - Patient has received 28 of the total 30 doses of radiation. Last 2 doses tomorrow  - next chemotherapy cycle to start tomorrow      Review of Systems   Constitutional: Positive for malaise/fatigue. Negative for chills and fever.   HENT: Positive for sore throat. Negative for congestion, hearing loss and nosebleeds.    Eyes: Negative for blurred vision, double vision and discharge.   Respiratory: Positive for cough. Negative for sputum production, shortness of breath and wheezing.    Cardiovascular: Negative for chest pain and palpitations.   Gastrointestinal: Negative for abdominal pain, constipation, diarrhea, nausea and vomiting.   Genitourinary: Negative for dysuria and urgency.   Musculoskeletal: Negative for joint pain and myalgias.   Skin: Negative for rash.   Neurological: Negative for dizziness, tingling, focal weakness, weakness and headaches.   Psychiatric/Behavioral: Negative for hallucinations. The patient is not nervous/anxious.        Consultants/Specialty  Hem/Onc  Radio/Onc    Disposition  Inpatient for radiation treatments    Quality Measures    Reviewed items::  Labs reviewed and Medications reviewed  Garvin catheter::  No Garvin  DVT prophylaxis pharmacological::  Heparin  Ulcer Prophylaxis::  Not indicated          Physical Exam       Vitals:    17 1600 17 2104  11/12/17 0335 11/12/17 0800   BP: 113/69 102/67 102/73 (!) 93/62   Pulse: 98 89 (!) 111 (!) 106   Resp: 18 18 18 16   Temp: 36.6 °C (97.8 °F) 36.8 °C (98.2 °F) 36.3 °C (97.3 °F) 36.7 °C (98 °F)   SpO2: 100% 95% 94% 94%   Weight:       Height:         Body mass index is 18.38 kg/m².    Oxygen Therapy:  Pulse Oximetry: 94 %, O2 (LPM): 0, O2 Delivery: None (Room Air)    Physical Exam   Constitutional: He is oriented to person, place, and time. No distress.   cachectic   HENT:   Mouth/Throat: No oropharyngeal exudate.   Neck: Neck supple. No tracheal deviation present.   Scar healing well   Cardiovascular: Normal rate and regular rhythm.    No murmur heard.  Pulmonary/Chest: No respiratory distress. He has no wheezes. He has no rales. He exhibits no tenderness.   Abdominal: He exhibits no distension. There is no tenderness.   Musculoskeletal: He exhibits no edema.   Lymphadenopathy:     He has no cervical adenopathy.   Neurological: He is alert and oriented to person, place, and time. No cranial nerve deficit.   Skin: He is not diaphoretic.   Psychiatric: Affect normal.     Lab Data Review:   10/26/2017  11:40 AM    Recent Labs      11/10/17   0020  11/11/17   0500  11/12/17   0600   SODIUM  131*  132*  136   POTASSIUM  4.0  4.2  4.2   CHLORIDE  99  100  103   CO2  25  24  23   BUN  9  9  8   CREATININE  0.52  0.45*  0.43*   CALCIUM  9.2  9.7  9.4       Recent Labs      11/10/17   0020  11/11/17   0500  11/12/17   0600   GLUCOSE  93  95  93       Recent Labs      11/10/17   0020  11/11/17   0500  11/12/17   0600   RBC  3.25*  3.27*  3.34*   HEMOGLOBIN  10.9*  11.1*  11.3*   HEMATOCRIT  31.3*  31.6*  32.0*   PLATELETCT  206  227  219       Recent Labs      11/10/17   0020  11/11/17   0500  11/12/17   0600   WBC  2.5*  8.2  5.6   NEUTSPOLYS  25.00*  77.30*  70.50   LYMPHOCYTES  31.90  7.50*  9.80*   MONOCYTES  30.60*  11.50  14.80*   EOSINOPHILS  7.70*  1.70  2.80   BASOPHILS  0.80  1.10  0.90           Assessment/Plan      * Small cell carcinoma of lung (CMS-HCC)- (present on admission)   Assessment & Plan    -Diagnosed by mediastinoscopy with biopsy of paratracheal mass (10/17) showing metastatic small cell carcinoma  - Staging workup including MRI Brain and CT Abdomen/Pelvis:  Unremarkable  - 10/21-10/23 Patient received chemotherapy. CISplatin on day 1 and etoposide on days 1-3 28 day cycle for 4-6 cycles.   - 10/23 Radiation BID started, to be continued for 15 days.   - He received 28 doses of radiation. 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 malignancy  - Exhibits good appetite  - Nutrition following        Chronic alcohol use- (present on admission)   Assessment & Plan    - Consumed 1 beer daily   - BAL: 0.01 on admission  - Continue PO thiamine, B12 and folic acid supplements

## 2017-11-12 NOTE — CARE PLAN
Problem: Venous Thromboembolism (VTW)/Deep Vein Thrombosis (DVT) Prevention:  Goal: Patient will participate in Venous Thrombosis (VTE)/Deep Vein Thrombosis (DVT)Prevention Measures  Outcome: PROGRESSING AS EXPECTED  Pt is receiving SQ heparin  Intervention: Assess and monitor for anticoagulation complications  No s/s of bleeding      Problem: Bowel/Gastric:  Goal: Normal bowel function is maintained or improved  Outcome: PROGRESSING AS EXPECTED    Intervention: Educate patient and significant other/support system about diet, fluid intake, medications and activity to promote bowel function  Last BM 11/11  Pt is refusing stool softeners

## 2017-11-12 NOTE — PROGRESS NOTES
Assumed pt care at change of shift. Labs and orders noted. Pt aa&o x4, up self with steady gait. PICC present, patent,+return. Pt c/o same ongoing throat pain. Spray and lozenges administered with moderate relief. No redness/ patchy areas noted. Plan of care updated and questions addressed. Continuing hourly rounding and assessing for additional needs.

## 2017-11-12 NOTE — CARE PLAN
Problem: Venous Thromboembolism (VTW)/Deep Vein Thrombosis (DVT) Prevention:  Goal: Patient will participate in Venous Thrombosis (VTE)/Deep Vein Thrombosis (DVT)Prevention Measures    Intervention: Assess and monitor for anticoagulation complications  SQ Heparin in place for DVT prophylaxis      Problem: Mobility  Goal: Risk for activity intolerance will decrease  Pt up in room and hallway with steady gait    Problem: Pain Management  Goal: Pain level will decrease to patient's comfort goal    Intervention: Follow pain managment plan developed in collaboration with patient and Interdisciplinary Team  Throat pain managed with moderate relief with lozenges, Tylenol and Tramadol

## 2017-11-12 NOTE — PROGRESS NOTES
Received report from day RN. POC discussed with patient, pt verbalizes understanding and agreement.  A&Ox4. Pt is up self. PICC with positive blood return. Tylenol give for throat pain as well as chloraseptic spray.  Call light in reach, bed in low position, Q2 hr rounding.

## 2017-11-12 NOTE — PROGRESS NOTES
No changes from epic. Aox4 HOTN and tachycardia, has been pt baseline, asymptomatic. Up self and steady. No c/o nausea. Having some throat pain, educated about new medication Dr Pastor ordered. PICC patent, + blood return, NS TKO. Reviewed POC- chemo and radiation tomorrow, finding placement. Pt agreeable. Call light and belongings within reach, able to make needs known, rounding in place, will monitor.

## 2017-11-13 LAB
ALBUMIN SERPL BCP-MCNC: 3.5 G/DL (ref 3.2–4.9)
ALBUMIN/GLOB SERPL: 1.1 G/DL
ALP SERPL-CCNC: 50 U/L (ref 30–99)
ALT SERPL-CCNC: 6 U/L (ref 2–50)
ANION GAP SERPL CALC-SCNC: 6 MMOL/L (ref 0–11.9)
AST SERPL-CCNC: 15 U/L (ref 12–45)
BASOPHILS # BLD AUTO: 0.9 % (ref 0–1.8)
BASOPHILS # BLD: 0.04 K/UL (ref 0–0.12)
BILIRUB SERPL-MCNC: 0.4 MG/DL (ref 0.1–1.5)
BUN SERPL-MCNC: 7 MG/DL (ref 8–22)
CALCIUM SERPL-MCNC: 9.4 MG/DL (ref 8.5–10.5)
CHLORIDE SERPL-SCNC: 101 MMOL/L (ref 96–112)
CO2 SERPL-SCNC: 25 MMOL/L (ref 20–33)
CREAT SERPL-MCNC: 0.44 MG/DL (ref 0.5–1.4)
EOSINOPHIL # BLD AUTO: 0.19 K/UL (ref 0–0.51)
EOSINOPHIL NFR BLD: 4.2 % (ref 0–6.9)
ERYTHROCYTE [DISTWIDTH] IN BLOOD BY AUTOMATED COUNT: 49.1 FL (ref 35.9–50)
GFR SERPL CREATININE-BSD FRML MDRD: >60 ML/MIN/1.73 M 2
GLOBULIN SER CALC-MCNC: 3.1 G/DL (ref 1.9–3.5)
GLUCOSE SERPL-MCNC: 88 MG/DL (ref 65–99)
HCT VFR BLD AUTO: 32.7 % (ref 42–52)
HGB BLD-MCNC: 11.5 G/DL (ref 14–18)
IMM GRANULOCYTES # BLD AUTO: 0.06 K/UL (ref 0–0.11)
IMM GRANULOCYTES NFR BLD AUTO: 1.3 % (ref 0–0.9)
LYMPHOCYTES # BLD AUTO: 0.55 K/UL (ref 1–4.8)
LYMPHOCYTES NFR BLD: 12.2 % (ref 22–41)
MAGNESIUM SERPL-MCNC: 1.6 MG/DL (ref 1.5–2.5)
MCH RBC QN AUTO: 34.1 PG (ref 27–33)
MCHC RBC AUTO-ENTMCNC: 35.2 G/DL (ref 33.7–35.3)
MCV RBC AUTO: 97 FL (ref 81.4–97.8)
MONOCYTES # BLD AUTO: 0.74 K/UL (ref 0–0.85)
MONOCYTES NFR BLD AUTO: 16.4 % (ref 0–13.4)
NEUTROPHILS # BLD AUTO: 2.94 K/UL (ref 1.82–7.42)
NEUTROPHILS NFR BLD: 65 % (ref 44–72)
NRBC # BLD AUTO: 0 K/UL
NRBC BLD AUTO-RTO: 0 /100 WBC
PLATELET # BLD AUTO: 305 K/UL (ref 164–446)
PMV BLD AUTO: 9 FL (ref 9–12.9)
POTASSIUM SERPL-SCNC: 4 MMOL/L (ref 3.6–5.5)
PROT SERPL-MCNC: 6.6 G/DL (ref 6–8.2)
RBC # BLD AUTO: 3.37 M/UL (ref 4.7–6.1)
SODIUM SERPL-SCNC: 132 MMOL/L (ref 135–145)
WBC # BLD AUTO: 4.5 K/UL (ref 4.8–10.8)

## 2017-11-13 PROCEDURE — 700111 HCHG RX REV CODE 636 W/ 250 OVERRIDE (IP): Performed by: INTERNAL MEDICINE

## 2017-11-13 PROCEDURE — 80053 COMPREHEN METABOLIC PANEL: CPT

## 2017-11-13 PROCEDURE — A9270 NON-COVERED ITEM OR SERVICE: HCPCS | Performed by: STUDENT IN AN ORGANIZED HEALTH CARE EDUCATION/TRAINING PROGRAM

## 2017-11-13 PROCEDURE — 77386 HCHG IMRT DELIVERY COMPLEX: CPT | Performed by: RADIOLOGY

## 2017-11-13 PROCEDURE — 85025 COMPLETE CBC W/AUTO DIFF WBC: CPT

## 2017-11-13 PROCEDURE — 770004 HCHG ROOM/CARE - ONCOLOGY PRIVATE *

## 2017-11-13 PROCEDURE — 77014 PR CT GUIDANCE PLACEMENT RAD THERAPY FIELDS: CPT | Mod: 26,XE | Performed by: RADIOLOGY

## 2017-11-13 PROCEDURE — 77336 RADIATION PHYSICS CONSULT: CPT | Performed by: RADIOLOGY

## 2017-11-13 PROCEDURE — 99232 SBSQ HOSP IP/OBS MODERATE 35: CPT | Performed by: HOSPITALIST

## 2017-11-13 PROCEDURE — 700102 HCHG RX REV CODE 250 W/ 637 OVERRIDE(OP): Performed by: STUDENT IN AN ORGANIZED HEALTH CARE EDUCATION/TRAINING PROGRAM

## 2017-11-13 PROCEDURE — 700102 HCHG RX REV CODE 250 W/ 637 OVERRIDE(OP): Performed by: INTERNAL MEDICINE

## 2017-11-13 PROCEDURE — A9270 NON-COVERED ITEM OR SERVICE: HCPCS | Performed by: INTERNAL MEDICINE

## 2017-11-13 PROCEDURE — 700105 HCHG RX REV CODE 258: Performed by: INTERNAL MEDICINE

## 2017-11-13 PROCEDURE — 83735 ASSAY OF MAGNESIUM: CPT

## 2017-11-13 PROCEDURE — 700111 HCHG RX REV CODE 636 W/ 250 OVERRIDE (IP): Performed by: STUDENT IN AN ORGANIZED HEALTH CARE EDUCATION/TRAINING PROGRAM

## 2017-11-13 RX ORDER — DEXAMETHASONE SODIUM PHOSPHATE 4 MG/ML
12 INJECTION, SOLUTION INTRA-ARTICULAR; INTRALESIONAL; INTRAMUSCULAR; INTRAVENOUS; SOFT TISSUE ONCE
Status: COMPLETED | OUTPATIENT
Start: 2017-11-13 | End: 2017-11-13

## 2017-11-13 RX ORDER — SODIUM CHLORIDE 9 MG/ML
500 INJECTION, SOLUTION INTRAVENOUS ONCE
Status: COMPLETED | OUTPATIENT
Start: 2017-11-13 | End: 2017-11-13

## 2017-11-13 RX ORDER — MAGNESIUM SULFATE HEPTAHYDRATE 40 MG/ML
2 INJECTION, SOLUTION INTRAVENOUS ONCE
Status: COMPLETED | OUTPATIENT
Start: 2017-11-13 | End: 2017-11-13

## 2017-11-13 RX ADMIN — LIDOCAINE HYDROCHLORIDE 5 ML: 20 SOLUTION OROPHARYNGEAL at 07:07

## 2017-11-13 RX ADMIN — VITAMIN D, TAB 1000IU (100/BT) 5000 UNITS: 25 TAB at 08:24

## 2017-11-13 RX ADMIN — ETOPOSIDE 169 MG: 20 INJECTION, SOLUTION, CONCENTRATE INTRAVENOUS at 17:21

## 2017-11-13 RX ADMIN — BENZOCAINE AND MENTHOL 1 LOZENGE: 15; 3.6 LOZENGE ORAL at 20:24

## 2017-11-13 RX ADMIN — SODIUM CHLORIDE 150 MG: 9 INJECTION, SOLUTION INTRAVENOUS at 14:00

## 2017-11-13 RX ADMIN — SODIUM CHLORIDE: 9 INJECTION, SOLUTION INTRAVENOUS at 23:52

## 2017-11-13 RX ADMIN — MAGNESIUM SULFATE IN WATER 2 G: 40 INJECTION, SOLUTION INTRAVENOUS at 08:22

## 2017-11-13 RX ADMIN — HEPARIN SODIUM 5000 UNITS: 5000 INJECTION, SOLUTION INTRAVENOUS; SUBCUTANEOUS at 22:18

## 2017-11-13 RX ADMIN — THIAMINE HCL TAB 100 MG 100 MG: 100 TAB at 08:24

## 2017-11-13 RX ADMIN — MIDODRINE HYDROCHLORIDE 5 MG: 5 TABLET ORAL at 08:22

## 2017-11-13 RX ADMIN — ACETAMINOPHEN 650 MG: 325 TABLET, FILM COATED ORAL at 20:24

## 2017-11-13 RX ADMIN — CISPLATIN 135.2 MG: 1 INJECTION, SOLUTION INTRAVENOUS at 15:44

## 2017-11-13 RX ADMIN — ONDANSETRON HYDROCHLORIDE 16 MG: 2 SOLUTION INTRAMUSCULAR; INTRAVENOUS at 14:00

## 2017-11-13 RX ADMIN — ACETAMINOPHEN 650 MG: 325 TABLET, FILM COATED ORAL at 07:07

## 2017-11-13 RX ADMIN — SODIUM CHLORIDE 500 ML: 9 INJECTION, SOLUTION INTRAVENOUS at 13:45

## 2017-11-13 RX ADMIN — MIDODRINE HYDROCHLORIDE 5 MG: 5 TABLET ORAL at 17:49

## 2017-11-13 RX ADMIN — FOLIC ACID 1 MG: 1 TABLET ORAL at 08:22

## 2017-11-13 RX ADMIN — HEPARIN SODIUM 5000 UNITS: 5000 INJECTION, SOLUTION INTRAVENOUS; SUBCUTANEOUS at 06:17

## 2017-11-13 RX ADMIN — PHENOL: 1.5 LIQUID ORAL at 20:24

## 2017-11-13 RX ADMIN — LIDOCAINE HYDROCHLORIDE 5 ML: 20 SOLUTION OROPHARYNGEAL at 20:29

## 2017-11-13 RX ADMIN — NICOTINE 14 MG: 14 PATCH, EXTENDED RELEASE TRANSDERMAL at 06:17

## 2017-11-13 RX ADMIN — THERA TABS 1 TABLET: TAB at 08:23

## 2017-11-13 RX ADMIN — MIDODRINE HYDROCHLORIDE 5 MG: 5 TABLET ORAL at 12:40

## 2017-11-13 RX ADMIN — HEPARIN SODIUM 5000 UNITS: 5000 INJECTION, SOLUTION INTRAVENOUS; SUBCUTANEOUS at 14:40

## 2017-11-13 RX ADMIN — DEXAMETHASONE SODIUM PHOSPHATE 12 MG: 4 INJECTION, SOLUTION INTRAMUSCULAR; INTRAVENOUS at 14:41

## 2017-11-13 RX ADMIN — SODIUM CHLORIDE 500 ML: 9 INJECTION, SOLUTION INTRAVENOUS at 12:30

## 2017-11-13 ASSESSMENT — ENCOUNTER SYMPTOMS
TINGLING: 0
SORE THROAT: 1
MYALGIAS: 0
CONSTIPATION: 0
COUGH: 1
NERVOUS/ANXIOUS: 0
WEAKNESS: 0
DIARRHEA: 0
BLURRED VISION: 0
HALLUCINATIONS: 0
NEUROLOGICAL NEGATIVE: 1
EYES NEGATIVE: 1
DOUBLE VISION: 0
HEARTBURN: 1
FEVER: 0
HEADACHES: 0
NAUSEA: 0
ABDOMINAL PAIN: 0
DIZZINESS: 0
SHORTNESS OF BREATH: 0
FOCAL WEAKNESS: 0
VOMITING: 0
RESPIRATORY NEGATIVE: 1
PALPITATIONS: 0
MUSCULOSKELETAL NEGATIVE: 1
CARDIOVASCULAR NEGATIVE: 1
WHEEZING: 0
CHILLS: 0
SPUTUM PRODUCTION: 0
EYE DISCHARGE: 0

## 2017-11-13 ASSESSMENT — PAIN SCALES - GENERAL
PAINLEVEL_OUTOF10: 5
PAINLEVEL_OUTOF10: 2
PAINLEVEL_OUTOF10: 5

## 2017-11-13 NOTE — DISCHARGE PLANNING
Received call from Vanessa with [facility] declining due to drug use.

## 2017-11-13 NOTE — DISCHARGE PLANNING
Medical SW    Referral: Sw noted EPIC updates including two SNF declines: [facility] due to pt care exceeding their capacity and inability to transport him to chemo/radiation and ONC appointments in future, [place] declined due to pt's insurance reportedly willing to pay ARLIN of $100.00 per day and this amount is too low.     Intervention: Shawn spoke to supervisor Gee. Sw spoke to bedside RN, Judy. She reports pt is very involved w/ his care and would be independent enough to have a motel/apt and transportation to out pt appointments. He does not need SNF care at this time.    Sw left  w/ CM Lara. Sw spoke to Lara. She will f/u w/ resource referral to support for application w/ Social Security Disability.     Shawn called [employer]. Shawn provided update as above. Carey will meet w/ Lara in the AM tomorrow and update this Sw following. Sw provided work schedule and contact number.    Sw updated supervisor Gee regarding pt's current d/c plan as above.      Plan: Sw to assist w/ d/c planning as needed.

## 2017-11-13 NOTE — PROGRESS NOTES
"Pharmacy Chemotherapy Note    Patient Name: CASSIDY PAGE  Dx: Small cell lung cancer      Protocol:  CISPLATIN + ETOPOSIDE + XRT     CISplatin 80 mg/m2 IV over 60 minutes on day 1  Etoposide 100 mg/m2 IV over 60 minutes on days 1-3  21-28 day cycle for 4-6 cycles   -MD giving Cycle 2 21 days after Cycle 1, each cycle length to be determined ([place] plan has 28-day cycles currently)    *Dosing Reference*  NCCN Guidelines for Small Cell Lung Cancer.V.2.2017  Ruth Ann AT, et al. N Engl J Med. 199;340-(4):265-71  Moy MORILLO et al. J Clin Oncol. 2002;20(24):6095-72      /72   Pulse (!) 117   Temp 36.7 °C (98 °F)   Resp 17   Ht 1.778 m (5' 10\")   Wt 57.7 kg (127 lb 3.3 oz)   SpO2 100%   BMI 18.25 kg/m²  Body surface area is 1.69 meters squared.    LABS 2017  ANC ~2900   Plt = 305k   Hgb = 11.5    SCr: 0.44 mg/dL CrCl ~96 mL/min  (using min SCr 0.7)    Ma.6   K: 4.0  LFTs: WNL TBili = 0.4      Drug Order   (Drug name, dose, route, IV Fluid & volume, frequency, number of doses) Cycle: 2      Previous treatment: C1 = Oct 21 - 23, 2017     Medication = Cisplatin  Base Dose= 80 mg/m2  Calc Dose: Base Dose x 1.69 m2 = 135.2 mg  Final Dose = 135.2 mg  Route = IV   Fluid & Volume =  mL  Admin Duration = Over 60 min    Day 1 only      <5% difference, OK to treat with final dose    Medication = Etoposide  Base Dose= 100 mg/m2  Calc Dose: Base Dose x 1.69 m2 = 169 mg  Final Dose = 169 mg  Route = IV  Fluid & Volume =  mL  Admin Duration = Over 1-2 hrs   Days 1-3      <5% difference, OK to treat with final dose      By my signature below, I confirm this process was performed independently with the BSA and all final chemotherapy dosing calculations congruent. I have reviewed the above chemotherapy order and that my calculation of the final dose and BSA (when applicable) corroborate those calculations of the  pharmacist. Discrepancies of 5% or greater in the written dose have been " addressed and documented within the EPIC Progress notes.    Jerome Murphy, PharmD

## 2017-11-13 NOTE — PROGRESS NOTES
Chemotherapy Verification - SECONDARY RN  Cycle 2 day 1       Height = 177.8 cm  Weight = 57.7 kg  BSA = 1.69 m2       Medication: etoposide   Dose: 100 mg/m2  Calculated Dose: 169 mg, (ordered dose=169 mg <5% difference)                             (In mg/m2, AUC, mg/kg)     Medication: cisplatin  Dose: 80 mg/m2  Calculated Dose: 135.2 mg (ordered dose= 135.2 <5% difference)                             (In mg/m2, AUC, mg/kg)      I confirm that this process was performed independently.

## 2017-11-13 NOTE — CARE PLAN
Problem: Infection  Goal: Will remain free from infection  Outcome: PROGRESSING AS EXPECTED  Daily CBC is being monitored. CHG/hibiclens daily with central line. Vitals are being monitored.     Problem: Pain Management  Goal: Pain level will decrease to patient's comfort goal  Outcome: PROGRESSING AS EXPECTED  Pain assessed. Pt has no complaints of pain.

## 2017-11-13 NOTE — PROGRESS NOTES
Pt is A&O.  Up adlib.  Gait is steady.  Has 1 more XRT tx this afternoon.  Pt c/o throat being sore from radiation.  Has irritating cough as well.  Voiding and having normal bms per pt.  Anticipating starting chemo this afternoon.  Iv mag running now.  PICC has positive blood return.  Appetite is great.  Drinking lots of water.

## 2017-11-13 NOTE — PROGRESS NOTES
Rec'd report from day shift RN. Assumed pt care. Assessment completed. AAOx4. Patient complaining of 2/10 throat pain. Declining medication at this time. No other s/s of discomfort or distress. PICC with positive blood return. Pt ambulates to the bathroom by self, maintains steady gait and tolerates well. Bed in lowest position, bed locked, bed alarm is not on. Patient calls appropriately. Treaded socks in place, RN and CNA numbers provided, call light within reach. Pt needs met at this time.

## 2017-11-13 NOTE — DISCHARGE PLANNING
Received call from MARIAJOSE Cain to follow up on referral:  Contacted [facility] spoke to Janessa Patino phone [phone]. Janessa to call back.  Contacted [facility] Andrew spoke to Vanessa Patino number. Vanessa will call back.

## 2017-11-13 NOTE — PROGRESS NOTES
Patient was transported to our department for radiation therapy treatment number 29 of 30 to his thorax. We plan on treating the patient again at 1:30pm (Tx 30 of 30, BID)

## 2017-11-13 NOTE — PROGRESS NOTES
Oncology/Hematology Progress Note               Author: Jose Manuel Costello Date & Time created: 11/13/2017  10:50 AM   CC:  Lung cancer  Interval History:    He is doing ok.  No new issues.  Mild sore throat.    Review of Systems:  Review of Systems   Constitutional: Positive for malaise/fatigue.   HENT: Negative.    Eyes: Negative.    Respiratory: Negative.    Cardiovascular: Negative.    Gastrointestinal: Positive for heartburn. Negative for constipation and diarrhea.   Genitourinary: Negative.    Musculoskeletal: Negative.    Skin: Negative.    Neurological: Negative.    Endo/Heme/Allergies: Negative.        Physical Exam:  Physical Exam   Constitutional: He is oriented to person, place, and time. He appears well-developed.   HENT:   Head: Normocephalic.   Eyes: Pupils are equal, round, and reactive to light.   Cardiovascular: Normal rate and regular rhythm.    Pulmonary/Chest: Effort normal and breath sounds normal.   Abdominal: Soft. Bowel sounds are normal.   Musculoskeletal: He exhibits no edema.   Neurological: He is alert and oriented to person, place, and time.   Skin: Skin is warm.       Labs:        Invalid input(s): [id number]      Recent Labs      11/11/17   0500  11/12/17   0600 11/13/17   0610   SODIUM  132*  136  132*   POTASSIUM  4.2  4.2  4.0   CHLORIDE  100  103  101   CO2  24  23  25   BUN  9  8  7*   CREATININE  0.45*  0.43*  0.44*   MAGNESIUM   --    --   1.6   CALCIUM  9.7  9.4  9.4     Recent Labs      11/11/17   0500  11/12/17   0600 11/13/17   0610   ALTSGPT   --    --   6   ASTSGOT   --    --   15   ALKPHOSPHAT   --    --   50   TBILIRUBIN   --    --   0.4   GLUCOSE  95  93  88     Recent Labs      11/11/17   0500  11/12/17   0600  11/13/17   0610   RBC  3.27*  3.34*  3.37*   HEMOGLOBIN  11.1*  11.3*  11.5*   HEMATOCRIT  31.6*  32.0*  32.7*   PLATELETCT  227  219  305     Recent Labs      11/11/17   0500  11/12/17   0600  11/13/17   0610   WBC  8.2  5.6  4.5*   NEUTSPOLYS  77.30*   70.50  65.00   LYMPHOCYTES  7.50*  9.80*  12.20*   MONOCYTES  11.50  14.80*  16.40*   EOSINOPHILS  1.70  2.80  4.20   BASOPHILS  1.10  0.90  0.90   ASTSGOT   --    --   15   ALTSGPT   --    --   6   ALKPHOSPHAT   --    --   50   TBILIRUBIN   --    --   0.4     Recent Labs      17   0500  17   0600  17   0610   SODIUM  132*  136  132*   POTASSIUM  4.2  4.2  4.0   CHLORIDE  100  103  101   CO2  24  23  25   GLUCOSE  95  93  88   BUN  9  8  7*   CREATININE  0.45*  0.43*  0.44*   CALCIUM  9.7  9.4  9.4     Hemodynamics:  Temp (24hrs), Av.6 °C (97.9 °F), Min:36.3 °C (97.4 °F), Max:36.9 °C (98.4 °F)  Temperature: 36.7 °C (98 °F)  Pulse  Av.6  Min: 64  Max: 117   Blood Pressure: 108/72     Respiratory:    Respiration: 17, Pulse Oximetry: 100 %        RLL Breath Sounds: Diminished, LLL Breath Sounds: Diminished  Fluids:    Intake/Output Summary (Last 24 hours) at 17 1050  Last data filed at 17 0950   Gross per 24 hour   Intake             1250 ml   Output                0 ml   Net             1250 ml        GI/Nutrition:  Orders Placed This Encounter   Procedures   • DIET ORDER     Standing Status:   Standing     Number of Occurrences:   1     Order Specific Question:   Diet:     Answer:   Regular [1]     Medical Decision Making, by Problem:  Active Hospital Problems    Diagnosis   • *Small cell carcinoma of lung (CMS-HCC) [C34.90]   • Hyponatremia [E87.1]   • Chronic alcohol use [F10.10]   • Sorethroat [J02.9]   • Lung cancer, upper lobe (CMS-HCC) [C34.10]   • Body mass index (BMI) 19.9 or less, adult [Z68.1]     Pmhx no changes    DX  1- Limited stage SCLC on  Cisplatin and Etoposide along with RT started  on 10/21/17   2. 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 Esophagitis due to RT     Plan    1. Cycle 2 today.  Labs ok.  2. Radiation finishing today.    High complexity / chemotherapy written    Quality-Core Measures

## 2017-11-13 NOTE — PROGRESS NOTES
Patient was transported to our department for radiation therapy treatment number 30 of 30 to his Lung. He has completed all prescribed fractions for this course of radiation therapy.

## 2017-11-13 NOTE — PROGRESS NOTES
"Pharmacy Chemotherapy Verification    Patient Name: Froylan Spain  Dx: SCLC - limited stage    Cycle 2 Previous treatment: C1 = OCT 21-23, 2017    Protocol: CISplatin/Etoposide + XRT    *Dosing Reference*  CISplatin 80 mg/m2 IV over 60 minutes on day 1  Etoposide 100 mg/m2 IV over 60 minutes on days 1-3  Q21-28 day cycle for 4-6 cycles - Q28 days in treatment plan but Dr. Costello ok'd for Q21 day for cycle 2.  Future cycles depending on rounding oncologist discretion.   NCCN Guidelines for Small Cell [place] Cancer.V.2.2017  Ruth Ann AT, et al. N Engl J Med. 199;340-(4):265-71  Moy S, et al. J Clin Oncol. 2002;20(24):8335-72  Belkys ARANDA et al. J Clin Oncol. 1994;12(10):2022-24  Michael H, et al. J Clin Oncol. 2006;24(33):7305-52    Allergies:Review of patient's allergies indicates no known allergies.    /72   Pulse (!) 117 Comment: rn notified  Temp 36.7 °C (98 °F)   Resp 17   Ht 1.778 m (5' 10\")   Wt 57.7 kg (127 lb 3.3 oz)   SpO2 100%   BMI 18.25 kg/m²  Body surface area is 1.69 meters squared.    Labs 11/13/17  ANC ~ 2900 Plt = 305k     Hgb = 11.5 SCr =  0.44 mg/dL      CrCl ~97 ml/min  AST/ALT/AP = 15/6/50        Tbili = 0.4 K = 4  Mg = 1.6 (repalced w/ Mg 2gm per MD)      CISplatin (Platinol) 80 mg/m2 x 1.69 m2 = 135.2 mg   <5% difference, OK to treat with final dose = 135.2 mg IV on Day 1 only       Etoposide (Toposar) 100 mg/m2 x 1.69 m2 = 169 mg   <5% difference, OK to treat with final dose = 169 mg IV on Days 1-3      Lj Landaverde, PharmD      "

## 2017-11-13 NOTE — PROGRESS NOTES
Chemotherapy Verification - PRIMARY RN      Height = 1.7m  Weight = 58.1kg  BSA = 1.69m2       Medication: Etoposide  Dose: 100mg/[id number] Dose: 169mg=ordered dose                             (In mg/m2, AUC, mg/kg)     Medication: Cisplatin  Dose: 80mg/m2  Calculated Dose: 135.2mg=ordered dose                             (In mg/m2, AUC, mg/kg)          I confirm this process was performed independently with the BSA and all final chemotherapy dosing calculations congruent.  Any discrepancies of 5% or greater have been addressed with the chemotherapy pharmacist. The resolution of the discrepancy has been documented in the EPIC progress notes.

## 2017-11-14 LAB
ANION GAP SERPL CALC-SCNC: 7 MMOL/L (ref 0–11.9)
BASOPHILS # BLD AUTO: 0.4 % (ref 0–1.8)
BASOPHILS # BLD: 0.01 K/UL (ref 0–0.12)
BUN SERPL-MCNC: 10 MG/DL (ref 8–22)
CALCIUM SERPL-MCNC: 8.8 MG/DL (ref 8.5–10.5)
CHLORIDE SERPL-SCNC: 103 MMOL/L (ref 96–112)
CO2 SERPL-SCNC: 22 MMOL/L (ref 20–33)
CREAT SERPL-MCNC: 0.45 MG/DL (ref 0.5–1.4)
EOSINOPHIL # BLD AUTO: 0 K/UL (ref 0–0.51)
EOSINOPHIL NFR BLD: 0 % (ref 0–6.9)
ERYTHROCYTE [DISTWIDTH] IN BLOOD BY AUTOMATED COUNT: 48.4 FL (ref 35.9–50)
GFR SERPL CREATININE-BSD FRML MDRD: >60 ML/MIN/1.73 M 2
GLUCOSE SERPL-MCNC: 174 MG/DL (ref 65–99)
HCT VFR BLD AUTO: 28.8 % (ref 42–52)
HGB BLD-MCNC: 10 G/DL (ref 14–18)
IMM GRANULOCYTES # BLD AUTO: 0.04 K/UL (ref 0–0.11)
IMM GRANULOCYTES NFR BLD AUTO: 1.6 % (ref 0–0.9)
LYMPHOCYTES # BLD AUTO: 0.06 K/UL (ref 1–4.8)
LYMPHOCYTES NFR BLD: 2.5 % (ref 22–41)
MCH RBC QN AUTO: 33.6 PG (ref 27–33)
MCHC RBC AUTO-ENTMCNC: 34.7 G/DL (ref 33.7–35.3)
MCV RBC AUTO: 96.6 FL (ref 81.4–97.8)
MONOCYTES # BLD AUTO: 0.13 K/UL (ref 0–0.85)
MONOCYTES NFR BLD AUTO: 5.3 % (ref 0–13.4)
NEUTROPHILS # BLD AUTO: 2.19 K/UL (ref 1.82–7.42)
NEUTROPHILS NFR BLD: 90.2 % (ref 44–72)
NRBC # BLD AUTO: 0 K/UL
NRBC BLD AUTO-RTO: 0 /100 WBC
PLATELET # BLD AUTO: 297 K/UL (ref 164–446)
PMV BLD AUTO: 8.8 FL (ref 9–12.9)
POTASSIUM SERPL-SCNC: 4 MMOL/L (ref 3.6–5.5)
RBC # BLD AUTO: 2.98 M/UL (ref 4.7–6.1)
SODIUM SERPL-SCNC: 132 MMOL/L (ref 135–145)
WBC # BLD AUTO: 2.4 K/UL (ref 4.8–10.8)

## 2017-11-14 PROCEDURE — 85025 COMPLETE CBC W/AUTO DIFF WBC: CPT

## 2017-11-14 PROCEDURE — 700102 HCHG RX REV CODE 250 W/ 637 OVERRIDE(OP): Performed by: STUDENT IN AN ORGANIZED HEALTH CARE EDUCATION/TRAINING PROGRAM

## 2017-11-14 PROCEDURE — 80048 BASIC METABOLIC PNL TOTAL CA: CPT

## 2017-11-14 PROCEDURE — A9270 NON-COVERED ITEM OR SERVICE: HCPCS | Performed by: STUDENT IN AN ORGANIZED HEALTH CARE EDUCATION/TRAINING PROGRAM

## 2017-11-14 PROCEDURE — 700111 HCHG RX REV CODE 636 W/ 250 OVERRIDE (IP): Performed by: STUDENT IN AN ORGANIZED HEALTH CARE EDUCATION/TRAINING PROGRAM

## 2017-11-14 PROCEDURE — 770004 HCHG ROOM/CARE - ONCOLOGY PRIVATE *

## 2017-11-14 PROCEDURE — 97530 THERAPEUTIC ACTIVITIES: CPT

## 2017-11-14 PROCEDURE — 700111 HCHG RX REV CODE 636 W/ 250 OVERRIDE (IP): Performed by: INTERNAL MEDICINE

## 2017-11-14 PROCEDURE — G8980 MOBILITY D/C STATUS: HCPCS | Mod: CI

## 2017-11-14 PROCEDURE — G8979 MOBILITY GOAL STATUS: HCPCS | Mod: CI

## 2017-11-14 PROCEDURE — A9270 NON-COVERED ITEM OR SERVICE: HCPCS | Performed by: INTERNAL MEDICINE

## 2017-11-14 PROCEDURE — 700102 HCHG RX REV CODE 250 W/ 637 OVERRIDE(OP): Performed by: INTERNAL MEDICINE

## 2017-11-14 PROCEDURE — 99232 SBSQ HOSP IP/OBS MODERATE 35: CPT | Performed by: HOSPITALIST

## 2017-11-14 PROCEDURE — 700105 HCHG RX REV CODE 258: Performed by: INTERNAL MEDICINE

## 2017-11-14 RX ORDER — ONDANSETRON 4 MG/1
4 TABLET, ORALLY DISINTEGRATING ORAL ONCE
Status: COMPLETED | OUTPATIENT
Start: 2017-11-14 | End: 2017-11-14

## 2017-11-14 RX ORDER — DEXAMETHASONE 4 MG/1
4 TABLET ORAL ONCE
Status: COMPLETED | OUTPATIENT
Start: 2017-11-14 | End: 2017-11-14

## 2017-11-14 RX ADMIN — MIDODRINE HYDROCHLORIDE 5 MG: 5 TABLET ORAL at 17:13

## 2017-11-14 RX ADMIN — THERA TABS 1 TABLET: TAB at 07:32

## 2017-11-14 RX ADMIN — DEXAMETHASONE 4 MG: 4 TABLET ORAL at 13:43

## 2017-11-14 RX ADMIN — HEPARIN SODIUM 5000 UNITS: 5000 INJECTION, SOLUTION INTRAVENOUS; SUBCUTANEOUS at 21:38

## 2017-11-14 RX ADMIN — VITAMIN D, TAB 1000IU (100/BT) 5000 UNITS: 25 TAB at 07:32

## 2017-11-14 RX ADMIN — MIDODRINE HYDROCHLORIDE 5 MG: 5 TABLET ORAL at 07:32

## 2017-11-14 RX ADMIN — FOLIC ACID 1 MG: 1 TABLET ORAL at 07:32

## 2017-11-14 RX ADMIN — BENZOCAINE AND MENTHOL 1 LOZENGE: 15; 3.6 LOZENGE ORAL at 07:41

## 2017-11-14 RX ADMIN — MIDODRINE HYDROCHLORIDE 5 MG: 5 TABLET ORAL at 11:05

## 2017-11-14 RX ADMIN — ACETAMINOPHEN 650 MG: 325 TABLET, FILM COATED ORAL at 07:32

## 2017-11-14 RX ADMIN — ETOPOSIDE 169 MG: 20 INJECTION, SOLUTION, CONCENTRATE INTRAVENOUS at 16:40

## 2017-11-14 RX ADMIN — BENZOCAINE AND MENTHOL 1 LOZENGE: 15; 3.6 LOZENGE ORAL at 21:38

## 2017-11-14 RX ADMIN — PHENOL 1 SPRAY: 1.5 LIQUID ORAL at 21:38

## 2017-11-14 RX ADMIN — HEPARIN SODIUM 5000 UNITS: 5000 INJECTION, SOLUTION INTRAVENOUS; SUBCUTANEOUS at 05:32

## 2017-11-14 RX ADMIN — HEPARIN SODIUM 5000 UNITS: 5000 INJECTION, SOLUTION INTRAVENOUS; SUBCUTANEOUS at 13:43

## 2017-11-14 RX ADMIN — THIAMINE HCL TAB 100 MG 100 MG: 100 TAB at 07:32

## 2017-11-14 RX ADMIN — NICOTINE 14 MG: 14 PATCH, EXTENDED RELEASE TRANSDERMAL at 05:31

## 2017-11-14 RX ADMIN — MAGNESIUM HYDROXIDE 30 ML: 400 SUSPENSION ORAL at 11:05

## 2017-11-14 RX ADMIN — ONDANSETRON 4 MG: 4 TABLET, ORALLY DISINTEGRATING ORAL at 13:43

## 2017-11-14 ASSESSMENT — COGNITIVE AND FUNCTIONAL STATUS - GENERAL
SUGGESTED CMS G CODE MODIFIER MOBILITY: CI
CLIMB 3 TO 5 STEPS WITH RAILING: A LITTLE
MOBILITY SCORE: 23

## 2017-11-14 ASSESSMENT — ENCOUNTER SYMPTOMS
DOUBLE VISION: 0
DIZZINESS: 0
WEAKNESS: 0
CARDIOVASCULAR NEGATIVE: 1
SPUTUM PRODUCTION: 0
NERVOUS/ANXIOUS: 0
DIARRHEA: 0
CHILLS: 0
NEUROLOGICAL NEGATIVE: 1
CONSTIPATION: 0
SHORTNESS OF BREATH: 0
EYES NEGATIVE: 1
NAUSEA: 0
EYE DISCHARGE: 0
FOCAL WEAKNESS: 0
VOMITING: 0
RESPIRATORY NEGATIVE: 1
HALLUCINATIONS: 0
HEADACHES: 0
COUGH: 1
BLURRED VISION: 0
MYALGIAS: 0
PALPITATIONS: 0
MUSCULOSKELETAL NEGATIVE: 1
TINGLING: 0
FEVER: 0
ABDOMINAL PAIN: 0
WHEEZING: 0
HEARTBURN: 1
SORE THROAT: 1

## 2017-11-14 ASSESSMENT — GAIT ASSESSMENTS
GAIT LEVEL OF ASSIST: MODIFIED INDEPENDENT
DISTANCE (FEET): 500

## 2017-11-14 ASSESSMENT — PAIN SCALES - GENERAL
PAINLEVEL_OUTOF10: 5
PAINLEVEL_OUTOF10: 5

## 2017-11-14 NOTE — PROGRESS NOTES
"Pharmacy Chemotherapy Note    Patient Name: CASSIDY PAGE  Dx: Limited Stage SCLC    Protocol:  CISPLATIN + ETOPOSIDE + XRT       *Dosing Reference*  CISplatin 80 mg/m2 IV over 60 minutes on day 1  Etoposide 100 mg/m2 IV over 60 minutes on days 1-3  21-28 day cycle for 4-6 cycles -MD giving Cycle 2 21 days after Cycle 1, each cycle length to be determined ([place] plan has 28-day cycles currently)  NCCN Guidelines for Small Cell Lung Cancer.V.2.2017  Ruth Ann AT, et al. N Engl J Med. 199;340-(4):265-71  Moy S et al. J Clin Oncol. 2002;20(24):3625-72    /77   Pulse (!) 109   Temp 36.6 °C (97.9 °F)   Resp 16   Ht 1.778 m (5' 10\")   Wt 57.7 kg (127 lb 3.3 oz)   SpO2 96%   BMI 18.25 kg/m²  Body surface area is 1.69 meters squared.    Labs 11/13/17  ANC ~2200   Plt = 297k   Hgb = 10   SCr = 0.45 mg/dL CrCl ~96 mL/min (min SCr 0.7 mg/dL)    Labs 11/13/17  SCr = 0.44 mg/dL CrCl ~96 mL/min  (min SCr 0.7)    Mag = 1.6   K = 4  LFTs = WNL  TBili = 0.4      Drug Order   (Drug name, dose, route, IV Fluid & volume, frequency, number of doses) Cycle 2, Day 2      Previous treatment: C1 = Oct 21 - 23, 2017     Medication = Etoposide (Toposar)  Base Dose= 100 mg/m2  Calc Dose: Base Dose x 1.69 m2 = 169 mg  Final Dose = 169 mg  Route = IV  Fluid & Volume =  mL  Admin Duration = Over 1-2 hrs   Days 1-3      <5% difference, OK to treat with final dose      By my signature below, I confirm this process was performed independently with the BSA and all final chemotherapy dosing calculations congruent. I have reviewed the above chemotherapy order and that my calculation of the final dose and BSA (when applicable) corroborate those calculations of the  pharmacist. Discrepancies of 5% or greater in the written dose have been addressed and documented within the [facility] Progress notes.    Lj Landaverde, PharmD   "

## 2017-11-14 NOTE — CARE PLAN
Problem: Skin Integrity  Goal: Risk for impaired skin integrity will decrease    Intervention: Assess risk factors for impaired skin integrity and/or pressure ulcers  Pt is cachetic.  Skin assessed for impaired skin integrity.  Pt offered pillows for support.       Problem: Pain Management  Goal: Pain level will decrease to patient's comfort goal    Intervention: Follow pain managment plan developed in collaboration with patient and Interdisciplinary Team  Pt c/o of pain. Pt medicated per MAR. Lozenge and spray offered as well.

## 2017-11-14 NOTE — PROGRESS NOTES
Received report and assumed patient care at change of shift. Patient is resting in bed, A&O x4. Patient reports 5/10 throat pain at this time, medications provided per MAR.     Pt has PICC on right side.     Plan of care discussed, questions answered. Bed is in the lowest position and locked, call light within reach, non-skid socks in place, hourly rounding. Patient reports no further needs and this time.

## 2017-11-14 NOTE — PROGRESS NOTES
Oncology/Hematology Progress Note               Author: Jose Manuel Costello Date & Time created: 11/14/2017  12:09 PM   CC:  Lung cancer  Interval History:    He is doing ok.  No new issues.     Review of Systems:  Review of Systems   Constitutional: Positive for malaise/fatigue.   HENT: Negative.    Eyes: Negative.    Respiratory: Negative.    Cardiovascular: Negative.    Gastrointestinal: Positive for heartburn. Negative for constipation and diarrhea.   Genitourinary: Negative.    Musculoskeletal: Negative.    Skin: Negative.    Neurological: Negative.    Endo/Heme/Allergies: Negative.        Physical Exam:  Physical Exam   Constitutional: He is oriented to person, place, and time. He appears well-developed.   HENT:   Head: Normocephalic.   Eyes: Pupils are equal, round, and reactive to light.   Cardiovascular: Normal rate and regular rhythm.    Pulmonary/Chest: Effort normal and breath sounds normal.   Abdominal: Soft. Bowel sounds are normal.   Musculoskeletal: He exhibits no edema.   Neurological: He is alert and oriented to person, place, and time.   Skin: Skin is warm.       Labs:        Invalid input(s): [id number]      Recent Labs      11/12/17 0600 11/13/17 0610  11/14/17   0113   SODIUM  136  132*  132*   POTASSIUM  4.2  4.0  4.0   CHLORIDE  103  101  103   CO2  23  25  22   BUN  8  7*  10   CREATININE  0.43*  0.44*  0.45*   MAGNESIUM   --   1.6   --    CALCIUM  9.4  9.4  8.8     Recent Labs      11/12/17 0600 11/13/17 0610  11/14/17   0113   ALTSGPT   --   6   --    ASTSGOT   --   15   --    ALKPHOSPHAT   --   50   --    TBILIRUBIN   --   0.4   --    GLUCOSE  93  88  174*     Recent Labs      11/12/17 0600 11/13/17 0610  11/14/17   0113   RBC  3.34*  3.37*  2.98*   HEMOGLOBIN  11.3*  11.5*  10.0*   HEMATOCRIT  32.0*  32.7*  28.8*   PLATELETCT  219  305  297     Recent Labs      11/12/17 0600 11/13/17 0610  11/14/17   0113   WBC  5.6  4.5*  2.4*   NEUTSPOLYS  70.50  65.00  90.20*    LYMPHOCYTES  9.80*  12.20*  2.50*   MONOCYTES  14.80*  16.40*  5.30   EOSINOPHILS  2.80  4.20  0.00   BASOPHILS  0.90  0.90  0.40   ASTSGOT   --   15   --    ALTSGPT   --   6   --    ALKPHOSPHAT   --   50   --    TBILIRUBIN   --   0.4   --      Recent Labs      17   0600  17   0610  17   0113   SODIUM  136  132*  132*   POTASSIUM  4.2  4.0  4.0   CHLORIDE  103  101  103   CO2  23  25  22   GLUCOSE  93  88  174*   BUN  8  7*  10   CREATININE  0.43*  0.44*  0.45*   CALCIUM  9.4  9.4  8.8     Hemodynamics:  Temp (24hrs), Av.8 °C (98.2 °F), Min:36.6 °C (97.9 °F), Max:37.1 °C (98.7 °F)  Temperature: 36.6 °C (97.9 °F)  Pulse  Av.8  Min: 64  Max: 117   Blood Pressure: 119/77     Respiratory:    Respiration: 16, Pulse Oximetry: 96 %        RUL Breath Sounds: Clear, RML Breath Sounds: Clear, RLL Breath Sounds: Diminished, LEONARDO Breath Sounds: Clear, LLL Breath Sounds: Diminished  Fluids:    Intake/Output Summary (Last 24 hours) at 17 1050  Last data filed at 17 0950   Gross per 24 hour   Intake             1250 ml   Output                0 ml   Net             1250 ml        GI/Nutrition:  Orders Placed This Encounter   Procedures   • DIET ORDER     Standing Status:   Standing     Number of Occurrences:   1     Order Specific Question:   Diet:     Answer:   Regular [1]     Medical Decision Making, by Problem:  Active Hospital Problems    Diagnosis   • *Small cell carcinoma of lung (CMS-HCC) [C34.90]   • Hyponatremia [E87.1]   • Chronic alcohol use [F10.10]   • Sorethroat [J02.9]   • Lung cancer, upper lobe (CMS-HCC) [C34.10]   • Body mass index (BMI) 19.9 or less, adult [Z68.1]     Pmhx no changes    DX  1- Limited stage SCLC on  Cisplatin and Etoposide along with RT started  on 10/21/17   2. 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 Esophagitis due to RT finished 17    Plan    1. DAY2  2. Proceed with chemotherapy    High complexity / chemotherapy written    Quality-Core Measures

## 2017-11-14 NOTE — PROGRESS NOTES
Chemotherapy Verification - SECONDARY RN       Height = 1.778 m  Weight = 57.7 kg  BSA = 1.69 m2       Medication: etoposide  Dose: 100 mg/m2  Calculated Dose: 169 mg  Ordered dose: 169 mg                             (In mg/m2, AUC, mg/kg)     I confirm that this process was performed independently.

## 2017-11-14 NOTE — PROGRESS NOTES
Saroj from Lab called with critical result of WBC 2.4 at 0356. Critical lab result read back to Saroj.   This critical lab result is within parameters established by Dr.[facility] Policy for this patient

## 2017-11-14 NOTE — THERAPY
"Physical Therapy Treatment completed.   Bed Mobility:  Supine to Sit: Modified Independent  Transfers: Sit to Stand: Modified Independent  Gait: Level Of Assist: Modified Independent with No Equipment Needed       Plan of Care: Will benefit from Physical Therapy 1 times per week  Discharge Recommendations: Equipment: No Equipment Needed.    See \"Rehab Therapy-Acute\" Patient Summary Report for complete documentation.     Pt presenting w/ improved functional mobility. Pt able to perform transfers and ambulation at a [place] level. Pt even able to manage IV and lines w/ no LOB. Discussed pt therapy goals w/ pt stating he is independent w/ his own program and feels back to pre admission strength. Pt stating he no longer has PT needs in the hospital. Discussed w/ PT, pt meeting all goals and is functionally capable to DC from therapy in the acute care setting.  "

## 2017-11-14 NOTE — CARE PLAN
Problem: Safety  Goal: Will remain free from injury  Call light within reach, treaded socks in place, bed in lowest position and locked.  Hourly rounding in progress.     Problem: Pain Management  Goal: Pain level will decrease to patient's comfort goal  Pt medicated PRN per MAR.

## 2017-11-14 NOTE — PROGRESS NOTES
Internal Medicine Interval Note  Note Author: Trav Fierro M.D.     Name Froylan Spian     1960   Age/Sex 57 y.o. male   MRN [id number]   Code Status Full code     After 5PM or if no immediate response to page, please call for cross-coverage  Attending/Team: Meredith See Patient List for primary contact information  Call [phone] to page    1st Call - Day Intern (R1):   Vadim 2nd Call - Day Sr. Resident (R2/R3):   Criss Lynch         Reason for interval visit  (Principal Problem)   Small cell carcinoma of lung (CMS-HCC)    Interval Problem Daily Status Update  (24 hours)   - Completed 30 doses of radiation today  - Second cycle of chemotherapy started  - No new complaints. No events overnight      Review of Systems   Constitutional: Positive for malaise/fatigue. Negative for chills and fever.   HENT: Positive for sore throat. Negative for congestion, hearing loss and nosebleeds.    Eyes: Negative for blurred vision, double vision and discharge.   Respiratory: Positive for cough. Negative for sputum production, shortness of breath and wheezing.    Cardiovascular: Negative for chest pain and palpitations.   Gastrointestinal: Negative for abdominal pain, constipation, diarrhea, nausea and vomiting.   Genitourinary: Negative for dysuria and urgency.   Musculoskeletal: Negative for joint pain and myalgias.   Skin: Negative for rash.   Neurological: Negative for dizziness, tingling, focal weakness, weakness and headaches.   Psychiatric/Behavioral: Negative for hallucinations. The patient is not nervous/anxious.        Consultants/Specialty  Hem/Onc  Radio/Onc    Disposition  Inpatient for radiation treatments    Quality Measures    Reviewed items::  Labs reviewed and Medications reviewed  Garvin catheter::  No Garvin  DVT prophylaxis pharmacological::  Heparin  Ulcer Prophylaxis::  Not indicated          Physical Exam       Vitals:    17 0537 17 0747 17 1128 17 1551   BP:  (!) 97/65 108/72  107/63   Pulse: (!) 104 (!) 117  99   Resp: 18 17 17   Temp: 36.3 °C (97.4 °F) 36.7 °C (98 °F)  36.7 °C (98.1 °F)   SpO2: 98% 100%  100%   Weight:   57.7 kg (127 lb 3.3 oz)    Height:         Body mass index is 18.25 kg/m². Weight: 57.7 kg (127 lb 3.3 oz)  Oxygen Therapy:  Pulse Oximetry: 100 %, O2 Delivery: None (Room Air)    Physical Exam   Constitutional: He is oriented to person, place, and time. No distress.   cachectic   HENT:   Mouth/Throat: No oropharyngeal exudate.   Neck: Neck supple. No tracheal deviation present.   Scar healing well   Cardiovascular: Normal rate and regular rhythm.    No murmur heard.  Pulmonary/Chest: No respiratory distress. He has no wheezes. He has no rales. He exhibits no tenderness.   Abdominal: He exhibits no distension. There is no tenderness.   Musculoskeletal: He exhibits no edema.   Lymphadenopathy:     He has no cervical adenopathy.   Neurological: He is alert and oriented to person, place, and time. No cranial nerve deficit.   Skin: He is not diaphoretic.   Psychiatric: Affect normal.     Lab Data Review:   10/26/2017  11:40 AM    Recent Labs      11/11/17   0500  11/12/17   0600 11/13/17   0610   SODIUM  132*  136  132*   POTASSIUM  4.2  4.2  4.0   CHLORIDE  100  103  101   CO2  24  23  25   BUN  9  8  7*   CREATININE  0.45*  0.43*  0.44*   MAGNESIUM   --    --   1.6   CALCIUM  9.7  9.4  9.4       Recent Labs      11/11/17   0500  11/12/17   0600  11/13/17   0610   ALTSGPT   --    --   6   ASTSGOT   --    --   15   ALKPHOSPHAT   --    --   50   TBILIRUBIN   --    --   0.4   GLUCOSE  95  93  88       Recent Labs      11/11/17   0500  11/12/17   0600  11/13/17   0610   RBC  3.27*  3.34*  3.37*   HEMOGLOBIN  11.1*  11.3*  11.5*   HEMATOCRIT  31.6*  32.0*  32.7*   PLATELETCT  227  219  305       Recent Labs      11/11/17   0500  11/12/17   0600  11/13/17   0610   WBC  8.2  5.6  4.5*   NEUTSPOLYS  77.30*  70.50  65.00   LYMPHOCYTES  7.50*  9.80*  12.20*    MONOCYTES  11.50  14.80*  16.40*   EOSINOPHILS  1.70  2.80  4.20   BASOPHILS  1.10  0.90  0.90   ASTSGOT   --    --   15   ALTSGPT   --    --   6   ALKPHOSPHAT   --    --   50   TBILIRUBIN   --    --   0.4           Assessment/Plan     * Small cell carcinoma of lung (CMS-HCC)- (present on admission)   Assessment & Plan    -Diagnosed by mediastinoscopy with biopsy of paratracheal mass (10/17) showing metastatic small cell carcinoma  - Staging workup including MRI Brain and CT Abdomen/Pelvis:  Unremarkable  - 10/21-10/23 Patient received first cycle of chemotherapy. CISplatin on day 1 and etoposide on days 1-3 28 day cycle for 4-6 cycles.   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 malignancy  - Exhibits good appetite  - Nutrition following        Chronic alcohol use- (present on admission)   Assessment & Plan    - Consumed 1 beer daily   - BAL: 0.01 on admission  - Continue PO thiamine, B12 and folic acid supplements

## 2017-11-14 NOTE — PROGRESS NOTES
Chemotherapy Verification - PRIMARY RN  Cycle# 2 Day #2      Height = 177.8 cm  Weight = 57.7 kg  BSA = 1.69 m2       Medication: Etoposide  Dose: 100 mg/m2  Calculated Dose: 169 mg (zieaabx=743 mg)                             (In mg/m2, AUC, mg/kg)       I confirm this process was performed independently with the BSA and all final chemotherapy dosing calculations congruent.  Any discrepancies of 5% or greater have been addressed with the chemotherapy pharmacist. The resolution of the discrepancy has been documented in the EPIC progress notes.

## 2017-11-14 NOTE — PROGRESS NOTES
Assumed pt care at 0715.  Received report from night RN.  Assessment completed.  Pt AAOx4.  Pt complains of throat pain.  Medicated per MAR.  No other s/s of discomfort or distress. Pt ambulates up self.  Bed in lowest position and locked.  Chemo #2 tonight.  Pt calls appropriately.  Treaded socks in place, call light within reach and staff numbers provided.  Pt needs met at this time.

## 2017-11-15 LAB
BASOPHILS # BLD AUTO: 0.4 % (ref 0–1.8)
BASOPHILS # BLD: 0.03 K/UL (ref 0–0.12)
EOSINOPHIL # BLD AUTO: 0.01 K/UL (ref 0–0.51)
EOSINOPHIL NFR BLD: 0.1 % (ref 0–6.9)
ERYTHROCYTE [DISTWIDTH] IN BLOOD BY AUTOMATED COUNT: 49.1 FL (ref 35.9–50)
HCT VFR BLD AUTO: 29.2 % (ref 42–52)
HGB BLD-MCNC: 10.1 G/DL (ref 14–18)
IMM GRANULOCYTES # BLD AUTO: 0.08 K/UL (ref 0–0.11)
IMM GRANULOCYTES NFR BLD AUTO: 1.2 % (ref 0–0.9)
LYMPHOCYTES # BLD AUTO: 0.13 K/UL (ref 1–4.8)
LYMPHOCYTES NFR BLD: 1.9 % (ref 22–41)
MCH RBC QN AUTO: 33.6 PG (ref 27–33)
MCHC RBC AUTO-ENTMCNC: 34.6 G/DL (ref 33.7–35.3)
MCV RBC AUTO: 97 FL (ref 81.4–97.8)
MONOCYTES # BLD AUTO: 0.73 K/UL (ref 0–0.85)
MONOCYTES NFR BLD AUTO: 10.6 % (ref 0–13.4)
NEUTROPHILS # BLD AUTO: 5.92 K/UL (ref 1.82–7.42)
NEUTROPHILS NFR BLD: 85.8 % (ref 44–72)
NRBC # BLD AUTO: 0 K/UL
NRBC BLD AUTO-RTO: 0 /100 WBC
PLATELET # BLD AUTO: 338 K/UL (ref 164–446)
PMV BLD AUTO: 9.2 FL (ref 9–12.9)
RBC # BLD AUTO: 3.01 M/UL (ref 4.7–6.1)
WBC # BLD AUTO: 6.9 K/UL (ref 4.8–10.8)

## 2017-11-15 PROCEDURE — 700102 HCHG RX REV CODE 250 W/ 637 OVERRIDE(OP): Performed by: STUDENT IN AN ORGANIZED HEALTH CARE EDUCATION/TRAINING PROGRAM

## 2017-11-15 PROCEDURE — 700105 HCHG RX REV CODE 258: Performed by: INTERNAL MEDICINE

## 2017-11-15 PROCEDURE — 700102 HCHG RX REV CODE 250 W/ 637 OVERRIDE(OP): Performed by: INTERNAL MEDICINE

## 2017-11-15 PROCEDURE — 85025 COMPLETE CBC W/AUTO DIFF WBC: CPT

## 2017-11-15 PROCEDURE — 700111 HCHG RX REV CODE 636 W/ 250 OVERRIDE (IP): Performed by: INTERNAL MEDICINE

## 2017-11-15 PROCEDURE — A9270 NON-COVERED ITEM OR SERVICE: HCPCS | Performed by: STUDENT IN AN ORGANIZED HEALTH CARE EDUCATION/TRAINING PROGRAM

## 2017-11-15 PROCEDURE — 99232 SBSQ HOSP IP/OBS MODERATE 35: CPT | Performed by: HOSPITALIST

## 2017-11-15 PROCEDURE — A9270 NON-COVERED ITEM OR SERVICE: HCPCS | Performed by: INTERNAL MEDICINE

## 2017-11-15 PROCEDURE — 700111 HCHG RX REV CODE 636 W/ 250 OVERRIDE (IP): Performed by: STUDENT IN AN ORGANIZED HEALTH CARE EDUCATION/TRAINING PROGRAM

## 2017-11-15 PROCEDURE — 770004 HCHG ROOM/CARE - ONCOLOGY PRIVATE *

## 2017-11-15 RX ORDER — ONDANSETRON 4 MG/1
4 TABLET, ORALLY DISINTEGRATING ORAL ONCE
Status: COMPLETED | OUTPATIENT
Start: 2017-11-15 | End: 2017-11-15

## 2017-11-15 RX ORDER — DEXAMETHASONE 4 MG/1
4 TABLET ORAL ONCE
Status: COMPLETED | OUTPATIENT
Start: 2017-11-15 | End: 2017-11-15

## 2017-11-15 RX ADMIN — HEPARIN SODIUM 5000 UNITS: 5000 INJECTION, SOLUTION INTRAVENOUS; SUBCUTANEOUS at 05:27

## 2017-11-15 RX ADMIN — ACETAMINOPHEN 650 MG: 325 TABLET, FILM COATED ORAL at 05:27

## 2017-11-15 RX ADMIN — FOLIC ACID 1 MG: 1 TABLET ORAL at 09:52

## 2017-11-15 RX ADMIN — ETOPOSIDE 169 MG: 20 INJECTION, SOLUTION, CONCENTRATE INTRAVENOUS at 16:56

## 2017-11-15 RX ADMIN — ONDANSETRON 4 MG: 4 TABLET, ORALLY DISINTEGRATING ORAL at 16:02

## 2017-11-15 RX ADMIN — MIDODRINE HYDROCHLORIDE 5 MG: 5 TABLET ORAL at 11:29

## 2017-11-15 RX ADMIN — MIDODRINE HYDROCHLORIDE 5 MG: 5 TABLET ORAL at 18:17

## 2017-11-15 RX ADMIN — THERA TABS 1 TABLET: TAB at 09:52

## 2017-11-15 RX ADMIN — DEXAMETHASONE 4 MG: 4 TABLET ORAL at 16:02

## 2017-11-15 RX ADMIN — ACETAMINOPHEN 650 MG: 325 TABLET, FILM COATED ORAL at 19:03

## 2017-11-15 RX ADMIN — MIDODRINE HYDROCHLORIDE 5 MG: 5 TABLET ORAL at 07:58

## 2017-11-15 RX ADMIN — HEPARIN SODIUM 5000 UNITS: 5000 INJECTION, SOLUTION INTRAVENOUS; SUBCUTANEOUS at 21:24

## 2017-11-15 RX ADMIN — LIDOCAINE HYDROCHLORIDE 5 ML: 20 SOLUTION OROPHARYNGEAL at 11:28

## 2017-11-15 RX ADMIN — NICOTINE 14 MG: 14 PATCH, EXTENDED RELEASE TRANSDERMAL at 05:27

## 2017-11-15 RX ADMIN — THIAMINE HCL TAB 100 MG 100 MG: 100 TAB at 09:52

## 2017-11-15 RX ADMIN — VITAMIN D, TAB 1000IU (100/BT) 5000 UNITS: 25 TAB at 09:52

## 2017-11-15 RX ADMIN — STANDARDIZED SENNA CONCENTRATE AND DOCUSATE SODIUM 2 TABLET: 8.6; 5 TABLET, FILM COATED ORAL at 09:52

## 2017-11-15 RX ADMIN — HEPARIN SODIUM 5000 UNITS: 5000 INJECTION, SOLUTION INTRAVENOUS; SUBCUTANEOUS at 14:04

## 2017-11-15 RX ADMIN — LIDOCAINE HYDROCHLORIDE 5 ML: 20 SOLUTION OROPHARYNGEAL at 21:24

## 2017-11-15 RX ADMIN — STANDARDIZED SENNA CONCENTRATE AND DOCUSATE SODIUM 2 TABLET: 8.6; 5 TABLET, FILM COATED ORAL at 21:25

## 2017-11-15 RX ADMIN — BENZOCAINE AND MENTHOL 1 LOZENGE: 15; 3.6 LOZENGE ORAL at 21:25

## 2017-11-15 RX ADMIN — MAGNESIUM HYDROXIDE 30 ML: 400 SUSPENSION ORAL at 11:29

## 2017-11-15 ASSESSMENT — PAIN SCALES - GENERAL
PAINLEVEL_OUTOF10: 2
PAINLEVEL_OUTOF10: 0
PAINLEVEL_OUTOF10: 0

## 2017-11-15 ASSESSMENT — ENCOUNTER SYMPTOMS
SPUTUM PRODUCTION: 0
NAUSEA: 1
FEVER: 0
EYE DISCHARGE: 0
CHILLS: 0
HEADACHES: 0
BLURRED VISION: 0
DIARRHEA: 0
TINGLING: 1
WHEEZING: 0
MYALGIAS: 0
TINGLING: 0
VOMITING: 0
CARDIOVASCULAR NEGATIVE: 1
ABDOMINAL PAIN: 0
NERVOUS/ANXIOUS: 0
PSYCHIATRIC NEGATIVE: 1
EYES NEGATIVE: 1
HALLUCINATIONS: 0
NAUSEA: 0
PALPITATIONS: 0
FOCAL WEAKNESS: 0
RESPIRATORY NEGATIVE: 1
SHORTNESS OF BREATH: 0
COUGH: 1
DIZZINESS: 0
DOUBLE VISION: 0
SORE THROAT: 1
WEAKNESS: 0
CONSTIPATION: 0

## 2017-11-15 NOTE — PROGRESS NOTES
Received report and assumed patient care at change of shift. Patient is resting in bed, A&O x4. Patient reports 5/10 throat pain at this time, medications provided per MAR.     PICC R side     Plan of care discussed, questions answered. Bed is in the lowest position and locked, call light within reach, non-skid socks in place, hourly rounding. Patient reports no further needs and this time.

## 2017-11-15 NOTE — PROGRESS NOTES
Internal Medicine Interval Note  Note Author: Jeremy Choudhayr M.D.     Name Froylan Spain     1960   Age/Sex 57 y.o. male   MRN [id number]   Code Status Full code     After 5PM or if no immediate response to page, please call for cross-coverage  Attending/Team: Meredith See Patient List for primary contact information  Call [phone] to page    1st Call - Day Intern (R1):   Vadim 2nd Call - Day Sr. Resident (R2/R3):   Criss Lynch         Reason for interval visit  (Principal Problem)   Small cell carcinoma of lung (CMS-HCC)    Interval Problem Daily Status Update  (24 hours)   Patient feeling fine. No acute events overnight.   - Completed 30 doses of radiation   - Second cycle of chemotherapy started day 2        Review of Systems   Constitutional: Positive for malaise/fatigue. Negative for chills and fever.   HENT: Positive for sore throat. Negative for congestion, hearing loss and nosebleeds.    Eyes: Negative for blurred vision, double vision and discharge.   Respiratory: Positive for cough. Negative for sputum production, shortness of breath and wheezing.    Cardiovascular: Negative for chest pain and palpitations.   Gastrointestinal: Negative for abdominal pain, constipation, diarrhea, nausea and vomiting.   Genitourinary: Negative for dysuria and urgency.   Musculoskeletal: Negative for joint pain and myalgias.   Skin: Negative for rash.   Neurological: Negative for dizziness, tingling, focal weakness, weakness and headaches.   Psychiatric/Behavioral: Negative for hallucinations. The patient is not nervous/anxious.        Consultants/Specialty  Hem/Onc  Radio/Onc    Disposition  Inpatient for radiation treatments    Quality Measures    Reviewed items::  Labs reviewed and Medications reviewed  Garvin catheter::  No Garvin  DVT prophylaxis pharmacological::  Heparin  Ulcer Prophylaxis::  Not indicated          Physical Exam       Vitals:    17 0534 17 0749  11/14/17 1543   BP: 111/65 115/69 119/77 111/79   Pulse: 97 100 (!) 109 (!) 109   Resp: 18 18 16 16   Temp: 37.1 °C (98.7 °F) 36.6 °C (97.9 °F) 36.6 °C (97.9 °F) 36.4 °C (97.6 °F)   SpO2: 95% 96% 96% 99%   Weight:       Height:         Body mass index is 18.25 kg/m².    Oxygen Therapy:  Pulse Oximetry: 99 %, O2 Delivery: None (Room Air)    Physical Exam   Constitutional: He is oriented to person, place, and time. No distress.   cachectic   HENT:   Mouth/Throat: No oropharyngeal exudate.   Neck: Neck supple. No tracheal deviation present.   Scar healing well   Cardiovascular: Normal rate and regular rhythm.    No murmur heard.  Pulmonary/Chest: No respiratory distress. He has no wheezes. He has no rales. He exhibits no tenderness.   Abdominal: He exhibits no distension. There is no tenderness.   Musculoskeletal: He exhibits no edema.   Lymphadenopathy:     He has no cervical adenopathy.   Neurological: He is alert and oriented to person, place, and time. No cranial nerve deficit.   Skin: He is not diaphoretic.   Psychiatric: Affect normal.     Lab Data Review:   10/26/2017  11:40 AM    Recent Labs      11/12/17 0600 11/13/17 0610  11/14/17   0113   SODIUM  136  132*  132*   POTASSIUM  4.2  4.0  4.0   CHLORIDE  103  101  103   CO2  23  25  22   BUN  8  7*  10   CREATININE  0.43*  0.44*  0.45*   MAGNESIUM   --   1.6   --    CALCIUM  9.4  9.4  8.8       Recent Labs      11/12/17   0600 11/13/17   0610  11/14/17   0113   ALTSGPT   --   6   --    ASTSGOT   --   15   --    ALKPHOSPHAT   --   50   --    TBILIRUBIN   --   0.4   --    GLUCOSE  93  88  174*       Recent Labs      11/12/17   0600 11/13/17   0610  11/14/17   0113   RBC  3.34*  3.37*  2.98*   HEMOGLOBIN  11.3*  11.5*  10.0*   HEMATOCRIT  32.0*  32.7*  28.8*   PLATELETCT  219  305  297       Recent Labs      11/12/17   0600  11/13/17   0610  11/14/17   0113   WBC  5.6  4.5*  2.4*   NEUTSPOLYS  70.50  65.00  90.20*   LYMPHOCYTES  9.80*  12.20*  2.50*    MONOCYTES  14.80*  16.40*  5.30   EOSINOPHILS  2.80  4.20  0.00   BASOPHILS  0.90  0.90  0.40   ASTSGOT   --   15   --    ALTSGPT   --   6   --    ALKPHOSPHAT   --   50   --    TBILIRUBIN   --   0.4   --            Assessment/Plan     * Small cell carcinoma of lung (CMS-HCC)- (present on admission)   Assessment & Plan    -Diagnosed by mediastinoscopy with biopsy of paratracheal mass (10/17) showing metastatic small cell carcinoma  - Staging workup including MRI Brain and CT Abdomen/Pelvis:  Unremarkable  - 10/21-10/23 Patient received first cycle of chemotherapy. CISplatin on day 1 and etoposide on days 1-3 28 day cycle for 4-6 cycles.   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 malignancy  - Exhibits good appetite  - Nutrition following        Chronic alcohol use- (present on admission)   Assessment & Plan    - Consumed 1 beer daily   - BAL: 0.01 on admission  - Continue PO thiamine, B12 and folic acid supplements

## 2017-11-15 NOTE — PROGRESS NOTES
Chemotherapy Verification - PRIMARY RN      Height = 177.8cm  Weight = 57.7kg  BSA = 1.69m2     Medication: Etoposide  Dose: 100mg/m2  Calculated Dose: 169 mg (same as ordered dose)                            (In mg/m2, AUC, mg/kg)           I confirm this process was performed independently with the BSA and all final chemotherapy dosing calculations congruent.  Any discrepancies of 5% or greater have been addressed with the chemotherapy pharmacist. The resolution of the discrepancy has been documented in the EPIC progress notes.

## 2017-11-15 NOTE — PROGRESS NOTES
"Pharmacy Chemotherapy Note    Patient Name: CASSIDY PAGE  Dx: Limited Stage SCLC    Protocol:  CISPLATIN + ETOPOSIDE + XRT     CISplatin 80 mg/m2 IV over 60 minutes on day 1  Etoposide 100 mg/m2 IV over 60 minutes on days 1-3  21-28 day cycle for 4-6 cycles -MD giving Cycle 2 21 days after Cycle 1, each cycle length to be determined ([place] plan has 28-day cycles currently)  NCCN Guidelines for Small Cell Lung Cancer.V.2.2017  Ruth Ann AT, et al. N Engl J Med. 199;340-(4):265-71  Moy S, et al. J Clin Oncol. 2002;20(24):7385-72    /76   Pulse 92   Temp 37.1 °C (98.8 °F)   Resp 16   Ht 1.778 m (5' 10\")   Wt 57.7 kg (127 lb 3.3 oz)   SpO2 100%   BMI 18.25 kg/m²  Body surface area is 1.69 meters squared.    Labs 11/15/17  ANC ~5920   Plt = 338k   Hgb = 10.1       11/14/17: SCr = 0.45 mg/dL CrCl ~96 mL/min (min SCr 0.7 mg/dL)      11/13/17: Mag = 1.6   K = 4 LFTs = WNL  TBili = 0.4      Drug Order   (Drug name, dose, route, IV Fluid & volume, frequency, number of doses) Cycle 2, Day 3 of 3      Previous treatment: C1 = Oct 21 - 23, 2017     Medication = Etoposide (Toposar)  Base Dose= 100 mg/m2  Calc Dose: Base Dose x 1.69 m2 = 169 mg  Final Dose = 169 mg  Route = IV  Fluid & Volume =  mL  Admin Duration = Over 1-2 hrs   Days 1-3      <5% difference, OK to treat with final dose      By my signature below, I confirm this process was performed independently with the BSA and all final chemotherapy dosing calculations congruent. I have reviewed the above chemotherapy order and that my calculation of the final dose and BSA (when applicable) corroborate those calculations of the  pharmacist. Discrepancies of 5% or greater in the written dose have been addressed and documented within the [facility] Progress notes.    Isis Prince, PharmD   "

## 2017-11-15 NOTE — CARE PLAN
Problem: Safety  Goal: Will remain free from injury    Intervention: Provide assistance with mobility  Pt is uself. Educated pt on use of call light when needed. Pt verbalized understanding      Problem: Infection  Goal: Will remain free from infection    Intervention: Implement standard precautions and perform hand washing before and after patient contact  Standard precautions in place. Hand hygiene performed before and after pt care.

## 2017-11-15 NOTE — CARE PLAN
Problem: Nutritional:  Goal: Achieve adequate nutritional intake  Patient will consume >50% of meals  Outcome: MET Date Met: 11/15/17  Met with pt for weekly screening, pt with variable PO per ADL. Pt reports good appetite eating ~ 50% of meals and three Magic Cups daily. Expect adequate intake. RD available PRN.

## 2017-11-15 NOTE — PROGRESS NOTES
Oncology/Hematology Progress Note               Author: Jose Manuel Costello Date & Time created: 11/15/2017  11:53 AM   CC:  Lung cancer  Interval History:    He is doing ok.  No new issues. He has some mild tingling / nausea    Review of Systems:  Review of Systems   Constitutional: Positive for malaise/fatigue.   HENT: Negative.    Eyes: Negative.    Respiratory: Negative.    Cardiovascular: Negative.    Gastrointestinal: Positive for nausea. Negative for constipation and diarrhea.   Genitourinary: Negative.    Skin: Negative.    Neurological: Positive for tingling.   Endo/Heme/Allergies: Negative.    Psychiatric/Behavioral: Negative.        Physical Exam:  Physical Exam   Constitutional: He is oriented to person, place, and time. He appears well-developed.   HENT:   Head: Normocephalic.   Eyes: Pupils are equal, round, and reactive to light.   Cardiovascular: Normal rate and regular rhythm.    Pulmonary/Chest: Effort normal and breath sounds normal.   Abdominal: Soft. Bowel sounds are normal.   Musculoskeletal: He exhibits no edema.   Neurological: He is alert and oriented to person, place, and time.   Skin: Skin is warm.   Psychiatric: He has a normal mood and affect. His behavior is normal. Judgment and thought content normal.       Labs:        Invalid input(s): [id number]      Recent Labs      11/13/17   0610  11/14/17   0113   SODIUM  132*  132*   POTASSIUM  4.0  4.0   CHLORIDE  101  103   CO2  25  22   BUN  7*  10   CREATININE  0.44*  0.45*   MAGNESIUM  1.6   --    CALCIUM  9.4  8.8     Recent Labs      11/13/17   0610  11/14/17   0113   ALTSGPT  6   --    ASTSGOT  15   --    ALKPHOSPHAT  50   --    TBILIRUBIN  0.4   --    GLUCOSE  88  174*     Recent Labs      11/13/17   0610  11/14/17   0113  11/15/17   0150   RBC  3.37*  2.98*  3.01*   HEMOGLOBIN  11.5*  10.0*  10.1*   HEMATOCRIT  32.7*  28.8*  29.2*   PLATELETCT  305  297  338     Recent Labs      11/13/17   0610  11/14/17   0113  11/15/17   0150   WBC   4.5*  2.4*  6.9   NEUTSPOLYS  65.00  90.20*  85.80*   LYMPHOCYTES  12.20*  2.50*  1.90*   MONOCYTES  16.40*  5.30  10.60   EOSINOPHILS  4.20  0.00  0.10   BASOPHILS  0.90  0.40  0.40   ASTSGOT  15   --    --    ALTSGPT  6   --    --    ALKPHOSPHAT  50   --    --    TBILIRUBIN  0.4   --    --      Recent Labs      17   0610  17   0113   SODIUM  132*  132*   POTASSIUM  4.0  4.0   CHLORIDE  101  103   CO2  25  22   GLUCOSE  88  174*   BUN  7*  10   CREATININE  0.44*  0.45*   CALCIUM  9.4  8.8     Hemodynamics:  Temp (24hrs), Av.6 °C (97.9 °F), Min:36.4 °C (97.6 °F), Max:37.1 °C (98.8 °F)  Temperature: 37.1 °C (98.8 °F)  Pulse  Av  Min: 64  Max: 117   Blood Pressure: 148/76     Respiratory:    Respiration: 16, Pulse Oximetry: 100 %        RUL Breath Sounds: Clear, RML Breath Sounds: Diminished, RLL Breath Sounds: Diminished, LEONARDO Breath Sounds: Clear, LLL Breath Sounds: Diminished  Fluids:    Intake/Output Summary (Last 24 hours) at 17 1050  Last data filed at 17 0950   Gross per 24 hour   Intake             1250 ml   Output                0 ml   Net             1250 ml        GI/Nutrition:  Orders Placed This Encounter   Procedures   • DIET ORDER     Standing Status:   Standing     Number of Occurrences:   1     Order Specific Question:   Diet:     Answer:   Regular [1]     Medical Decision Making, by Problem:  Active Hospital Problems    Diagnosis   • *Small cell carcinoma of lung (CMS-HCC) [C34.90]   • Hyponatremia [E87.1]   • Chronic alcohol use [F10.10]   • Sorethroat [J02.9]   • Lung cancer, upper lobe (CMS-HCC) [C34.10]   • Body mass index (BMI) 19.9 or less, adult [Z68.1]     Pmhx no changes    DX  1- Limited stage SCLC on  Cisplatin and Etoposide along with RT started  on 10/21/17   2. 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 Esophagitis due to RT finished 17    Plan    1. DAY3/3  2. Proceed with chemotherapy  3. WBC came back up      High complexity / chemotherapy toxicity  monitoring      Reviewed items::  Medications reviewed, Labs reviewed and Radiology images reviewed

## 2017-11-15 NOTE — DISCHARGE PLANNING
CCS called [facility] and spoke to David. The referral is pending. The referral is under review of the administration. [facility] Liaison will do Onsite evaluation. SW on floor has been notified

## 2017-11-15 NOTE — DISCHARGE PLANNING
Medical SW    Referral: Shawn spoke to RN and called REGINALDO Cotton to f/u w/ pt's pending acceptance to [facility].    Intervention: Lula indicates Altagracia is no longer at SNF but Abhinav is the liaison for admissions there. Shawn provided [employer] AUTH CM Carey number since she advised she already spoke to Abhinav about accepting pt w/ INS ARLIN.     Shawn spoke to [facility] w/ [facility]. Shawn answered questions. Shawn contacted [facility] and pt will receive 3rd cycle of chemo starting 12/4 (for three days: 1-cisplatin (5-6 hrs), 2-etoposide (5-6 hrs), 3-estoposide (no more than 2 hrs)). Pt will then return for another cycle in 28 total days. Pt will need CNA w/ him during each visit.    Altagracia will get back to Shawn once she knows.    Shawn spoke to Dr Costello and updated as above.    Plan: Shawn to assist w/ d/c planning as needed.

## 2017-11-15 NOTE — CARE PLAN
Problem: Respiratory:  Goal: Respiratory status will improve  Outcome: PROGRESSING AS EXPECTED  Breath sounds clear. No increased work of breathing noted. Some dyspnea on exertion. O2 sat >90% on room air

## 2017-11-15 NOTE — CARE PLAN
Problem: Pain Management  Goal: Pain level will decrease to patient's comfort goal  Outcome: MET Date Met: 11/15/17  Patient denies pain

## 2017-11-15 NOTE — CARE PLAN
Problem: Bowel/Gastric:  Goal: Normal bowel function is maintained or improved  Outcome: PROGRESSING AS EXPECTED  Patient reports regular bowel movements. Bowel sounds normoactive. Abdomen soft, flat, nontender.

## 2017-11-16 LAB
ANION GAP SERPL CALC-SCNC: 7 MMOL/L (ref 0–11.9)
BASOPHILS # BLD AUTO: 0 % (ref 0–1.8)
BASOPHILS # BLD: 0 K/UL (ref 0–0.12)
BUN SERPL-MCNC: 13 MG/DL (ref 8–22)
CALCIUM SERPL-MCNC: 9.5 MG/DL (ref 8.5–10.5)
CHLORIDE SERPL-SCNC: 101 MMOL/L (ref 96–112)
CO2 SERPL-SCNC: 23 MMOL/L (ref 20–33)
CREAT SERPL-MCNC: 0.54 MG/DL (ref 0.5–1.4)
EOSINOPHIL # BLD AUTO: 0.01 K/UL (ref 0–0.51)
EOSINOPHIL NFR BLD: 0.4 % (ref 0–6.9)
ERYTHROCYTE [DISTWIDTH] IN BLOOD BY AUTOMATED COUNT: 48.1 FL (ref 35.9–50)
GFR SERPL CREATININE-BSD FRML MDRD: >60 ML/MIN/1.73 M 2
GLUCOSE SERPL-MCNC: 124 MG/DL (ref 65–99)
HCT VFR BLD AUTO: 28.9 % (ref 42–52)
HGB BLD-MCNC: 10.3 G/DL (ref 14–18)
IMM GRANULOCYTES # BLD AUTO: 0.02 K/UL (ref 0–0.11)
IMM GRANULOCYTES NFR BLD AUTO: 0.7 % (ref 0–0.9)
LYMPHOCYTES # BLD AUTO: 0.11 K/UL (ref 1–4.8)
LYMPHOCYTES NFR BLD: 4 % (ref 22–41)
MCH RBC QN AUTO: 34.6 PG (ref 27–33)
MCHC RBC AUTO-ENTMCNC: 35.6 G/DL (ref 33.7–35.3)
MCV RBC AUTO: 97 FL (ref 81.4–97.8)
MONOCYTES # BLD AUTO: 0.31 K/UL (ref 0–0.85)
MONOCYTES NFR BLD AUTO: 11.4 % (ref 0–13.4)
NEUTROPHILS # BLD AUTO: 2.27 K/UL (ref 1.82–7.42)
NEUTROPHILS NFR BLD: 83.5 % (ref 44–72)
NRBC # BLD AUTO: 0 K/UL
NRBC BLD AUTO-RTO: 0 /100 WBC
PLATELET # BLD AUTO: 308 K/UL (ref 164–446)
PMV BLD AUTO: 8.8 FL (ref 9–12.9)
POTASSIUM SERPL-SCNC: 4.3 MMOL/L (ref 3.6–5.5)
RBC # BLD AUTO: 2.98 M/UL (ref 4.7–6.1)
SODIUM SERPL-SCNC: 131 MMOL/L (ref 135–145)
WBC # BLD AUTO: 2.7 K/UL (ref 4.8–10.8)

## 2017-11-16 PROCEDURE — 700105 HCHG RX REV CODE 258: Performed by: INTERNAL MEDICINE

## 2017-11-16 PROCEDURE — A9270 NON-COVERED ITEM OR SERVICE: HCPCS | Performed by: STUDENT IN AN ORGANIZED HEALTH CARE EDUCATION/TRAINING PROGRAM

## 2017-11-16 PROCEDURE — 700102 HCHG RX REV CODE 250 W/ 637 OVERRIDE(OP): Performed by: STUDENT IN AN ORGANIZED HEALTH CARE EDUCATION/TRAINING PROGRAM

## 2017-11-16 PROCEDURE — 99231 SBSQ HOSP IP/OBS SF/LOW 25: CPT | Performed by: HOSPITALIST

## 2017-11-16 PROCEDURE — 85025 COMPLETE CBC W/AUTO DIFF WBC: CPT

## 2017-11-16 PROCEDURE — A9270 NON-COVERED ITEM OR SERVICE: HCPCS | Performed by: INTERNAL MEDICINE

## 2017-11-16 PROCEDURE — 80048 BASIC METABOLIC PNL TOTAL CA: CPT

## 2017-11-16 PROCEDURE — 700111 HCHG RX REV CODE 636 W/ 250 OVERRIDE (IP): Performed by: STUDENT IN AN ORGANIZED HEALTH CARE EDUCATION/TRAINING PROGRAM

## 2017-11-16 PROCEDURE — 700102 HCHG RX REV CODE 250 W/ 637 OVERRIDE(OP): Performed by: INTERNAL MEDICINE

## 2017-11-16 PROCEDURE — 770004 HCHG ROOM/CARE - ONCOLOGY PRIVATE *

## 2017-11-16 RX ADMIN — ACETAMINOPHEN 650 MG: 325 TABLET, FILM COATED ORAL at 21:02

## 2017-11-16 RX ADMIN — MIDODRINE HYDROCHLORIDE 5 MG: 5 TABLET ORAL at 08:22

## 2017-11-16 RX ADMIN — HEPARIN SODIUM 5000 UNITS: 5000 INJECTION, SOLUTION INTRAVENOUS; SUBCUTANEOUS at 21:01

## 2017-11-16 RX ADMIN — HEPARIN SODIUM 5000 UNITS: 5000 INJECTION, SOLUTION INTRAVENOUS; SUBCUTANEOUS at 13:34

## 2017-11-16 RX ADMIN — THERA TABS 1 TABLET: TAB at 08:22

## 2017-11-16 RX ADMIN — STANDARDIZED SENNA CONCENTRATE AND DOCUSATE SODIUM 2 TABLET: 8.6; 5 TABLET, FILM COATED ORAL at 08:22

## 2017-11-16 RX ADMIN — STANDARDIZED SENNA CONCENTRATE AND DOCUSATE SODIUM 2 TABLET: 8.6; 5 TABLET, FILM COATED ORAL at 21:02

## 2017-11-16 RX ADMIN — MAGNESIUM HYDROXIDE 30 ML: 400 SUSPENSION ORAL at 11:08

## 2017-11-16 RX ADMIN — FOLIC ACID 1 MG: 1 TABLET ORAL at 08:22

## 2017-11-16 RX ADMIN — VITAMIN D, TAB 1000IU (100/BT) 5000 UNITS: 25 TAB at 08:22

## 2017-11-16 RX ADMIN — ACETAMINOPHEN 650 MG: 325 TABLET, FILM COATED ORAL at 13:38

## 2017-11-16 RX ADMIN — MIDODRINE HYDROCHLORIDE 5 MG: 5 TABLET ORAL at 17:26

## 2017-11-16 RX ADMIN — ACETAMINOPHEN 650 MG: 325 TABLET, FILM COATED ORAL at 05:29

## 2017-11-16 RX ADMIN — THIAMINE HCL TAB 100 MG 100 MG: 100 TAB at 08:22

## 2017-11-16 RX ADMIN — LIDOCAINE HYDROCHLORIDE 5 ML: 20 SOLUTION OROPHARYNGEAL at 17:26

## 2017-11-16 RX ADMIN — HEPARIN SODIUM 5000 UNITS: 5000 INJECTION, SOLUTION INTRAVENOUS; SUBCUTANEOUS at 05:29

## 2017-11-16 RX ADMIN — NICOTINE 14 MG: 14 PATCH, EXTENDED RELEASE TRANSDERMAL at 05:29

## 2017-11-16 RX ADMIN — MIDODRINE HYDROCHLORIDE 5 MG: 5 TABLET ORAL at 11:08

## 2017-11-16 RX ADMIN — SODIUM CHLORIDE: 9 INJECTION, SOLUTION INTRAVENOUS at 00:22

## 2017-11-16 RX ADMIN — LIDOCAINE HYDROCHLORIDE 5 ML: 20 SOLUTION OROPHARYNGEAL at 08:25

## 2017-11-16 ASSESSMENT — ENCOUNTER SYMPTOMS
WEAKNESS: 0
COUGH: 1
VOMITING: 0
TINGLING: 0
WHEEZING: 0
BLURRED VISION: 0
DIZZINESS: 0
FEVER: 0
CHILLS: 0
SHORTNESS OF BREATH: 0
NAUSEA: 0
HALLUCINATIONS: 0
CONSTIPATION: 0
DOUBLE VISION: 0
ABDOMINAL PAIN: 0
SORE THROAT: 1
DIARRHEA: 0
EYE DISCHARGE: 0
SPUTUM PRODUCTION: 0
NERVOUS/ANXIOUS: 0
FOCAL WEAKNESS: 0
HEADACHES: 0
PALPITATIONS: 0
MYALGIAS: 0

## 2017-11-16 ASSESSMENT — PAIN SCALES - GENERAL
PAINLEVEL_OUTOF10: 4

## 2017-11-16 NOTE — PROGRESS NOTES
Aox4. VSS. O2 sat >90% on room air. Up to bathroom independently, reports some shortness of breath with exertion. Chemo administered, tolerated well. /48. Denies pain, denies nausea. Resting in bed at this time, no distress noted. Bed low, locked. Call light in reach

## 2017-11-16 NOTE — DISCHARGE PLANNING
Medical SW    Referral: Sw left  w/ Altagracia, liaison from [facility]. Sw requested update regarding pt's transfer.    Intervention: Sw spoke to [employer] CM Lara. She and Sw met w/ pt in room. Sw provided SSD application information again. He is aware he can call and acquire a phone interview to acquire Social Security Disability.   Sw spoke to pt's [employer] MD and explained Lara stated earlier pt can go to temporary  w/ [facility] for 7 days. The application assistance for SSD is a new program and she has been unable to contact them. She also understands SNF will not accept ARLIN from insurance as this Sw was advised.     Lara reports later in day pt is accepted to  @ [address] [place] [address] and can transport Friday AM at 10. Mariajose advised bedside RN and waiting for MD to call back. Sw left  w/ [facility]. Mariajose provided taxi voucher to RN who placed on chart.    Mariajose called pt's brother, Benjamin, to see if he would be willing to support pt w/ a motel/weekly cost until his SSD starts. Mariajose dialed number pt states he contacted brother w/ earlier and unfortunately that number is no longer working. Sw double checked number for accuracy. RN advises brother's current number is: [phone]. If pt gets d/gracy and wallet is mailed to [facility] by ex mariano, someone should call his [facility]/ [facility] Lara at INS office [phone].      Mariajose left report for MARIAJOSE Clemons to f/u on Friday.       Plan: Mariajose to assist w/ d/c planning as needed.

## 2017-11-16 NOTE — RESPIRATORY CARE
COPD EDUCATION by COPD CLINICAL EDUCATOR  11/16/2017 at 6:58 AM by Fátima Edge     Patient reviewed by COPD education team. Patient does not qualify for COPD program.

## 2017-11-16 NOTE — PROGRESS NOTES
Internal Medicine Interval Note  Note Author: Trav Fierro M.D.     Name Froylan Spain 1960   Age/Sex 57 y.o. male   MRN [id number]   Code Status Full code     After 5PM or if no immediate response to page, please call for cross-coverage  Attending/Team: Meredith See Patient List for primary contact information  Call [phone] to page    1st Call - Day Intern (R1):   Vadim 2nd Call - Day Sr. Resident (R2/R3):   Criss Lynch         Reason for interval visit  (Principal Problem)   Small cell carcinoma of lung (CMS-HCC)    Interval Problem Daily Status Update  (24 hours)   Patient feeling fine. No acute events overnight.   - Completed 30 doses of radiation on   - Second cycle of chemotherapy started on  day 3  -  working on placement        Review of Systems   Constitutional: Positive for malaise/fatigue. Negative for chills and fever.   HENT: Positive for sore throat. Negative for congestion, hearing loss and nosebleeds.    Eyes: Negative for blurred vision, double vision and discharge.   Respiratory: Positive for cough. Negative for sputum production, shortness of breath and wheezing.    Cardiovascular: Negative for chest pain and palpitations.   Gastrointestinal: Negative for abdominal pain, constipation, diarrhea, nausea and vomiting.   Genitourinary: Negative for dysuria and urgency.   Musculoskeletal: Negative for joint pain and myalgias.   Skin: Negative for rash.   Neurological: Negative for dizziness, tingling, focal weakness, weakness and headaches.   Psychiatric/Behavioral: Negative for hallucinations. The patient is not nervous/anxious.        Consultants/Specialty  Hem/Onc  Radio/Onc    Disposition  Inpatient for radiation treatments    Quality Measures    Reviewed items::  Labs reviewed and Medications reviewed  Garvin catheter::  No Garvin  DVT prophylaxis pharmacological::  Heparin  Ulcer Prophylaxis::  Not indicated          Physical Exam       Vitals:     11/14/17 2009 11/15/17 0541 11/15/17 0754 11/15/17 1600   BP: 123/77 122/74 148/76 118/70   Pulse: 90 93 92 90   Resp: 18 18 16 16   Temp: 36.5 °C (97.7 °F) 36.4 °C (97.6 °F) 37.1 °C (98.8 °F) 37.6 °C (99.6 °F)   SpO2: 95% 97% 100% 99%   Weight:       Height:         Body mass index is 18.25 kg/m².    Oxygen Therapy:  Pulse Oximetry: 99 %, O2 Delivery: None (Room Air)    Physical Exam   Constitutional: He is oriented to person, place, and time. No distress.   cachectic   HENT:   Mouth/Throat: No oropharyngeal exudate.   Neck: Neck supple. No tracheal deviation present.   Scar healing well   Cardiovascular: Normal rate and regular rhythm.    No murmur heard.  Pulmonary/Chest: No respiratory distress. He has no wheezes. He has no rales. He exhibits no tenderness.   Abdominal: He exhibits no distension. There is no tenderness.   Musculoskeletal: He exhibits no edema.   Lymphadenopathy:     He has no cervical adenopathy.   Neurological: He is alert and oriented to person, place, and time. No cranial nerve deficit.   Skin: He is not diaphoretic.   Psychiatric: Affect normal.     Lab Data Review:   10/26/2017  11:40 AM    Recent Labs      11/13/17 0610 11/14/17 0113   SODIUM  132*  132*   POTASSIUM  4.0  4.0   CHLORIDE  101  103   CO2  25  22   BUN  7*  10   CREATININE  0.44*  0.45*   MAGNESIUM  1.6   --    CALCIUM  9.4  8.8       Recent Labs      11/13/17 0610  11/14/17 0113   ALTSGPT  6   --    ASTSGOT  15   --    ALKPHOSPHAT  50   --    TBILIRUBIN  0.4   --    GLUCOSE  88  174*       Recent Labs      11/13/17   0610  11/14/17   0113  11/15/17   0150   RBC  3.37*  2.98*  3.01*   HEMOGLOBIN  11.5*  10.0*  10.1*   HEMATOCRIT  32.7*  28.8*  29.2*   PLATELETCT  305  297  338       Recent Labs      11/13/17   0610  11/14/17   0113  11/15/17   0150   WBC  4.5*  2.4*  6.9   NEUTSPOLYS  65.00  90.20*  85.80*   LYMPHOCYTES  12.20*  2.50*  1.90*   MONOCYTES  16.40*  5.30  10.60   EOSINOPHILS  4.20  0.00  0.10    BASOPHILS  0.90  0.40  0.40   ASTSGOT  15   --    --    ALTSGPT  6   --    --    ALKPHOSPHAT  50   --    --    TBILIRUBIN  0.4   --    --            Assessment/Plan     * Small cell carcinoma of lung (CMS-HCC)- (present on admission)   Assessment & Plan    -Diagnosed by mediastinoscopy with biopsy of paratracheal mass (10/17) showing metastatic small cell carcinoma  - Staging workup including MRI Brain and CT Abdomen/Pelvis:  Unremarkable  - 10/21-10/23 Patient received first cycle of chemotherapy. CISplatin on day 1 and etoposide on days 1-3 28 day cycle for 4-6 cycles.   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 malignancy  - Exhibits good appetite  - Nutrition following        Chronic alcohol use- (present on admission)   Assessment & Plan    - Consumed 1 beer daily   - BAL: 0.01 on admission  - Continue PO thiamine, B12 and folic acid supplements

## 2017-11-16 NOTE — CARE PLAN
Problem: Bowel/Gastric:  Goal: Normal bowel function is maintained or improved  Outcome: PROGRESSING AS EXPECTED  Pt reports regular bowel movements, denies abdominal pain or tenderness. Bowel sounds normoactive.

## 2017-11-16 NOTE — CARE PLAN
Problem: Safety  Goal: Will remain free from injury    Intervention: Educate patient and significant other/support system about adaptive mobility strategies and safe transfers  Pt is up self. Pt educated on the use of call light if assistance is needed. Pt verbalized understanding.       Problem: Pain Management  Goal: Pain level will decrease to patient's comfort goal    Intervention: Follow pain managment plan developed in collaboration with patient and Interdisciplinary Team  Pt has c/o of throat pain. Pt medicate per MAR

## 2017-11-16 NOTE — PROGRESS NOTES
Pt AOX4. VSS. Reports headache, tylenol given per PRN order. Up to bathroom independently. Reports episode of vomiting x2, refused PRN zofran. Reports anxiety regarding discharge,  contacted. Education provided regarding infection prevention, hand washing, and compromised immune system.

## 2017-11-16 NOTE — PROGRESS NOTES
Received report and assumed patient care at change of shift. Patient is resting in bed, A/O x4. Patient reports no pain at this time, medications provided per MAR.     Pt has PICC R- double lumen.      Plan of care discussed, questions answered. Bed is in the lowest position and locked, call light within reach, non-skid socks in place, hourly rounding. Patient reports no further needs and this time.

## 2017-11-16 NOTE — CARE PLAN
Problem: Discharge Barriers/Planning  Goal: Patient's continuum of care needs will be met  Outcome: PROGRESSING SLOWER THAN EXPECTED  Discharge planning continues, patient unable to find placement in SNF, group home accepted. Patient currently applying for housing

## 2017-11-17 ENCOUNTER — PATIENT OUTREACH (OUTPATIENT)
Dept: HEALTH INFORMATION MANAGEMENT | Facility: OTHER | Age: 57
End: 2017-11-17

## 2017-11-17 VITALS
TEMPERATURE: 97.7 F | HEART RATE: 70 BPM | HEIGHT: 70 IN | BODY MASS INDEX: 18.21 KG/M2 | WEIGHT: 127.21 LBS | OXYGEN SATURATION: 95 % | SYSTOLIC BLOOD PRESSURE: 102 MMHG | DIASTOLIC BLOOD PRESSURE: 71 MMHG | RESPIRATION RATE: 18 BRPM

## 2017-11-17 LAB
ANION GAP SERPL CALC-SCNC: 8 MMOL/L (ref 0–11.9)
BASOPHILS # BLD AUTO: 0.4 % (ref 0–1.8)
BASOPHILS # BLD: 0.03 K/UL (ref 0–0.12)
BUN SERPL-MCNC: 13 MG/DL (ref 8–22)
CALCIUM SERPL-MCNC: 9.6 MG/DL (ref 8.5–10.5)
CHLORIDE SERPL-SCNC: 100 MMOL/L (ref 96–112)
CO2 SERPL-SCNC: 24 MMOL/L (ref 20–33)
COMMENT 1642: NORMAL
CREAT SERPL-MCNC: 0.5 MG/DL (ref 0.5–1.4)
EKG IMPRESSION: NORMAL
EOSINOPHIL # BLD AUTO: 0.11 K/UL (ref 0–0.51)
EOSINOPHIL NFR BLD: 1.5 % (ref 0–6.9)
ERYTHROCYTE [DISTWIDTH] IN BLOOD BY AUTOMATED COUNT: 49.4 FL (ref 35.9–50)
GFR SERPL CREATININE-BSD FRML MDRD: >60 ML/MIN/1.73 M 2
GLUCOSE SERPL-MCNC: 126 MG/DL (ref 65–99)
HCT VFR BLD AUTO: 30.8 % (ref 42–52)
HGB BLD-MCNC: 10.8 G/DL (ref 14–18)
IMM GRANULOCYTES # BLD AUTO: 0.05 K/UL (ref 0–0.11)
IMM GRANULOCYTES NFR BLD AUTO: 0.7 % (ref 0–0.9)
LYMPHOCYTES # BLD AUTO: 0.31 K/UL (ref 1–4.8)
LYMPHOCYTES NFR BLD: 4.2 % (ref 22–41)
MCH RBC QN AUTO: 34.1 PG (ref 27–33)
MCHC RBC AUTO-ENTMCNC: 35.1 G/DL (ref 33.7–35.3)
MCV RBC AUTO: 97.2 FL (ref 81.4–97.8)
MONOCYTES # BLD AUTO: 0.22 K/UL (ref 0–0.85)
MONOCYTES NFR BLD AUTO: 3 % (ref 0–13.4)
MORPHOLOGY BLD-IMP: NORMAL
NEUTROPHILS # BLD AUTO: 6.65 K/UL (ref 1.82–7.42)
NEUTROPHILS NFR BLD: 90.2 % (ref 44–72)
NRBC # BLD AUTO: 0 K/UL
NRBC BLD AUTO-RTO: 0 /100 WBC
PLATELET # BLD AUTO: 281 K/UL (ref 164–446)
PMV BLD AUTO: 9.5 FL (ref 9–12.9)
POTASSIUM SERPL-SCNC: 4 MMOL/L (ref 3.6–5.5)
RBC # BLD AUTO: 3.17 M/UL (ref 4.7–6.1)
SODIUM SERPL-SCNC: 132 MMOL/L (ref 135–145)
WBC # BLD AUTO: 7.4 K/UL (ref 4.8–10.8)

## 2017-11-17 PROCEDURE — A9270 NON-COVERED ITEM OR SERVICE: HCPCS | Performed by: STUDENT IN AN ORGANIZED HEALTH CARE EDUCATION/TRAINING PROGRAM

## 2017-11-17 PROCEDURE — 85025 COMPLETE CBC W/AUTO DIFF WBC: CPT

## 2017-11-17 PROCEDURE — 700102 HCHG RX REV CODE 250 W/ 637 OVERRIDE(OP): Performed by: STUDENT IN AN ORGANIZED HEALTH CARE EDUCATION/TRAINING PROGRAM

## 2017-11-17 PROCEDURE — 700111 HCHG RX REV CODE 636 W/ 250 OVERRIDE (IP): Performed by: STUDENT IN AN ORGANIZED HEALTH CARE EDUCATION/TRAINING PROGRAM

## 2017-11-17 PROCEDURE — 80048 BASIC METABOLIC PNL TOTAL CA: CPT

## 2017-11-17 PROCEDURE — 99239 HOSP IP/OBS DSCHRG MGMT >30: CPT | Mod: GC | Performed by: HOSPITALIST

## 2017-11-17 RX ORDER — MIDODRINE HYDROCHLORIDE 5 MG/1
5 TABLET ORAL
Qty: 60 TAB | Refills: 1 | Status: SHIPPED | OUTPATIENT
Start: 2017-11-17 | End: 2018-02-01 | Stop reason: SDUPTHER

## 2017-11-17 RX ORDER — FOLIC ACID 1 MG/1
1 TABLET ORAL DAILY
Qty: 30 TAB | Refills: 1 | Status: SHIPPED | OUTPATIENT
Start: 2017-11-18 | End: 2018-02-01 | Stop reason: SDUPTHER

## 2017-11-17 RX ORDER — LANOLIN ALCOHOL/MO/W.PET/CERES
100 CREAM (GRAM) TOPICAL DAILY
Qty: 30 TAB | Refills: 1 | Status: SHIPPED | OUTPATIENT
Start: 2017-11-18 | End: 2018-02-01 | Stop reason: SDUPTHER

## 2017-11-17 RX ORDER — ACETAMINOPHEN 325 MG/1
650 TABLET ORAL EVERY 6 HOURS PRN
Qty: 10 TAB | Refills: 0 | Status: SHIPPED | OUTPATIENT
Start: 2017-11-17 | End: 2018-02-01 | Stop reason: SDUPTHER

## 2017-11-17 RX ADMIN — ACETAMINOPHEN 650 MG: 325 TABLET, FILM COATED ORAL at 10:54

## 2017-11-17 RX ADMIN — THIAMINE HCL TAB 100 MG 100 MG: 100 TAB at 08:06

## 2017-11-17 RX ADMIN — VITAMIN D, TAB 1000IU (100/BT) 5000 UNITS: 25 TAB at 08:06

## 2017-11-17 RX ADMIN — THERA TABS 1 TABLET: TAB at 08:06

## 2017-11-17 RX ADMIN — MAGNESIUM HYDROXIDE 30 ML: 400 SUSPENSION ORAL at 10:50

## 2017-11-17 RX ADMIN — POLYETHYLENE GLYCOL 3350 1 PACKET: 17 POWDER, FOR SOLUTION ORAL at 08:12

## 2017-11-17 RX ADMIN — FOLIC ACID 1 MG: 1 TABLET ORAL at 08:06

## 2017-11-17 RX ADMIN — MIDODRINE HYDROCHLORIDE 5 MG: 5 TABLET ORAL at 10:51

## 2017-11-17 RX ADMIN — NICOTINE 14 MG: 14 PATCH, EXTENDED RELEASE TRANSDERMAL at 05:45

## 2017-11-17 RX ADMIN — MIDODRINE HYDROCHLORIDE 5 MG: 5 TABLET ORAL at 08:07

## 2017-11-17 RX ADMIN — STANDARDIZED SENNA CONCENTRATE AND DOCUSATE SODIUM 2 TABLET: 8.6; 5 TABLET, FILM COATED ORAL at 08:07

## 2017-11-17 RX ADMIN — HEPARIN SODIUM 5000 UNITS: 5000 INJECTION, SOLUTION INTRAVENOUS; SUBCUTANEOUS at 05:45

## 2017-11-17 ASSESSMENT — PAIN SCALES - GENERAL: PAINLEVEL_OUTOF10: 2

## 2017-11-17 ASSESSMENT — LIFESTYLE VARIABLES: EVER_SMOKED: YES

## 2017-11-17 NOTE — DISCHARGE PLANNING
Medical Social Work     MARIAJOSE received tc from MARCIA hunter/ [facility] [phone] inquiring about pt not arriving to group home. MARIAJOSE spoke with bedside RN who stated they don't d/c pt's that early and pt can go later between 2-3. MARIAJOSE updated Lara who will update group home.    Plan: Pt will d/c between 2-3 to group home.

## 2017-11-17 NOTE — DISCHARGE INSTRUCTIONS
Discharge Instructions    Discharged to group home by car with relative. Discharged via wheelchair, hospital escort: Yes.  Special equipment needed: Not Applicable    Be sure to schedule a follow-up appointment with your primary care doctor or any specialists as instructed.     Discharge Plan:   Diet Plan: Discussed  Activity Level: Discussed  Smoking Cessation Offered: Patient Counseled  Confirmed Follow up Appointment: Appointment Scheduled  Confirmed Symptoms Management: Discussed  Medication Reconciliation Updated: Yes  Pneumococcal Vaccine Given - only chart on this line when given: Given (See MAR)  Influenza Vaccine Indication: Patient Refuses  Influenza Vaccine Given - only chart on this line when given: Influenza Vaccine Given (See MAR)    I understand that a diet low in cholesterol, fat, and sodium is recommended for good health. Unless I have been given specific instructions below for another diet, I accept this instruction as my diet prescription.   Other diet: regular      Special Instructions: None    · Is patient discharged on Warfarin / Coumadin?   No     · Is patient Post Blood Transfusion?  No    Depression / Suicide Risk    As you are discharged from this [facility] facility, it is important to learn how to keep safe from harming yourself.    Recognize the warning signs:  · Abrupt changes in personality, positive or negative- including increase in energy   · Giving away possessions  · Change in eating patterns- significant weight changes-  positive or negative  · Change in sleeping patterns- unable to sleep or sleeping all the time   · Unwillingness or inability to communicate  · Depression  · Unusual sadness, discouragement and loneliness  · Talk of wanting to die  · Neglect of personal appearance   · Rebelliousness- reckless behavior  · Withdrawal from people/activities they love  · Confusion- inability to concentrate     If you or a loved one observes any of these behaviors or has concerns  about self-harm, here's what you can do:  · Talk about it- your feelings and reasons for harming yourself  · Remove any means that you might use to hurt yourself (examples: pills, rope, extension cords, firearm)  · Get professional help from the community (Mental Health, Substance Abuse, psychological counseling)  · Do not be alone:Call your Safe Contact- someone whom you trust who will be there for you.  · Call your local CRISIS HOTLINE 490-0236 or 447-113-4648  · Call your local Children's Mobile Crisis Response Team [place] (892) 912-9203 or www.[employer]  · Call the toll free National Suicide Prevention Hotlines   · National Suicide Prevention Lifeline 260-573-ICXN (4951)  · National Hope Line Network 800-SUICIDE (406-0494)

## 2017-11-17 NOTE — PROGRESS NOTES
Internal Medicine Interval Note  Note Author: Trav Fierro M.D.     Name Froylan Spain     1960   Age/Sex 57 y.o. male   MRN [id number]   Code Status Full code     After 5PM or if no immediate response to page, please call for cross-coverage  Attending/Team: Meredith See Patient List for primary contact information  Call [phone] to page    1st Call - Day Intern (R1):   Vadim 2nd Call - Day Sr. Resident (R2/R3):   Criss Lynch         Reason for interval visit  (Principal Problem)   Small cell carcinoma of lung (CMS-HCC)    Interval Problem Daily Status Update  (24 hours)   Completed second cycle of chemotherapy yesterday.  Patient has been accepted to a group home as per . He will be discharged tomorrow.        Review of Systems   Constitutional: Positive for malaise/fatigue. Negative for chills and fever.   HENT: Positive for sore throat. Negative for congestion, hearing loss and nosebleeds.    Eyes: Negative for blurred vision, double vision and discharge.   Respiratory: Positive for cough. Negative for sputum production, shortness of breath and wheezing.    Cardiovascular: Negative for chest pain and palpitations.   Gastrointestinal: Negative for abdominal pain, constipation, diarrhea, nausea and vomiting.   Genitourinary: Negative for dysuria and urgency.   Musculoskeletal: Negative for joint pain and myalgias.   Skin: Negative for rash.   Neurological: Negative for dizziness, tingling, focal weakness, weakness and headaches.   Psychiatric/Behavioral: Negative for hallucinations. The patient is not nervous/anxious.        Consultants/Specialty  Hem/Onc  Radio/Onc    Disposition  Inpatient    Quality Measures    Reviewed items::  Labs reviewed and Medications reviewed  Garvin catheter::  No Garvin  DVT prophylaxis pharmacological::  Heparin  Ulcer Prophylaxis::  Not indicated          Physical Exam       Vitals:    11/15/17 1924 17 0447 17 0800 17 1600   BP:  114/74 103/60 132/82 126/79   Pulse: 87 87 92 91   Resp: 16 16 18 18   Temp: 36.8 °C (98.3 °F) 37.2 °C (98.9 °F) 37.2 °C (99 °F) 36.9 °C (98.5 °F)   SpO2: 98% 97% 98% 99%   Weight:       Height:         Body mass index is 18.25 kg/m².    Oxygen Therapy:  Pulse Oximetry: 99 %, O2 (LPM): 0, O2 Delivery: None (Room Air)    Physical Exam   Constitutional: He is oriented to person, place, and time. No distress.   cachectic   HENT:   Mouth/Throat: No oropharyngeal exudate.   Neck: Neck supple. No tracheal deviation present.   Scar healing well   Cardiovascular: Normal rate and regular rhythm.    No murmur heard.  Pulmonary/Chest: No respiratory distress. He has no wheezes. He has no rales. He exhibits no tenderness.   Abdominal: He exhibits no distension. There is no tenderness.   Musculoskeletal: He exhibits no edema.   Lymphadenopathy:     He has no cervical adenopathy.   Neurological: He is alert and oriented to person, place, and time. No cranial nerve deficit.   Skin: He is not diaphoretic.   Psychiatric: Affect normal.     Lab Data Review:   10/26/2017  11:40 AM    Recent Labs      11/14/17 0113 11/16/17   0025   SODIUM  132*  131*   POTASSIUM  4.0  4.3   CHLORIDE  103  101   CO2  22  23   BUN  10  13   CREATININE  0.45*  0.54   CALCIUM  8.8  9.5       Recent Labs      11/14/17 0113 11/16/17   0025   GLUCOSE  174*  124*       Recent Labs      11/14/17   0113  11/15/17   0150  11/16/17   0025   RBC  2.98*  3.01*  2.98*   HEMOGLOBIN  10.0*  10.1*  10.3*   HEMATOCRIT  28.8*  29.2*  28.9*   PLATELETCT  297  338  308       Recent Labs      11/14/17   0113  11/15/17   0150  11/16/17   0025   WBC  2.4*  6.9  2.7*   NEUTSPOLYS  90.20*  85.80*  83.50*   LYMPHOCYTES  2.50*  1.90*  4.00*   MONOCYTES  5.30  10.60  11.40   EOSINOPHILS  0.00  0.10  0.40   BASOPHILS  0.40  0.40  0.00           Assessment/Plan     * Small cell carcinoma of lung (CMS-HCC)- (present on admission)   Assessment & Plan    -Diagnosed by  mediastinoscopy with biopsy of paratracheal mass (10/17) showing metastatic small cell carcinoma  - Staging workup including MRI Brain and CT Abdomen/Pelvis:  Unremarkable  - Chemotherapy regimen-  CISplatin on day 1 and etoposide on days 1-3 28 day cycle for 4-6 cycles. 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 malignancy  - Exhibits good appetite  - Nutrition following        Chronic alcohol use- (present on admission)   Assessment & Plan    - Consumed 1 beer daily   - BAL: 0.01 on admission  - Continue PO thiamine, B12 and folic acid supplements

## 2017-11-17 NOTE — PROGRESS NOTES
Patient discharged to group home. PICC removed, site dressed with gauze and coban. All education materials discussed. Patient verbalized understanding. Taxi voucher given and taxi called for pickup.

## 2017-11-21 ENCOUNTER — OFFICE VISIT (OUTPATIENT)
Dept: INTERNAL MEDICINE | Facility: MEDICAL CENTER | Age: 57
End: 2017-11-21
Payer: MEDICAID

## 2017-11-21 ENCOUNTER — APPOINTMENT (OUTPATIENT)
Dept: INTERNAL MEDICINE | Facility: MEDICAL CENTER | Age: 57
End: 2017-11-21
Payer: MEDICAID

## 2017-11-21 VITALS
BODY MASS INDEX: 19.78 KG/M2 | SYSTOLIC BLOOD PRESSURE: 117 MMHG | HEIGHT: 67 IN | TEMPERATURE: 98.4 F | WEIGHT: 126 LBS | OXYGEN SATURATION: 98 % | DIASTOLIC BLOOD PRESSURE: 81 MMHG | HEART RATE: 107 BPM | RESPIRATION RATE: 17 BRPM

## 2017-11-21 DIAGNOSIS — C34.92 SMALL CELL CARCINOMA OF LEFT LUNG (HCC): ICD-10-CM

## 2017-11-21 DIAGNOSIS — J02.9 SORETHROAT: ICD-10-CM

## 2017-11-21 PROCEDURE — 99204 OFFICE O/P NEW MOD 45 MIN: CPT | Mod: GC | Performed by: INTERNAL MEDICINE

## 2017-11-21 ASSESSMENT — PATIENT HEALTH QUESTIONNAIRE - PHQ9: CLINICAL INTERPRETATION OF PHQ2 SCORE: 1
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
## 2017-11-28 NOTE — DISCHARGE PLANNING
Medical SW    Referral: Pt came into hospital to acquire his wallet which was supposed to mailed from his former deny elias. Sw called [facility] and left VM requesting the location of pt's wallet. Sw left VM w/ Lara at [facility] indicating pt's needs supports as below.     Intervention: Pt has transport from [facility] and can use their phone to make calls. Pt states [facility] w/ [facility] his community CM w/ [employer] calls every other day to discuss they plan to get him permanent housing. Sw deferred to his CM for the issues his is currently having.     Sw provided the application for DMV ID for homeless individuals. Sw provided resources for homeless shelter and Social Security office. Pt states scheduled phone appointment w/ [facility] is today at hospital room phone. He is aware he should call [facility] and let them know his current location. He is concerned for his transportation to his MD Dr Antonio appointment on 12/6. Sw provided pamphlet for MTM his free medical transport via his insurance. Sw advised he should call today and make the appointment for transportation as they require early appointments.    Sw received VM from pt's former Radha pugh. She states she mailed pt's wallet, but when she went to the post office to check they stated they cannot locate the address for the hospital. She believes it will most likely be mailed back to her eventually. She states she has also received communication from pt's [facility].            Plan: Sw to assist w/ d/c planning as needed.

## 2017-12-06 LAB
MYCOBACTERIUM SPEC CULT: NORMAL
RHODAMINE-AURAMINE STN SPEC: NORMAL
SIGNIFICANT IND 70042: NORMAL
SITE SITE: NORMAL
SOURCE SOURCE: NORMAL

## 2017-12-07 ENCOUNTER — PATIENT OUTREACH (OUTPATIENT)
Dept: OTHER | Facility: MEDICAL CENTER | Age: 57
End: 2017-12-07

## 2017-12-07 NOTE — PROGRESS NOTES
Call placed to Dr Antonio's office.  They did note that patient had appointment with Dr Antonio but no indication noted yet on plan of care.  They provided number of [phone] for patient.    Call placed to patient.  He currently is at a transitional house and reports he believes he has a 30 day extension.  He reports may be able to stay longer than that even.  He has  through [employer] who is assisting him as well.  They help with transportation to appointments when needed.  He has follow up appointments with counseling as well as primary care physician.  Pt states saw Dr Antonio and is set up at [facility] for chemotherapy.  He is hoping to get in sooner than the 18th which was first available.  He has been given multiple resources by  and direction on getting what he needs.  Pt has food card and denies current needs.  Discussed cancer nurse navigator as a support for his cancer diagnosis and to assist with education and information in this area.  Provided contact information for cancer navigator to patient.  Will follow up with patient when he comes for chemotherapy on the 18th.

## 2017-12-12 RX ORDER — ONDANSETRON 4 MG/1
4 TABLET, ORALLY DISINTEGRATING ORAL ONCE
Status: CANCELLED | OUTPATIENT
Start: 2017-12-20 | End: 2017-12-12

## 2017-12-12 RX ORDER — DEXAMETHASONE 4 MG/1
4 TABLET ORAL ONCE
Status: CANCELLED | OUTPATIENT
Start: 2017-12-20 | End: 2017-12-12

## 2017-12-12 RX ORDER — DEXAMETHASONE SODIUM PHOSPHATE 4 MG/ML
12 INJECTION, SOLUTION INTRA-ARTICULAR; INTRALESIONAL; INTRAMUSCULAR; INTRAVENOUS; SOFT TISSUE ONCE
Status: CANCELLED | OUTPATIENT
Start: 2017-12-19 | End: 2017-12-12

## 2017-12-12 RX ORDER — SODIUM CHLORIDE 9 MG/ML
500 INJECTION, SOLUTION INTRAVENOUS ONCE
Status: CANCELLED | OUTPATIENT
Start: 2017-12-19 | End: 2017-12-12

## 2017-12-12 RX ORDER — DEXAMETHASONE 4 MG/1
4 TABLET ORAL ONCE
Status: CANCELLED | OUTPATIENT
Start: 2017-12-21 | End: 2017-12-12

## 2017-12-12 RX ORDER — ONDANSETRON 4 MG/1
4 TABLET, ORALLY DISINTEGRATING ORAL ONCE
Status: CANCELLED | OUTPATIENT
Start: 2017-12-21 | End: 2017-12-12

## 2017-12-18 ENCOUNTER — PATIENT OUTREACH (OUTPATIENT)
Dept: OTHER | Facility: MEDICAL CENTER | Age: 57
End: 2017-12-18

## 2017-12-18 NOTE — PROGRESS NOTES
"Pharmacy Chemotherapy Note     Patient Name: CASSIDY PAGE  Dx: Small cell lung cancer       Protocol:  CISPLATIN + ETOPOSIDE + XRT     CISplatin 80 mg/m2 IV over 60 minutes on day 1  Etoposide 100 mg/m2 IV over 60 minutes on days 1-3  21-28 day cycle for 4-6 cycles              -MD giving Cycle 2 21 days after Cycle 1, each cycle length to be determined ([place] plan has 28-day cycles currently)     *Dosing Reference*  NCCN Guidelines for Small Cell Lung Cancer.V.2.2017  Ruth Ann AT, et al. N Engl J Med. 199;340-(4):265-71  Moy MORILLO et al. J Clin Oncol. 2002;20(24):3272-72      /75   Pulse 98   Temp 36.6 °C (97.8 °F)   Resp 18   Ht 1.689 m (5' 6.5\")   Wt 60.2 kg (132 lb 11.5 oz)   SpO2 99%   BMI 21.10 kg/m²  Body surface area is 1.68 meters squared.    Labs 12/19/17:  ANC~ 1960         Plt = 426k            Hgb = 11.1           SCr = 0.53 mg/dL         CrCl ~ 100mL/min (min 0.7)   LFT's = WNL      TBili = 0.3       Mag = 1.7         K+ = 4      Drug Order   (Drug name, dose, route, IV Fluid & volume, frequency, number of doses) Cycle: 3 Day 1 of 3   Previous treatment: C2 = Nov 13 - 15, 2017   Medication = Cisplatin  Base Dose= 80 mg/m2  Calc Dose: Base Dose x 1.68 m2 = 134.4 mg  Final Dose = 134.4 mg  Route = IV   Fluid & Volume =  mL  Admin Duration = Over 60 min     Day 1 only    <5% difference, OK to treat with final dose    Medication = Etoposide  Base Dose= 100 mg/m2  Calc Dose: Base Dose x 1.68 m2 = 168 mg  Final Dose = 168 mg  Route = IV  Fluid & Volume =  mL  Admin Duration = Over 1-2 hrs    Days 1-3    <5% difference, OK to treat with final dose       By my signature below, I confirm this process was performed independently with the BSA and all final chemotherapy dosing calculations congruent. I have reviewed the above chemotherapy order and that my calculation of the final dose and BSA (when applicable) corroborate those calculations of the  pharmacist. " Discrepancies of 5% or greater in the written dose have been addressed and documented within the EPIC Progress notes.     Karina Sanchez, PharmD, BCOP

## 2017-12-18 NOTE — PROGRESS NOTES
"Pharmacy Chemotherapy Verification     Patient Name: Froylan Spain  Dx: SCLC - limited stage     Cycle 3  Days 1-3           Previous treatment: C2 = Nov 13-15, 2017     Protocol: CISplatin/Etoposide + XRT     *Dosing Reference*  CISplatin 80 mg/m2 IV over 60 minutes on day 1  Etoposide 100 mg/m2 IV over 60 minutes on days 1-3  Q21-28 day cycle for 4-6 cycles   - Q28 days in treatment plan but Dr. Costello ok'd for Q21 day for cycle 2.  Future cycles depending on oncologist discretion.   NCCN Guidelines for Small Cell [place] Cancer.V.2.2017  Ruth Ann AT, et al. N Engl J Med. 199;340-(4):265-71  Moy S, et al. J Clin Oncol. 2002;20(24):0418-72  Belkys ARANDA et al. J Clin Oncol. 1994;12(10):2022-24  Michael H, et al. J Clin Oncol. 2006;24(33):7449-52     Allergies:Review of patient's allergies indicates no known allergies.     /75   Pulse 98   Temp 36.6 °C (97.8 °F)   Resp 18   Ht 1.689 m (5' 6.5\")   Wt 60.2 kg (132 lb 11.5 oz)   SpO2 99%   BMI 21.10 kg/m²    Body surface area is 1.68 meters squared.       ANC~ 1960         Plt = 426k            Hgb = 11.1           SCr = 0.53mg/dL           CrCl ~ 100mL/min (min Scr = 0.7)   LFT's = WNL     TBili = 0.3   Mag = 1.7           CISplatin (Platinol) 80 mg/m2 x 1.68m2 = 134.4mg              <5% difference, OK to treat with final dose = 134.4mg IV on Day 1 only         Etoposide (Toposar) 100 mg/m2 x 1.68m2 = 168mg              <5% difference, OK to treat with final dose = 168mg IV on Days 1-3        DINA Gagnon Pharm.D.  "

## 2017-12-18 NOTE — PROGRESS NOTES
Pt did not come in for treatment today.  Nurse navigator was planning on meeting with patient.  Spoke with infusion who reported transportation issues and ride that had been arranged through  with [employer] did not show up.    Call placed to [facility] and left message to assist with coordination of care.  Left message.    Will meet with patient tomorrow to assess needs and barriers to care.

## 2017-12-19 ENCOUNTER — PATIENT OUTREACH (OUTPATIENT)
Dept: OTHER | Facility: MEDICAL CENTER | Age: 57
End: 2017-12-19

## 2017-12-19 ENCOUNTER — DOCUMENTATION (OUTPATIENT)
Dept: HEMATOLOGY ONCOLOGY | Facility: MEDICAL CENTER | Age: 57
End: 2017-12-19

## 2017-12-19 ENCOUNTER — OUTPATIENT INFUSION SERVICES (OUTPATIENT)
Dept: ONCOLOGY | Facility: MEDICAL CENTER | Age: 57
End: 2017-12-19
Attending: INTERNAL MEDICINE
Payer: MEDICAID

## 2017-12-19 VITALS
DIASTOLIC BLOOD PRESSURE: 75 MMHG | OXYGEN SATURATION: 99 % | TEMPERATURE: 97.8 F | BODY MASS INDEX: 20.83 KG/M2 | HEART RATE: 98 BPM | WEIGHT: 132.72 LBS | SYSTOLIC BLOOD PRESSURE: 126 MMHG | HEIGHT: 67 IN | RESPIRATION RATE: 18 BRPM

## 2017-12-19 DIAGNOSIS — Z65.8 PSYCHOSOCIAL DISTRESS: ICD-10-CM

## 2017-12-19 DIAGNOSIS — C34.92 SMALL CELL CARCINOMA OF LEFT LUNG (HCC): ICD-10-CM

## 2017-12-19 DIAGNOSIS — C34.91 SMALL CELL CARCINOMA OF RIGHT LUNG (HCC): ICD-10-CM

## 2017-12-19 LAB
ALBUMIN SERPL BCP-MCNC: 3.8 G/DL (ref 3.2–4.9)
ALBUMIN/GLOB SERPL: 1.4 G/DL
ALP SERPL-CCNC: 55 U/L (ref 30–99)
ALT SERPL-CCNC: 6 U/L (ref 2–50)
ANION GAP SERPL CALC-SCNC: 5 MMOL/L (ref 0–11.9)
AST SERPL-CCNC: 13 U/L (ref 12–45)
BASOPHILS # BLD AUTO: 1.2 % (ref 0–1.8)
BASOPHILS # BLD: 0.05 K/UL (ref 0–0.12)
BILIRUB SERPL-MCNC: 0.3 MG/DL (ref 0.1–1.5)
BUN SERPL-MCNC: 12 MG/DL (ref 8–22)
CALCIUM SERPL-MCNC: 9.7 MG/DL (ref 8.5–10.5)
CHLORIDE SERPL-SCNC: 103 MMOL/L (ref 96–112)
CO2 SERPL-SCNC: 27 MMOL/L (ref 20–33)
CREAT SERPL-MCNC: 0.53 MG/DL (ref 0.5–1.4)
EOSINOPHIL # BLD AUTO: 0.28 K/UL (ref 0–0.51)
EOSINOPHIL NFR BLD: 7 % (ref 0–6.9)
ERYTHROCYTE [DISTWIDTH] IN BLOOD BY AUTOMATED COUNT: 53.8 FL (ref 35.9–50)
GFR SERPL CREATININE-BSD FRML MDRD: >60 ML/MIN/1.73 M 2
GLOBULIN SER CALC-MCNC: 2.7 G/DL (ref 1.9–3.5)
GLUCOSE SERPL-MCNC: 99 MG/DL (ref 65–99)
HCT VFR BLD AUTO: 33.3 % (ref 42–52)
HGB BLD-MCNC: 11.1 G/DL (ref 14–18)
IMM GRANULOCYTES # BLD AUTO: 0.02 K/UL (ref 0–0.11)
IMM GRANULOCYTES NFR BLD AUTO: 0.5 % (ref 0–0.9)
LYMPHOCYTES # BLD AUTO: 0.87 K/UL (ref 1–4.8)
LYMPHOCYTES NFR BLD: 21.6 % (ref 22–41)
MAGNESIUM SERPL-MCNC: 1.7 MG/DL (ref 1.5–2.5)
MCH RBC QN AUTO: 33.5 PG (ref 27–33)
MCHC RBC AUTO-ENTMCNC: 33.3 G/DL (ref 33.7–35.3)
MCV RBC AUTO: 100.6 FL (ref 81.4–97.8)
MONOCYTES # BLD AUTO: 0.84 K/UL (ref 0–0.85)
MONOCYTES NFR BLD AUTO: 20.9 % (ref 0–13.4)
NEUTROPHILS # BLD AUTO: 1.96 K/UL (ref 1.82–7.42)
NEUTROPHILS NFR BLD: 48.8 % (ref 44–72)
NRBC # BLD AUTO: 0 K/UL
NRBC BLD-RTO: 0 /100 WBC
PLATELET # BLD AUTO: 426 K/UL (ref 164–446)
PMV BLD AUTO: 8.7 FL (ref 9–12.9)
POTASSIUM SERPL-SCNC: 4 MMOL/L (ref 3.6–5.5)
PROT SERPL-MCNC: 6.5 G/DL (ref 6–8.2)
RBC # BLD AUTO: 3.31 M/UL (ref 4.7–6.1)
SODIUM SERPL-SCNC: 135 MMOL/L (ref 135–145)
WBC # BLD AUTO: 4 K/UL (ref 4.8–10.8)

## 2017-12-19 PROCEDURE — 700105 HCHG RX REV CODE 258

## 2017-12-19 PROCEDURE — 700111 HCHG RX REV CODE 636 W/ 250 OVERRIDE (IP): Performed by: INTERNAL MEDICINE

## 2017-12-19 PROCEDURE — 83735 ASSAY OF MAGNESIUM: CPT

## 2017-12-19 PROCEDURE — 96367 TX/PROPH/DG ADDL SEQ IV INF: CPT

## 2017-12-19 PROCEDURE — 700105 HCHG RX REV CODE 258: Performed by: INTERNAL MEDICINE

## 2017-12-19 PROCEDURE — 96413 CHEMO IV INFUSION 1 HR: CPT

## 2017-12-19 PROCEDURE — 85025 COMPLETE CBC W/AUTO DIFF WBC: CPT

## 2017-12-19 PROCEDURE — 96375 TX/PRO/DX INJ NEW DRUG ADDON: CPT

## 2017-12-19 PROCEDURE — 96415 CHEMO IV INFUSION ADDL HR: CPT

## 2017-12-19 PROCEDURE — 96368 THER/DIAG CONCURRENT INF: CPT

## 2017-12-19 PROCEDURE — 700111 HCHG RX REV CODE 636 W/ 250 OVERRIDE (IP)

## 2017-12-19 PROCEDURE — 304540 HCHG NITRO SET VENT 2ND TUB

## 2017-12-19 PROCEDURE — 80053 COMPREHEN METABOLIC PANEL: CPT

## 2017-12-19 PROCEDURE — 96417 CHEMO IV INFUS EACH ADDL SEQ: CPT

## 2017-12-19 PROCEDURE — 96361 HYDRATE IV INFUSION ADD-ON: CPT

## 2017-12-19 RX ORDER — SODIUM CHLORIDE 9 MG/ML
500 INJECTION, SOLUTION INTRAVENOUS ONCE
Status: COMPLETED | OUTPATIENT
Start: 2017-12-19 | End: 2017-12-19

## 2017-12-19 RX ORDER — DEXAMETHASONE SODIUM PHOSPHATE 4 MG/ML
12 INJECTION, SOLUTION INTRA-ARTICULAR; INTRALESIONAL; INTRAMUSCULAR; INTRAVENOUS; SOFT TISSUE ONCE
Status: COMPLETED | OUTPATIENT
Start: 2017-12-19 | End: 2017-12-19

## 2017-12-19 RX ADMIN — DEXAMETHASONE SODIUM PHOSPHATE 12 MG: 4 INJECTION, SOLUTION INTRAMUSCULAR; INTRAVENOUS at 10:16

## 2017-12-19 RX ADMIN — ETOPOSIDE 168 MG: 20 INJECTION, SOLUTION, CONCENTRATE INTRAVENOUS at 12:38

## 2017-12-19 RX ADMIN — MAGNESIUM SULFATE HEPTAHYDRATE: 500 INJECTION, SOLUTION INTRAMUSCULAR; INTRAVENOUS at 12:38

## 2017-12-19 RX ADMIN — SODIUM CHLORIDE 150 MG: 9 INJECTION, SOLUTION INTRAVENOUS at 10:45

## 2017-12-19 RX ADMIN — ONDANSETRON HYDROCHLORIDE 16 MG: 2 INJECTION, SOLUTION INTRAMUSCULAR; INTRAVENOUS at 10:24

## 2017-12-19 RX ADMIN — CISPLATIN 134.4 MG: 1 INJECTION, SOLUTION INTRAVENOUS at 11:30

## 2017-12-19 RX ADMIN — SODIUM CHLORIDE 500 ML: 9 INJECTION, SOLUTION INTRAVENOUS at 09:00

## 2017-12-19 ASSESSMENT — PAIN SCALES - GENERAL: PAINLEVEL: NO PAIN

## 2017-12-19 NOTE — PROGRESS NOTES
Chemotherapy Verification - SECONDARY RN       Height = 168.9cm  Weight = 60.2kg  BSA = 1.68m2       Medication: Cisplatin  Dose: 80mg/m2  Calculated Dose: 134.4mg                             (In mg/m2, AUC, mg/kg)     Medication: Etoposide  Dose: 100mg/m2  Calculated Dose: 168mg                             (In mg/m2, AUC, mg/kg)        I confirm that this process was performed independently.

## 2017-12-19 NOTE — PROGRESS NOTES
Chemotherapy Verification - PRIMARY RN      Height = 168.9 cm  Weight = 60.2 kg  BSA = 1.68 m2       Medication: Cisplatin  Dose: 80 mg/m2  Calculated Dose: 134.4 mg                             (In mg/m2, AUC, mg/kg)     Medication: Etoposide  Dose: 100 mg/m2  Calculated Dose: 168 mg                             (In mg/m2, AUC, mg/kg)          I confirm this process was performed independently with the BSA and all final chemotherapy dosing calculations congruent.  Any discrepancies of 5% or greater have been addressed with the chemotherapy pharmacist. The resolution of the discrepancy has been documented in the EPIC progress notes.

## 2017-12-19 NOTE — PROGRESS NOTES
Returns for chemotherapy.  Received previous cycle in [facility].  Reviewed side effects.  Reports did well except for constipation.  Does not have any antiemetics at home.  Call to MD placed to call in for him to [facility].  Also reported magnesium level.  Order received to replace.  BILLY Otero here and then Edie Ruano RN, nurse navigator here to meet with pt.  Premeds and Cisplatin infused without issue.  Etoposide and post hydration infusing.  Snacks taken.
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
## 2017-12-19 NOTE — PROGRESS NOTES
"Received call back from Michael from [employer].  He reported that patient has MTM services and he did help patient yesterday set up transportation.  He was aware of issue regarding transportation yesterday as well.  Michael states pt has been given information on MTM, how to call and who to call if ride does not show up.  He reports that pt is also established with [facility].  Michael reporting that [facility] is the main one following him now.  He is unsure how long that patient can continue to remain in his current group home.    Met with patient in infusion services.  Pt reporting has had some issues with MTM and plans to \"walk\" or \"ride bus\" before using MTM. Discussed with patient that walking would not be in his best interest during treatment.  He reports it would take 1 hour to get to infusion.  Pt with difficulty focusing on importance of what he needs.  Pt has not followed up with disability reporting that he has been receiving help from his .  He talked about RTC Access.  Provided pt with education that he would have had to have signed up for RTC Access to qualify for continued MTM assistance.  He reports he believes he has but has not had interview yet.  Encouraged him to follow up with  for update on what he needs to do.  Pt has not gone to  his phone, reporting \"I'm not going to try and find a 'green tent' in a parking lot\".  Pt saying he might ask his brother to get him a trac phone.  Pt saying his brother did get him a month booklet for bus.  Pt believes his  with [facility] was [facility].  Pt had [facility] application from inpatient stay, but had not completed it.  Reviewed with him benefit of [place] [employer] and use for emergency back up for transportation.  Pt going back to that he really doesn't need anything.  Several times prompting patient on importance of following up on needed things, like social " "security, RTC access and living situation.  Pt vague at times and gives impression that everything will get done, eventually.  Reviewed with patient again importance of filling out [employer] [facility] [employer] application.  Pt reports 6/10 on oncology distress scale.  He reports this number only related to recent interaction with MTM.  He reports he was frustrated regarding events.  MTM transportation had not picked him up on Monday and today the  stopped shortly after picking him up and asking if he had a \"voucher\".  They had to call and verify that patient had coverage for transportation.  Discussed with patient that MT should be his source for transportation and to ask for navigation assistance if this continues to be a barrier with the service.  Pt to fill out [employer] application while infusion today.  "

## 2017-12-20 ENCOUNTER — OUTPATIENT INFUSION SERVICES (OUTPATIENT)
Dept: ONCOLOGY | Facility: MEDICAL CENTER | Age: 57
End: 2017-12-20
Attending: INTERNAL MEDICINE
Payer: MEDICAID

## 2017-12-20 VITALS
HEART RATE: 93 BPM | DIASTOLIC BLOOD PRESSURE: 80 MMHG | SYSTOLIC BLOOD PRESSURE: 143 MMHG | WEIGHT: 134.7 LBS | RESPIRATION RATE: 18 BRPM | TEMPERATURE: 97.6 F | HEIGHT: 66 IN | OXYGEN SATURATION: 100 % | BODY MASS INDEX: 21.65 KG/M2

## 2017-12-20 DIAGNOSIS — C34.91 SMALL CELL CARCINOMA OF RIGHT LUNG (HCC): ICD-10-CM

## 2017-12-20 DIAGNOSIS — C34.92 SMALL CELL CARCINOMA OF LEFT LUNG (HCC): ICD-10-CM

## 2017-12-20 PROCEDURE — 700102 HCHG RX REV CODE 250 W/ 637 OVERRIDE(OP): Performed by: INTERNAL MEDICINE

## 2017-12-20 PROCEDURE — A9270 NON-COVERED ITEM OR SERVICE: HCPCS | Performed by: INTERNAL MEDICINE

## 2017-12-20 PROCEDURE — 304540 HCHG NITRO SET VENT 2ND TUB

## 2017-12-20 PROCEDURE — 700111 HCHG RX REV CODE 636 W/ 250 OVERRIDE (IP)

## 2017-12-20 PROCEDURE — 700111 HCHG RX REV CODE 636 W/ 250 OVERRIDE (IP): Performed by: INTERNAL MEDICINE

## 2017-12-20 PROCEDURE — 96413 CHEMO IV INFUSION 1 HR: CPT

## 2017-12-20 PROCEDURE — 700105 HCHG RX REV CODE 258: Performed by: INTERNAL MEDICINE

## 2017-12-20 RX ORDER — DEXAMETHASONE 4 MG/1
4 TABLET ORAL ONCE
Status: COMPLETED | OUTPATIENT
Start: 2017-12-20 | End: 2017-12-20

## 2017-12-20 RX ORDER — ONDANSETRON 4 MG/1
4 TABLET, ORALLY DISINTEGRATING ORAL ONCE
Status: COMPLETED | OUTPATIENT
Start: 2017-12-20 | End: 2017-12-20

## 2017-12-20 RX ADMIN — ETOPOSIDE 169 MG: 20 INJECTION, SOLUTION, CONCENTRATE INTRAVENOUS at 16:12

## 2017-12-20 RX ADMIN — ONDANSETRON 4 MG: 4 TABLET, ORALLY DISINTEGRATING ORAL at 15:47

## 2017-12-20 RX ADMIN — DEXAMETHASONE 4 MG: 4 TABLET ORAL at 15:48

## 2017-12-20 ASSESSMENT — PAIN SCALES - GENERAL: PAINLEVEL: NO PAIN

## 2017-12-20 NOTE — PROGRESS NOTES
Chemotherapy Verification - SECONDARY RN       Height = 66.5in  Weight = 61.1kg  BSA = 1.69m2       Medication: Etoposide  Dose: 100mg/m2  Calculated Dose: 169mg                             (In mg/m2, AUC, mg/kg)         I confirm that this process was performed independently.

## 2017-12-20 NOTE — PROGRESS NOTES
"Pharmacy Chemotherapy Note     Patient Name: CASSIDY PAGE  Dx: Small cell lung cancer       Protocol:  CISPLATIN + ETOPOSIDE + XRT     CISplatin 80 mg/m2 IV over 60 minutes on day 1  Etoposide 100 mg/m2 IV over 60 minutes on days 1-3  21-28 day cycle for 4-6 cycles  - Per MD each cycle length to be determined ([place] plan 28-day cycles currently)     *Dosing Reference*  NCCN Guidelines for Small Cell Lung Cancer.V.2.2017  Ruth Ann AT, et al. N Engl J Med. 199;340-(4):265-71  Moy S et al. J Clin Oncol. 2002;20(24):7835-72      /80   Pulse 93   Temp 36.4 °C (97.6 °F)   Resp 18   Ht 1.689 m (5' 6.5\")   Wt 61.1 kg (134 lb 11.2 oz)   SpO2 100%   BMI 21.42 kg/m²  Body surface area is 1.69 meters squared.    Labs 12/19/17:  ANC~ 1959         Plt = 426k            Hgb = 11.1           SCr = 0.53 mg/dL         CrCl ~ 100mL/min (min 0.7)   LFT's = WNL      TBili = 0.3       Mag = 1.7         K+ = 4      Drug Order   (Drug name, dose, route, IV Fluid & volume, frequency, number of doses) Cycle: 3 Day 2 of 3   Previous treatment: C2 = Nov 13 - 15, 2017   Medication = Etoposide  Base Dose= 100 mg/m2  Calc Dose: Base Dose x 1.69m2 = 169mg  Final Dose = 169 mg  Route = IV  Fluid & Volume =  mL  Admin Duration = Over 1-2 hrs    Days 1-3    <5% difference, OK to treat with final dose       By my signature below, I confirm this process was performed independently with the BSA and all final chemotherapy dosing calculations congruent. I have reviewed the above chemotherapy order and that my calculation of the final dose and BSA (when applicable) corroborate those calculations of the  pharmacist. Discrepancies of 5% or greater in the written dose have been addressed and documented within the [facility] Progress notes.     DINA Gagnon Pharm.D.      "

## 2017-12-21 ENCOUNTER — OUTPATIENT INFUSION SERVICES (OUTPATIENT)
Dept: ONCOLOGY | Facility: MEDICAL CENTER | Age: 57
End: 2017-12-21
Attending: INTERNAL MEDICINE
Payer: MEDICAID

## 2017-12-21 VITALS
OXYGEN SATURATION: 100 % | SYSTOLIC BLOOD PRESSURE: 136 MMHG | RESPIRATION RATE: 18 BRPM | DIASTOLIC BLOOD PRESSURE: 82 MMHG | BODY MASS INDEX: 20.87 KG/M2 | HEART RATE: 93 BPM | WEIGHT: 132.94 LBS | HEIGHT: 67 IN | TEMPERATURE: 97.7 F

## 2017-12-21 DIAGNOSIS — C34.92 SMALL CELL CARCINOMA OF LEFT LUNG (HCC): ICD-10-CM

## 2017-12-21 DIAGNOSIS — C34.91 SMALL CELL CARCINOMA OF RIGHT LUNG (HCC): ICD-10-CM

## 2017-12-21 PROCEDURE — 700111 HCHG RX REV CODE 636 W/ 250 OVERRIDE (IP)

## 2017-12-21 PROCEDURE — 700105 HCHG RX REV CODE 258: Performed by: INTERNAL MEDICINE

## 2017-12-21 PROCEDURE — 700111 HCHG RX REV CODE 636 W/ 250 OVERRIDE (IP): Performed by: INTERNAL MEDICINE

## 2017-12-21 PROCEDURE — A9270 NON-COVERED ITEM OR SERVICE: HCPCS | Performed by: INTERNAL MEDICINE

## 2017-12-21 PROCEDURE — 96413 CHEMO IV INFUSION 1 HR: CPT

## 2017-12-21 PROCEDURE — 700102 HCHG RX REV CODE 250 W/ 637 OVERRIDE(OP): Performed by: INTERNAL MEDICINE

## 2017-12-21 PROCEDURE — 304540 HCHG NITRO SET VENT 2ND TUB

## 2017-12-21 RX ORDER — DEXAMETHASONE 4 MG/1
4 TABLET ORAL ONCE
Status: COMPLETED | OUTPATIENT
Start: 2017-12-21 | End: 2017-12-21

## 2017-12-21 RX ORDER — ONDANSETRON 4 MG/1
4 TABLET, ORALLY DISINTEGRATING ORAL ONCE
Status: COMPLETED | OUTPATIENT
Start: 2017-12-21 | End: 2017-12-21

## 2017-12-21 RX ADMIN — DEXAMETHASONE 4 MG: 4 TABLET ORAL at 15:55

## 2017-12-21 RX ADMIN — ETOPOSIDE 168 MG: 20 INJECTION, SOLUTION, CONCENTRATE INTRAVENOUS at 16:09

## 2017-12-21 RX ADMIN — ONDANSETRON 4 MG: 4 TABLET, ORALLY DISINTEGRATING ORAL at 15:55

## 2017-12-21 ASSESSMENT — PAIN SCALES - GENERAL: PAINLEVEL: NO PAIN

## 2017-12-21 NOTE — PROGRESS NOTES
Returns for day 2 etoposide.  Denies nausea, starting constipation.  Discussed fluids and diet.  Has not received antiemetics yet from [facility].  MD called and he reports he called it in to [facility] pharmacy.  Premeds given and chemo infused without issue.  Does not wish to keep iv in overnoc.  DC to self care. Still needs to turn in support paperwork.

## 2017-12-21 NOTE — PROGRESS NOTES
Chemotherapy Verification - PRIMARY RN      Height = 168.9 cm  Weight = 61.1 kg  BSA = 1.69 m2       Medication: Etoposice  Dose: 100 mg/m2  Calculated Dose: 169 mg                             (In mg/m2, AUC, mg/kg)           I confirm this process was performed independently with the BSA and all final chemotherapy dosing calculations congruent.  Any discrepancies of 5% or greater have been addressed with the chemotherapy pharmacist. The resolution of the discrepancy has been documented in the EPIC progress notes.

## 2017-12-21 NOTE — PROGRESS NOTES
"Pharmacy Chemotherapy Note     Patient Name: CASSIDY PAGE  Dx: Small cell lung cancer       Protocol:  CISPLATIN + ETOPOSIDE + XRT     CISplatin 80 mg/m2 IV over 60 minutes on day 1  Etoposide 100 mg/m2 IV over 60 minutes on days 1-3  21-28 day cycle for 4-6 cycles  - Per MD each cycle length to be determined ([place] plan 28-day cycles currently)     *Dosing Reference*  NCCN Guidelines for Small Cell Lung Cancer.V.2.2017  Ruth Ann AT, et al. N Engl J Med. 199;340-(4):265-71  Moy S et al. J Clin Oncol. 2002;20(24):4665-72      /82   Pulse 93   Temp 36.5 °C (97.7 °F)   Resp 18   Ht 1.69 m (5' 6.53\")   Wt 60.3 kg (132 lb 15 oz)   SpO2 100%   BMI 21.11 kg/m²  Body surface area is 1.68 meters squared.    Labs 12/19/17:  ANC~ 1959         Plt = 426k            Hgb = 11.1           SCr = 0.53 mg/dL         CrCl ~ 100mL/min (min 0.7)   LFT's = WNL      TBili = 0.3       Mag = 1.7         K+ = 4      Drug Order   (Drug name, dose, route, IV Fluid & volume, frequency, number of doses) Cycle: 3 Day 3 of 3   Previous treatment: C2 = Nov 13 - 15, 2017   Medication = Etoposide  Base Dose= 100 mg/m2  Calc Dose: Base Dose x 1.68 m2 = 168 mg  Final Dose = 168 mg  Route = IV  Fluid & Volume =  mL  Admin Duration = Over 1-2 hrs    Days 1-3    <5% difference, OK to treat with final dose       By my signature below, I confirm this process was performed independently with the BSA and all final chemotherapy dosing calculations congruent. I have reviewed the above chemotherapy order and that my calculation of the final dose and BSA (when applicable) corroborate those calculations of the  pharmacist. Discrepancies of 5% or greater in the written dose have been addressed and documented within the [facility] Progress notes.     Kylie Gutierrez, PharmD        "

## 2017-12-21 NOTE — PROGRESS NOTES
Chemotherapy Verification - PRIMARY RN      Height = 169 cm  Weight = 60.3 kg  BSA = 1.68 m2       Medication: Etoposide  Dose: 100 mg/m2  Calculated Dose: Etoposide 168 mg                            (In mg/m2, AUC, mg/kg)     I confirm this process was performed independently with the BSA and all final chemotherapy dosing calculations congruent.  Any discrepancies of 5% or greater have been addressed with the chemotherapy pharmacist. The resolution of the discrepancy has been documented in the EPIC progress notes.

## 2017-12-22 ENCOUNTER — PATIENT OUTREACH (OUTPATIENT)
Dept: OTHER | Facility: MEDICAL CENTER | Age: 57
End: 2017-12-22

## 2017-12-22 NOTE — PROGRESS NOTES
Pt presents to [facility] ambulatory for cycle 3, day 3 Etoposide.  POC discussed, patient feeling well.  PIV established to [facility]; blood return verified.  Premedications administered per MAR.  Etoposide infused over one hour with adverse reaction.  PIV flushed, removed, and site covered with gauze and coban.  Next appointment confirmed.  Pt discharged from [facility] in NAD under self care.

## 2017-12-22 NOTE — PROGRESS NOTES
Chemotherapy Verification - SECONDARY RN       Height = 66.53in  Weight = 60.3kg  BSA = 1.68m2       Medication: Etoposide  Dose: 100mg/m2  Calculated Dose: 168mg                             (In mg/m2, AUC, mg/kg)             I confirm that this process was performed independently.

## 2017-12-22 NOTE — PROGRESS NOTES
Pt turned in completed [place] [facility] application to infusion services.  Picked up application and will give to [facility].

## 2017-12-26 ENCOUNTER — OFFICE VISIT (OUTPATIENT)
Dept: INTERNAL MEDICINE | Facility: MEDICAL CENTER | Age: 57
End: 2017-12-26
Payer: MEDICAID

## 2017-12-26 VITALS
WEIGHT: 132.2 LBS | HEIGHT: 63 IN | RESPIRATION RATE: 18 BRPM | SYSTOLIC BLOOD PRESSURE: 100 MMHG | OXYGEN SATURATION: 99 % | BODY MASS INDEX: 23.42 KG/M2 | TEMPERATURE: 97.9 F | HEART RATE: 76 BPM | DIASTOLIC BLOOD PRESSURE: 60 MMHG

## 2017-12-26 DIAGNOSIS — C34.91 SMALL CELL CARCINOMA OF RIGHT LUNG (HCC): ICD-10-CM

## 2017-12-26 PROCEDURE — 99213 OFFICE O/P EST LOW 20 MIN: CPT | Mod: GE | Performed by: INTERNAL MEDICINE

## 2017-12-26 ASSESSMENT — PAIN SCALES - GENERAL: PAINLEVEL: NO PAIN

## 2017-12-26 NOTE — PROGRESS NOTES
"      Established Patient    Froylan presents today with the following:    CC: 5 week follow up    HPI: Mr. Spain is a 57 year old gentleman with PMHx significant for small cell lung cancer (SCLC), currently undergoing ongoing chemotherapy cycles, who presents for 5 week follow up.    SCLC: Had his third of six chemotherapy appointments last week with Dr. Antonio, Heme-oncology.  Chemotherapy cycles are three days long.  He felt run down last week after his sessions, but that has improved.  He also feels like he might have a \"slight cold,\" but denies cough, sore throat, fever/chills, body aches.  Reports mild nausea, abdominal cramping, and constipation, all of which are intermittent and resolving.  Denies vomiting, odynophagia.  Remains able to eat.  Patient also reports he has seen a psychiatrist as a part of coping with cancer.  He is being prescribed what he thinks is an antidepressant, which he takes, but he cannot recall the name of it.     Homelessness: Patient still living in the [facility], a transition house.  He reports he has received an extension to live there at least through February, by which time his chemotherapy sessions should have concluded.         Patient Active Problem List    Diagnosis Date Noted   • Hyponatremia 09/30/2017     Priority: High   • Multiple facial bone fractures (CMS-Formerly [facility]) 06/27/2016     Priority: High   • Small cell carcinoma of lung (CMS-Formerly [facility]) 06/27/2016     Priority: High   • Closed fracture of frontal sinus (CMS-Formerly [facility]) 06/27/2016     Priority: High   • Closed fracture of temporal bone (CMS-Formerly [facility]) 06/27/2016     Priority: Medium   • Intraocular lens dislocation 06/27/2016     Priority: Medium   • Acute alcohol intoxication (CMS-Formerly [facility]) 06/27/2016     Priority: Medium   • Concussion with brief loss of consciousness 06/27/2016     Priority: Medium   • Chronic alcohol use 09/30/2017     Priority: Low   • Sorethroat 11/07/2017   • Small cell carcinoma of left lung (CMS-HCC) 10/20/2017 " "  • Lung cancer, upper lobe (CMS-HCC) 10/19/2017   • Body mass index (BMI) 19.9 or less, adult 10/16/2017       Current Outpatient Prescriptions   Medication Sig Dispense Refill   • acetaminophen (TYLENOL) 325 MG Tab Take 2 Tabs by mouth every 6 hours as needed for Moderate Pain (Mild Pain; (Pain scale 1-3); Temp greater than 100.5 F). 10 Tab 0   • folic acid (FOLVITE) 1 MG Tab Take 1 Tab by mouth every day. 30 Tab 1   • benzocaine-menthol (CEPACOL) 15-3.6 MG Lozenge Spray 1 Lozenge in mouth/throat every 2 hours as needed. 30 Lozenge 0   • multivitamin (THERAGRAN) Tab Take 1 Tab by mouth every day. 30 Tab 0   • thiamine (THIAMINE) 100 MG tablet Take 1 Tab by mouth every day. 30 Tab 1   • vitamin D 5000 units Tab Take 5,000 Units by mouth every day. 60 Tab 1   • Alum & Mag Hydroxide-Simeth (MBX) Suspension Take 5 mL by mouth every 6 hours as needed (radiation induced esophagitits). 1 Bottle 0   • midodrine (PROAMATINE) 5 MG Tab Take 1 Tab by mouth 3 times a day, with meals. 60 Tab 1     No current facility-administered medications for this visit.        ROS: 12 point review of systems negative except as reported in HPI and below.     Constitutional:  Positive for fatigue.    GI:  Positive for nausea, abdominal cramping.       /60   Pulse 76   Temp 36.6 °C (97.9 °F)   Resp 18   Ht 1.609 m (5' 3.35\")   Wt 60 kg (132 lb 3.2 oz)   SpO2 99%   BMI 23.16 kg/m²     Physical Exam   Constitutional:  Sitting in exam chair, in no apparent distress.  Oriented to person, place, time, and situation.    Eyes: Pupils are equal, round, and reactive to light.  No scleral icterus.   Neck: Neck supple. No thyromegaly present.   Cardiovascular: Normal rate, regular rhythm and normal heart sounds.  Exam reveals no gallop and no friction rub.  No murmur heard.  Pulmonary/Chest: Breath sounds normal.  Clear to auscultation bilaterally.    Musculoskeletal:   no edema.   Lymphadenopathy: no cervical adenopathy  Neurological: alert " "and oriented to person, place, time, situation.  Bilateral lower and upper extremity strength 5+/5.  Sensation intact throughout.    Skin: No cyanosis. Nails show no clubbing.    Note: I have reviewed all pertinent labs and diagnostic tests associated with this visit with specific comments listed under the assessment and plan below    Assessment and Plan    1. Small cell carcinoma of the left lung  - Followed by Dr. Antonio, Heme-oncologist.   - Had third of six chemotherapy cycles last week.  Initially felt run down, nausea/abd cramping, constipation afterward, but now improving.    - Does report \"slight cold,\" but denies cough, sore throat, fever/chills, body aches.  Eating appropriately.    - Reports seeing a psychiatrist as a part of coping and is now taking an antidepressant medication.   - Patient has a follow up Heme-onc appointment next week and his fourth round of chemotherapy on Jan 9-11, 2018.  Heme-onc is ordering patient's blood work.  Will continue to monitor.    - Discussed signs/symptoms of low WBC, RBC, platelet counts with patient and when to report to ED.      2. Homelessness  - Patient still living in the [facility], a transition house.  Reports he has received an extension to live there through February, by which time his chemotherapy sessions should have concluded.     3. Health maintenance  - Flu shot and first pneumonia vaccine obtained during recent hospitalization.      Followup: Return in about 6 months (around 6/26/2018).      Signed by: Meaghan Barlow M.D.  "

## 2017-12-26 NOTE — PATIENT INSTRUCTIONS
Chemotherapy  Chemotherapy is the use of medicines to stop or slow the growth of cancer cells. Depending on the type and stage of your cancer, you may have chemotherapy to:  · Cure your cancer.  · Slow the progression of your cancer.  · Ease your cancer symptoms.  · Improve the benefits of radiation treatment.  · Shrink a tumor before surgery.  · Rid the body of cancer cells that remain after a tumor is surgically removed.  HOW IS CHEMOTHERAPY GIVEN?  Chemotherapy may be given:  · By mouth in liquid or pill form.  · Through a thin tube that is inserted into a vein or artery.  · By getting a shot.  · By rubbing a cream or ointment on your skin.  · Through liquids that are placed directly into various areas of the body, such as the abdomen, chest, or bladder.  HOW OFTEN IS CHEMOTHERAPY GIVEN?  Chemotherapy may be given continuously over time, or it may be given in cycles. For example, you may take the medicine for one week out of every month.  FOR HOW LONG WILL I NEED CHEMOTHERAPY TREATMENTS?  The length of treatment depends on many factors, including:  · The type of cancer.  · Whether the cancer has spread.  · How you respond to the chemotherapy.  · Whether you develop side effects.  Some types of chemotherapy medicine are given only one time. Others are given for months, years, or for life.  WHAT SAFETY PRECAUTIONS MUST I TAKE WHILE ON CHEMOTHERAPY?  Chemotherapy medicines are very strong. They will be in all of your bodily fluids, including your urine, stool, saliva, sweat, tears, vaginal secretions, and semen. You must carefully follow some safety precautions to prevent harm to others while you are using these medicines. Here are some recommended precautions:  · Make sure that people who help care for you wear disposable gloves if they are going to come into contact with any of your bodily fluids. Women who are pregnant or breastfeeding should not handle any of your bodily fluids.  · Wash any clothes, towels, and  linens that may have your bodily fluids on them twice in a washing machine using very hot water.  · Dispose of adult diapers, tampons, and sanitary napkins by first sealing them in a plastic bag.  · Use a condom when having sex for at least 2 weeks after receiving your chemotherapy.  · Do not share beverages or food.  · Keep your chemotherapy medicines in their original bottles. Keep them in a high, safe location, away from children. Do not expose them to heat or moisture. Do not put them in containers with other types of medicines.  · Dispose of all wrappers for your chemotherapy medicines by sealing them in a separate plastic bag.  · Do not throw away extra medicine, and do not flush it down the toilet. Take medicine that you are not going to use to your health care provider's office where it can be disposed of properly.  · Follow your health care provider's directions for the proper disposal of needles, IV tubing, and other medical supplies that have come into contact with your chemotherapy medicines.  · If you are issued a hazardous waste container, make sure you understand the directions for using it.  · Wash your hands thoroughly with warm water and soap after using the bathroom. Dry your hands with disposable paper towels.  · When using the toilet:  ¨ Flush it twice after each use, including after vomiting.  ¨ Close the lid of the toilet prior to flushing. This helps to avoid splashing.  ¨ Both men and women should sit to use the toilet. This helps avoid splashing.  WHAT ARE THE SIDE EFFECTS OF CHEMOTHERAPY?  Side effects depend on a variety of factors, including:  · The specific type of chemotherapy medicine used.  · The dosage.  · How long the medicine is used for.  · Your overall health.  Some of the side effects you may experience include:  · Fatigue and decreased energy.  · Decreased appetite.  · Changes in your sense of smell or taste.  · Nausea.  · Vomiting.  · Constipation or diarrhea.  · Hair  loss.  · Increased susceptibility to infection.  · Easy bleeding.  · Mouth sores.  · Burning or tingling in the hands or feet.  · Memory problems.     This information is not intended to replace advice given to you by your health care provider. Make sure you discuss any questions you have with your health care provider.     Document Released: 10/14/2008 Document Revised: 01/08/2016 Document Reviewed: 05/26/2015  [employer] Interactive Patient Education ©2016 [employer]      ---------------    Please follow up in 6 months.

## 2018-01-08 RX ORDER — DEXAMETHASONE 4 MG/1
4 TABLET ORAL ONCE
Status: CANCELLED | OUTPATIENT
Start: 2018-01-12 | End: 2018-01-18

## 2018-01-08 RX ORDER — SODIUM CHLORIDE 9 MG/ML
500 INJECTION, SOLUTION INTRAVENOUS ONCE
Status: CANCELLED | OUTPATIENT
Start: 2018-01-10 | End: 2018-01-16

## 2018-01-08 RX ORDER — ONDANSETRON 4 MG/1
4 TABLET, ORALLY DISINTEGRATING ORAL ONCE
Status: CANCELLED | OUTPATIENT
Start: 2018-01-12 | End: 2018-01-18

## 2018-01-08 RX ORDER — ONDANSETRON 4 MG/1
4 TABLET, ORALLY DISINTEGRATING ORAL ONCE
Status: CANCELLED | OUTPATIENT
Start: 2018-01-11 | End: 2018-01-17

## 2018-01-08 RX ORDER — DEXAMETHASONE 4 MG/1
4 TABLET ORAL ONCE
Status: CANCELLED | OUTPATIENT
Start: 2018-01-11 | End: 2018-01-17

## 2018-01-08 RX ORDER — DEXAMETHASONE SODIUM PHOSPHATE 4 MG/ML
12 INJECTION, SOLUTION INTRA-ARTICULAR; INTRALESIONAL; INTRAMUSCULAR; INTRAVENOUS; SOFT TISSUE ONCE
Status: CANCELLED | OUTPATIENT
Start: 2018-01-10 | End: 2018-01-16

## 2018-01-09 ENCOUNTER — DOCUMENTATION (OUTPATIENT)
Dept: HEMATOLOGY ONCOLOGY | Facility: MEDICAL CENTER | Age: 58
End: 2018-01-09

## 2018-01-09 ENCOUNTER — OUTPATIENT INFUSION SERVICES (OUTPATIENT)
Dept: ONCOLOGY | Facility: MEDICAL CENTER | Age: 58
End: 2018-01-09
Attending: INTERNAL MEDICINE
Payer: MEDICAID

## 2018-01-09 VITALS
BODY MASS INDEX: 20.76 KG/M2 | OXYGEN SATURATION: 100 % | WEIGHT: 132.28 LBS | RESPIRATION RATE: 18 BRPM | HEIGHT: 67 IN | SYSTOLIC BLOOD PRESSURE: 104 MMHG | HEART RATE: 87 BPM | TEMPERATURE: 98 F | DIASTOLIC BLOOD PRESSURE: 66 MMHG

## 2018-01-09 DIAGNOSIS — C34.91 SMALL CELL CARCINOMA OF RIGHT LUNG (HCC): ICD-10-CM

## 2018-01-09 LAB
ALBUMIN SERPL BCP-MCNC: 3.4 G/DL (ref 3.2–4.9)
ALBUMIN/GLOB SERPL: 1.3 G/DL
ALP SERPL-CCNC: 55 U/L (ref 30–99)
ALT SERPL-CCNC: 9 U/L (ref 2–50)
ANION GAP SERPL CALC-SCNC: 7 MMOL/L (ref 0–11.9)
ANISOCYTOSIS BLD QL SMEAR: ABNORMAL
AST SERPL-CCNC: 19 U/L (ref 12–45)
BASOPHILS # BLD AUTO: 3.4 % (ref 0–1.8)
BASOPHILS # BLD: 0.07 K/UL (ref 0–0.12)
BILIRUB SERPL-MCNC: 0.4 MG/DL (ref 0.1–1.5)
BUN SERPL-MCNC: 11 MG/DL (ref 8–22)
CALCIUM SERPL-MCNC: 8.9 MG/DL (ref 8.4–10.2)
CHLORIDE SERPL-SCNC: 97 MMOL/L (ref 96–112)
CO2 SERPL-SCNC: 26 MMOL/L (ref 20–33)
CREAT SERPL-MCNC: 0.66 MG/DL (ref 0.5–1.4)
EOSINOPHIL # BLD AUTO: 0.18 K/UL (ref 0–0.51)
EOSINOPHIL NFR BLD: 8.4 % (ref 0–6.9)
ERYTHROCYTE [DISTWIDTH] IN BLOOD BY AUTOMATED COUNT: 49.1 FL (ref 35.9–50)
GLOBULIN SER CALC-MCNC: 2.6 G/DL (ref 1.9–3.5)
GLUCOSE SERPL-MCNC: 105 MG/DL (ref 65–99)
HCT VFR BLD AUTO: 29.9 % (ref 42–52)
HGB BLD-MCNC: 10.5 G/DL (ref 14–18)
LYMPHOCYTES # BLD AUTO: 0.58 K/UL (ref 1–4.8)
LYMPHOCYTES NFR BLD: 27.7 % (ref 22–41)
MACROCYTES BLD QL SMEAR: ABNORMAL
MAGNESIUM SERPL-MCNC: 1.7 MG/DL (ref 1.5–2.5)
MANUAL DIFF BLD: NORMAL
MCH RBC QN AUTO: 34.2 PG (ref 27–33)
MCHC RBC AUTO-ENTMCNC: 35.1 G/DL (ref 33.7–35.3)
MCV RBC AUTO: 97.4 FL (ref 81.4–97.8)
MICROCYTES BLD QL SMEAR: ABNORMAL
MONOCYTES # BLD AUTO: 0.51 K/UL (ref 0–0.85)
MONOCYTES NFR BLD AUTO: 24.4 % (ref 0–13.4)
MORPHOLOGY BLD-IMP: NORMAL
NEUTROPHILS # BLD AUTO: 0.76 K/UL (ref 1.82–7.42)
NEUTROPHILS NFR BLD: 36.1 % (ref 44–72)
NRBC # BLD AUTO: 0 K/UL
NRBC BLD-RTO: 0 /100 WBC
PLATELET # BLD AUTO: 192 K/UL (ref 164–446)
PLATELET BLD QL SMEAR: NORMAL
PMV BLD AUTO: 9.6 FL (ref 9–12.9)
POLYCHROMASIA BLD QL SMEAR: NORMAL
POTASSIUM SERPL-SCNC: 4.2 MMOL/L (ref 3.6–5.5)
PROT SERPL-MCNC: 6 G/DL (ref 6–8.2)
RBC # BLD AUTO: 3.07 M/UL (ref 4.7–6.1)
RBC BLD AUTO: PRESENT
SODIUM SERPL-SCNC: 130 MMOL/L (ref 135–145)
WBC # BLD AUTO: 2.1 K/UL (ref 4.8–10.8)

## 2018-01-09 PROCEDURE — 85027 COMPLETE CBC AUTOMATED: CPT

## 2018-01-09 PROCEDURE — 83735 ASSAY OF MAGNESIUM: CPT

## 2018-01-09 PROCEDURE — 700111 HCHG RX REV CODE 636 W/ 250 OVERRIDE (IP): Performed by: INTERNAL MEDICINE

## 2018-01-09 PROCEDURE — 96372 THER/PROPH/DIAG INJ SC/IM: CPT

## 2018-01-09 PROCEDURE — 85007 BL SMEAR W/DIFF WBC COUNT: CPT

## 2018-01-09 PROCEDURE — 36415 COLL VENOUS BLD VENIPUNCTURE: CPT

## 2018-01-09 PROCEDURE — 80053 COMPREHEN METABOLIC PANEL: CPT

## 2018-01-09 RX ADMIN — FILGRASTIM 480 MCG: 480 INJECTION, SOLUTION INTRAVENOUS; SUBCUTANEOUS at 11:09

## 2018-01-09 ASSESSMENT — PAIN SCALES - GENERAL: PAINLEVEL: NO PAIN

## 2018-01-09 NOTE — PROGRESS NOTES
Patient presents for chemotherapy. Labs drawn as ordered. Patient's . Call placed to Dr. Antonio. MD states to give Neupogen today. Have patient return tomorrow for CBC and possibly chemotherapy. Neupogen given as ordered. Patient scheduled for tomorrow and more appointments. Per MD also have patient receive Neulasta on day 4. Patient updated on plan of care and verbalizes. Patient returns tomorrow and released in no acute distress.

## 2018-01-09 NOTE — PROGRESS NOTES
Nutrition Services: Update     Met w/ pt this date to follow-up on current intake and appetite. Pt reports that his appetite is poor; however, he continues to eat adequately despite it. Pt c/o ongoing nausea w/out vomiting and is currently taking antiemetics which he states have been helping alleviate GI distress. Pt reports consuming 3 meals/day and adequate amounts of protein; therefore, he has successfully maintained his body weight @132# since the start of his treatment. Pt did want to try Boost Plus oral supplementation for additional kcals/protein daily; therefore, sent him home w/ a couple of samples. Otherwise, RD to continue to monitor PO adequacy and wt status to leave further recommendations accordingly if warranted.

## 2018-01-10 ENCOUNTER — OUTPATIENT INFUSION SERVICES (OUTPATIENT)
Dept: ONCOLOGY | Facility: MEDICAL CENTER | Age: 58
End: 2018-01-10
Attending: INTERNAL MEDICINE
Payer: MEDICAID

## 2018-01-10 VITALS
TEMPERATURE: 97 F | BODY MASS INDEX: 20.97 KG/M2 | DIASTOLIC BLOOD PRESSURE: 66 MMHG | HEART RATE: 91 BPM | RESPIRATION RATE: 16 BRPM | OXYGEN SATURATION: 100 % | WEIGHT: 133.6 LBS | SYSTOLIC BLOOD PRESSURE: 112 MMHG | HEIGHT: 67 IN

## 2018-01-10 DIAGNOSIS — C34.91 SMALL CELL CARCINOMA OF RIGHT LUNG (HCC): ICD-10-CM

## 2018-01-10 LAB
ANISOCYTOSIS BLD QL SMEAR: ABNORMAL
BASOPHILS # BLD AUTO: 0.9 % (ref 0–1.8)
BASOPHILS # BLD: 0.05 K/UL (ref 0–0.12)
EOSINOPHIL # BLD AUTO: 0.3 K/UL (ref 0–0.51)
EOSINOPHIL NFR BLD: 5.2 % (ref 0–6.9)
ERYTHROCYTE [DISTWIDTH] IN BLOOD BY AUTOMATED COUNT: 49.4 FL (ref 35.9–50)
HCT VFR BLD AUTO: 28.6 % (ref 42–52)
HGB BLD-MCNC: 10.3 G/DL (ref 14–18)
LYMPHOCYTES # BLD AUTO: 0.91 K/UL (ref 1–4.8)
LYMPHOCYTES NFR BLD: 15.7 % (ref 22–41)
MACROCYTES BLD QL SMEAR: ABNORMAL
MANUAL DIFF BLD: NORMAL
MCH RBC QN AUTO: 34.9 PG (ref 27–33)
MCHC RBC AUTO-ENTMCNC: 36 G/DL (ref 33.7–35.3)
MCV RBC AUTO: 96.9 FL (ref 81.4–97.8)
MONOCYTES # BLD AUTO: 1.01 K/UL (ref 0–0.85)
MONOCYTES NFR BLD AUTO: 17.4 % (ref 0–13.4)
MORPHOLOGY BLD-IMP: NORMAL
NEUTROPHILS # BLD AUTO: 3.53 K/UL (ref 1.82–7.42)
NEUTROPHILS NFR BLD: 56.5 % (ref 44–72)
NEUTS BAND NFR BLD MANUAL: 4.3 % (ref 0–10)
NRBC # BLD AUTO: 0 K/UL
NRBC BLD-RTO: 0 /100 WBC
PLATELET # BLD AUTO: 189 K/UL (ref 164–446)
PLATELET BLD QL SMEAR: NORMAL
PMV BLD AUTO: 9.5 FL (ref 9–12.9)
RBC # BLD AUTO: 2.95 M/UL (ref 4.7–6.1)
RBC BLD AUTO: PRESENT
WBC # BLD AUTO: 5.8 K/UL (ref 4.8–10.8)

## 2018-01-10 PROCEDURE — 85007 BL SMEAR W/DIFF WBC COUNT: CPT

## 2018-01-10 PROCEDURE — 700111 HCHG RX REV CODE 636 W/ 250 OVERRIDE (IP)

## 2018-01-10 PROCEDURE — 700105 HCHG RX REV CODE 258

## 2018-01-10 PROCEDURE — 96368 THER/DIAG CONCURRENT INF: CPT

## 2018-01-10 PROCEDURE — 85027 COMPLETE CBC AUTOMATED: CPT

## 2018-01-10 PROCEDURE — 96375 TX/PRO/DX INJ NEW DRUG ADDON: CPT

## 2018-01-10 PROCEDURE — 304540 HCHG NITRO SET VENT 2ND TUB

## 2018-01-10 PROCEDURE — 700111 HCHG RX REV CODE 636 W/ 250 OVERRIDE (IP): Performed by: INTERNAL MEDICINE

## 2018-01-10 PROCEDURE — 700105 HCHG RX REV CODE 258: Performed by: INTERNAL MEDICINE

## 2018-01-10 PROCEDURE — 96417 CHEMO IV INFUS EACH ADDL SEQ: CPT

## 2018-01-10 PROCEDURE — 96367 TX/PROPH/DG ADDL SEQ IV INF: CPT

## 2018-01-10 PROCEDURE — 96413 CHEMO IV INFUSION 1 HR: CPT

## 2018-01-10 PROCEDURE — 96361 HYDRATE IV INFUSION ADD-ON: CPT

## 2018-01-10 RX ORDER — DEXAMETHASONE SODIUM PHOSPHATE 4 MG/ML
12 INJECTION, SOLUTION INTRA-ARTICULAR; INTRALESIONAL; INTRAMUSCULAR; INTRAVENOUS; SOFT TISSUE ONCE
Status: COMPLETED | OUTPATIENT
Start: 2018-01-10 | End: 2018-01-10

## 2018-01-10 RX ORDER — SODIUM CHLORIDE 9 MG/ML
500 INJECTION, SOLUTION INTRAVENOUS ONCE
Status: COMPLETED | OUTPATIENT
Start: 2018-01-10 | End: 2018-01-10

## 2018-01-10 RX ORDER — MAGNESIUM SULFATE 1 G/100ML
1 INJECTION INTRAVENOUS ONCE
Status: COMPLETED | OUTPATIENT
Start: 2018-01-10 | End: 2018-01-10

## 2018-01-10 RX ADMIN — SODIUM CHLORIDE 150 MG: 9 INJECTION, SOLUTION INTRAVENOUS at 13:02

## 2018-01-10 RX ADMIN — DEXAMETHASONE SODIUM PHOSPHATE 12 MG: 4 INJECTION, SOLUTION INTRAMUSCULAR; INTRAVENOUS at 12:05

## 2018-01-10 RX ADMIN — CISPLATIN 135.2 MG: 1 INJECTION, SOLUTION INTRAVENOUS at 13:40

## 2018-01-10 RX ADMIN — ETOPOSIDE 169 MG: 20 INJECTION, SOLUTION, CONCENTRATE INTRAVENOUS at 15:20

## 2018-01-10 RX ADMIN — MAGNESIUM SULFATE IN DEXTROSE 1 G: 10 INJECTION, SOLUTION INTRAVENOUS at 13:40

## 2018-01-10 RX ADMIN — ONDANSETRON HYDROCHLORIDE 16 MG: 2 INJECTION, SOLUTION INTRAMUSCULAR; INTRAVENOUS at 12:16

## 2018-01-10 RX ADMIN — SODIUM CHLORIDE 500 ML: 9 INJECTION, SOLUTION INTRAVENOUS at 12:02

## 2018-01-10 RX ADMIN — SODIUM CHLORIDE 500 ML: 9 INJECTION, SOLUTION INTRAVENOUS at 15:20

## 2018-01-10 ASSESSMENT — PAIN SCALES - GENERAL: PAINLEVEL: NO PAIN

## 2018-01-10 NOTE — PROGRESS NOTES
Chemotherapy Verification - PRIMARY RN      Height = 169 cm  Weight = 60.6 kg BSA = 1.69 m2       Medication: Etoposide  Dose: 100 mg/m2  Calculated Dose: 169 mg                          Medication: Cisplatin Dose: 80 mg/m2  Calculated Dose: 135.2 mg                               I confirm this process was performed independently with the BSA and all final chemotherapy dosing calculations congruent.  Any discrepancies of 5% or greater have been addressed with the chemotherapy pharmacist. The resolution of the discrepancy has been documented in the EPIC progress notes.

## 2018-01-10 NOTE — PROGRESS NOTES
"Pharmacy Chemotherapy Verification     Patient Name: Froylan Spain  Dx: SCLC - limited stage     Cycle 4  Days 1-3           Previous treatment: Dec 19-21, 2017     Protocol: CISplatin/Etoposide + XRT     *Dosing Reference*  CISplatin 80 mg/m2 IV over 60 minutes on day 1  Etoposide 100 mg/m2 IV over 60 minutes on days 1-3  Q21-28 day cycle for 4-6 cycles   - Q28 days in treatment plan but Dr. Costello ok'd for Q21 day for cycle 2.  Future cycles depending on oncologist discretion.   NCCN Guidelines for Small Cell [place] Cancer.V.2.2017  Ruth Ann AT, et al. N Engl J Med. 199;340-(4):265-71  Moy S, et al. J Clin Oncol. 2002;20(24):4992-72  Belkys ARANDA et al. J Clin Oncol. 1994;12(10):2022-24  Michael H, et al. J Clin Oncol. 2006;24(33):2892-52     Allergies:Review of patient's allergies indicates no known allergies.     /66   Pulse 91   Temp 36.1 °C (97 °F)   Resp 16   Ht 1.69 m (5' 6.53\")   Wt 60.6 kg (133 lb 9.6 oz)   SpO2 100%   BMI 21.22 kg/m²    Body surface area is 1.69 meters squared.       1/10/18-  ANC~ 3530 Plt = 189k   Hgb = 10.3       1/9/18  SCr = 0.66mg/dL CrCl ~ 102mL/min (min Scr = 0.7)   LFT's = WNL TBili = 0.4        CISplatin (Platinol) 80 mg/m2 x 1.69m2 = 135.2mg              <5% difference, OK to treat with final dose = 135.2mg IV on Day 1 only         Etoposide (Toposar) 100 mg/m2 x 1.69m2 = 169mg              <5% difference, OK to treat with final dose = 169mg IV on Days 1-3        DINA Gagnon Pharm.D.  "

## 2018-01-10 NOTE — PROGRESS NOTES
Chemotherapy Verification - SECONDARY RN       Height = 66.53in  Weight = 60.6kg  BSA = 1.68m2       Medication: Etoposide  Dose: 100mg/m2  Calculated Dose: 168mg                             (In mg/m2, AUC, mg/kg)     Medication: Cisplatin  Dose: 80mg/m2  Calculated Dose: 134.4mg                             (In mg/m2, AUC, mg/kg)        I confirm that this process was performed independently.

## 2018-01-10 NOTE — PROGRESS NOTES
"Pt ambulated into department for Cycle 4/ Day 1 of Cisplatin & Etoposide for Lung Cancer. Pt declined having any new symptoms since yesterday. PIV started, blood drawn and sent to lab for analysis for repeat CBC. Pt's ANC of 3,530. Dr. Antonio called and notified of lab results, and ordered for Pt's magnesium to be replaced per [facility] protocol. Pt given pre-medication, pre-hydration, and chemotherapy as prescribed. Magnesium replaced while Cisplatin was infusing. Pt's IV did not have blood return after Cisplatin, and \"redness\" was noted along the vein. No swelling seen, Pt stated having slight pain. RN removed IV, and Tamica KENNEDY started a new IV in patient's right arm. Etoposide given through new IV. Post-hydration started while Etoposide was running. Pt tolerated remaining medication administration well. IV had good blood return after. PIV removed per Pt's request, bleeding controlled with gauze and coban. \"Redness\" on Pt's left arm from initial IV had disappeared by the time treatment was complete.Tomorrow's appointment at 15:00 confirmed with Pt prior to leaving, left department by self appearing in good spirits and NAD.  "

## 2018-01-10 NOTE — PROGRESS NOTES
"Pharmacy Chemotherapy Note     Patient Name: CASSIDY PAGE  Dx: Small cell lung cancer       Protocol:  CISPLATIN + ETOPOSIDE + XRT     CISplatin 80 mg/m2 IV over 60 minutes on day 1  Etoposide 100 mg/m2 IV over 60 minutes on days 1-3  21-28 day cycle for 4-6 cycles  MD giving Cycle 2 21 days after Cycle 1, each cycle length to be determined ([place] plan has 28-day cycles currently)     *Dosing Reference*  NCCN Guidelines for Small Cell Lung Cancer.V.2.2017  Ruth Ann AT, et al. N Engl J Med. 199;340-(4):265-71  Moy MORILLO et al. J Clin Oncol. 2002;20(24):4615-72      /66   Pulse 91   Temp 36.1 °C (97 °F)   Resp 16   Ht 1.69 m (5' 6.53\")   Wt 60.6 kg (133 lb 9.6 oz)   SpO2 100%   BMI 21.22 kg/m²  Body surface area is 1.69 meters squared.    LABS 1/9/2018:  ANC~ 760         Plt = 192k            HgbHct = 10.5/29.9           SCr = 0.66 mg/dL         CrCl ~ 101 mL/min (min 0.7)   LFT's = 19/9/55      TBili = 0.4       Mag = 1.7         K+ = 4.2      Drug Order   (Drug name, dose, route, IV Fluid & volume, frequency, number of doses) Cycle: 4 Day 1 of 3   Previous treatment: C3 = 12/19/2017-12/21/2017   Medication = Cisplatin  Base Dose= 80 mg/m2  Calc Dose: Base Dose x Body surface area is 1.69 meters squared.   m2 = 135.2 mg  Final Dose = 135.2 mg  Route = IV   Fluid & Volume =  mL  Admin Duration = Over 60 min     Day 1 only    <5% difference, OK to treat with final dose    Medication = Etoposide  Base Dose= 100 mg/m2  Calc Dose: Base Dose x Body surface area is 1.69 meters squared. m2 = 169 mg  Final Dose = 169 mg  Route = IV  Fluid & Volume =  mL  Admin Duration = Over 1-2 hrs    Days 1-3    <5% difference, OK to treat with final dose       By my signature below, I confirm this process was performed independently with the BSA and all final chemotherapy dosing calculations congruent. I have reviewed the above chemotherapy order and that my calculation of the final dose and BSA (when " applicable) corroborate those calculations of the  pharmacist. Discrepancies of 5% or greater in the written dose have been addressed and documented within the EPIC Progress notes.     OMARI Boland, PharmD

## 2018-01-11 ENCOUNTER — OUTPATIENT INFUSION SERVICES (OUTPATIENT)
Dept: ONCOLOGY | Facility: MEDICAL CENTER | Age: 58
End: 2018-01-11
Attending: INTERNAL MEDICINE
Payer: MEDICAID

## 2018-01-11 VITALS
OXYGEN SATURATION: 98 % | BODY MASS INDEX: 21.49 KG/M2 | DIASTOLIC BLOOD PRESSURE: 68 MMHG | HEIGHT: 67 IN | SYSTOLIC BLOOD PRESSURE: 118 MMHG | TEMPERATURE: 98.6 F | HEART RATE: 87 BPM | WEIGHT: 136.91 LBS | RESPIRATION RATE: 18 BRPM

## 2018-01-11 DIAGNOSIS — C34.91 SMALL CELL CARCINOMA OF RIGHT LUNG (HCC): ICD-10-CM

## 2018-01-11 PROCEDURE — 700111 HCHG RX REV CODE 636 W/ 250 OVERRIDE (IP): Performed by: INTERNAL MEDICINE

## 2018-01-11 PROCEDURE — A9270 NON-COVERED ITEM OR SERVICE: HCPCS | Performed by: INTERNAL MEDICINE

## 2018-01-11 PROCEDURE — 700111 HCHG RX REV CODE 636 W/ 250 OVERRIDE (IP)

## 2018-01-11 PROCEDURE — 96413 CHEMO IV INFUSION 1 HR: CPT

## 2018-01-11 PROCEDURE — 700105 HCHG RX REV CODE 258: Performed by: INTERNAL MEDICINE

## 2018-01-11 PROCEDURE — 304540 HCHG NITRO SET VENT 2ND TUB

## 2018-01-11 PROCEDURE — 700102 HCHG RX REV CODE 250 W/ 637 OVERRIDE(OP): Performed by: INTERNAL MEDICINE

## 2018-01-11 RX ORDER — DEXAMETHASONE 4 MG/1
4 TABLET ORAL ONCE
Status: COMPLETED | OUTPATIENT
Start: 2018-01-11 | End: 2018-01-11

## 2018-01-11 RX ORDER — ONDANSETRON 4 MG/1
4 TABLET, ORALLY DISINTEGRATING ORAL ONCE
Status: COMPLETED | OUTPATIENT
Start: 2018-01-11 | End: 2018-01-11

## 2018-01-11 RX ADMIN — DEXAMETHASONE 4 MG: 4 TABLET ORAL at 15:36

## 2018-01-11 RX ADMIN — ONDANSETRON 4 MG: 4 TABLET, ORALLY DISINTEGRATING ORAL at 15:36

## 2018-01-11 RX ADMIN — ETOPOSIDE 171 MG: 20 INJECTION, SOLUTION, CONCENTRATE INTRAVENOUS at 16:27

## 2018-01-11 ASSESSMENT — PAIN SCALES - GENERAL: PAINLEVEL: NO PAIN

## 2018-01-11 NOTE — PROGRESS NOTES
Chemotherapy Verification - SECONDARY RN       Height = 66.53in  Weight = 62.1kg  BSA = 1.70m2       Medication: Etoposide  Dose: 100mg/m2  Calculated Dose: 170mg                             (In mg/m2, AUC, mg/kg)               I confirm that this process was performed independently.

## 2018-01-11 NOTE — PROGRESS NOTES
"Pharmacy Chemotherapy Note     Patient Name: CASSIDY PAGE  Dx: Small cell lung cancer       Protocol:  CISPLATIN + ETOPOSIDE + XRT     CISplatin 80 mg/m2 IV over 60 minutes on day 1  Etoposide 100 mg/m2 IV over 60 minutes on days 1-3  21-28 day cycle for 4-6 cycles  MD giving Cycle 2 21 days after Cycle 1, each cycle length to be determined ([place] plan has 28-day cycles currently)     *Dosing Reference*  NCCN Guidelines for Small Cell Lung Cancer.V.2.2017  Ruth Ann AT, et al. N Engl J Med. 199;340-(4):265-71  Moy MORILLO et al. J Clin Oncol. 2002;20(24):4635-72      /68   Pulse 87   Temp 37 °C (98.6 °F)   Resp 18   Ht 1.69 m (5' 6.53\")   Wt 62.1 kg (136 lb 14.5 oz)   SpO2 98%   BMI 21.74 kg/m²  Body surface area is 1.71 meters squared.    LABS 1/10/2018:  ANC~ 3530         Plt = 189 k          Hgb = 10.3          1/9/17 SCr = 0.66 mg/dL         CrCl ~ 103 mL/min (min 0.7)   LFT's = 19/9/55      TBili = 0.4       Mag = 1.7         K+ = 4.2      Drug Order   (Drug name, dose, route, IV Fluid & volume, frequency, number of doses) Cycle: 4 Day 2 of 3   Previous treatment: C3 = 12/19/2017-12/21/2017   Medication = Etoposide  Base Dose= 100 mg/m2  Calc Dose: Base Dose x 1.71 m2 = 171 mg  Final Dose = 171 mg  Route = IV  Fluid & Volume =  mL  Admin Duration = Over 1-2 hrs    Days 1-3    <5% difference, OK to treat with final dose       By my signature below, I confirm this process was performed independently with the BSA and all final chemotherapy dosing calculations congruent. I have reviewed the above chemotherapy order and that my calculation of the final dose and BSA (when applicable) corroborate those calculations of the  pharmacist. Discrepancies of 5% or greater in the written dose have been addressed and documented within the [facility] Progress notes.     Kylie Gutierrez, PharmD    "

## 2018-01-11 NOTE — PROGRESS NOTES
Chemotherapy Verification - PRIMARY RN      Height = 169 cm  Weight = 62.1 kg  BSA = 1.71 m^2       Medication: Etoposide  Dose: 100 mg/mg^2  Calculated Dose: 171 mg                             (In mg/m2, AUC, mg/kg)     I confirm this process was performed independently with the BSA and all final chemotherapy dosing calculations congruent.  Any discrepancies of 5% or greater have been addressed with the chemotherapy pharmacist. The resolution of the discrepancy has been documented in the EPIC progress notes.

## 2018-01-12 ENCOUNTER — OUTPATIENT INFUSION SERVICES (OUTPATIENT)
Dept: ONCOLOGY | Facility: MEDICAL CENTER | Age: 58
End: 2018-01-12
Attending: INTERNAL MEDICINE
Payer: MEDICAID

## 2018-01-12 VITALS
DIASTOLIC BLOOD PRESSURE: 74 MMHG | SYSTOLIC BLOOD PRESSURE: 130 MMHG | BODY MASS INDEX: 20.83 KG/M2 | HEART RATE: 84 BPM | TEMPERATURE: 98.4 F | HEIGHT: 67 IN | OXYGEN SATURATION: 100 % | WEIGHT: 132.72 LBS | RESPIRATION RATE: 18 BRPM

## 2018-01-12 DIAGNOSIS — C34.91 SMALL CELL CARCINOMA OF RIGHT LUNG (HCC): ICD-10-CM

## 2018-01-12 PROCEDURE — 96413 CHEMO IV INFUSION 1 HR: CPT

## 2018-01-12 PROCEDURE — 700111 HCHG RX REV CODE 636 W/ 250 OVERRIDE (IP): Performed by: INTERNAL MEDICINE

## 2018-01-12 PROCEDURE — 700102 HCHG RX REV CODE 250 W/ 637 OVERRIDE(OP): Performed by: INTERNAL MEDICINE

## 2018-01-12 PROCEDURE — A9270 NON-COVERED ITEM OR SERVICE: HCPCS | Performed by: INTERNAL MEDICINE

## 2018-01-12 PROCEDURE — 700105 HCHG RX REV CODE 258: Performed by: INTERNAL MEDICINE

## 2018-01-12 PROCEDURE — 700111 HCHG RX REV CODE 636 W/ 250 OVERRIDE (IP)

## 2018-01-12 PROCEDURE — 304540 HCHG NITRO SET VENT 2ND TUB

## 2018-01-12 RX ORDER — ONDANSETRON 4 MG/1
4 TABLET, ORALLY DISINTEGRATING ORAL ONCE
Status: COMPLETED | OUTPATIENT
Start: 2018-01-12 | End: 2018-01-12

## 2018-01-12 RX ORDER — DEXAMETHASONE 4 MG/1
4 TABLET ORAL ONCE
Status: COMPLETED | OUTPATIENT
Start: 2018-01-12 | End: 2018-01-12

## 2018-01-12 RX ADMIN — DEXAMETHASONE 4 MG: 4 TABLET ORAL at 15:26

## 2018-01-12 RX ADMIN — ONDANSETRON 4 MG: 4 TABLET, ORALLY DISINTEGRATING ORAL at 15:26

## 2018-01-12 RX ADMIN — ETOPOSIDE 168 MG: 20 INJECTION, SOLUTION, CONCENTRATE INTRAVENOUS at 15:43

## 2018-01-12 NOTE — PROGRESS NOTES
Patient here for Day 2 Etoposide. PIV established; brisk blood return noted. Etoposide given over 1 hour; no adverse reaction noted. PIV removed; gauze/coban applied over site. Next appointment scheduled. Discharged to self care; no apparent distress noted.

## 2018-01-12 NOTE — PROGRESS NOTES
Chemotherapy Verification - SECONDARY RN       Height = 169 cm  Weight = 60.2 kg  BSA = 1.68 m2       Medication: Etoposide  Dose: 100 mg/m2  Calculated Dose: 168 mg                             (In mg/m2, AUC, mg/kg)       I confirm that this process was performed independently.

## 2018-01-12 NOTE — PROGRESS NOTES
Chemotherapy Verification - PRIMARY RN      Height = 169 cm  Weight = 60.2 kg  BSA = 1.68 m^2       Medication: Etoposide  Dose: 100 mg/m^2  Calculated Dose: 168 mg                             (In mg/m2, AUC, mg/kg)     I confirm this process was performed independently with the BSA and all final chemotherapy dosing calculations congruent.  Any discrepancies of 5% or greater have been addressed with the chemotherapy pharmacist. The resolution of the discrepancy has been documented in the EPIC progress notes.

## 2018-01-12 NOTE — PROGRESS NOTES
"Pharmacy Chemotherapy Note     Patient Name: CASSIDY PAGE  Dx: Small cell lung cancer       Protocol:  CISPLATIN + ETOPOSIDE + XRT     CISplatin 80 mg/m2 IV over 60 minutes on day 1  Etoposide 100 mg/m2 IV over 60 minutes on days 1-3  21-28 day cycle for 4-6 cycles  MD giving Cycle 2 21 days after Cycle 1, each cycle length to be determined ([place] plan has 28-day cycles currently)     *Dosing Reference*  NCCN Guidelines for Small Cell Lung Cancer.V.2.2017  Ruth Ann AT, et al. N Engl J Med. 199;340-(4):265-71  Moy MORILLO et al. J Clin Oncol. 2002;20(24):1155-72      /74   Pulse 84   Temp 36.9 °C (98.4 °F)   Resp 18   Ht 1.69 m (5' 6.53\")   Wt 60.2 kg (132 lb 11.5 oz)   SpO2 100%   BMI 21.08 kg/m²  Body surface area is 1.68 meters squared.    LABS 1/10/2018:  ANC~ 3530         Plt = 189 k          Hgb = 10.3          1/9/17 SCr = 0.66 mg/dL         CrCl ~ 103 mL/min (min 0.7)   LFT's = 19/9/55      TBili = 0.4       Mag = 1.7         K+ = 4.2      Drug Order   (Drug name, dose, route, IV Fluid & volume, frequency, number of doses) Cycle: 4 Day 3 of 3   Previous treatment: C3 = 12/19/2017-12/21/2017   Medication = Etoposide  Base Dose= 100 mg/m2  Calc Dose: Base Dose x 1.68 m2 = 168 mg  Final Dose = 168 mg  Route = IV  Fluid & Volume =  mL  Admin Duration = Over 1-2 hrs    Days 1-3    <5% difference, OK to treat with final dose       By my signature below, I confirm this process was performed independently with the BSA and all final chemotherapy dosing calculations congruent. I have reviewed the above chemotherapy order and that my calculation of the final dose and BSA (when applicable) corroborate those calculations of the  pharmacist. Discrepancies of 5% or greater in the written dose have been addressed and documented within the [facility] Progress notes.     Kylie Gutierrez, PharmD    "

## 2018-01-13 ENCOUNTER — OUTPATIENT INFUSION SERVICES (OUTPATIENT)
Dept: ONCOLOGY | Facility: MEDICAL CENTER | Age: 58
End: 2018-01-13
Attending: INTERNAL MEDICINE
Payer: MEDICAID

## 2018-01-13 VITALS
SYSTOLIC BLOOD PRESSURE: 99 MMHG | BODY MASS INDEX: 20.69 KG/M2 | DIASTOLIC BLOOD PRESSURE: 58 MMHG | WEIGHT: 131.84 LBS | RESPIRATION RATE: 18 BRPM | TEMPERATURE: 98.2 F | OXYGEN SATURATION: 95 % | HEIGHT: 67 IN | HEART RATE: 99 BPM

## 2018-01-13 DIAGNOSIS — C34.91 SMALL CELL CARCINOMA OF RIGHT LUNG (HCC): ICD-10-CM

## 2018-01-13 PROCEDURE — 700111 HCHG RX REV CODE 636 W/ 250 OVERRIDE (IP): Performed by: INTERNAL MEDICINE

## 2018-01-13 PROCEDURE — 96372 THER/PROPH/DIAG INJ SC/IM: CPT

## 2018-01-13 RX ADMIN — PEGFILGRASTIM 6 MG: 6 INJECTION SUBCUTANEOUS at 17:04

## 2018-01-13 ASSESSMENT — PAIN SCALES - GENERAL: PAINLEVEL: NO PAIN

## 2018-01-13 NOTE — PROGRESS NOTES
Patient here for Day 3 Etoposide. PIV established; brisk blood return noted. Etoposide given over 1 hour; no adverse reaction noted. Etoposide completed at 1701. PIV removed; gauze/coban applied over site. Next appointment scheduled. Discharged to self care; no apparent distress noted.

## 2018-01-14 NOTE — PROGRESS NOTES
Pt arrived to [facility], ambulatory, for Neulasta injection. Pt voices no complaints. Neulasta injected into the R-back arm with no s/sx of adverse reaction. Pt left IS with no s/sx of distress. Follow up appointment confirmed.

## 2018-02-01 NOTE — TELEPHONE ENCOUNTER
Was the patient seen in the last year in this department? Yes   Last seen on 12/26/17 by Dr. Yen Guerrero Appt None  Does patient have an active prescription for medications requested? No     Received Request Via: Patient

## 2018-02-07 RX ORDER — SODIUM CHLORIDE 9 MG/ML
500 INJECTION, SOLUTION INTRAVENOUS ONCE
Status: CANCELLED | OUTPATIENT
Start: 2018-02-07 | End: 2018-02-07

## 2018-02-07 RX ORDER — DEXAMETHASONE SODIUM PHOSPHATE 4 MG/ML
12 INJECTION, SOLUTION INTRA-ARTICULAR; INTRALESIONAL; INTRAMUSCULAR; INTRAVENOUS; SOFT TISSUE ONCE
Status: CANCELLED | OUTPATIENT
Start: 2018-02-07 | End: 2018-02-07

## 2018-02-07 RX ORDER — DEXAMETHASONE 4 MG/1
4 TABLET ORAL ONCE
Status: CANCELLED | OUTPATIENT
Start: 2018-02-08 | End: 2018-02-08

## 2018-02-07 RX ORDER — ONDANSETRON 4 MG/1
4 TABLET, ORALLY DISINTEGRATING ORAL ONCE
Status: CANCELLED | OUTPATIENT
Start: 2018-02-09 | End: 2018-02-09

## 2018-02-07 RX ORDER — DEXAMETHASONE 4 MG/1
4 TABLET ORAL ONCE
Status: CANCELLED | OUTPATIENT
Start: 2018-02-09 | End: 2018-02-09

## 2018-02-07 RX ORDER — ONDANSETRON 4 MG/1
4 TABLET, ORALLY DISINTEGRATING ORAL ONCE
Status: CANCELLED | OUTPATIENT
Start: 2018-02-08 | End: 2018-02-08

## 2018-02-08 ENCOUNTER — OUTPATIENT INFUSION SERVICES (OUTPATIENT)
Dept: ONCOLOGY | Facility: MEDICAL CENTER | Age: 58
End: 2018-02-08
Attending: INTERNAL MEDICINE
Payer: MEDICAID

## 2018-02-08 ENCOUNTER — HOSPITAL ENCOUNTER (EMERGENCY)
Facility: MEDICAL CENTER | Age: 58
End: 2018-02-08
Payer: MEDICAID

## 2018-02-08 VITALS
HEIGHT: 68 IN | OXYGEN SATURATION: 94 % | DIASTOLIC BLOOD PRESSURE: 71 MMHG | HEART RATE: 94 BPM | SYSTOLIC BLOOD PRESSURE: 108 MMHG | BODY MASS INDEX: 18.31 KG/M2 | TEMPERATURE: 97.8 F | WEIGHT: 120.81 LBS | RESPIRATION RATE: 18 BRPM

## 2018-02-08 VITALS
OXYGEN SATURATION: 100 % | BODY MASS INDEX: 18.86 KG/M2 | HEIGHT: 67 IN | TEMPERATURE: 97.5 F | RESPIRATION RATE: 18 BRPM | HEART RATE: 100 BPM | DIASTOLIC BLOOD PRESSURE: 58 MMHG | SYSTOLIC BLOOD PRESSURE: 106 MMHG | WEIGHT: 120.15 LBS

## 2018-02-08 DIAGNOSIS — C34.91 SMALL CELL CARCINOMA OF RIGHT LUNG (HCC): ICD-10-CM

## 2018-02-08 LAB
ALBUMIN SERPL BCP-MCNC: 3.4 G/DL (ref 3.2–4.9)
ALBUMIN/GLOB SERPL: 1.1 G/DL
ALP SERPL-CCNC: 66 U/L (ref 30–99)
ALT SERPL-CCNC: 11 U/L (ref 2–50)
ANION GAP SERPL CALC-SCNC: 7 MMOL/L (ref 0–11.9)
AST SERPL-CCNC: 13 U/L (ref 12–45)
BASOPHILS # BLD AUTO: 1.6 % (ref 0–1.8)
BASOPHILS # BLD: 0.05 K/UL (ref 0–0.12)
BILIRUB SERPL-MCNC: 0.3 MG/DL (ref 0.1–1.5)
BUN SERPL-MCNC: 6 MG/DL (ref 8–22)
CALCIUM SERPL-MCNC: 9.4 MG/DL (ref 8.5–10.5)
CHLORIDE SERPL-SCNC: 94 MMOL/L (ref 96–112)
CO2 SERPL-SCNC: 27 MMOL/L (ref 20–33)
CREAT SERPL-MCNC: 0.62 MG/DL (ref 0.5–1.4)
EOSINOPHIL # BLD AUTO: 0.01 K/UL (ref 0–0.51)
EOSINOPHIL NFR BLD: 0.3 % (ref 0–6.9)
ERYTHROCYTE [DISTWIDTH] IN BLOOD BY AUTOMATED COUNT: 47.8 FL (ref 35.9–50)
GLOBULIN SER CALC-MCNC: 3.2 G/DL (ref 1.9–3.5)
GLUCOSE SERPL-MCNC: 97 MG/DL (ref 65–99)
HCT VFR BLD AUTO: 25.8 % (ref 42–52)
HGB BLD-MCNC: 9.2 G/DL (ref 14–18)
IMM GRANULOCYTES # BLD AUTO: 0.1 K/UL (ref 0–0.11)
IMM GRANULOCYTES NFR BLD AUTO: 3.2 % (ref 0–0.9)
LYMPHOCYTES # BLD AUTO: 0.74 K/UL (ref 1–4.8)
LYMPHOCYTES NFR BLD: 23.8 % (ref 22–41)
MAGNESIUM SERPL-MCNC: 1.5 MG/DL (ref 1.5–2.5)
MCH RBC QN AUTO: 33.9 PG (ref 27–33)
MCHC RBC AUTO-ENTMCNC: 35.7 G/DL (ref 33.7–35.3)
MCV RBC AUTO: 95.2 FL (ref 81.4–97.8)
MONOCYTES # BLD AUTO: 0.78 K/UL (ref 0–0.85)
MONOCYTES NFR BLD AUTO: 25.1 % (ref 0–13.4)
NEUTROPHILS # BLD AUTO: 1.43 K/UL (ref 1.82–7.42)
NEUTROPHILS NFR BLD: 46 % (ref 44–72)
NRBC # BLD AUTO: 0 K/UL
NRBC BLD-RTO: 0 /100 WBC
PLATELET # BLD AUTO: 404 K/UL (ref 164–446)
PMV BLD AUTO: 8.9 FL (ref 9–12.9)
POTASSIUM SERPL-SCNC: 3.9 MMOL/L (ref 3.6–5.5)
PROT SERPL-MCNC: 6.6 G/DL (ref 6–8.2)
RBC # BLD AUTO: 2.71 M/UL (ref 4.7–6.1)
SODIUM SERPL-SCNC: 128 MMOL/L (ref 135–145)
WBC # BLD AUTO: 3.1 K/UL (ref 4.8–10.8)

## 2018-02-08 PROCEDURE — 306780 HCHG STAT FOR TRANSFUSION PER CASE

## 2018-02-08 PROCEDURE — 85025 COMPLETE CBC W/AUTO DIFF WBC: CPT

## 2018-02-08 PROCEDURE — 80053 COMPREHEN METABOLIC PANEL: CPT

## 2018-02-08 PROCEDURE — 302449 STATCHG TRIAGE ONLY (STATISTIC)

## 2018-02-08 PROCEDURE — 83735 ASSAY OF MAGNESIUM: CPT

## 2018-02-08 ASSESSMENT — PAIN SCALES - GENERAL: PAINLEVEL: NO PAIN

## 2018-02-08 NOTE — ED TRIAGE NOTES
Pt sent to er from infusion center. Pt denies any complaints. Pt denies feeling confused. Pt alert, disoriented to date, can name president.

## 2018-02-08 NOTE — PROGRESS NOTES
"Prior to pt's arrival RN updated Dr. Antonio via telephone regarding events yesterday, MD would like pt to have labs drawn today and RN to evaluate pt's mental status to determine if a scan may be needed. Pt arrived ambulatory at 1100, 4.5 hours early for appt. Pt does not recall coming to [facility] yesterday, \"I was not here yesterday\". Pt with flat affect and only engages with conversation when spoken to. Pt able to provide name and birth date. However pt unable to recall date or day of the week, \"it is February 6th\". IV established and labs drawn. Pt meet with dietician and snacks provided, pt has lost 12 lbs in the last month. RN reviewed labs and sodium 128, magnesium 1.5. RN updated Dr. Antonio via telephone of pt's status and lab results at 1400, MD ordered pt to be seen in the ER to have hyponatremia evaluated and need for pt to have MRI scan to evaluate for brain mets. RN updated pt and he was agreeable to go to the ER. RN asked pt if he would like his housing to be informed of him going to the ER, pt said \"no\".  RN gave report to triage while pt was checking in to ensure that pt was placed in senior lounge for neutropenic precautions and to have staff checking in with pt. Report given to Kay triage RN at 1425, she verbalized understanding of pt needing to be evaluated with scan and hyponatremia and was informed they could contact Dr. Antonio with questions. Edie KENNEDY navigator informed of pt going to the ER.     [facility]/18 [facility]/c'd IV in R AC, gauze with coband applied to site. See telephone encounter from Yulisa KENNEDY for remainder of interaction with pt.   "

## 2018-02-08 NOTE — ED TRIAGE NOTES
Pt has small cell lung ca, was supposed to get chemo yesterday and missed appt, RN called pt to come in, pt went to wrong place, pt was sent to er for confusion and aloc and left without being seen. Pt arrived at 1130 in infusion center for 1530 appt, infusion ctr RN brought pt to er for aloc.

## 2018-02-09 ENCOUNTER — PATIENT OUTREACH (OUTPATIENT)
Dept: OTHER | Facility: MEDICAL CENTER | Age: 58
End: 2018-02-09

## 2018-02-09 ENCOUNTER — APPOINTMENT (OUTPATIENT)
Dept: ONCOLOGY | Facility: MEDICAL CENTER | Age: 58
End: 2018-02-09
Attending: INTERNAL MEDICINE
Payer: MEDICAID

## 2018-02-09 ENCOUNTER — HOSPITAL ENCOUNTER (EMERGENCY)
Facility: MEDICAL CENTER | Age: 58
End: 2018-02-09
Attending: EMERGENCY MEDICINE
Payer: MEDICAID

## 2018-02-09 ENCOUNTER — TELEPHONE (OUTPATIENT)
Dept: ONCOLOGY | Facility: MEDICAL CENTER | Age: 58
End: 2018-02-09

## 2018-02-09 VITALS
RESPIRATION RATE: 18 BRPM | HEIGHT: 68 IN | OXYGEN SATURATION: 99 % | WEIGHT: 125.44 LBS | TEMPERATURE: 97.9 F | SYSTOLIC BLOOD PRESSURE: 104 MMHG | DIASTOLIC BLOOD PRESSURE: 65 MMHG | BODY MASS INDEX: 19.01 KG/M2 | HEART RATE: 85 BPM

## 2018-02-09 DIAGNOSIS — C34.90 MALIGNANT NEOPLASM OF LUNG, UNSPECIFIED LATERALITY, UNSPECIFIED PART OF LUNG (HCC): ICD-10-CM

## 2018-02-09 LAB
ALBUMIN SERPL BCP-MCNC: 3.2 G/DL (ref 3.2–4.9)
ALBUMIN/GLOB SERPL: 1.1 G/DL
ALP SERPL-CCNC: 56 U/L (ref 30–99)
ALT SERPL-CCNC: 7 U/L (ref 2–50)
ANION GAP SERPL CALC-SCNC: 5 MMOL/L (ref 0–11.9)
AST SERPL-CCNC: 13 U/L (ref 12–45)
BASOPHILS # BLD AUTO: 0.7 % (ref 0–1.8)
BASOPHILS # BLD: 0.04 K/UL (ref 0–0.12)
BILIRUB SERPL-MCNC: 0.2 MG/DL (ref 0.1–1.5)
BUN SERPL-MCNC: 12 MG/DL (ref 8–22)
CALCIUM SERPL-MCNC: 8.9 MG/DL (ref 8.5–10.5)
CHLORIDE SERPL-SCNC: 95 MMOL/L (ref 96–112)
CO2 SERPL-SCNC: 27 MMOL/L (ref 20–33)
CREAT SERPL-MCNC: 0.63 MG/DL (ref 0.5–1.4)
EOSINOPHIL # BLD AUTO: 0.03 K/UL (ref 0–0.51)
EOSINOPHIL NFR BLD: 0.5 % (ref 0–6.9)
ERYTHROCYTE [DISTWIDTH] IN BLOOD BY AUTOMATED COUNT: 49.9 FL (ref 35.9–50)
GLOBULIN SER CALC-MCNC: 2.8 G/DL (ref 1.9–3.5)
GLUCOSE SERPL-MCNC: 96 MG/DL (ref 65–99)
HCT VFR BLD AUTO: 23.2 % (ref 42–52)
HGB BLD-MCNC: 8.3 G/DL (ref 14–18)
IMM GRANULOCYTES # BLD AUTO: 0.08 K/UL (ref 0–0.11)
IMM GRANULOCYTES NFR BLD AUTO: 1.4 % (ref 0–0.9)
LYMPHOCYTES # BLD AUTO: 0.67 K/UL (ref 1–4.8)
LYMPHOCYTES NFR BLD: 11.9 % (ref 22–41)
MCH RBC QN AUTO: 34.6 PG (ref 27–33)
MCHC RBC AUTO-ENTMCNC: 35.8 G/DL (ref 33.7–35.3)
MCV RBC AUTO: 96.7 FL (ref 81.4–97.8)
MONOCYTES # BLD AUTO: 0.88 K/UL (ref 0–0.85)
MONOCYTES NFR BLD AUTO: 15.7 % (ref 0–13.4)
NEUTROPHILS # BLD AUTO: 3.92 K/UL (ref 1.82–7.42)
NEUTROPHILS NFR BLD: 69.8 % (ref 44–72)
NRBC # BLD AUTO: 0 K/UL
NRBC BLD-RTO: 0 /100 WBC
PLATELET # BLD AUTO: 398 K/UL (ref 164–446)
PMV BLD AUTO: 9.1 FL (ref 9–12.9)
POTASSIUM SERPL-SCNC: 4.4 MMOL/L (ref 3.6–5.5)
PROT SERPL-MCNC: 6 G/DL (ref 6–8.2)
RBC # BLD AUTO: 2.4 M/UL (ref 4.7–6.1)
SODIUM SERPL-SCNC: 127 MMOL/L (ref 135–145)
WBC # BLD AUTO: 5.6 K/UL (ref 4.8–10.8)

## 2018-02-09 PROCEDURE — 85025 COMPLETE CBC W/AUTO DIFF WBC: CPT

## 2018-02-09 PROCEDURE — 99284 EMERGENCY DEPT VISIT MOD MDM: CPT

## 2018-02-09 PROCEDURE — 80053 COMPREHEN METABOLIC PANEL: CPT

## 2018-02-09 ASSESSMENT — ENCOUNTER SYMPTOMS
PHOTOPHOBIA: 0
FEVER: 0
CONFUSION: 0
SHORTNESS OF BREATH: 0
BACK PAIN: 0
COUGH: 0
DIZZINESS: 0
ABDOMINAL PAIN: 0

## 2018-02-09 NOTE — DISCHARGE INSTRUCTIONS
Cancer, General Information  Cancer is a group of many related diseases that begin in cells. Cells are the building blocks of the body. Cells are also the basic unit of life. The body is made up of many types of cells. Normally, cells grow and divide to produce more cells only when the body needs them. This orderly process helps keep the body healthy. Sometimes cells keep dividing when new cells are not needed. These extra cells may form a mass of tissue. It is called a growth or tumor. Tumors can be either:  · Not cancerous (benign).   · Cancerous (malignant).   Cancer can begin in any organ or tissue of the body. The original tumor is where the tumor started out. It is called the primary cancer or primary tumor. Usually it is named for the part of the body in which it begins.  Metastases mean the spread of cancer. Cancer cells can break away from a primary tumor and travel locally. These grow next to the tumor. Cancer cells can also travel through the bloodstream or lymphatic system to other parts of the body. Cancer cells may spread to lymph nodes near the primary tumor regional lymph nodes, so called because they are in the region. This is called:  · Sandi involvement.   · Positive nodes.   · Regional disease.   Cancer cells can also spread to other parts of the body, distant from the primary tumor. Doctors use the term metastatic disease or distant disease to describe cancer that spreads to other organs or to lymph nodes other than those near the primary tumor.  When cancer cells spread and form a new tumor, the new tumor is called a secondary, or metastatic, tumor. The cancer cells that form the secondary tumor are like those in the original tumor. That means, for example, that if breast cancer spreads (metastasizes) to the lung, the secondary tumor is made up of abnormal breast cells. It is not made of abnormal lung cells. The disease in the lung is metastatic breast cancer. It is not lung cancer.  A  metastasis is a tumor that started from a cancer cell or cells in another part of the body. But sometimes a primary cancer is discovered only after a metastasis causes problems (symptoms). For example, a man whose prostate cancer has spread to the bones in the pelvis may have lower back pain (caused by the cancer in his bones) before experiencing any symptoms from the prostate tumor itself.  The cells in a metastatic tumor resemble those in the primary tumor. The cancerous tissue is examined under a microscope to determine the cell type. Then a doctor can usually tell whether that type of cell is normally found in the part of the body from which the tissue sample was taken.  For instance, breast cancer cells look the same whether they are found in the breast or have spread to another part of the body. So, if a tissue sample taken from a tumor in the lung contains cells that look like breast cells, the doctor determines that the lung tumor is a secondary tumor. Metastatic cancers may be found at the same time as the primary tumor, or months or years later. When a second tumor is found in a patient who has been treated for cancer in the past, it is more often a metastasis than another primary tumor. In a small number of cancer patients, a secondary tumor is diagnosed, but no primary cancer can be found, in spite of extensive tests. Doctors refer to the primary tumor as unknown or occult. The patient is said to have cancer of unknown primary origin (CUP). This means we do not know where the tumor came from.  TREATMENT   When cancer has metastasized, it may be treated with:  · Chemotherapy.   · Radiation therapy.   · Biologic therapy.   · Hormone therapy.   · Surgery.   · A combination of these.   The choice of treatment generally depends on the:  · Type of primary cancer.   · Size and location of the metastasis.   · Patient's age and general health.   · Types of treatments used previously.   In patients diagnosed with  CUP, it is still possible to treat the disease even when the primary tumor cannot be located. It is also possible to treat the cancer even if the organ from which the cancer cells came cannot be identified.  New cancer treatments are currently under study. To develop new treatments, the National Cancer [place] (NCI) sponsors clinical trials (research studies) with cancer patients in many hospitals, universities, medical schools, and cancer centers around the country. Clinical trials are a critical step in the improvement of treatment. Before any new treatment can be recommended for general use, doctors conduct studies to find out whether the treatment is both safe for patients and effective against the disease. The results of such studies have led to progress not only in the treatment of cancer, but in the detection, diagnosis, improvement of survival rates, and prevention of the disease. Patients interested in participating in research should ask their doctor to find out whether they are eligible for a clinical trial.  FOR MORE INFORMATION  http://www.cancer.gov  Document Released: 12/18/2006 Document Revised: 06/18/2013 Document Reviewed: 09/04/2008  [facility] [employer]® Patient Information ©2013 [employer].

## 2018-02-09 NOTE — ED TRIAGE NOTES
"Chief Complaint   Patient presents with   • Sent by MD     pt sent to the ED by oncologist. states that he \"believes that I am here for a blood test, my sodium is a little low.\"     Pt is a very poor historian. Mask placed on pt.   Pt to senior lounge.  Blood pressure 109/64, pulse 98, temperature 36.9 °C (98.5 °F), resp. rate 16, height 1.727 m (5' 8\"), weight 56.9 kg (125 lb 7.1 oz), SpO2 99 %.    Pt informed of wait times. Educated on triage process.  Asked to return to triage RN for any new or worsening of symptoms. Thanked for patience.        "
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
## 2018-02-09 NOTE — ED NOTES
Spoke with cancer RN ANNIA, soco Irizarry refer to her notes for update. Per annia lucio would like pt evaluated and MRI to see if cancer has spread to brain.

## 2018-02-09 NOTE — ED NOTES
Pt received discharge instructions, pt understands all instructions. Pt did not want to stay for MRI, and stated he was not going to go to MRI appointment that was scheduled for him at [facility]. Pt stated he will go next week. Pt ambulated to [facility] with no difficulty

## 2018-02-09 NOTE — PROGRESS NOTES
"Was going to meet with patient yesterday r/t reports from infusion RN regarding increased confusion in patient for possible assistance with coordination of care if need.  Pt came in early yesterday for infusion services.  See note from infusion RN regarding pt not remembering coming day before and continued confusion.  She had taken patient to ER and Dr Antonio had been notified regarding previous concerns and current concerns. Navigator reviewed for follow up from ER.  Pt did not stay in ER and was never evaluated.    Call placed to patient at 10:45 to follow up with.  Pt reported was not going to wait around in ER and had left.  He was able to communicate thoughts and stating he was doing \"much better\" today and is \"perfectly fine\".  Pt reported date being 2/7 when asked what day of the week it was.  When informed patient it was Friday 2/9 he did not think it was, then reported \"what ever you say\".  Questioned patient regarding events from yesterday.  Pt stating he doesn't really remember but \"I don't even remember my dreams\".  Pt stating \"this is all new\" and that he has to have assistance with all appointments.  Asking who helps him.  Pt reporting his \"counselor\" but did not know name or who they worked for.  Talked to patient about his weight loss.  He did report was sick when getting his chemo and blamed weight loss on his chemo.  He did not know what \"boost\" or \"ensure\" was when asked but when rephrased to \"protein drink\" he stated he was drinking 2 a day.  Encouraged him to increase amount.  Pt reporting not having money to do this.  Will need to look at options to assist patient with this.  Pt also wanting a \"calendar\" for appointments and relaying on others to remember.  Pt talked about does not ask for help from current place he is staying.  Pt asking about if navigator was getting him \"a home\".  Discussed this was not something navigator has been working on for him and reported that he currently has a " "place to stay.  Pt unable to provide detailed information and at times off topic.  Did find out that patient had \"counselor\" named Lisa he has been working with.  Discussed with patient importance of follow up for low sodium and needing MRI.  Pt reporting he will do MRI if scheduled but will not wait in ER.  Pt was able to identify who is primary care physician was but states would be unable to see them today for evaluation.  Pt stating would not go back to ER to \"wait\" for evaluation.  Will follow up with Dr Antonio regarding patient not being assessed in ER.    Notified that patient was taken to ER yesterday with IV in place and if had left, would still have IV.  Call placed to patient again.  When asked patient about IV he started answering regarding ID.  Pt did report did still have IV and that he wanted treatment.  Repeated to patient again that he needed evaluation.  Discussed with him that IV needed to be removed and infection risk.  Pt willing to come in to have removed.  Pt stating he will take bus.    1205  Call placed to Dr Antonio's office to advise of above.  Spoke with Charlene who reported patient needed to be evaluated and if unsafe may need legal hold.  She discussed from information provided that he was \"danger to self\", left ER with IV in place and Na of 128.  Had spoken with patient earlier and unsure if would qualify for legal hold.  Pt does need to be evaluated and pt has not indicated would go to ER for this.    Spoke [facility] ER .  She does not feel, based on above information, does not seem like he would qualify for legal hold.  Can try to assist with pt being seen sooner in ER if he comes.  This again would be based on priority and immediate need of patient.    12:33   Charlene from Dr Antonio's office called reporting she had talked to patient.  She felt that he was able to answer questions appropriately and he had agreed to go to ER.  They are working on getting MRI ordered and hopefully " scheduled for patient.  She states he plans to take bus for 3 pm appointment today.  Discussed patient does not have 3 pm appointment, but was told to go to infusion services for IV removal.  She reported that patient will be there at 1:45 and will go to ER prior to infusion.    12:37 Spoke with [facility] [place] to see if could connect with patient's case manger.  Information provided that they stopped following in Dec. But pt had been seen by [facility].  Number provided of [phone].    Call placed to [facility].  They provided information that patient is being seen, sees psychiatry as well and has .  They will pass on information that was provided to .

## 2018-02-09 NOTE — ED NOTES
Pt ambulated to room with no difficulty, pt states he has no idea why oncologist sent him here. Social work called and stated to call Edie [phone] the cancer RN to get more information on why he is here. RN called with no answer

## 2018-02-10 ENCOUNTER — PATIENT OUTREACH (OUTPATIENT)
Dept: HEALTH INFORMATION MANAGEMENT | Facility: OTHER | Age: 58
End: 2018-02-10

## 2018-02-10 ENCOUNTER — APPOINTMENT (OUTPATIENT)
Dept: ONCOLOGY | Facility: MEDICAL CENTER | Age: 58
End: 2018-02-10
Attending: INTERNAL MEDICINE
Payer: MEDICAID

## 2018-02-10 ENCOUNTER — TELEPHONE (OUTPATIENT)
Dept: ONCOLOGY | Facility: MEDICAL CENTER | Age: 58
End: 2018-02-10

## 2018-02-10 NOTE — TELEPHONE ENCOUNTER
"Froylan arrives in Infusion at 16:00 today after being discharged from ER.  We informed him of his appointment for a MRI at [facility].  Offered to arrange a ride and patient agreed.  Called and spoke with IR at [facility] and was told patient would have to wait several hours.  Patient informed and did not want to go.  Stated \"I told Dr. Antonio to just make me an appointment and I would go\".  Deferred  to patient's home.   "

## 2018-02-10 NOTE — TELEPHONE ENCOUNTER
New appointment made for MRI on February 13th at 09:00 at [address].  Called and left message for Froylan to return the call regarding a new appointment scheduled.

## 2018-02-10 NOTE — TELEPHONE ENCOUNTER
Called this morning and spoke with Froylan.  Informed him of his upcoming appointment scheduled at [address] [place] [address] for a MRI on Tuesday February 13th at 09:00. Verbalized understanding.

## 2018-02-11 RX ORDER — FOLIC ACID 1 MG/1
1 TABLET ORAL DAILY
Qty: 30 TAB | Refills: 1 | Status: SHIPPED | OUTPATIENT
Start: 2018-02-11 | End: 2018-03-21 | Stop reason: SDUPTHER

## 2018-02-11 RX ORDER — ACETAMINOPHEN 325 MG/1
650 TABLET ORAL EVERY 6 HOURS PRN
Qty: 30 TAB | Refills: 0 | Status: SHIPPED | OUTPATIENT
Start: 2018-02-11 | End: 2018-03-21 | Stop reason: SDUPTHER

## 2018-02-11 RX ORDER — MIDODRINE HYDROCHLORIDE 5 MG/1
5 TABLET ORAL
Qty: 60 TAB | Refills: 1 | Status: SHIPPED | OUTPATIENT
Start: 2018-02-11 | End: 2018-03-21 | Stop reason: SDUPTHER

## 2018-02-11 RX ORDER — LANOLIN ALCOHOL/MO/W.PET/CERES
100 CREAM (GRAM) TOPICAL DAILY
Qty: 30 TAB | Refills: 1 | Status: SHIPPED | OUTPATIENT
Start: 2018-02-11

## 2018-02-12 ENCOUNTER — DOCUMENTATION (OUTPATIENT)
Dept: HEMATOLOGY ONCOLOGY | Facility: MEDICAL CENTER | Age: 58
End: 2018-02-12

## 2018-02-12 NOTE — PROGRESS NOTES
"Nutrition Services: Update    Ht: 68\" Weight: 56.8kg (125#) BMI: 19.1   Recent wt change: 8# (6%) x 1 month - severe unintentional wt loss     Labs (): glucose: 96 Na: 127 K: 4.4 albumin: 3.2 M.5     Met w/ pt this date to follow-up on current intake and appetite. Pt seemed uninterested in interview; however, mentioned his intake and appetite have decreased for reasons that he is \"unsure of.\" Pt does not have c/o n/v, taste changes, nor difficulty swallowing. His poor PO and decreased appetite has therefore led to him to experience a severe 6% (8#) wt loss in the past month.     Recommendations:  1) encouraged pt to increase intake of Boost or Ensure Plus TID for additional kcals/protein daily.   2) as well as increase intake of protein     Pt does not appear to be very compliant w/ increasing his intake, especially since he seemed very uninterested and inattentive during consultation. Therefore, will continue to monitor wt status and leave recommendations accordingly.   "

## 2018-02-13 ENCOUNTER — HOSPITAL ENCOUNTER (OUTPATIENT)
Dept: RADIOLOGY | Facility: MEDICAL CENTER | Age: 58
End: 2018-02-13
Attending: INTERNAL MEDICINE
Payer: MEDICAID

## 2018-02-13 DIAGNOSIS — C34.92 SQUAMOUS CARCINOMA OF LUNG, LEFT (HCC): ICD-10-CM

## 2018-02-13 PROCEDURE — 700117 HCHG RX CONTRAST REV CODE 255: Performed by: INTERNAL MEDICINE

## 2018-02-13 PROCEDURE — 70553 MRI BRAIN STEM W/O & W/DYE: CPT

## 2018-02-13 PROCEDURE — A9579 GAD-BASE MR CONTRAST NOS,1ML: HCPCS | Performed by: INTERNAL MEDICINE

## 2018-02-13 RX ADMIN — GADODIAMIDE 10 ML: 287 INJECTION INTRAVENOUS at 10:07

## 2018-02-20 ENCOUNTER — HOSPITAL ENCOUNTER (OUTPATIENT)
Facility: MEDICAL CENTER | Age: 58
End: 2018-02-20
Attending: NURSE PRACTITIONER
Payer: MEDICAID

## 2018-02-20 PROCEDURE — 80053 COMPREHEN METABOLIC PANEL: CPT

## 2018-02-21 LAB
ALBUMIN SERPL BCP-MCNC: 4.1 G/DL (ref 3.2–4.9)
ALBUMIN/GLOB SERPL: 1.5 G/DL
ALP SERPL-CCNC: 62 U/L (ref 30–99)
ALT SERPL-CCNC: 10 U/L (ref 2–50)
ANION GAP SERPL CALC-SCNC: 4 MMOL/L (ref 0–11.9)
AST SERPL-CCNC: 18 U/L (ref 12–45)
BILIRUB SERPL-MCNC: 0.3 MG/DL (ref 0.1–1.5)
BUN SERPL-MCNC: 12 MG/DL (ref 8–22)
CALCIUM SERPL-MCNC: 9.4 MG/DL (ref 8.5–10.5)
CHLORIDE SERPL-SCNC: 102 MMOL/L (ref 96–112)
CO2 SERPL-SCNC: 26 MMOL/L (ref 20–33)
CREAT SERPL-MCNC: 0.66 MG/DL (ref 0.5–1.4)
GLOBULIN SER CALC-MCNC: 2.8 G/DL (ref 1.9–3.5)
GLUCOSE SERPL-MCNC: 70 MG/DL (ref 65–99)
POTASSIUM SERPL-SCNC: 4.9 MMOL/L (ref 3.6–5.5)
PROT SERPL-MCNC: 6.9 G/DL (ref 6–8.2)
SODIUM SERPL-SCNC: 132 MMOL/L (ref 135–145)

## 2018-03-07 ENCOUNTER — PATIENT OUTREACH (OUTPATIENT)
Dept: OTHER | Facility: MEDICAL CENTER | Age: 58
End: 2018-03-07

## 2018-03-07 ENCOUNTER — DOCUMENTATION (OUTPATIENT)
Dept: HEMATOLOGY ONCOLOGY | Facility: MEDICAL CENTER | Age: 58
End: 2018-03-07

## 2018-03-07 ENCOUNTER — OUTPATIENT INFUSION SERVICES (OUTPATIENT)
Dept: ONCOLOGY | Facility: MEDICAL CENTER | Age: 58
End: 2018-03-07
Attending: INTERNAL MEDICINE
Payer: MEDICAID

## 2018-03-07 VITALS
WEIGHT: 129.41 LBS | BODY MASS INDEX: 20.31 KG/M2 | RESPIRATION RATE: 18 BRPM | DIASTOLIC BLOOD PRESSURE: 73 MMHG | HEART RATE: 93 BPM | TEMPERATURE: 97.2 F | OXYGEN SATURATION: 100 % | HEIGHT: 67 IN | SYSTOLIC BLOOD PRESSURE: 126 MMHG

## 2018-03-07 DIAGNOSIS — C34.91 SMALL CELL CARCINOMA OF RIGHT LUNG (HCC): ICD-10-CM

## 2018-03-07 LAB
ALBUMIN SERPL BCP-MCNC: 4.1 G/DL (ref 3.2–4.9)
ALBUMIN/GLOB SERPL: 1.4 G/DL
ALP SERPL-CCNC: 53 U/L (ref 30–99)
ALT SERPL-CCNC: 10 U/L (ref 2–50)
ANION GAP SERPL CALC-SCNC: 5 MMOL/L (ref 0–11.9)
ANISOCYTOSIS BLD QL SMEAR: ABNORMAL
AST SERPL-CCNC: 20 U/L (ref 12–45)
BASOPHILS # BLD AUTO: 1.8 % (ref 0–1.8)
BASOPHILS # BLD: 0.06 K/UL (ref 0–0.12)
BILIRUB SERPL-MCNC: 0.4 MG/DL (ref 0.1–1.5)
BUN SERPL-MCNC: 13 MG/DL (ref 8–22)
CALCIUM SERPL-MCNC: 9.5 MG/DL (ref 8.5–10.5)
CHLORIDE SERPL-SCNC: 102 MMOL/L (ref 96–112)
CO2 SERPL-SCNC: 25 MMOL/L (ref 20–33)
CREAT SERPL-MCNC: 0.86 MG/DL (ref 0.5–1.4)
EOSINOPHIL # BLD AUTO: 0.33 K/UL (ref 0–0.51)
EOSINOPHIL NFR BLD: 9.7 % (ref 0–6.9)
ERYTHROCYTE [DISTWIDTH] IN BLOOD BY AUTOMATED COUNT: 51.9 FL (ref 35.9–50)
GLOBULIN SER CALC-MCNC: 2.9 G/DL (ref 1.9–3.5)
GLUCOSE SERPL-MCNC: 85 MG/DL (ref 65–99)
HCT VFR BLD AUTO: 31 % (ref 42–52)
HGB BLD-MCNC: 10.8 G/DL (ref 14–18)
LYMPHOCYTES # BLD AUTO: 0.93 K/UL (ref 1–4.8)
LYMPHOCYTES NFR BLD: 27.4 % (ref 22–41)
MACROCYTES BLD QL SMEAR: ABNORMAL
MAGNESIUM SERPL-MCNC: 1.8 MG/DL (ref 1.5–2.5)
MANUAL DIFF BLD: NORMAL
MCH RBC QN AUTO: 34.5 PG (ref 27–33)
MCHC RBC AUTO-ENTMCNC: 34.8 G/DL (ref 33.7–35.3)
MCV RBC AUTO: 99 FL (ref 81.4–97.8)
MONOCYTES # BLD AUTO: 0.63 K/UL (ref 0–0.85)
MONOCYTES NFR BLD AUTO: 18.6 % (ref 0–13.4)
MORPHOLOGY BLD-IMP: NORMAL
NEUTROPHILS # BLD AUTO: 1.45 K/UL (ref 1.82–7.42)
NEUTROPHILS NFR BLD: 41.6 % (ref 44–72)
NEUTS BAND NFR BLD MANUAL: 0.9 % (ref 0–10)
NRBC # BLD AUTO: 0 K/UL
NRBC BLD-RTO: 0 /100 WBC
OVALOCYTES BLD QL SMEAR: NORMAL
PLATELET # BLD AUTO: 307 K/UL (ref 164–446)
PLATELET BLD QL SMEAR: NORMAL
PMV BLD AUTO: 8.9 FL (ref 9–12.9)
POIKILOCYTOSIS BLD QL SMEAR: NORMAL
POTASSIUM SERPL-SCNC: 4.4 MMOL/L (ref 3.6–5.5)
PROT SERPL-MCNC: 7 G/DL (ref 6–8.2)
RBC # BLD AUTO: 3.13 M/UL (ref 4.7–6.1)
RBC BLD AUTO: PRESENT
SODIUM SERPL-SCNC: 132 MMOL/L (ref 135–145)
WBC # BLD AUTO: 3.4 K/UL (ref 4.8–10.8)

## 2018-03-07 PROCEDURE — 700111 HCHG RX REV CODE 636 W/ 250 OVERRIDE (IP): Performed by: INTERNAL MEDICINE

## 2018-03-07 PROCEDURE — 96367 TX/PROPH/DG ADDL SEQ IV INF: CPT

## 2018-03-07 PROCEDURE — 700105 HCHG RX REV CODE 258: Performed by: INTERNAL MEDICINE

## 2018-03-07 PROCEDURE — 96413 CHEMO IV INFUSION 1 HR: CPT

## 2018-03-07 PROCEDURE — 85007 BL SMEAR W/DIFF WBC COUNT: CPT

## 2018-03-07 PROCEDURE — 96375 TX/PRO/DX INJ NEW DRUG ADDON: CPT

## 2018-03-07 PROCEDURE — 83735 ASSAY OF MAGNESIUM: CPT

## 2018-03-07 PROCEDURE — 85027 COMPLETE CBC AUTOMATED: CPT

## 2018-03-07 PROCEDURE — 304540 HCHG NITRO SET VENT 2ND TUB

## 2018-03-07 PROCEDURE — 96417 CHEMO IV INFUS EACH ADDL SEQ: CPT

## 2018-03-07 PROCEDURE — 700105 HCHG RX REV CODE 258

## 2018-03-07 PROCEDURE — 700111 HCHG RX REV CODE 636 W/ 250 OVERRIDE (IP)

## 2018-03-07 PROCEDURE — 96361 HYDRATE IV INFUSION ADD-ON: CPT

## 2018-03-07 PROCEDURE — 80053 COMPREHEN METABOLIC PANEL: CPT

## 2018-03-07 RX ORDER — SODIUM CHLORIDE 9 MG/ML
500 INJECTION, SOLUTION INTRAVENOUS ONCE
Status: COMPLETED | OUTPATIENT
Start: 2018-03-07 | End: 2018-03-07

## 2018-03-07 RX ORDER — DEXAMETHASONE SODIUM PHOSPHATE 4 MG/ML
12 INJECTION, SOLUTION INTRA-ARTICULAR; INTRALESIONAL; INTRAMUSCULAR; INTRAVENOUS; SOFT TISSUE ONCE
Status: COMPLETED | OUTPATIENT
Start: 2018-03-07 | End: 2018-03-07

## 2018-03-07 RX ORDER — ONDANSETRON 4 MG/1
4 TABLET, ORALLY DISINTEGRATING ORAL ONCE
Status: CANCELLED | OUTPATIENT
Start: 2018-03-09 | End: 2018-03-09

## 2018-03-07 RX ORDER — DEXAMETHASONE 4 MG/1
4 TABLET ORAL ONCE
Status: CANCELLED | OUTPATIENT
Start: 2018-03-08 | End: 2018-03-08

## 2018-03-07 RX ORDER — ONDANSETRON 4 MG/1
4 TABLET, ORALLY DISINTEGRATING ORAL ONCE
Status: CANCELLED | OUTPATIENT
Start: 2018-03-08 | End: 2018-03-08

## 2018-03-07 RX ORDER — DEXAMETHASONE 4 MG/1
4 TABLET ORAL ONCE
Status: CANCELLED | OUTPATIENT
Start: 2018-03-09 | End: 2018-03-09

## 2018-03-07 RX ADMIN — SODIUM CHLORIDE 500 ML: 9 INJECTION, SOLUTION INTRAVENOUS at 11:45

## 2018-03-07 RX ADMIN — ONDANSETRON HYDROCHLORIDE 16 MG: 2 INJECTION, SOLUTION INTRAMUSCULAR; INTRAVENOUS at 12:18

## 2018-03-07 RX ADMIN — SODIUM CHLORIDE 500 ML: 9 INJECTION, SOLUTION INTRAVENOUS at 15:56

## 2018-03-07 RX ADMIN — CISPLATIN 99.6 MG: 1 INJECTION, SOLUTION INTRAVENOUS at 14:38

## 2018-03-07 RX ADMIN — SODIUM CHLORIDE 150 MG: 9 INJECTION, SOLUTION INTRAVENOUS at 13:00

## 2018-03-07 RX ADMIN — ETOPOSIDE 132.8 MG: 20 INJECTION, SOLUTION, CONCENTRATE INTRAVENOUS at 15:55

## 2018-03-07 RX ADMIN — DEXAMETHASONE SODIUM PHOSPHATE 12 MG: 4 INJECTION, SOLUTION INTRAMUSCULAR; INTRAVENOUS at 12:22

## 2018-03-07 ASSESSMENT — PAIN SCALES - GENERAL: PAINLEVEL: NO PAIN

## 2018-03-07 NOTE — PROGRESS NOTES
"Pharmacy Chemotherapy Note     Patient Name: CASSIDY PAGE  Dx: Small cell lung cancer       Protocol:  CISPLATIN + ETOPOSIDE + XRT     *Dosing Reference*  CISplatin  IV over 60 minutes on day 1   -- 3/7/18: dose decreased to 60 mg/m² for tolerance  Etoposide 100 mg/m² IV over 60 minutes on days 1-3   -- 3/7/18: dose decreased to 80 mg/m² for tolerance  21-28 day cycle for 4-6 cycles   MD giving Cycle 2 21 days after Cycle 1, each cycle length to be determined ([facility] plan has 28-day cycles currently)  NCCN Guidelines for Small Cell Lung Cancer.V.2.2017  Ruth Ann AT, et al. N Engl J Med. 199;340-(4):265-71  Moy S, et al. J Clin Oncol. 2002;20(24):9951-72      /73   Pulse 93   Temp 36.2 °C (97.2 °F)   Resp 18   Ht 1.69 m (5' 6.53\")   Wt 58.7 kg (129 lb 6.6 oz)   SpO2 100%   BMI 20.55 kg/m²  Body surface area is 1.66 meters squared.    Labs 3/7/18:  ANC~ 1450   Plt = 307 k     Hgb = 10.8     SCr = 0.86 mg/dL  CrCl ~ 80 mL/min    AST/ALT/AP = 20/10/53 TBili = 0.4   Mag = 1.8   K+ = 4.4        Drug Order   (Drug name, dose, route, IV Fluid & volume, frequency, number of doses) Cycle: 6, Day 1 of 3   Previous treatment: C5 omitted for ALOC; C4 = 1/10-1/12/18   Medication = Cisplatin (Platinol)  Base Dose = 60 mg/m²  Calc Dose: Base Dose x 1.66 m² = 99.6 mg  Final Dose = 99.6 mg  Route = IV  Fluid & Volume =  mL  Admin Duration = Over 60 min   Day 1 only       <5% difference, okay to treat with final dose   Medication = Etoposide (VePesid)  Base Dose = 80 mg/m²  Calc Dose: Base Dose x 1.66 m² = 132.8 mg  Final Dose = 132.8 mg  Route = IV  Fluid & Volume =  mL  Admin Duration = Over 120 min   Days 1-3       <5% difference, okay to treat with final dose     By my signature below, I confirm this process was performed independently with the BSA and all final chemotherapy dosing calculations congruent. I have reviewed the above chemotherapy order and that my calculation of the final dose and " BSA (when applicable) corroborate those calculations of the  pharmacist. Discrepancies of 5% or greater in the written dose have been addressed and documented within the EPIC Progress notes.     Jennifer Bateman, PharmD

## 2018-03-07 NOTE — PROGRESS NOTES
"Chemotherapy Verification - SECONDARY RN       Height = 66.53\"  Weight = 58.7 kg  BSA = 1.66 m2       Medication: Toposar  Dose: 80 mg/m2  Calculated Dose: 132.8 mg                       Medication: Cisplatin  Dose: 60 mg/m2  Calculated Dose: 99.6 mg                             I confirm that this process was performed independently.   "

## 2018-03-07 NOTE — PROGRESS NOTES
Cancer Nurse Navigation follow up:      Discussion: Met with patient in infusion services.  Pt reports feels that he is doing well.  Gave information that last treatment was held and he had low sodium, which caused confusion.  Pt provided information that he moved from the [facility] to new location off [facility].  He stated would make sure infusion had updated information if they did not already.  Pt did state he does not have any bus passes left.  His brother had helped him get current ones he was using.  Pt does have [employer] as transportation benefit, but has had issues with them not showing up and being late.  7 day bus pass had been donated to [facility] and was able to provide to patient from [facility].  Pt very grateful.  Pt discussed that Dr Antonio told him that this most likely would be his last cycle and that he probably won't need the 6th cycle.  Pt has follow up with Dr Antonio in 2 weeks, per patient.  When asked pt did report he is keeping his nutrition up and drinking fluids.  Provided education regarding hydration but also monitoring sodium as to not drop this again.  Pt thinks he has nausea medication, but will check today and follow up with oncologist and nurses tomorrow if he does not.  Pt denies other needs or barriers at this time.  [facility] has been following him and assisting him with additional community resources.    Barriers to Care: transportation    Resources Provided: 7 day bus pass provided    Outcome: No other needs at this time.

## 2018-03-07 NOTE — PROGRESS NOTES
Nutrition Services: Brief Update  Met w/ pt this date to follow-up on current intake and appetite. Pt reports that his intake and appetite has drastically improved and states has eaten more in the past couples weeks compared to when he initially started treatment. Also, observed pt eating nuts and cheese during visit; therefore, no current c/o GI distress, taste changes, nor swallowing difficulty at this time. Pt's wt status improved to a 4# (3.2%) wt gain in the past month. Encouraged pt to continue w/ current intake, and pt reports no further questions at this time. Otherwise, pt appears better nourished, RD to sign off, please re-consult us if any changes in intake or weight occur. RD available PRN.

## 2018-03-07 NOTE — PROGRESS NOTES
"Pharmacy Chemotherapy Calculations Note:    Patient Name: Froylan Spain        Dx: NSCLC, limited stage  Cycle:  5 (deferred one month for ALOC)  Previous treatment: C4 = 1/10-1/12/18     Protocol: CISplatin + Etoposide + XRT  CISplatin 80 mg/m² IV over 60 minutes on day 1    3/7/8:  dose reduced to 60 mg/m² C5 for tolerance per Dr. Antonio  Etoposide 100 mg/m² IV over 60 minutes on days 1-3   3/7/8:  dose reduced to 80 mg/m² C5 for tolerance per Dr. Antonio  Every 21-28 day cycle for 4-6 cycles   - every 28 days in treatment plan but Dr. Costello ok'd for every 21 day for cycle 2.  Future cycles depending on oncologist discretion.     NCCN Guidelines for Small Cell [place] Cancer. V.2.2017.  Ruth Ann AT, et al. N Engl J Med. 199;340-(4):265-71.  Moy S, et al. J Clin Oncol. 2002;20(24):4665-72.  Belkys ARANDA, et al. J Clin Oncol. 1994;12(10):2022-24.  Michael H, et al. J Clin Oncol. 2006;24(33):6043-52.          /73   Pulse 93   Temp 36.2 °C (97.2 °F)   Resp 18   Ht 1.69 m (5' 6.53\")   Wt 58.7 kg (129 lb 6.6 oz)   SpO2 100%   BMI 20.55 kg/m²  Body surface area is 1.66 meters squared.  ANC~ 1450  Plt = 307 k    SCr = 0.86 mg/dL CrCl = ~80 mL/min  LFT's = WNL  TBili = 0.4  Mg = 1.8     CISplatin (Platinol) 60 mg/m² x 1.66 m² = 100 mg   <5% difference, ok to treat with final written dose = 99.6 mg IV on day one only    Etoposide (VePesid) 80 mg/m² x 1.66 m² = 133 mg   <5% difference, ok to treat with final written dose = 132.8 mg IV daily on days 1-3    Chema Albert, PharmD, BCPS             "

## 2018-03-07 NOTE — PROGRESS NOTES
Chemotherapy Verification - PRIMARY RN      Height = 169 cm  Weight = 58.7 kg  BSA = 1.66 m^2       Medication: Etoposide  Dose: 80 mg/m^2  Calculated Dose: 132.8 mg                             (In mg/m2, AUC, mg/kg)     Medication: Cisplatin  Dose: 60 mg/m^2  Calculated Dose: 99.6 mg                             (In mg/m2, AUC, mg/kg)    I confirm this process was performed independently with the BSA and all final chemotherapy dosing calculations congruent.  Any discrepancies of 5% or greater have been addressed with the chemotherapy pharmacist. The resolution of the discrepancy has been documented in the EPIC progress notes.

## 2018-03-08 ENCOUNTER — OUTPATIENT INFUSION SERVICES (OUTPATIENT)
Dept: ONCOLOGY | Facility: MEDICAL CENTER | Age: 58
End: 2018-03-08
Attending: INTERNAL MEDICINE
Payer: MEDICAID

## 2018-03-08 VITALS
SYSTOLIC BLOOD PRESSURE: 123 MMHG | WEIGHT: 132.72 LBS | DIASTOLIC BLOOD PRESSURE: 73 MMHG | HEIGHT: 67 IN | BODY MASS INDEX: 20.83 KG/M2 | OXYGEN SATURATION: 98 % | RESPIRATION RATE: 16 BRPM | HEART RATE: 92 BPM | TEMPERATURE: 98.7 F

## 2018-03-08 DIAGNOSIS — C34.91 SMALL CELL CARCINOMA OF RIGHT LUNG (HCC): ICD-10-CM

## 2018-03-08 PROCEDURE — 700102 HCHG RX REV CODE 250 W/ 637 OVERRIDE(OP): Performed by: INTERNAL MEDICINE

## 2018-03-08 PROCEDURE — 700111 HCHG RX REV CODE 636 W/ 250 OVERRIDE (IP): Performed by: INTERNAL MEDICINE

## 2018-03-08 PROCEDURE — A9270 NON-COVERED ITEM OR SERVICE: HCPCS | Performed by: INTERNAL MEDICINE

## 2018-03-08 PROCEDURE — 96413 CHEMO IV INFUSION 1 HR: CPT

## 2018-03-08 PROCEDURE — 700111 HCHG RX REV CODE 636 W/ 250 OVERRIDE (IP)

## 2018-03-08 PROCEDURE — 304540 HCHG NITRO SET VENT 2ND TUB

## 2018-03-08 PROCEDURE — 700105 HCHG RX REV CODE 258: Performed by: INTERNAL MEDICINE

## 2018-03-08 RX ORDER — DEXAMETHASONE 4 MG/1
4 TABLET ORAL ONCE
Status: COMPLETED | OUTPATIENT
Start: 2018-03-08 | End: 2018-03-08

## 2018-03-08 RX ORDER — ONDANSETRON 4 MG/1
4 TABLET, ORALLY DISINTEGRATING ORAL ONCE
Status: COMPLETED | OUTPATIENT
Start: 2018-03-08 | End: 2018-03-08

## 2018-03-08 RX ADMIN — ETOPOSIDE 134.4 MG: 20 INJECTION, SOLUTION, CONCENTRATE INTRAVENOUS at 16:13

## 2018-03-08 RX ADMIN — ONDANSETRON 4 MG: 4 TABLET, ORALLY DISINTEGRATING ORAL at 15:33

## 2018-03-08 RX ADMIN — DEXAMETHASONE 4 MG: 4 TABLET ORAL at 15:33

## 2018-03-08 ASSESSMENT — PAIN SCALES - GENERAL: PAINLEVEL: NO PAIN

## 2018-03-08 NOTE — PROGRESS NOTES
Patient here Cisplatin/Etoposide. PIV established; labs drawn. Pre-hydration and pre-medications given per MAR. Nurse Navigator, Edie, in to see patient. Cisplatin given per MAR. Etoposide given per MAR. Post-hydration given per MAR. PIV removed; gauze/coban applied to site. Next appointment scheduled. Discharged to self care; no apparent distress noted.

## 2018-03-08 NOTE — PROGRESS NOTES
Chemotherapy Verification - SECONDARY RN       Height = 169 cm  Weight = 60.2 kg  BSA = 1.68 m2       Medication: Etoposide  Dose: 80 mg/m2  Calculated Dose: 134.4 mg                             (In mg/m2, AUC, mg/kg)     I confirm that this process was performed independently.

## 2018-03-08 NOTE — PROGRESS NOTES
"Pharmacy Chemotherapy Note     Patient Name: CASSIDY PAGE  Dx: Small cell lung cancer       Protocol:  CISPLATIN + ETOPOSIDE + XRT     *Dosing Reference*  CISplatin  IV over 60 minutes on day 1   -- 3/7/18: dose decreased to 60 mg/m² for tolerance  Etoposide 100 mg/m² IV over 60 minutes on days 1-3   -- 3/7/18: dose decreased to 80 mg/m² for tolerance  21-28 day cycle for 4-6 cycles   MD giving Cycle 2 21 days after Cycle 1, each cycle length to be determined ([place] plan has 28-day cycles currently)  NCCN Guidelines for Small Cell Lung Cancer.V.2.2017  Ruth Ann AT, et al. N Engl J Med. 199;340-(4):265-71  Moy S, et al. J Clin Oncol. 2002;20(24):4683-72      /73   Pulse 92   Temp 37.1 °C (98.7 °F)   Resp 16   Ht 1.69 m (5' 6.53\")   Wt 60.2 kg (132 lb 11.5 oz)   SpO2 98%   BMI 21.08 kg/m²  Body surface area is 1.68 meters squared.    Labs 3/7/18:  ANC~ 1450   Plt = 307 k     Hgb = 10.8     SCr = 0.86 mg/dL  CrCl ~ 80 mL/min    AST/ALT/AP = 20/10/53 TBili = 0.4   Mag = 1.8   K+ = 4.4        Drug Order   (Drug name, dose, route, IV Fluid & volume, frequency, number of doses) Cycle: 6, Day 2 of 3   Previous treatment: C4 = 1/10-1/12/18, cycle 5 omitted due to ALOC   Medication = Etoposide (VePesid)  Base Dose = 80 mg/m²  Calc Dose: Base Dose x 1.68 m² = 134.4mg  Final Dose = 134.4  mg  Route = IV  Fluid & Volume =  mL  Admin Duration = Over 120 min   Days 1-3       <5% difference, okay to treat with final dose     By my signature below, I confirm this process was performed independently with the BSA and all final chemotherapy dosing calculations congruent. I have reviewed the above chemotherapy order and that my calculation of the final dose and BSA (when applicable) corroborate those calculations of the  pharmacist. Discrepancies of 5% or greater in the written dose have been addressed and documented within the EPIC Progress notes.     Kylie Gutierrez, PharmD      "

## 2018-03-08 NOTE — PROGRESS NOTES
Chemotherapy Verification - PRIMARY RN      Height = 169 cm  Weight = 60.2 kg  BSA = 1.68 m^2       Medication: Etoposide  Dose: 80 mg/m^2  Calculated Dose: 134.4 mg                             (In mg/m2, AUC, mg/kg)     I confirm this process was performed independently with the BSA and all final chemotherapy dosing calculations congruent.  Any discrepancies of 5% or greater have been addressed with the chemotherapy pharmacist. The resolution of the discrepancy has been documented in the EPIC progress notes.

## 2018-03-09 ENCOUNTER — OUTPATIENT INFUSION SERVICES (OUTPATIENT)
Dept: ONCOLOGY | Facility: MEDICAL CENTER | Age: 58
End: 2018-03-09
Attending: INTERNAL MEDICINE
Payer: MEDICAID

## 2018-03-09 VITALS
TEMPERATURE: 98 F | SYSTOLIC BLOOD PRESSURE: 127 MMHG | HEIGHT: 67 IN | HEART RATE: 100 BPM | RESPIRATION RATE: 18 BRPM | OXYGEN SATURATION: 98 % | BODY MASS INDEX: 20.83 KG/M2 | WEIGHT: 132.72 LBS | DIASTOLIC BLOOD PRESSURE: 72 MMHG

## 2018-03-09 DIAGNOSIS — C34.91 SMALL CELL CARCINOMA OF RIGHT LUNG (HCC): ICD-10-CM

## 2018-03-09 PROCEDURE — 700102 HCHG RX REV CODE 250 W/ 637 OVERRIDE(OP): Performed by: INTERNAL MEDICINE

## 2018-03-09 PROCEDURE — 700111 HCHG RX REV CODE 636 W/ 250 OVERRIDE (IP): Performed by: INTERNAL MEDICINE

## 2018-03-09 PROCEDURE — 96413 CHEMO IV INFUSION 1 HR: CPT

## 2018-03-09 PROCEDURE — 700111 HCHG RX REV CODE 636 W/ 250 OVERRIDE (IP)

## 2018-03-09 PROCEDURE — A9270 NON-COVERED ITEM OR SERVICE: HCPCS | Performed by: INTERNAL MEDICINE

## 2018-03-09 PROCEDURE — 700105 HCHG RX REV CODE 258: Performed by: INTERNAL MEDICINE

## 2018-03-09 PROCEDURE — 304540 HCHG NITRO SET VENT 2ND TUB

## 2018-03-09 RX ORDER — ONDANSETRON 4 MG/1
4 TABLET, ORALLY DISINTEGRATING ORAL ONCE
Status: COMPLETED | OUTPATIENT
Start: 2018-03-09 | End: 2018-03-09

## 2018-03-09 RX ORDER — DEXAMETHASONE 4 MG/1
4 TABLET ORAL ONCE
Status: COMPLETED | OUTPATIENT
Start: 2018-03-09 | End: 2018-03-09

## 2018-03-09 RX ADMIN — ONDANSETRON 4 MG: 4 TABLET, ORALLY DISINTEGRATING ORAL at 15:42

## 2018-03-09 RX ADMIN — DEXAMETHASONE 4 MG: 4 TABLET ORAL at 15:41

## 2018-03-09 RX ADMIN — ETOPOSIDE 134.4 MG: 20 INJECTION, SOLUTION, CONCENTRATE INTRAVENOUS at 16:25

## 2018-03-09 ASSESSMENT — PAIN SCALES - GENERAL: PAINLEVEL: NO PAIN

## 2018-03-09 NOTE — PROGRESS NOTES
Patient here Etoposide D2. PIV established; labs drawn. Pre-medications given per MAR. Etoposide given per MAR. Post-hydration given per MAR. PIV removed; gauze/coban applied to site. Next appointment scheduled. Discharged to self care; no apparent distress noted.

## 2018-03-09 NOTE — PROGRESS NOTES
"Pharmacy Chemotherapy Note     Patient Name: CASSIDY PAGE  Dx: Small cell lung cancer       Protocol:  CISPLATIN + ETOPOSIDE + XRT     *Dosing Reference*  CISplatin  IV over 60 minutes on day 1   -- 3/7/18: dose decreased to 60 mg/m² for tolerance  Etoposide 100 mg/m² IV over 60 minutes on days 1-3   -- 3/7/18: dose decreased to 80 mg/m² for tolerance  21-28 day cycle for 4-6 cycles   MD giving Cycle 2 21 days after Cycle 1, each cycle length to be determined ([place] plan has 28-day cycles currently)  NCCN Guidelines for Small Cell Lung Cancer.V.2.2017  Ruth Ann AT, et al. N Engl J Med. 199;340-(4):265-71  Moy S, et al. J Clin Oncol. 2002;20(24):4970-72      /72   Pulse 100   Temp 36.7 °C (98 °F)   Resp 18   Ht 1.69 m (5' 6.53\")   Wt 60.2 kg (132 lb 11.5 oz)   SpO2 98%   BMI 21.08 kg/m²  Body surface area is 1.68 meters squared.    Labs 3/7/18:  ANC~ 1450   Plt = 307 k     Hgb = 10.8     SCr = 0.86 mg/dL  CrCl ~ 80 mL/min    AST/ALT/AP = 20/10/53 TBili = 0.4   Mag = 1.8   K+ = 4.4        Drug Order   (Drug name, dose, route, IV Fluid & volume, frequency, number of doses) Cycle: 6, Day 3 of 3   Previous treatment: C4 = 1/10-1/12/18, cycle 5 omitted due to ALOC   Medication = Etoposide (VePesid)  Base Dose = 80 mg/m²  Calc Dose: Base Dose x 1.68 m² = 134.4 mg  Final Dose = 134.4  mg  Route = IV  Fluid & Volume =  mL  Admin Duration = Over 120 min   Days 1-3       <5% difference, okay to treat with final dose     By my signature below, I confirm this process was performed independently with the BSA and all final chemotherapy dosing calculations congruent. I have reviewed the above chemotherapy order and that my calculation of the final dose and BSA (when applicable) corroborate those calculations of the  pharmacist. Discrepancies of 5% or greater in the written dose have been addressed and documented within the EPIC Progress notes.     Kylie Gutierrez, PharmD      "

## 2018-03-09 NOTE — PROGRESS NOTES
Chemotherapy Verification - SECONDARY RN       Height = 66.53 in  Weight = 132 lb  BSA = 1.68 m2       Medication: Etoposide  Dose: 80 mg/m2 Calculated Dose: 134 4 mg                             (In mg/m2, AUC, mg/kg)       I confirm that this process was performed independently.

## 2018-03-10 NOTE — PROGRESS NOTES
Patient here Etoposide D3. PIV established; labs drawn. Pre-medications given per MAR. Etoposide given per MAR. Post-hydration given per MAR. PIV removed; gauze/coban applied to site. States that this will be his last cycle of chemotherapy. Next appointment scheduled. Discharged to self care; no apparent distress noted.

## 2018-03-14 ENCOUNTER — OUTPATIENT INFUSION SERVICES (OUTPATIENT)
Dept: ONCOLOGY | Facility: MEDICAL CENTER | Age: 58
End: 2018-03-14
Attending: INTERNAL MEDICINE
Payer: MEDICAID

## 2018-03-14 VITALS
TEMPERATURE: 97.6 F | BODY MASS INDEX: 20.28 KG/M2 | OXYGEN SATURATION: 100 % | HEIGHT: 67 IN | DIASTOLIC BLOOD PRESSURE: 63 MMHG | HEART RATE: 98 BPM | SYSTOLIC BLOOD PRESSURE: 133 MMHG | WEIGHT: 129.19 LBS | RESPIRATION RATE: 18 BRPM

## 2018-03-14 DIAGNOSIS — C34.10 MALIGNANT NEOPLASM OF UPPER LOBE OF LUNG, UNSPECIFIED LATERALITY (HCC): ICD-10-CM

## 2018-03-14 LAB
BASOPHILS # BLD AUTO: 0.6 % (ref 0–1.8)
BASOPHILS # BLD: 0.03 K/UL (ref 0–0.12)
EOSINOPHIL # BLD AUTO: 0.1 K/UL (ref 0–0.51)
EOSINOPHIL NFR BLD: 1.9 % (ref 0–6.9)
ERYTHROCYTE [DISTWIDTH] IN BLOOD BY AUTOMATED COUNT: 45.4 FL (ref 35.9–50)
HCT VFR BLD AUTO: 26 % (ref 42–52)
HGB BLD-MCNC: 9.4 G/DL (ref 14–18)
IMM GRANULOCYTES # BLD AUTO: 0.05 K/UL (ref 0–0.11)
IMM GRANULOCYTES NFR BLD AUTO: 0.9 % (ref 0–0.9)
LYMPHOCYTES # BLD AUTO: 0.63 K/UL (ref 1–4.8)
LYMPHOCYTES NFR BLD: 11.8 % (ref 22–41)
MCH RBC QN AUTO: 34.6 PG (ref 27–33)
MCHC RBC AUTO-ENTMCNC: 36.2 G/DL (ref 33.7–35.3)
MCV RBC AUTO: 95.6 FL (ref 81.4–97.8)
MONOCYTES # BLD AUTO: 0.25 K/UL (ref 0–0.85)
MONOCYTES NFR BLD AUTO: 4.7 % (ref 0–13.4)
NEUTROPHILS # BLD AUTO: 4.3 K/UL (ref 1.82–7.42)
NEUTROPHILS NFR BLD: 80.1 % (ref 44–72)
NRBC # BLD AUTO: 0 K/UL
NRBC BLD-RTO: 0 /100 WBC
PLATELET # BLD AUTO: 174 K/UL (ref 164–446)
PMV BLD AUTO: 9 FL (ref 9–12.9)
RBC # BLD AUTO: 2.72 M/UL (ref 4.7–6.1)
WBC # BLD AUTO: 5.4 K/UL (ref 4.8–10.8)

## 2018-03-14 PROCEDURE — 36415 COLL VENOUS BLD VENIPUNCTURE: CPT

## 2018-03-14 PROCEDURE — 85025 COMPLETE CBC W/AUTO DIFF WBC: CPT

## 2018-03-14 ASSESSMENT — PAIN SCALES - GENERAL: PAINLEVEL: NO PAIN

## 2018-03-15 ENCOUNTER — OUTPATIENT INFUSION SERVICES (OUTPATIENT)
Dept: ONCOLOGY | Facility: MEDICAL CENTER | Age: 58
End: 2018-03-15
Attending: INTERNAL MEDICINE
Payer: MEDICAID

## 2018-03-15 VITALS
TEMPERATURE: 97.5 F | HEIGHT: 67 IN | BODY MASS INDEX: 20.62 KG/M2 | SYSTOLIC BLOOD PRESSURE: 137 MMHG | DIASTOLIC BLOOD PRESSURE: 82 MMHG | HEART RATE: 91 BPM | RESPIRATION RATE: 18 BRPM | WEIGHT: 131.39 LBS | OXYGEN SATURATION: 100 %

## 2018-03-15 DIAGNOSIS — C34.10 MALIGNANT NEOPLASM OF UPPER LOBE OF LUNG, UNSPECIFIED LATERALITY (HCC): ICD-10-CM

## 2018-03-15 PROCEDURE — 700111 HCHG RX REV CODE 636 W/ 250 OVERRIDE (IP): Performed by: PHYSICIAN ASSISTANT

## 2018-03-15 PROCEDURE — 96372 THER/PROPH/DIAG INJ SC/IM: CPT

## 2018-03-15 RX ORDER — PROCHLORPERAZINE MALEATE 10 MG
TABLET ORAL
Refills: 1 | COMMUNITY
Start: 2018-03-09 | End: 2018-05-01

## 2018-03-15 RX ADMIN — FILGRASTIM 480 MCG: 480 INJECTION, SOLUTION INTRAVENOUS; SUBCUTANEOUS at 14:56

## 2018-03-15 ASSESSMENT — PAIN SCALES - GENERAL: PAINLEVEL: NO PAIN

## 2018-03-15 NOTE — PROGRESS NOTES
Patient presents ambulatory for CBC possible neupogen.  Patient reports feeling well and denies any s/s of infection at this time.  CBC collected via 25 g butterfly, gauze, and coban applied to site.  ANC 4300, call to MD office, per nursing admin no neupogen today.  Patient to return 3/15/18, confirmed with patient.    After patient left KEIKO Allen called and patient is to have neupogen for the next four days and no CBC necessary.

## 2018-03-15 NOTE — PROGRESS NOTES
Pt to [facility] for neupogen injection.  Pt with no complaints at this time, denies sx of infection.  Neupogen injection given in back of right arm, pt tolerated well, band-aid applied.  Pt left in NAD, to return tomorrow for subsequent injection, pt reminded of time.

## 2018-03-20 ENCOUNTER — TELEPHONE (OUTPATIENT)
Dept: ONCOLOGY | Facility: MEDICAL CENTER | Age: 58
End: 2018-03-20

## 2018-03-21 NOTE — TELEPHONE ENCOUNTER
Last seen: 12/26/17 by Dr. Barlow  Next appt: 06/19/18 with Dr. Barlow     Asking for vitamin b-1 for refill not on pt's med list     Was the patient seen in the last year in this department? Yes   Does patient have an active prescription for medications requested? No   Received Request Via: Pharmacy

## 2018-03-26 RX ORDER — ACETAMINOPHEN 325 MG/1
650 TABLET ORAL EVERY 6 HOURS PRN
Qty: 90 TAB | Refills: 0 | Status: SHIPPED | OUTPATIENT
Start: 2018-03-26

## 2018-03-26 RX ORDER — FOLIC ACID 1 MG/1
1 TABLET ORAL DAILY
Qty: 30 TAB | Refills: 0 | Status: SHIPPED | OUTPATIENT
Start: 2018-03-26

## 2018-03-26 RX ORDER — MIDODRINE HYDROCHLORIDE 5 MG/1
5 TABLET ORAL
Qty: 90 TAB | Refills: 0 | Status: SHIPPED | OUTPATIENT
Start: 2018-03-26

## 2018-03-28 ENCOUNTER — PATIENT OUTREACH (OUTPATIENT)
Dept: OTHER | Facility: MEDICAL CENTER | Age: 58
End: 2018-03-28

## 2018-03-28 NOTE — PROGRESS NOTES
Cancer nurse navigation follow up.  Call placed to both numbers in chart.  Unable to get ahold of patient or leave message.

## 2018-03-30 NOTE — PROGRESS NOTES
"Call placed to patient for follow up.  Was provided with information that patient was just moved to [address] this am.  Number of [phone] was provided.  Call placed to patient.  Pt reports he is doing well.  He is anxious to be able to go back home to [place] but wants to complete what he \"needs to do\".  Pt is aware of CT scan on April 16th.  Pt reports does not have follow up with Dr Antonio scheduled yet, but he did just recently see him.  Looks like patient scheduled for follow up with radiation oncology first week of May.  Will see if that follow up can be moved up.  Pt would greatly appreciate that.  Pt denies issues or concerns at this time.  Is aware of discussion regarding radiation to his brain as \"preventative\" measure.  Call placed to radiation oncology .  They will reach out to patient to move appointment up sooner.  "

## 2018-04-16 ENCOUNTER — HOSPITAL ENCOUNTER (OUTPATIENT)
Dept: RADIOLOGY | Facility: MEDICAL CENTER | Age: 58
End: 2018-04-16
Attending: INTERNAL MEDICINE
Payer: MEDICAID

## 2018-04-16 DIAGNOSIS — C34.92 SQUAMOUS CARCINOMA OF LUNG, LEFT (HCC): ICD-10-CM

## 2018-04-16 PROCEDURE — 74160 CT ABDOMEN W/CONTRAST: CPT

## 2018-04-16 PROCEDURE — 700117 HCHG RX CONTRAST REV CODE 255: Performed by: INTERNAL MEDICINE

## 2018-04-16 RX ADMIN — IOHEXOL 100 ML: 350 INJECTION, SOLUTION INTRAVENOUS at 14:51

## 2018-04-16 RX ADMIN — IOHEXOL 50 ML: 240 INJECTION, SOLUTION INTRATHECAL; INTRAVASCULAR; INTRAVENOUS; ORAL at 14:51

## 2018-04-20 NOTE — PROGRESS NOTES
Leandro from Lab called with critical result of Sodium of 113. Critical lab result read back to Leandro.   Dr. Casper notified of critical lab result at 2140.  Critical lab result read back by Dr. Casper.     Orders received to stop Rally bag infusion and update MD on next BMP result.    yes

## 2018-05-01 ENCOUNTER — HOSPITAL ENCOUNTER (OUTPATIENT)
Dept: RADIATION ONCOLOGY | Facility: MEDICAL CENTER | Age: 58
End: 2018-05-31
Attending: RADIOLOGY
Payer: MEDICAID

## 2018-05-01 ENCOUNTER — TELEPHONE (OUTPATIENT)
Dept: INTERNAL MEDICINE | Facility: MEDICAL CENTER | Age: 58
End: 2018-05-01

## 2018-05-01 VITALS
DIASTOLIC BLOOD PRESSURE: 75 MMHG | BODY MASS INDEX: 19.77 KG/M2 | TEMPERATURE: 97.9 F | OXYGEN SATURATION: 100 % | WEIGHT: 124.5 LBS | HEART RATE: 105 BPM | SYSTOLIC BLOOD PRESSURE: 106 MMHG

## 2018-05-01 DIAGNOSIS — C34.90 SMALL CELL CARCINOMA OF LUNG (HCC): ICD-10-CM

## 2018-05-01 PROCEDURE — 99214 OFFICE O/P EST MOD 30 MIN: CPT | Performed by: RADIOLOGY

## 2018-05-01 PROCEDURE — 99212 OFFICE O/P EST SF 10 MIN: CPT | Performed by: RADIOLOGY

## 2018-05-01 RX ORDER — MEMANTINE HYDROCHLORIDE 10 MG/1
10 TABLET ORAL 2 TIMES DAILY
Qty: 60 TAB | Refills: 5 | Status: SHIPPED | OUTPATIENT
Start: 2018-05-01

## 2018-05-01 ASSESSMENT — PAIN SCALES - GENERAL: PAINLEVEL: NO PAIN

## 2018-05-01 NOTE — TELEPHONE ENCOUNTER
Medication requesting for a refill after hospital visit:  Midobrine 5 mh 1 po bid with meals  Vitamin B-1 1 po qd   Thera tab 1 po qd     Patient has appt on 6/19/18 for follow up. Called placed by home health nurse.   Patient ph:[phone]

## 2018-05-01 NOTE — NON-PROVIDER
Patient was seen today in clinic with Dr. Arango for follow up.  Vitals signs and weight were obtained and pain assessment was completed.  Allergies and medications were reviewed with the patient.  Review of systems completed.     Vitals/Pain:  Vitals:    05/01/18 1002   BP: 106/75   Pulse: (!) 105   Temp: 36.6 °C (97.9 °F)   SpO2: 100%   Weight: 56.5 kg (124 lb 8 oz)   Pain Score: No pain        Allergies:   Patient has no known allergies.    Current Medications:  Current Outpatient Prescriptions   Medication Sig Dispense Refill   • folic acid (FOLVITE) 1 MG Tab Take 1 Tab by mouth every day. 30 Tab 0   • midodrine (PROAMATINE) 5 MG Tab Take 1 Tab by mouth 3 times a day, with meals. 90 Tab 0   • multivitamin (THERAGRAN) Tab Take 1 Tab by mouth every day. 30 Tab 0   • acetaminophen (TYLENOL) 325 MG Tab Take 2 Tabs by mouth every 6 hours as needed for Moderate Pain (Mild Pain; (Pain scale 1-3); Temp greater than 100.5 F). 90 Tab 0   • prochlorperazine (COMPAZINE) 10 MG Tab   1   • thiamine (THIAMINE) 100 MG tablet Take 1 Tab by mouth every day. 30 Tab 1   • benzocaine-menthol (CEPACOL) 15-3.6 MG Lozenge Spray 1 Lozenge in mouth/throat every 2 hours as needed. 30 Lozenge 0   • vitamin D 5000 units Tab Take 5,000 Units by mouth every day. 60 Tab 1   • Alum & Mag Hydroxide-Simeth (MBX) Suspension Take 5 mL by mouth every 6 hours as needed (radiation induced esophagitits). 1 Bottle 0     No current facility-administered medications for this encounter.          PCP:  Yen Wilburn, Med Ass't

## 2018-05-04 ENCOUNTER — HOSPITAL ENCOUNTER (OUTPATIENT)
Dept: RADIATION ONCOLOGY | Facility: MEDICAL CENTER | Age: 58
End: 2018-05-04

## 2018-05-04 DIAGNOSIS — C34.90 SMALL CELL LUNG CANCER (HCC): ICD-10-CM

## 2018-05-04 PROCEDURE — 77263 THER RADIOLOGY TX PLNG CPLX: CPT | Performed by: RADIOLOGY

## 2018-05-04 PROCEDURE — 77290 THER RAD SIMULAJ FIELD CPLX: CPT | Performed by: RADIOLOGY

## 2018-05-04 PROCEDURE — 77290 THER RAD SIMULAJ FIELD CPLX: CPT | Mod: 26 | Performed by: RADIOLOGY

## 2018-05-04 PROCEDURE — 77334 RADIATION TREATMENT AID(S): CPT | Performed by: RADIOLOGY

## 2018-05-04 PROCEDURE — 77334 RADIATION TREATMENT AID(S): CPT | Mod: 26,XU | Performed by: RADIOLOGY

## 2018-05-04 PROCEDURE — 77470 SPECIAL RADIATION TREATMENT: CPT | Mod: 26 | Performed by: RADIOLOGY

## 2018-05-04 RX ORDER — MIDODRINE HYDROCHLORIDE 10 MG/1
5 TABLET ORAL
Qty: 90 TAB | Refills: 3 | Status: SHIPPED | OUTPATIENT
Start: 2018-05-04

## 2018-05-04 RX ORDER — M-VIT,TX,IRON,MINS/CALC/FOLIC 27MG-0.4MG
1 TABLET ORAL DAILY
Qty: 30 TAB | Refills: 11 | Status: SHIPPED | OUTPATIENT
Start: 2018-05-04

## 2018-05-04 RX ORDER — THIAMINE MONONITRATE (VIT B1) 100 MG
100 TABLET ORAL DAILY
Qty: 30 TAB | Refills: 3 | Status: SHIPPED | OUTPATIENT
Start: 2018-05-04

## 2018-05-07 PROCEDURE — 77295 3-D RADIOTHERAPY PLAN: CPT | Mod: 26 | Performed by: RADIOLOGY

## 2018-05-07 PROCEDURE — 77334 RADIATION TREATMENT AID(S): CPT | Performed by: RADIOLOGY

## 2018-05-07 PROCEDURE — 77300 RADIATION THERAPY DOSE PLAN: CPT | Mod: 26 | Performed by: RADIOLOGY

## 2018-05-07 PROCEDURE — 77300 RADIATION THERAPY DOSE PLAN: CPT | Performed by: RADIOLOGY

## 2018-05-07 PROCEDURE — 77334 RADIATION TREATMENT AID(S): CPT | Mod: 26 | Performed by: RADIOLOGY

## 2018-05-07 PROCEDURE — 77295 3-D RADIOTHERAPY PLAN: CPT | Performed by: RADIOLOGY

## 2018-05-09 PROCEDURE — 77412 RADIATION TX DELIVERY LVL 3: CPT | Performed by: RADIOLOGY

## 2018-05-09 PROCEDURE — 77280 THER RAD SIMULAJ FIELD SMPL: CPT | Mod: 26 | Performed by: RADIOLOGY

## 2018-05-09 PROCEDURE — 77280 THER RAD SIMULAJ FIELD SMPL: CPT | Performed by: RADIOLOGY

## 2018-05-10 ENCOUNTER — HOSPITAL ENCOUNTER (OUTPATIENT)
Dept: RADIATION ONCOLOGY | Facility: MEDICAL CENTER | Age: 58
End: 2018-05-10

## 2018-05-10 PROCEDURE — 77412 RADIATION TX DELIVERY LVL 3: CPT | Performed by: RADIOLOGY

## 2018-05-10 PROCEDURE — 77014 PR CT GUIDANCE PLACEMENT RAD THERAPY FIELDS: CPT | Mod: 26 | Performed by: RADIOLOGY

## 2018-05-10 PROCEDURE — 77387 GUIDANCE FOR RADJ TX DLVR: CPT | Performed by: RADIOLOGY

## 2018-05-11 ENCOUNTER — HOSPITAL ENCOUNTER (OUTPATIENT)
Dept: RADIATION ONCOLOGY | Facility: MEDICAL CENTER | Age: 58
End: 2018-05-11

## 2018-05-11 PROCEDURE — 77412 RADIATION TX DELIVERY LVL 3: CPT | Performed by: RADIOLOGY

## 2018-05-11 PROCEDURE — 77336 RADIATION PHYSICS CONSULT: CPT | Performed by: RADIOLOGY

## 2018-05-11 PROCEDURE — 77014 PR CT GUIDANCE PLACEMENT RAD THERAPY FIELDS: CPT | Mod: 26 | Performed by: RADIOLOGY

## 2018-05-11 PROCEDURE — 77387 GUIDANCE FOR RADJ TX DLVR: CPT | Performed by: RADIOLOGY

## 2018-05-14 ENCOUNTER — HOSPITAL ENCOUNTER (OUTPATIENT)
Dept: RADIATION ONCOLOGY | Facility: MEDICAL CENTER | Age: 58
End: 2018-05-14

## 2018-05-14 PROCEDURE — 77014 PR CT GUIDANCE PLACEMENT RAD THERAPY FIELDS: CPT | Mod: 26 | Performed by: RADIOLOGY

## 2018-05-14 PROCEDURE — 77387 GUIDANCE FOR RADJ TX DLVR: CPT | Performed by: RADIOLOGY

## 2018-05-14 PROCEDURE — 77412 RADIATION TX DELIVERY LVL 3: CPT | Performed by: RADIOLOGY

## 2018-05-15 ENCOUNTER — HOSPITAL ENCOUNTER (OUTPATIENT)
Dept: RADIATION ONCOLOGY | Facility: MEDICAL CENTER | Age: 58
End: 2018-05-15

## 2018-05-15 PROCEDURE — 77412 RADIATION TX DELIVERY LVL 3: CPT | Performed by: RADIOLOGY

## 2018-05-15 PROCEDURE — 77427 RADIATION TX MANAGEMENT X5: CPT | Performed by: RADIOLOGY

## 2018-05-15 PROCEDURE — 77387 GUIDANCE FOR RADJ TX DLVR: CPT | Performed by: RADIOLOGY

## 2018-05-15 PROCEDURE — 77014 PR CT GUIDANCE PLACEMENT RAD THERAPY FIELDS: CPT | Mod: 26 | Performed by: RADIOLOGY

## 2018-05-16 PROCEDURE — 77412 RADIATION TX DELIVERY LVL 3: CPT | Performed by: RADIOLOGY

## 2018-05-16 PROCEDURE — 77014 PR CT GUIDANCE PLACEMENT RAD THERAPY FIELDS: CPT | Mod: 26 | Performed by: RADIOLOGY

## 2018-05-16 PROCEDURE — 77387 GUIDANCE FOR RADJ TX DLVR: CPT | Performed by: RADIOLOGY

## 2018-05-17 ENCOUNTER — HOSPITAL ENCOUNTER (OUTPATIENT)
Dept: RADIATION ONCOLOGY | Facility: MEDICAL CENTER | Age: 58
End: 2018-05-17

## 2018-05-17 PROCEDURE — 77412 RADIATION TX DELIVERY LVL 3: CPT | Performed by: RADIOLOGY

## 2018-05-17 PROCEDURE — 77014 PR CT GUIDANCE PLACEMENT RAD THERAPY FIELDS: CPT | Mod: 26 | Performed by: RADIOLOGY

## 2018-05-17 PROCEDURE — 77387 GUIDANCE FOR RADJ TX DLVR: CPT | Performed by: RADIOLOGY

## 2018-05-18 ENCOUNTER — HOSPITAL ENCOUNTER (OUTPATIENT)
Dept: RADIATION ONCOLOGY | Facility: MEDICAL CENTER | Age: 58
End: 2018-05-18

## 2018-05-18 PROCEDURE — 77014 PR CT GUIDANCE PLACEMENT RAD THERAPY FIELDS: CPT | Mod: 26 | Performed by: RADIOLOGY

## 2018-05-18 PROCEDURE — 77412 RADIATION TX DELIVERY LVL 3: CPT | Performed by: RADIOLOGY

## 2018-05-18 PROCEDURE — 77336 RADIATION PHYSICS CONSULT: CPT | Performed by: RADIOLOGY

## 2018-05-18 PROCEDURE — 77387 GUIDANCE FOR RADJ TX DLVR: CPT | Performed by: RADIOLOGY

## 2018-05-21 ENCOUNTER — HOSPITAL ENCOUNTER (OUTPATIENT)
Dept: RADIATION ONCOLOGY | Facility: MEDICAL CENTER | Age: 58
End: 2018-05-21

## 2018-05-21 PROCEDURE — 77014 PR CT GUIDANCE PLACEMENT RAD THERAPY FIELDS: CPT | Mod: 26 | Performed by: RADIOLOGY

## 2018-05-21 PROCEDURE — 77387 GUIDANCE FOR RADJ TX DLVR: CPT | Performed by: RADIOLOGY

## 2018-05-21 PROCEDURE — 77412 RADIATION TX DELIVERY LVL 3: CPT | Performed by: RADIOLOGY

## 2018-05-22 ENCOUNTER — APPOINTMENT (OUTPATIENT)
Dept: RADIOLOGY | Facility: MEDICAL CENTER | Age: 58
End: 2018-05-22
Attending: RADIOLOGY
Payer: MEDICAID

## 2018-05-22 ENCOUNTER — HOSPITAL ENCOUNTER (OUTPATIENT)
Dept: RADIATION ONCOLOGY | Facility: MEDICAL CENTER | Age: 58
End: 2018-05-22

## 2018-05-22 PROCEDURE — 77412 RADIATION TX DELIVERY LVL 3: CPT | Performed by: RADIOLOGY

## 2018-05-22 PROCEDURE — 77387 GUIDANCE FOR RADJ TX DLVR: CPT | Performed by: RADIOLOGY

## 2018-05-22 PROCEDURE — 77427 RADIATION TX MANAGEMENT X5: CPT | Performed by: RADIOLOGY

## 2018-05-22 PROCEDURE — 77014 PR CT GUIDANCE PLACEMENT RAD THERAPY FIELDS: CPT | Mod: 26 | Performed by: RADIOLOGY

## 2018-07-23 ENCOUNTER — PATIENT OUTREACH (OUTPATIENT)
Dept: OTHER | Facility: MEDICAL CENTER | Age: 58
End: 2018-07-23

## 2018-07-23 NOTE — PROGRESS NOTES
"Pt did not show up for his radiation oncology follow up yesterday.  Was going to provide patient with survivorship care plan if most recent CT scan with no evidence of progression.    Call placed to patient, left message.    Call placed to \"cell\" number ([phone]), was number of group house he was staying out.  Information provided that he is no longer staying there.  "

## 2018-07-31 NOTE — PROGRESS NOTES
"Attempt to reach patient's brother at [phone] and received message that it is no longer a working number.  Unable to contact patient outside of cell number, pt has not returned call.    2nd attempt to reach patient, received message that \"call did not go through\"  "

## 2018-08-08 ENCOUNTER — PATIENT OUTREACH (OUTPATIENT)
Dept: OTHER | Facility: MEDICAL CENTER | Age: 58
End: 2018-08-08

## 2018-08-08 NOTE — PROGRESS NOTES
No current number to contact patient.  Email sent to let patient know regarding follow up with radiation therapy.  Unable to deliver treatment summary.  Pt has completed treatment and only follow up at this point.  No further cancer nurse navigation needed.
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
## (undated) DEVICE — GOWN SURGEONS LARGE - (32/CA)

## (undated) DEVICE — GLOVE BIOGEL INDICATOR SZ 6.5 SURGICAL PF LTX - (50PR/BX 4BX/CA)

## (undated) DEVICE — CATHETER IV 20 GA X 1-1/4 ---SURG.& SDS ONLY--- (50EA/BX)

## (undated) DEVICE — NEPTUNE 4 PORT MANIFOLD - (20/PK)

## (undated) DEVICE — CHLORAPREP 26 ML APPLICATOR - ORANGE TINT(25/CA)

## (undated) DEVICE — MASK ANESTHESIA ADULT  - (100/CA)

## (undated) DEVICE — GLOVE BIOGEL PI INDICATOR SZ 6.5 SURGICAL PF LF - (50/BX 4BX/CA)

## (undated) DEVICE — KIT ROOM DECONTAMINATION

## (undated) DEVICE — TUBE CONNECTING SUCTION - CLEAR PLASTIC STERILE 72 IN (50EA/CA)

## (undated) DEVICE — PACK ENT OR - (2EA/CA)

## (undated) DEVICE — SET EXTENSION WITH 2 PORTS (48EA/CA) ***PART #2C8610 IS A SUBSTITUTE*****

## (undated) DEVICE — SET LEADWIRE 5 LEAD BEDSIDE DISPOSABLE ECG (1SET OF 5/EA)

## (undated) DEVICE — KIT  I.V. START (100EA/CA)

## (undated) DEVICE — NEEDLE BIOPSY 22G W/ LOCKABLE SYRINGE FOR EBUS (5EA/BX)

## (undated) DEVICE — SPONGE GAUZESTER. 2X2 4-PL - (2/PK 50PK/BX 30BX/CS)

## (undated) DEVICE — TUBING CLEARLINK DUO-VENT - C-FLO (48EA/CA)

## (undated) DEVICE — KIT ANESTHESIA W/CIRCUIT & 3/LT BAG W/FILTER (20EA/CA)

## (undated) DEVICE — BLADE SURGICAL #15 - (50/BX 3BX/CA)

## (undated) DEVICE — HEMOSTAT SURG ABSORBABLE - 4 X 8 IN SURGICEL (24EA/CA)

## (undated) DEVICE — SLEEVE, VASO, THIGH, MED

## (undated) DEVICE — SHEET THYROID - (10EA/CA)

## (undated) DEVICE — GLOVE BIOGEL SZ 7.5 SURGICAL PF LTX - (50PR/BX 4BX/CA)

## (undated) DEVICE — PACK MINOR BASIN - (2EA/CA)

## (undated) DEVICE — CON SEDATION/>5 YR 1ST 15 MIN

## (undated) DEVICE — CANISTER SUCTION RIGID RED 1500CC (40EA/CA)

## (undated) DEVICE — GLOVE BIOGEL SZ 6.5 SURGICAL PF LTX (50PR/BX 4BX/CA)

## (undated) DEVICE — PROTECTOR ULNA NERVE - (36PR/CA)

## (undated) DEVICE — SYRINGE 6 CC 20 GA X 1 1/2 - NDL SAFETY  (50/BX)

## (undated) DEVICE — GLOVE BIOGEL INDICATOR SZ 7.5 SURGICAL PF LTX - (50PR/BX 4BX/CA)

## (undated) DEVICE — SODIUM CHL IRRIGATION 0.9% 1000ML (12EA/CA)

## (undated) DEVICE — HEAD HOLDER JUNIOR/ADULT

## (undated) DEVICE — BLADE SURGICAL CLIPPER - (50EA/CA)

## (undated) DEVICE — DRESSING NON ADHERENT 3 X 4 - STERILE (100/BX 12BX/CA)

## (undated) DEVICE — SUTURE 3-0 VICRYL PLUS SH - 27 INCH (36/BX)

## (undated) DEVICE — SENSOR SPO2 NEO LNCS ADHESIVE (20/BX) SEE USER NOTES

## (undated) DEVICE — TEETHGUARD ENT -2BX MIN ORDER- (6EA/BX)

## (undated) DEVICE — ELECTRODE 850 FOAM ADHESIVE - HYDROGEL RADIOTRNSPRNT (50/PK)

## (undated) DEVICE — STAPLER SKIN DISP - (6/BX 10BX/CA) VISISTAT

## (undated) DEVICE — CONNECTOR ELBOW  DOUBLE SWIVEL W/ 7.6MM AND 9.5MM PORT

## (undated) DEVICE — WATER IRRIGATION STERILE 1000ML (12EA/CA)

## (undated) DEVICE — SPONGE KITTNER DISSECTORS - (5EA/PK 50PK/CA)

## (undated) DEVICE — TUBE E-T HI-LO CUFF 7.0MM (10EA/PK)

## (undated) DEVICE — GOWN SURGEONS X-LARGE - DISP. (30/CA)

## (undated) DEVICE — SUCTION INSTRUMENT YANKAUER BULBOUS TIP W/O VENT (50EA/CA)

## (undated) DEVICE — MEDICINE CUP STERILE 2 OZ - (100/CA)

## (undated) DEVICE — SUTURE 5-0 VICRYL PLUS P-3 18 (36PK/BX)"

## (undated) DEVICE — LACTATED RINGERS INJ 1000 ML - (14EA/CA 60CA/PF)

## (undated) DEVICE — ELECTRODE DUAL RETURN W/ CORD - (50/PK)

## (undated) DEVICE — CANISTER SUCTION 3000ML MECHANICAL FILTER AUTO SHUTOFF MEDI-VAC NONSTERILE LF DISP  (40EA/CA)

## (undated) DEVICE — TOWELS CLOTH SURGICAL - (4/PK 20PK/CA)

## (undated) DEVICE — SYRINGE 3 CC 22 GA X 1-1/2 - NDL SAFETY (50/BX 8BX/CA)

## (undated) DEVICE — SUTURE GENERAL

## (undated) DEVICE — CIRCUIT VENTILATOR PEDIATRIC WITH FILTER  (20EA/CS)